# Patient Record
Sex: MALE | Race: WHITE | NOT HISPANIC OR LATINO | Employment: FULL TIME | URBAN - METROPOLITAN AREA
[De-identification: names, ages, dates, MRNs, and addresses within clinical notes are randomized per-mention and may not be internally consistent; named-entity substitution may affect disease eponyms.]

---

## 2017-02-03 ENCOUNTER — PRE VISIT (OUTPATIENT)
Dept: UROLOGY | Facility: CLINIC | Age: 52
End: 2017-02-03

## 2017-02-06 DIAGNOSIS — C61 MALIGNANT NEOPLASM OF PROSTATE (H): ICD-10-CM

## 2017-02-06 LAB — PSA SERPL-MCNC: 7.39 UG/L (ref 0–4)

## 2017-02-09 ENCOUNTER — OFFICE VISIT (OUTPATIENT)
Dept: UROLOGY | Facility: CLINIC | Age: 52
End: 2017-02-09

## 2017-02-09 VITALS
WEIGHT: 202 LBS | BODY MASS INDEX: 29.92 KG/M2 | SYSTOLIC BLOOD PRESSURE: 157 MMHG | HEIGHT: 69 IN | DIASTOLIC BLOOD PRESSURE: 105 MMHG | HEART RATE: 90 BPM

## 2017-02-09 DIAGNOSIS — C61 MALIGNANT NEOPLASM OF PROSTATE (H): Primary | ICD-10-CM

## 2017-02-09 ASSESSMENT — PAIN SCALES - GENERAL: PAINLEVEL: NO PAIN (0)

## 2017-02-09 NOTE — LETTER
"2/9/2017       RE: Mlies Valencia  [address] [place] [address]     Dear Colleague,    Thank you for referring your patient, Miles Valencia, to the [facility] UROLOGY AND INST FOR PROSTATE AND UROLOGIC CANCERS at [facility]. Please see a copy of my visit note below.    Prostate Cancer Follow Up    Miles Valencia is a very pleasant 51 year old male who presents with a history of prostate cancer.    Initial PSA:2.8  Biopsy Rafael Score was 3 + 3 on 7/28/16  Pathologic Stage T1c     Risk Group: Low  Normal MRI 2016  Sacral lesion (confirmed negative with bone scan)    At his last visit, he elected to manage his prostate cancer with active surveillance. He returns today for 3 month PSA recheck. PSA has increased from 2.28 in August to 6.53 in September and 9.56 last week. He denies any new LUTS, hematuria, or pelvic pain. He also had a MRI of his prostate which was unremarkable other than an indeterminate 2 cm sacral lesion.     PSA   Date Value Ref Range Status   02/06/2017 7.39* 0 - 4 ug/L Final     Comment:     Assay Method:  Chemiluminescence using Siemens Vista analyzer   11/01/2016 9.56* 0 - 4 ug/L Final     Comment:     Assay Method:  Chemiluminescence using Siemens Vista analyzer   09/23/2016 6.53* 0 - 4 ug/L Final     Comment:     Assay Method:  Chemiluminescence using Siemens Vista analyzer   08/23/2010 2.28 0 - 4 ug/L Final       MRI prostate shows no PIRADS 3 or higher 11/2016  Bone scan negative 9/2016      Objective:  /105 mmHg  Pulse 90  Ht 1.753 m (5' 9\")  Wt 91.627 kg (202 lb)  BMI 29.82 kg/m2     Alert, oriented x3, NAD  No respiratory distress on room air    Assessment:  Mr. Valencia is a 51 year old male with Rafael 3+3 prostate cancer managed with active surveillance.     -No specific prostate lesions to target on MRI guided biopsy; will plan to repeat prostate biopsy in 3 months (July 2017) given persistent increase in PSA since " July    Better since school is out then as well    Plan for repeat biopsy in July to assure acitve surveillance remains a good option    Eber Mcmillan MD  Department of Urology  [facility]

## 2017-02-09 NOTE — PATIENT INSTRUCTIONS
Please schedule a regular biopsy in July and a follow up appointment to review the results two weeks later.          [facility] for Prostate and Urologic Cancers  Preparation for Prostate Ultrasound and Biopsy    You have been scheduled for a prostate ultrasound with biopsies. A lubricated probe will be inserted into your rectum by your doctor. This equipment uses sound waves to produce an image or picture of the inside of the prostate gland. They will be able to measure the size of your prostate, look for any abnormalities in anatomy, and target any areas that may look suspicious for cancer before taking the samples (biopsies).  During this procedure the prostate will be injected with a numbing medication. This medication will not make you sleepy nor affect your ability to drive.    How to Prepare for the Procedure      On the day of the procedure eat and drink normally. Please do not fast or skip meals on the day of your procedure.      Please bring a list of your current medications to your appointment and take all regular medications as normal.      Do not take any of the following medications 7 days before your procedure:  - Anacin  - Bufferin  - Excedrin  - Ibuprofen  - Ecotrin   - Any other aspirin based products.   - Motrin  - Naprosyn  - Feldene  - Plavix  - Any other anti-inflammatory   medications      If you are taking any anticoagulation medications such as Coumadin, Jantoven, Warfarin, special instructions will need to be discussed.      You will be given an antibiotic the day of the procedure in the clinic.  *If patient had ? 2 prostate biopsies in the last 2 years, Gentamicin 80mg IM and Cipro 500 mg will be administered in the clinic prior to procedure*      You will need to purchase one Fleets enema (or equivalent).  This can be purchased at any pharmacy or grocery store and will be found in the laxative section. We ask that you give the enema to yourself approximately 2 hours before your  appointment time.       The procedure takes about 30-45 minutes and will be done in the office. After the procedure you will be sent home with instructions.    Please call Urology/Center for Pro[facility] Clinic with any questions or concerns at [phone], press option # 3 to speak with a nurse.      As recommended by the American Urological Association Education and Research, Inc. article of 2007.

## 2017-02-09 NOTE — MR AVS SNAPSHOT
After Visit Summary   2/9/2017    Miles Valencia    MRN: [id number]           Patient Information     Date Of Birth          1965        Visit Information        Provider Department      2/9/2017 1:00 PM Weight, Eber Luna MD [facility] Urology and [facility] for Prostate and Urologic Cancers        Care Instructions    Please schedule a regular biopsy in July and a follow up appointment to review the results two weeks later.          [place] for Prostate and Urologic Cancers  Preparation for Prostate Ultrasound and Biopsy    You have been scheduled for a prostate ultrasound with biopsies. A lubricated probe will be inserted into your rectum by your doctor. This equipment uses sound waves to produce an image or picture of the inside of the prostate gland. They will be able to measure the size of your prostate, look for any abnormalities in anatomy, and target any areas that may look suspicious for cancer before taking the samples (biopsies).  During this procedure the prostate will be injected with a numbing medication. This medication will not make you sleepy nor affect your ability to drive.    How to Prepare for the Procedure      On the day of the procedure eat and drink normally. Please do not fast or skip meals on the day of your procedure.      Please bring a list of your current medications to your appointment and take all regular medications as normal.      Do not take any of the following medications 7 days before your procedure:  - Anacin  - Bufferin  - Excedrin  - Ibuprofen  - Ecotrin   - Any other aspirin based products.   - Motrin  - Naprosyn  - Feldene  - Plavix  - Any other anti-inflammatory   medications      If you are taking any anticoagulation medications such as Coumadin, Jantoven, Warfarin, special instructions will need to be discussed.      You will be given an antibiotic the day of the procedure in the clinic.  *If patient had ? 2 prostate biopsies in the last 2 years,  Gentamicin 80mg IM and Cipro 500 mg will be administered in the clinic prior to procedure*      You will need to purchase one Fleets enema (or equivalent).  This can be purchased at any pharmacy or grocery store and will be found in the laxative section. We ask that you give the enema to yourself approximately 2 hours before your appointment time.       The procedure takes about 30-45 minutes and will be done in the office. After the procedure you will be sent home with instructions.    Please call Urology/[place] for Prostate Clinic with any questions or concerns at [phone], press option # 3 to speak with a nurse.      As recommended by the American Urological Association Education and Research, Inc. article of 2007.        Follow-ups after your visit        Your next 10 appointments already scheduled     Jul 27, 2017  8:00 AM   (Arrive by 7:45 AM)   Sonography/Biopsy with Eber Mcmillan MD   [facility] Urology and [facility] for Prostate and Urologic Cancers ([facility])    [address] [place] [address] [phone]            Aug 10, 2017  2:45 PM   (Arrive by 2:30 PM)   Return Visit with Eber Mcmillan MD   [facility] Urology and [facility] for Prostate and Urologic Cancers ([facility])    [address] [place] [address] [phone]              Who to contact     Please call your clinic at [phone] to:    Ask questions about your health    Make or cancel appointments    Discuss your medicines    Learn about your test results    Speak to your doctor   If you have compliments or concerns about an experience at your clinic, or if you wish to file a complaint, please contact [facility] Patient Relations at [phone] or email us at [email or web address]         Additional Information About Your Visit        [employer] Information     [employer] gives you secure  "access to your electronic health record. If you see a primary care provider, you can also send messages to your care team and make appointments. If you have questions, please call your primary care clinic.  If you do not have a primary care provider, please call [phone] and they will assist you.      [employer] is an electronic gateway that provides easy, online access to your medical records. With [employer], you can request a clinic appointment, read your test results, renew a prescription or communicate with your care team.     To access your existing account, please contact your [facility] or call [phone] for assistance.        Care EveryWhere ID     This is your Care EveryWhere ID. This could be used by other organizations to access your [facility] medical records  [id number]        Your Vitals Were     Pulse Height BMI (Body Mass Index)             90 1.753 m (5' 9\") 29.82 kg/m2          Blood Pressure from Last 3 Encounters:   02/09/17 157/105   11/10/16 142/84   09/23/16 151/91    Weight from Last 3 Encounters:   02/09/17 91.627 kg (202 lb)   11/10/16 89.631 kg (197 lb 9.6 oz)   09/23/16 91.173 kg (201 lb)              Today, you had the following     No orders found for display       Primary Care Provider Office Phone # Fax #    Oneyda Jones [phone]       [address] [address]        Thank you!     Thank you for choosing [facility] UROLOGY AND [facility] FOR PROSTATE AND UROLOGIC CANCERS  for your care. Our goal is always to provide you with excellent care. Hearing back from our patients is one way we can continue to improve our services. Please take a few minutes to complete the written survey that you may receive in the mail after your visit with us. Thank you!             Your Updated Medication List - Protect others around you: Learn how to safely use, store and throw away your medicines at www.disposemymeds.org. "          This list is accurate as of: 2/9/17  2:35 PM.  Always use your most recent med list.                   Brand Name Dispense Instructions for use    AMBIEN 5 MG tablet   Generic drug:  zolpidem      Take 5 mg by mouth nightly as needed for sleep       ammonium lactate 12 % cream    AMLACTIN     Apply  topically 2 times daily.       BENADRYL ALLERGY PO      Take  by mouth as needed.       CARTIA  MG 24 hr capsule   Generic drug:  diltiazem          cetirizine HCl 10 MG Caps      Take  by mouth.       gabapentin 100 MG capsule    NEURONTIN     TK 4 CS PO D HS       hydrochlorothiazide 25 MG tablet    HYDRODIURIL     25 mg       IBUPROFEN PO          meclizine 12.5 MG tablet    ANTIVERT    30 tablet    Take 2 tablets (25 mg) by mouth 4 times daily as needed for dizziness       Omega-3 Fish Oil 1000 MG Caps      1,000 mg       QVAR 80 MCG/ACT Inhaler   Generic drug:  beclomethasone      INL 2 PUFFS PO BID       rizatriptan 10 MG ODT tab    MAXALT-MLT         SUDAFED COLD/COUGH PO      Take  by mouth as needed.       SUMAtriptan Succinate Refill 6 MG/0.5ML Soct    IMITREX         TYLENOL PO          VITAMIN D3 MAXIMUM STRENGTH 5000 UNITS Caps   Generic drug:  cholecalciferol      5,000 Units

## 2017-02-09 NOTE — NURSING NOTE
"Chief Complaint   Patient presents with     RECHECK     Prostate cancer follow up       Initial Ht 1.753 m (5' 9\")  Wt 91.627 kg (202 lb)  BMI 29.82 kg/m2 Estimated body mass index is 29.82 kg/(m^2) as calculated from the following:    Height as of this encounter: 1.753 m (5' 9\").    Weight as of this encounter: 91.627 kg (202 lb).  Medication Reconciliation: complete     DAV Zaman    "

## 2017-03-16 DIAGNOSIS — M25.512 CHRONIC LEFT SHOULDER PAIN: Primary | ICD-10-CM

## 2017-03-16 DIAGNOSIS — G89.29 CHRONIC LEFT SHOULDER PAIN: Primary | ICD-10-CM

## 2017-03-29 ENCOUNTER — OFFICE VISIT (OUTPATIENT)
Dept: ORTHOPEDICS | Facility: CLINIC | Age: 52
End: 2017-03-29

## 2017-03-29 VITALS — BODY MASS INDEX: 29.13 KG/M2 | HEIGHT: 69 IN | WEIGHT: 196.7 LBS

## 2017-03-29 DIAGNOSIS — M19.019 ARTHRITIS OF SHOULDER: Primary | ICD-10-CM

## 2017-03-29 NOTE — NURSING NOTE
Teaching Flowsheet   Relevant Diagnosis: Shoulder arthritis  Teaching Topic: Pre-op for left total shoulder arthroplasty - will be scheduled at [facility] stay post-op. Surgery coordinator will call to schedule.     Person(s) involved in teaching:   Patient     Motivation Level:  Asks Questions: Yes  Eager to Learn: Yes  Cooperative: Yes  Receptive (willing/able to accept information): Yes  Any cultural factors/Uatsdin beliefs that may influence understanding or compliance? No     Patient demonstrates understanding of the following:  Reason for the appointment, diagnosis and treatment plan: Yes  Knowledge of proper use of medications and conditions for which they are ordered (with special attention to potential side effects or drug interactions): Yes  Which situations necessitate calling provider and whom to contact: Yes    Aware of pre-op expectations and post-op limitations     Proper use and care of sling x 6 weeks  Nutritional needs and diet plan: Yes  Pain management techniques: Yes  Wound Care: Yes  How and/when to access community resources: Yes     Instructional Materials Used/Given: Pre-op packet, surgical soap, guidelines for care after shoulder replacement     Time spent with patient: 12.

## 2017-03-29 NOTE — MR AVS SNAPSHOT
After Visit Summary   3/29/2017    Miles Valencia    MRN: [id number]           Patient Information     Date Of Birth          1965        Visit Information        Provider Department      3/29/2017 8:00 AM Jossy Swanson MD [facility] Orthopaedic Clinic        Today's Diagnoses     Arthritis of shoulder    -  1       Follow-ups after your visit        Your next 10 appointments already scheduled     May 08, 2017  3:30 PM CDT   (Arrive by 3:15 PM)   New Patient Visit with Yamileth Ascencio PA-C   [facility] Dermatology ([facility])    [address] [place] [address]  3rd Floor  [facility] [id number] [phone]            Jun 13, 2017   Procedure with Jossy Swanson MD   [facility] Surgery and Procedure Center ([facility])    [address] [place] [address]  [facility] [facility] [id number] [phone]           Located in the Clinics and Surgery Center at [address] [place] [address].   parking is very convenient and highly recommended.  is a $6 flat rate fee.  Both  and self parkers should enter the main arrival plaza from [facility]; parking attendants will direct you based on your parking preference.            Jul 27, 2017  8:00 AM CDT   (Arrive by 7:45 AM)   Sonography/Biopsy with Eber Mcmillan MD   [facility] Urology and [facility] for Prostate and Urologic Cancers ([facility])    [address] [place] [address]  [facility] [id number] [phone]            Aug 10, 2017  2:45 PM CDT   (Arrive by 2:30 PM)   Return Visit with Eber Mcmillan MD   [facility] Urology and [facility] for Prostate and Urologic Cancers ([facility])    [address] [place] [address]  [facility] [id number] [phone]              Who to contact     Please call your clinic at [phone] to:    Ask questions about your health    Make or cancel  "appointments    Discuss your medicines    Learn about your test results    Speak to your doctor   If you have compliments or concerns about an experience at your clinic, or if you wish to file a complaint, please contact [facility] Patient Relations at [phone] or email us at [email or web address]         Additional Information About Your Visit        MyChart Information     [employer] gives you secure access to your electronic health record. If you see a primary care provider, you can also send messages to your care team and make appointments. If you have questions, please call your primary care clinic.  If you do not have a primary care provider, please call [phone] and they will assist you.      [employer] is an electronic gateway that provides easy, online access to your medical records. With [employer], you can request a clinic appointment, read your test results, renew a prescription or communicate with your care team.     To access your existing account, please contact your [facility] or call [phone] for assistance.        Care EveryWhere ID     This is your Care EveryWhere ID. This could be used by other organizations to access your [place] medical records  [id number]        Your Vitals Were     Height BMI (Body Mass Index)                1.753 m (5' 9\") 29.05 kg/m2           Blood Pressure from Last 3 Encounters:   02/09/17 (!) 157/105   11/10/16 142/84   09/23/16 (!) 151/91    Weight from Last 3 Encounters:   03/29/17 89.2 kg (196 lb 11.2 oz)   02/09/17 91.6 kg (202 lb)   11/10/16 89.6 kg (197 lb 9.6 oz)              We Performed the Following     Amita-Operative Worksheet        Primary Care Provider Office Phone # Fax #    Oneyda VANDANA Jones [phone]       [address] [address]        Thank you!     Thank you for choosing [facility] ORTHOPAEDIC [facility]  for your care. Our goal " is always to provide you with excellent care. Hearing back from our patients is one way we can continue to improve our services. Please take a few minutes to complete the written survey that you may receive in the mail after your visit with us. Thank you!             Your Updated Medication List - Protect others around you: Learn how to safely use, store and throw away your medicines at www.disposemymeds.org.          This list is accurate as of: 3/29/17 11:59 PM.  Always use your most recent med list.                   Brand Name Dispense Instructions for use    AMBIEN 5 MG tablet   Generic drug:  zolpidem      Take 5 mg by mouth nightly as needed for sleep       ammonium lactate 12 % cream    AMLACTIN     Apply  topically 2 times daily.       BENADRYL ALLERGY PO      Take  by mouth as needed.       CARTIA  MG 24 hr capsule   Generic drug:  diltiazem          cetirizine HCl 10 MG Caps      Take  by mouth.       fish oil-omega-3 fatty acids 1000 MG capsule      1,000 mg       gabapentin 100 MG capsule    NEURONTIN     TK 4 CS PO D HS       hydrochlorothiazide 25 MG tablet    HYDRODIURIL     25 mg       IBUPROFEN PO          meclizine 12.5 MG tablet    ANTIVERT    30 tablet    Take 2 tablets (25 mg) by mouth 4 times daily as needed for dizziness       QVAR 80 MCG/ACT Inhaler   Generic drug:  beclomethasone      INL 2 PUFFS PO BID       rizatriptan 10 MG ODT tab    MAXALT-MLT         SUDAFED COLD/COUGH PO      Take  by mouth as needed.       SUMAtriptan Succinate Refill 6 MG/0.5ML Soct    IMITREX         TYLENOL PO          VITAMIN B-12 PO      Take by mouth daily       VITAMIN D3 MAXIMUM STRENGTH 5000 UNITS Caps   Generic drug:  cholecalciferol      5,000 Units

## 2017-03-29 NOTE — NURSING NOTE
"Reason For Visit:   Chief Complaint   Patient presents with     Shoulder Pain     Follow up for left glenohumeral osteoarthritis.        PCP: Oneyda Jones  Ref: established    ?  No  Occupation  .  Currently working? Yes.  Work status?  Full time.  Date of injury: none  Date of surgery: none  Smoker: No      Right hand dominant    SANE score  Affected shoulder: Left  Right shoulder SANE: 100  Left shoulder SANE: 50    Ht 1.753 m (5' 9\")  Wt 89.2 kg (196 lb 11.2 oz)  BMI 29.05 kg/m2      Pain Assessment  Patient Currently in Pain: No        "

## 2017-03-29 NOTE — LETTER
3/29/2017       RE: Miles Valencia  [address] [place] [address]     Dear Colleague,    Thank you for referring your patient, Miles Valencia, to the [facility] ORTHOPAEDIC CLINIC at [facility]. Please see a copy of my visit note below.    CHIEF CONCERN: Left shoulder arthritis    HISTORY OF PRESENT ILLNESS: Mr. Valencia is a 52 year old gentleman I have seen previously for his left shoulder arthritis. He has been managing with non-operative measures but he feels this is no longer working and he would like to discuss total shoulder arthroplasty. Pain is limiting his basic daily activities.    PHYSICAL EXAM:  Adult male in no acute distress  Respirations even and unlabored  Left shoulder: CMS intact without deficits. ROM is active FE to 150, ER to 40, and IR to T12.  Right shoulder ROM is 170/85/T6    IMAGING:   Left shoulder xrays demonstrate a moderately large humeral osteophyte and rather marked flattening of the humeral head. There is posterior glenoid wear.     ASSESSMENT:  1. Left end stage glenohumeral arthritis    PLAN:  I reviewed the risks and benefits of nonoperative and operative treatment options. We discussed expectations for total shoulder replacement as well as the expected postop restrictions. We reviewed the surgical risks including but not limited to incomplete relief of pain, bleeding, infection, stiffness, and reaction to anesthesia.   He would like to proceed with TSA and may want to consider the outpatient option.  We will work with him on scheduling.    Again, thank you for allowing me to participate in the care of your patient.      Sincerely,    Jossy Swanson MD

## 2017-04-14 ENCOUNTER — TRANSFERRED RECORDS (OUTPATIENT)
Dept: HEALTH INFORMATION MANAGEMENT | Facility: CLINIC | Age: 52
End: 2017-04-14

## 2017-04-14 ENCOUNTER — MEDICAL CORRESPONDENCE (OUTPATIENT)
Dept: HEALTH INFORMATION MANAGEMENT | Facility: CLINIC | Age: 52
End: 2017-04-14

## 2017-04-20 ENCOUNTER — MEDICAL CORRESPONDENCE (OUTPATIENT)
Dept: HEALTH INFORMATION MANAGEMENT | Facility: CLINIC | Age: 52
End: 2017-04-20

## 2017-04-24 NOTE — PROGRESS NOTES
CHIEF CONCERN: Left shoulder arthritis    HISTORY OF PRESENT ILLNESS: Mr. Valencia is a 52 year old gentleman I have seen previously for his left shoulder arthritis. He has been managing with non-operative measures but he feels this is no longer working and he would like to discuss total shoulder arthroplasty. Pain is limiting his basic daily activities.    PHYSICAL EXAM:  Adult male in no acute distress  Respirations even and unlabored  Left shoulder: CMS intact without deficits. ROM is active FE to 150, ER to 40, and IR to T12.  Right shoulder ROM is 170/85/T6    IMAGING:   Left shoulder xrays demonstrate a moderately large humeral osteophyte and rather marked flattening of the humeral head. There is posterior glenoid wear.     ASSESSMENT:  1. Left end stage glenohumeral arthritis    PLAN:  I reviewed the risks and benefits of nonoperative and operative treatment options. We discussed expectations for total shoulder replacement as well as the expected postop restrictions. We reviewed the surgical risks including but not limited to incomplete relief of pain, bleeding, infection, stiffness, and reaction to anesthesia.   He would like to proceed with TSA and may want to consider the outpatient option.  We will work with him on scheduling.

## 2017-05-08 ENCOUNTER — OFFICE VISIT (OUTPATIENT)
Dept: DERMATOLOGY | Facility: CLINIC | Age: 52
End: 2017-05-08

## 2017-05-08 DIAGNOSIS — L82.1 SEBORRHEIC KERATOSIS: Primary | ICD-10-CM

## 2017-05-08 DIAGNOSIS — Z12.83 SKIN CANCER SCREENING: ICD-10-CM

## 2017-05-08 DIAGNOSIS — D22.39 FIBROUS PAPULE OF NOSE: ICD-10-CM

## 2017-05-08 DIAGNOSIS — D22.9 MULTIPLE BENIGN NEVI: ICD-10-CM

## 2017-05-08 ASSESSMENT — PAIN SCALES - GENERAL: PAINLEVEL: NO PAIN (0)

## 2017-05-08 NOTE — MR AVS SNAPSHOT
After Visit Summary   5/8/2017    Miles Valencia    MRN: [id number]           Patient Information     Date Of Birth          1965        Visit Information        Provider Department      5/8/2017 3:30 PM Yamileth Ascencio PA-C [facility] Dermatology        Today's Diagnoses     Dermatofibroma    -  1    Seborrheic keratosis        Multiple benign nevi        Fibrous papule of nose           Follow-ups after your visit        Your next 10 appointments already scheduled     Jun 08, 2017  8:20 AM CDT   (Arrive by 8:05 AM)   Sonography/Biopsy with Eber Mcmillan MD   [facility] ([facility])    [address] [place] [address]  [facility] [address] [phone]            Jun 13, 2017   Procedure with Jossy Swanson MD   [facility] ([facility])    [address] [place] [address]  [facility] [id number] [phone]           Located in the Clinics and Surgery Center at [address] [place] [address].   parking is very convenient and highly recommended.  is a $6 flat rate fee.  Both  and self parkers should enter the main arrival plaza from [facility]; parking attendants will direct you based on your parking preference.            Jun 29, 2017 12:20 PM CDT   (Arrive by 12:05 PM)   Return Visit with Eber Mcmillan MD   [facility] ([facility])    [address] [place] [address] [phone]              Who to contact     Please call your clinic at [phone] to:    Ask questions about your health    Make or cancel appointments    Discuss your medicines    Learn about your test results    Speak to your doctor   If you have compliments or concerns about an experience at your clinic, or if you wish to file a  complaint, please contact [facility] Patient Relations at [phone] or email us at [email or web address]         Additional Information About Your Visit        MyChart Information     [employer] gives you secure access to your electronic health record. If you see a primary care provider, you can also send messages to your care team and make appointments. If you have questions, please call your primary care clinic.  If you do not have a primary care provider, please call [phone] and they will assist you.      [employer] is an electronic gateway that provides easy, online access to your medical records. With [employer], you can request a clinic appointment, read your test results, renew a prescription or communicate with your care team.     To access your existing account, please contact your [facility] Clinic or call [phone] for assistance.        Care EveryWhere ID     This is your Care EveryWhere ID. This could be used by other organizations to access your [place] medical records  [id number]         Blood Pressure from Last 3 Encounters:   02/09/17 (!) 157/105   11/10/16 142/84   09/23/16 (!) 151/91    Weight from Last 3 Encounters:   03/29/17 89.2 kg (196 lb 11.2 oz)   02/09/17 91.6 kg (202 lb)   11/10/16 89.6 kg (197 lb 9.6 oz)              Today, you had the following     No orders found for display       Primary Care Provider Office Phone # Fax #    Oneyda Jones [phone]       [address] [address]        Thank you!     Thank you for choosing [facility] DERMATOLOGY  for your care. Our goal is always to provide you with excellent care. Hearing back from our patients is one way we can continue to improve our services. Please take a few minutes to complete the written survey that you may receive in the mail after your visit with us. Thank you!             Your Updated Medication List -  Protect others around you: Learn how to safely use, store and throw away your medicines at www.disposemymeds.org.          This list is accurate as of: 5/8/17  4:01 PM.  Always use your most recent med list.                   Brand Name Dispense Instructions for use    AMBIEN 5 MG tablet   Generic drug:  zolpidem      Take 5 mg by mouth nightly as needed for sleep       ammonium lactate 12 % cream    AMLACTIN     Apply  topically 2 times daily.       BENADRYL ALLERGY PO      Take  by mouth as needed.       CARTIA  MG 24 hr capsule   Generic drug:  diltiazem          cetirizine HCl 10 MG Caps      Take  by mouth.       fish oil-omega-3 fatty acids 1000 MG capsule      1,000 mg       gabapentin 100 MG capsule    NEURONTIN     TK 4 CS PO D HS       hydrochlorothiazide 25 MG tablet    HYDRODIURIL     25 mg       IBUPROFEN PO          meclizine 12.5 MG tablet    ANTIVERT    30 tablet    Take 2 tablets (25 mg) by mouth 4 times daily as needed for dizziness       QVAR 80 MCG/ACT Inhaler   Generic drug:  beclomethasone      INL 2 PUFFS PO BID       rizatriptan 10 MG ODT tab    MAXALT-MLT         SUDAFED COLD/COUGH PO      Take  by mouth as needed.       SUMAtriptan Succinate Refill 6 MG/0.5ML Soct    IMITREX         TYLENOL PO          VITAMIN B-12 PO      Take by mouth daily       VITAMIN D3 MAXIMUM STRENGTH 5000 UNITS Caps   Generic drug:  cholecalciferol      5,000 Units

## 2017-05-08 NOTE — PROGRESS NOTES
"[facility] Dermatology Note      Dermatology Problem List:  1.Skin cancer screening 5/8/17  With multiple benign pigmented nevi, seborrheic keratoses and fibrous papule of the nose  CC:   Chief Complaint   Patient presents with     Derm Problem     Miles states \" I have a mole on my chest that seems abnormal\"          Encounter Date: May 8, 2017    History of Present Illness:  Mr. Miles Valencia is a 52 year old male who presents as a new patient as a referral from Dr. Oneyda Benitez. He is here for a skin cancer screening. He has noticed a growth on his chest that he feels looks unusual. He state is has a weird texture and he doesn't always remember having this. He had a lot of sun exposure in this early years, living in [place] until he was 18. He is otherwise a healthy person with other other skin concerns. The area on his chest does not bleed, itch, hurt or grow rapidly.     Past Medical History:   Patient Active Problem List   Diagnosis     Pain in joint, shoulder region     Achilles bursitis or tendinitis     Past Medical History:   Diagnosis Date     Congestion      Headache(784.0)      Sleep problems      Past Surgical History:   Procedure Laterality Date     BIOPSY      Prostate     COLONOSCOPY N/A 3/21/2016    Procedure: COLONOSCOPY;  Surgeon: Toy Lima MD;  [facility]: [facility]       Social History:  The patient works as a . The patient denies use of tanning beds.    Family History:  Mother had spots removed, on sun exposed areas.     Medications:  Current Outpatient Prescriptions   Medication Sig Dispense Refill     Cyanocobalamin (VITAMIN B-12 PO) Take by mouth daily       QVAR 80 MCG/ACT Inhaler INL 2 PUFFS PO BID  0     meclizine (ANTIVERT) 12.5 MG tablet Take 2 tablets (25 mg) by mouth 4 times daily as needed for dizziness 30 tablet 0     CARTIA  MG 24 hr CD capsule   0     gabapentin (NEURONTIN) 100 MG capsule TK 4 CS PO D HS  2     rizatriptan " (MAXALT-MLT) 10 MG disintegrating tablet   6     SUMAtriptan Succinate Refill (IMITREX) 6 MG/0.5ML SOCT   1     hydrochlorothiazide (HYDRODIURIL) 25 MG tablet 25 mg       Omega-3 Fatty Acids (OMEGA-3 FISH OIL) 1000 MG CAPS 1,000 mg       cholecalciferol (VITAMIN D3 MAXIMUM STRENGTH) 5000 UNITS CAPS 5,000 Units       Acetaminophen (TYLENOL PO)        IBUPROFEN PO        zolpidem (AMBIEN) 5 MG tablet Take 5 mg by mouth nightly as needed for sleep       Cetirizine HCl 10 MG CAPS Take  by mouth.       Pseudoephedrine-DM-GG-APAP (SUDAFED COLD/COUGH PO) Take  by mouth as needed.       DiphenhydrAMINE HCl (BENADRYL ALLERGY PO) Take  by mouth as needed.       ammonium lactate (AMLACTIN) 12 % cream Apply  topically 2 times daily.       Allergies   Allergen Reactions     Amoxicillin Diarrhea         Review of Systems:  -Skin/Heme New Pt: The patient admits to frequent sun exposure, in his early life. LIved in [facility] age 9-18. Lots of sunburns. The patient denies excessive scarring or problems healing except as per HPI. The patient denies excessive bleeding.  -Constitutional: The patient denies fatigue, fevers, chills, unintended weight loss, and night sweats.  -HEENT: Patient denies nonhealing oral sores.  -Skin: As above in HPI. No additional skin concerns.    Physical exam:  Vitals: There were no vitals taken for this visit.  GEN: This is a well developed, well-nourished female in no acute distress, in a pleasant mood.    SKIN: Full skin, which includes the head/face, both arms, chest, back, abdomen,both legs, groin, buttocks, digits and/or nails, was examined. Significant for:   -There are waxy stuck on tan to brown papules on the trunk, including the chest and extremities.  -Multiple regular brown pigmented macules and papules are identified on the trunk and extremities.   There is a skin colored papule on the nose, ~2 mm.   -No other lesions of concern on areas examined.     Impression/Plan:  1. Seborrheic  keratosis, non irritated (chest, remaining trunk and extremities)    Seborrheic keratosis: Discussed benign nature of lesions.    2. Multiple clinically benign nevi on the trunk and extremities.     ABCDs of melanoma were discussed and self skin checks were advised.     Sun precaution was advised including the use of sun screens of SPF 30 or higher, sun protective clothing, and avoidance of tanning beds.      3. Fibrous papule on the nose.    Benign. Notify office if you are seeing changes.       CC Dr. Jones on close of this encounter.        Staff Involved:  Staff Only    All risk, benefits and alternatives were discussed with patient.  Patient is in agreement and understands the assessment and plan.  All questions were answered.  Sun Screen Education was given.   Return to Clinic in 1-2 yrs or sooner as needed.   Yamileth Ascencoi PA-C

## 2017-05-08 NOTE — LETTER
"5/8/2017       RE: Miles Valencia  [address] [place] [address]     Dear Colleague,    Thank you for referring your patient, Miles Valencia, to the [facility] DERMATOLOGY at [facility]. Please see a copy of my visit note below.    [facility] Dermatology Note      Dermatology Problem List:  1.Skin cancer screening 5/8/17  With multiple benign pigmented nevi, seborrheic keratoses and fibrous papule of the nose  CC:   Chief Complaint   Patient presents with     Derm Problem     Miles states \" I have a mole on my chest that seems abnormal\"          Encounter Date: May 8, 2017    History of Present Illness:  Mr. Miles Valencia is a 52 year old male who presents as a new patient as a referral from Dr. Oneyda Benitez. He is here for a skin cancer screening. He has noticed a growth on his chest that he feels looks unusual. He state is has a weird texture and he doesn't always remember having this. He had a lot of sun exposure in this early years, living in [place] until he was 18. He is otherwise a healthy person with other other skin concerns. The area on his chest does not bleed, itch, hurt or grow rapidly.     Past Medical History:   Patient Active Problem List   Diagnosis     Pain in joint, shoulder region     Achilles bursitis or tendinitis     Past Medical History:   Diagnosis Date     Congestion      Headache(784.0)      Sleep problems      Past Surgical History:   Procedure Laterality Date     BIOPSY      Prostate     COLONOSCOPY N/A 3/21/2016    Procedure: COLONOSCOPY;  Surgeon: Toy Lima MD;  Location:  GI       Social History:  The patient works as a . The patient denies use of tanning beds.    Family History:  Mother had spots removed, on sun exposed areas.     Medications:  Current Outpatient Prescriptions   Medication Sig Dispense Refill     Cyanocobalamin (VITAMIN B-12 PO) Take by mouth daily       QVAR 80 MCG/ACT " Inhaler INL 2 PUFFS PO BID  0     meclizine (ANTIVERT) 12.5 MG tablet Take 2 tablets (25 mg) by mouth 4 times daily as needed for dizziness 30 tablet 0     CARTIA  MG 24 hr CD capsule   0     gabapentin (NEURONTIN) 100 MG capsule TK 4 CS PO D HS  2     rizatriptan (MAXALT-MLT) 10 MG disintegrating tablet   6     SUMAtriptan Succinate Refill (IMITREX) 6 MG/0.5ML SOCT   1     hydrochlorothiazide (HYDRODIURIL) 25 MG tablet 25 mg       Omega-3 Fatty Acids (OMEGA-3 FISH OIL) 1000 MG CAPS 1,000 mg       cholecalciferol (VITAMIN D3 MAXIMUM STRENGTH) 5000 UNITS CAPS 5,000 Units       Acetaminophen (TYLENOL PO)        IBUPROFEN PO        zolpidem (AMBIEN) 5 MG tablet Take 5 mg by mouth nightly as needed for sleep       Cetirizine HCl 10 MG CAPS Take  by mouth.       Pseudoephedrine-DM-GG-APAP (SUDAFED COLD/COUGH PO) Take  by mouth as needed.       DiphenhydrAMINE HCl (BENADRYL ALLERGY PO) Take  by mouth as needed.       ammonium lactate (AMLACTIN) 12 % cream Apply  topically 2 times daily.       Allergies   Allergen Reactions     Amoxicillin Diarrhea         Review of Systems:  -Skin/Heme New Pt: The patient admits to frequent sun exposure, in his early life. LIved in [facility] age 9-18. Lots of sunburns. The patient denies excessive scarring or problems healing except as per HPI. The patient denies excessive bleeding.  -Constitutional: The patient denies fatigue, fevers, chills, unintended weight loss, and night sweats.  -HEENT: Patient denies nonhealing oral sores.  -Skin: As above in HPI. No additional skin concerns.    Physical exam:  Vitals: There were no vitals taken for this visit.  GEN: This is a well developed, well-nourished female in no acute distress, in a pleasant mood.    SKIN: Full skin, which includes the head/face, both arms, chest, back, abdomen,both legs, groin, buttocks, digits and/or nails, was examined. Significant for:   -There are waxy stuck on tan to brown papules on the trunk, including  the chest and extremities.  -Multiple regular brown pigmented macules and papules are identified on the trunk and extremities.   There is a skin colored papule on the nose, ~2 mm.   -No other lesions of concern on areas examined.     Impression/Plan:  1. Seborrheic keratosis, non irritated (chest, remaining trunk and extremities)    Seborrheic keratosis: Discussed benign nature of lesions.    2. Multiple clinically benign nevi on the trunk and extremities.     ABCDs of melanoma were discussed and self skin checks were advised.     Sun precaution was advised including the use of sun screens of SPF 30 or higher, sun protective clothing, and avoidance of tanning beds.      3. Fibrous papule on the nose.    Benign. Notify office if you are seeing changes.       CC Dr. Jones on close of this encounter.        Staff Involved:  Staff Only    All risk, benefits and alternatives were discussed with patient.  Patient is in agreement and understands the assessment and plan.  All questions were answered.  Sun Screen Education was given.   Return to Clinic in 1-2 yrs or sooner as needed.   Yamileth sAcencio PA-C

## 2017-05-08 NOTE — NURSING NOTE
"Dermatology Rooming Note    Miles Valencia's goals for this visit include:   Chief Complaint   Patient presents with     Derm Problem     Miles states \" I have a mole on my chest that seems abnormal\"      Gunjan Reese CMA  "

## 2017-05-31 ENCOUNTER — ALLIED HEALTH/NURSE VISIT (OUTPATIENT)
Dept: SURGERY | Facility: CLINIC | Age: 52
End: 2017-05-31

## 2017-05-31 ENCOUNTER — OFFICE VISIT (OUTPATIENT)
Dept: SURGERY | Facility: CLINIC | Age: 52
End: 2017-05-31

## 2017-05-31 ENCOUNTER — ANESTHESIA EVENT (OUTPATIENT)
Dept: SURGERY | Facility: AMBULATORY SURGERY CENTER | Age: 52
End: 2017-05-31

## 2017-05-31 VITALS
OXYGEN SATURATION: 96 % | HEIGHT: 69 IN | WEIGHT: 201.9 LBS | BODY MASS INDEX: 29.9 KG/M2 | RESPIRATION RATE: 16 BRPM | HEART RATE: 90 BPM | SYSTOLIC BLOOD PRESSURE: 157 MMHG | DIASTOLIC BLOOD PRESSURE: 94 MMHG

## 2017-05-31 DIAGNOSIS — M19.012 GLENOHUMERAL ARTHRITIS, LEFT: ICD-10-CM

## 2017-05-31 DIAGNOSIS — C61 MALIGNANT NEOPLASM OF PROSTATE (H): ICD-10-CM

## 2017-05-31 DIAGNOSIS — M19.019 SHOULDER ARTHRITIS: Primary | ICD-10-CM

## 2017-05-31 DIAGNOSIS — Z01.818 PREOP EXAMINATION: Primary | ICD-10-CM

## 2017-05-31 DIAGNOSIS — Z83.2 FAMILY HISTORY OF FACTOR V LEIDEN MUTATION: ICD-10-CM

## 2017-05-31 LAB
ALBUMIN UR-MCNC: NEGATIVE MG/DL
ANION GAP SERPL CALCULATED.3IONS-SCNC: 7 MMOL/L (ref 3–14)
APPEARANCE UR: CLEAR
BILIRUB UR QL STRIP: NEGATIVE
BUN SERPL-MCNC: 15 MG/DL (ref 7–30)
CALCIUM SERPL-MCNC: 9.2 MG/DL (ref 8.5–10.1)
CHLORIDE SERPL-SCNC: 104 MMOL/L (ref 94–109)
CO2 SERPL-SCNC: 26 MMOL/L (ref 20–32)
COLOR UR AUTO: NORMAL
CREAT SERPL-MCNC: 0.85 MG/DL (ref 0.66–1.25)
ERYTHROCYTE [DISTWIDTH] IN BLOOD BY AUTOMATED COUNT: 12.5 % (ref 10–15)
GFR SERPL CREATININE-BSD FRML MDRD: ABNORMAL ML/MIN/1.7M2
GLUCOSE SERPL-MCNC: 106 MG/DL (ref 70–99)
GLUCOSE UR STRIP-MCNC: NEGATIVE MG/DL
HCT VFR BLD AUTO: 45.6 % (ref 40–53)
HGB BLD-MCNC: 15.8 G/DL (ref 13.3–17.7)
HGB UR QL STRIP: NEGATIVE
KETONES UR STRIP-MCNC: NEGATIVE MG/DL
LEUKOCYTE ESTERASE UR QL STRIP: NEGATIVE
MCH RBC QN AUTO: 29.4 PG (ref 26.5–33)
MCHC RBC AUTO-ENTMCNC: 34.6 G/DL (ref 31.5–36.5)
MCV RBC AUTO: 85 FL (ref 78–100)
NITRATE UR QL: NEGATIVE
PH UR STRIP: 6 PH (ref 5–7)
PLATELET # BLD AUTO: 311 10E9/L (ref 150–450)
POTASSIUM SERPL-SCNC: 3.4 MMOL/L (ref 3.4–5.3)
PSA SERPL-MCNC: 6.91 UG/L (ref 0–4)
RBC # BLD AUTO: 5.37 10E12/L (ref 4.4–5.9)
SODIUM SERPL-SCNC: 137 MMOL/L (ref 133–144)
SP GR UR STRIP: 1.01 (ref 1–1.03)
URN SPEC COLLECT METH UR: NORMAL
UROBILINOGEN UR STRIP-MCNC: 0 MG/DL (ref 0–2)
WBC # BLD AUTO: 6.6 10E9/L (ref 4–11)

## 2017-05-31 RX ORDER — BENZOCAINE/MENTHOL 6 MG-10 MG
LOZENGE MUCOUS MEMBRANE 2 TIMES DAILY PRN
COMMUNITY

## 2017-05-31 RX ORDER — ALBUTEROL SULFATE 90 UG/1
2 AEROSOL, METERED RESPIRATORY (INHALATION) EVERY 4 HOURS PRN
COMMUNITY

## 2017-05-31 ASSESSMENT — LIFESTYLE VARIABLES: TOBACCO_USE: 0

## 2017-05-31 NOTE — PROGRESS NOTES
Preoperative Assessment Center medication history for May 31, 2017 is complete.    See Epic admission navigator for allergy information, pharmacy and prior to admission medications.    Operating room staff will still need to confirm medications and last dose information on day of surgery.     Medication history interview sources:  patient, patient's med list     Changes made to PTA medication list (reason)  Added: excedrin, zofran, tramadol, phenylephrine PO, sarna cream, hydrocortisone, ibuprofen gel, albuterol inhaler  Deleted: sudafed cold  Changed: sig/dose updated or added on: acetamionphen, ibuprofen, cholecalciferol, zyrtec, gabapentin, benadryl, vitamin B12, hydrochlorothiazide, fish oil, Q massimo (uses PRN), rizatriptan, imitrex, ibuprofen    Additional medication history information (including reliability of information, actions taken by pharmacist):None    Prior to Admission medications    Medication Sig Last Dose Taking? Auth Provider   aspirin-acetaminophen-caffeine (EXCEDRIN MIGRAINE) 250-250-65 MG per tablet Take 2 tablets by mouth every 6 hours as needed for headaches Taking Yes Unknown, Entered By History   TRAMADOL HCL PO Take 1 tablet by mouth every 6 hours as needed for moderate to severe pain Taking Yes Unknown, Entered By History   PHENYLEPHRINE HCL PO Take 10 mg by mouth every 4 hours as needed Taking Yes Unknown, Entered By History   Ondansetron (ZOFRAN ODT PO) Take 4 mg by mouth every 8 hours as needed for nausea Taking Yes Unknown, Entered By History   hydrocortisone (CORTAID) 1 % cream Apply topically 2 times daily as needed Taking Yes Unknown, Entered By History   camphor-menthol (DERMASARRA) 0.5-0.5 % LOTN Apply topically every 6 hours as needed for skin care Taking Yes Unknown, Entered By History   NONFORMULARY Drug: Ibuprofen gel  Apply topically to L shoulder as needed.  Yes Unknown, Entered By History   albuterol (PROAIR HFA/PROVENTIL HFA/VENTOLIN HFA) 108 (90 BASE) MCG/ACT Inhaler  Inhale 2 puffs into the lungs every 4 hours as needed for shortness of breath / dyspnea or wheezing Taking Yes Unknown, Entered By History   Cyanocobalamin (VITAMIN B-12 PO) Take 1 tablet by mouth every evening  Taking Yes Reported, Patient   QVAR 80 MCG/ACT Inhaler Inhale 2 puffs twice daily as needed when allergies flare up Taking Yes Reported, Patient   meclizine (ANTIVERT) 12.5 MG tablet Take 2 tablets (25 mg) by mouth 4 times daily as needed for dizziness Taking Yes Bill Branham MD   CARTIA  MG 24 hr CD capsule Take 120 mg by mouth every morning  Taking Yes Reported, Patient   gabapentin (NEURONTIN) 100 MG capsule Take 400 mg by mouth at night  (occasionally takes an extra pill to make 500 mg by mouth at night) Taking Yes Reported, Patient   rizatriptan (MAXALT-MLT) 10 MG disintegrating tablet Take 10 mg by mouth at onset of headache  Taking Yes Reported, Patient   SUMAtriptan Succinate Refill (IMITREX) 6 MG/0.5ML SOCT Inject 6 mg into the muscle every 12 hours as needed  Taking Yes Reported, Patient   hydrochlorothiazide (HYDRODIURIL) 25 MG tablet Take 25 mg by mouth every morning  Taking Yes Reported, Patient   Omega-3 Fatty Acids (OMEGA-3 FISH OIL) 1000 MG CAPS Take 1 gram by mouth in the morning and take 2 grams by mouth in the evening. Taking Yes Reported, Patient   cholecalciferol (VITAMIN D3 MAXIMUM STRENGTH) 5000 UNITS CAPS Take 5,000 Units by mouth every morning  Taking Yes Reported, Patient   Acetaminophen (TYLENOL PO) Take 1,000 mg by mouth every 6 hours as needed  Taking Yes Reported, Patient   IBUPROFEN PO Take 800 mg by mouth every 8 hours as needed  Taking Yes Reported, Patient   zolpidem (AMBIEN) 5 MG tablet Take 5 mg by mouth nightly as needed for sleep Taking Yes Reported, Patient   Cetirizine HCl 10 MG CAPS Take 10 mg by mouth daily  Taking Yes Reported, Patient   DiphenhydrAMINE HCl (BENADRYL ALLERGY PO) Take 25-50 mg by mouth daily as needed (allergies)  Taking Yes Reported,  Patient   ammonium lactate (AMLACTIN) 12 % cream Apply topically daily as needed  Taking Yes Reported, Patient       Medication history completed by: Praneeth Olivarez RPH

## 2017-05-31 NOTE — MR AVS SNAPSHOT
After Visit Summary   5/31/2017    Miles Valencia    MRN: [id number]           Patient Information     Date Of Birth          1965        Visit Information        Provider Department      5/31/2017 8:30 AM Pharmacist, Danyelle Polo [facility] [place]        Today's Diagnoses     Preop examination    -  1       Follow-ups after your visit        Your next 10 appointments already scheduled     May 31, 2017 10:00 AM CDT   (Arrive by 9:45 AM)   PAC RN ASSESSMENT with Danyelle Pac Rn   [facility] [place] ([facility])    [address] [place] [id number] [phone]            May 31, 2017 10:40 AM CDT   (Arrive by 10:25 AM)   PAC Anesthesia Consult with  Pac Anesthesiologist   [facility] [place] ([facility])    [address] [place] [id number] [phone]            May 31, 2017 11:15 AM CDT   LAB with  LAB   [facility] ([facility])    [address] [place] [address] [id number] [phone]           Patient must bring picture ID.  Patient should be prepared to give a urine specimen  Please do not eat 10-12 hours before your appointment if you are coming in fasting for labs on lipids, cholesterol, or glucose (sugar).  Pregnant women should follow their Care Team instructions. Water with medications is okay. Do not drink coffee or other fluids.   If you have concerns about taking  your medications, please ask at office or if scheduling via [employer], send a message by clicking on Secure Messaging, Message Your Care Team.            Jun 08, 2017  8:20 AM CDT   (Arrive by 8:05 AM)   Sonography/Biopsy with Eber Mcmillan MD   [facility] ([facility])    [address] [place] [id number]    [phone]            Jun 13, 2017   Procedure with Jossy Swanson MD   [facility] ([facility])    [address] [place] [address]  5th [facility] [id number] [phone]           Located in the Clinics and Surgery Center at [address] [place] [id number].   parking is very convenient and highly recommended.  is a $6 flat rate fee.  Both  and self parkers should enter the main arrival plaza from [facility]; parking attendants will direct you based on your parking preference.            Jun 28, 2017  8:00 AM CDT   (Arrive by 7:45 AM)   RETURN SHOULDER with Jossy Swanson MD   [facility] ([facility])    [address] [place] [address]  4th [facility] [id number] [phone]            Jun 29, 2017 12:20 PM CDT   (Arrive by 12:05 PM)   Return Visit with Eber Mcmillan MD   [facility] ([facility])    [address] [place] [id number] [phone]            Jul 26, 2017  8:00 AM CDT   (Arrive by 7:45 AM)   RETURN SHOULDER with Jossy Swanson MD   [facility] ([facility])    [address] [place] [id number] [phone]              Future tests that were ordered for you today     Open Future Orders        Priority Expected Expires Ordered    Basic metabolic panel Routine 5/31/2017 6/30/2017 5/31/2017    CBC with platelets Routine 5/31/2017 6/30/2017 5/31/2017    UA reflex to Microscopic and Culture Routine 5/31/2017 6/30/2017 5/31/2017            Who to contact     Please call your clinic at [phone] to:    Ask questions about your health    Make or cancel appointments    Discuss your medicines    Learn about your test results    Speak to your doctor   If you have compliments or concerns  about an experience at your clinic, or if you wish to file a complaint, please contact [facility] Patient Relations at [phone] or email us at [email or web address]         Additional Information About Your Visit        [employer] Information     [employer] gives you secure access to your electronic health record. If you see a primary care provider, you can also send messages to your care team and make appointments. If you have questions, please call your primary care clinic.  If you do not have a primary care provider, please call [phone] and they will assist you.      [employer] is an electronic gateway that provides easy, online access to your medical records. With [employer], you can request a clinic appointment, read your test results, renew a prescription or communicate with your care team.     To access your existing account, please contact your [facility] or call [phone] for assistance.        Care EveryWhere ID     This is your Care EveryWhere ID. This could be used by other organizations to access your [facility] medical records  [id number]         Blood Pressure from Last 3 Encounters:   05/31/17 (!) 157/94   02/09/17 (!) 157/105   11/10/16 142/84    Weight from Last 3 Encounters:   05/31/17 91.6 kg (201 lb 14.4 oz)   03/29/17 89.2 kg (196 lb 11.2 oz)   02/09/17 91.6 kg (202 lb)              Today, you had the following     No orders found for display         Today's Medication Changes          These changes are accurate as of: 5/31/17  9:02 AM.  If you have any questions, ask your nurse or doctor.               Stop taking these medicines if you haven't already. Please contact your care team if you have questions.     SUDAFED COLD/COUGH PO   Stopped by:  Pharmacist, [facility]                    Primary Care Provider Office Phone # Fax #    Oneyda Jones [phone]       [address]  [facility] [address]        Thank you!     Thank you for choosing [facility]  for your care. Our goal is always to provide you with excellent care. Hearing back from our patients is one way we can continue to improve our services. Please take a few minutes to complete the written survey that you may receive in the mail after your visit with us. Thank you!             Your Updated Medication List - Protect others around you: Learn how to safely use, store and throw away your medicines at www.disposemymeds.org.          This list is accurate as of: 5/31/17  9:02 AM.  Always use your most recent med list.                   Brand Name Dispense Instructions for use    AMBIEN 5 MG tablet   Generic drug:  zolpidem      Take 5 mg by mouth nightly as needed for sleep       ammonium lactate 12 % cream    AMLACTIN     Apply topically daily as needed       aspirin-acetaminophen-caffeine 250-250-65 MG per tablet    EXCEDRIN MIGRAINE     Take 2 tablets by mouth every 6 hours as needed for headaches       BENADRYL ALLERGY PO      Take 25-50 mg by mouth daily as needed (allergies)       camphor-menthol 0.5-0.5 % Lotn    DERMASARRA     Apply topically every 6 hours as needed for skin care       CARTIA  MG 24 hr capsule   Generic drug:  diltiazem      Take 120 mg by mouth every morning       cetirizine HCl 10 MG Caps      Take 10 mg by mouth daily       fish oil-omega-3 fatty acids 1000 MG capsule      Take 1 gram by mouth in the morning and take 2 grams by mouth in the evening.       gabapentin 100 MG capsule    NEURONTIN     Take 400 mg by mouth at night  (occasionally takes an extra pill to make 500 mg by mouth at night)       hydrochlorothiazide 25 MG tablet    HYDRODIURIL     Take 25 mg by mouth every morning       hydrocortisone 1 % cream    CORTAID     Apply topically 2 times daily as needed       IBUPROFEN PO      Take 800 mg by mouth every 8 hours as needed       meclizine 12.5 MG  tablet    ANTIVERT    30 tablet    Take 2 tablets (25 mg) by mouth 4 times daily as needed for dizziness       NONFORMULARY      Drug: Ibuprofen gel Apply topically to L shoulder as needed.       PHENYLEPHRINE HCL PO      Take 10 mg by mouth every 4 hours as needed       QVAR 80 MCG/ACT Inhaler   Generic drug:  beclomethasone      Inhale 2 puffs twice daily as needed when allergies flare up       rizatriptan 10 MG ODT tab    MAXALT-MLT     Take 10 mg by mouth at onset of headache       SUMAtriptan Succinate Refill 6 MG/0.5ML Soct    IMITREX     Inject 6 mg into the muscle every 12 hours as needed       TRAMADOL HCL PO      Take 1 tablet by mouth every 6 hours as needed for moderate to severe pain       TYLENOL PO      Take 1,000 mg by mouth every 6 hours as needed       VITAMIN B-12 PO      Take 1 tablet by mouth every evening       VITAMIN D3 MAXIMUM STRENGTH 5000 UNITS Caps   Generic drug:  cholecalciferol      Take 5,000 Units by mouth every morning       ZOFRAN ODT PO      Take 4 mg by mouth every 8 hours as needed for nausea

## 2017-05-31 NOTE — MR AVS SNAPSHOT
After Visit Summary   5/31/2017    Miles Valencia    MRN: [id number]           Patient Information     Date Of Birth          1965        Visit Information        Provider Department      5/31/2017 10:00 AM Rn, [facility]        Care Instructions      Using an Incentive Spirometer: 5x's/day and 5x's each day, Bring with you.  Soon after your surgery, a nurse or therapist will teach you breathing exercises. These keep your lungs clear, strengthen your breathing muscles, and help prevent complications.  The exercises include doing a deep-breathing exercise using a device called an incentive spirometer.  To do these exercises, you will breathe in through your mouth and not your nose. The incentive spirometer only works correctly if you breathe in through your mouth.  Four steps to clear lungs     Deep breathing expands the lungs, aids circulation, and helps prevent pneumonia.   1. Exhale normally.    Relax and breathe out.  2. Place your lips tightly around the mouthpiece.    Make sure the device is upright and not tilted.  3. Inhale as much air as you can through the mouthpiece (don't breath through your nose).    Inhale slowly and deeply.    Hold your breath long enough to keep the balls or disk raised for at least 3 seconds.    If you re inhaling too quickly, your device may make a tone. If you hear this tone, inhale more slowly.  4. Repeat the exercise regularly.    Do this exercise every hour while you're awake, or as your health care provider instructs.    You will also be taught coughing exercises and be asked to do them regularly on your own.    0872-3215 [employer]. [address] [place] [address]. All rights reserved. This information is not intended as a substitute for professional medical care. Always follow your healthcare professional's   instructions.    AFTER YOUR SURGERY  Breathing exercises   Breathing exercises help you  recover faster. Take deep breaths and let the air out slowly. This will:     Help you wake up after surgery.    Help prevent complications like pneumonia.  Preventing complications will help you go home sooner.   We may give you a breathing device (incentive spirometer) to encourage you to breathe deeply.   Nausea and vomiting   You may feel sick to your stomach after surgery; if so, let your nurse know.    Pain control:  After surgery, you may have pain. Our goal is to help you manage your pain. Pain medicine will help you feel comfortable enough to do activities that will help you heal.  These activities may include breathing exercises, walking and physical therapy.   To help your health care team treat your pain we will ask: 1) If you have pain  2) where it is located 3) describe your pain in your words  Methods of pain control include medications given by mouth, vein or by nerve block for some surgeries.  We may give you a pain control pump that will:  1) Deliver the medicine through a tube placed in your vein  2) Control the amount of medicine you receive  3) Allow you to push a button to deliver a dose of pain medicine  Sequential Compression Device (SCD) or Pneumo Boots:  You may need to wear SCD S on your legs or feet. These are wraps connected to a machine that pumps in air and releases it. The repeated pumping helps prevent blood clots from forming. AFTER YOUR SURGERY  Breathing exercises   Breathing exercises help you recover faster. Take deep breaths and let the air out slowly. This will:     Help you wake up after surgery.    Help prevent complications like pneumonia.  Preventing complications will help you go home sooner.   We may give you a breathing device (incentive spirometer) to encourage you to breathe deeply.   Nausea and vomiting   You may feel sick to your stomach after surgery; if so, let your nurse know.    Pain control:  After surgery, you may have pain. Our goal is to help you manage your  pain. Pain medicine will help you feel comfortable enough to do activities that will help you heal.  These activities may include breathing exercises, walking and physical therapy.   To help your health care team treat your pain we will ask: 1) If you have pain  2) where it is located 3) describe your pain in your words  Methods of pain control include medications given by mouth, vein or by nerve block for some surgeries.  We may give you a pain control pump that will:  1) Deliver the medicine through a tube placed in your vein  2) Control the amount of medicine you receive  3) Allow you to push a button to deliver a dose of pain medicine  Sequential Compression Device (SCD) or Pneumo Boots:  You may need to wear SCD S on your legs or feet. These are wraps connected to a machine that pumps in air and releases it. The repeated pumping helps prevent blood clots from forming.      Preparing for Your Surgery      Name:  Miles Valencia   MRN:  [id number]   :  1965   Today's Date:  2017     Arriving for surgery:  Surgery date: 17  Surgery time:0800 AM  Arrival time: 0600 AM  Please come to:     [facility]  [address] [place] [phone]     Parking is available in front of the Clinics and Surgery Center building from 5:30AM to 8:00PM.  -  Proceed to the 5th floor to check into the Ambulatory Surgery Center.              >> There will be patient concierges on the 1st and 5th floor, for assistance or an escort, if you would like.              >> Please call [phone] with any questions.    What can I eat or drink?  -  You may have solid food or milk products until 8 hours prior to your surgery. 12 MN Monday Yoselin.  -  You may have water, apple juice or 7up/Sprite until 2 hours prior to your surgery. 0600 AM Tuesday    Which medicines can I take?  -  Do NOT take these medications in the morning, the day of surgery:  Migraine meds x2, Vit. D,  Hydrochlorothiazide, STOP Ibuprofen  And Ibuprofen gel 06/11/17, Stop Excedrin 06/06/17 and Omega 3's    -  Please take these medications the day of surgery:  Scheduled meds. (Take Cartia.)    How do I prepare myself?  -  Take two showers: one the night before surgery; and one the morning of surgery.         Use Scrubcare or Hibiclens to wash from neck down.  You may use your own shampoo and conditioner. No other hair products.   -  Do NOT use lotion, powder, deodorant, or antiperspirant the day of your surgery.  -  Do NOT wear any makeup, fingernail polish or jewelry.  -  Begin using Incentive Spirometer 1 week prior to surgery.  Use 4 times per day, up to 5-10 breaths each time.  Bring Incentive Spirometer to hospital.  -Do not bring your own medications to the hospital, except for inhalers and eye drops.  -  Bring your ID and insurance card.    Questions or Concerns:  If you have questions or concerns, please call the  Preoperative Assessment Center, Monday-Friday 7AM-7PM:  [phone]                      Follow-ups after your visit        Your next 10 appointments already scheduled     May 31, 2017 10:00 AM CDT   (Arrive by 9:45 AM)   PAC RN ASSESSMENT with  Pac Rn   [facility] ([facility])    [address] [place] [id number] [phone]            May 31, 2017 10:40 AM CDT   (Arrive by 10:25 AM)   PAC Anesthesia Consult with  Pac Anesthesiologist   [facility] ([facility])    [address] [place] [id number] [phone]            May 31, 2017 11:15 AM CDT   LAB with  LAB   [facility] ([facility])    [address] [place] [id number] [phone]           Patient must bring picture ID.  Patient should be prepared to give a urine specimen  Please do not eat 10-12 hours before your  appointment if you are coming in fasting for labs on lipids, cholesterol, or glucose (sugar).  Pregnant women should follow their Care Team instructions. Water with medications is okay. Do not drink coffee or other fluids.   If you have concerns about taking  your medications, please ask at office or if scheduling via Teachbase[employer], send a message by clicking on Secure Messaging, Message Your Care Team.            Jun 08, 2017  8:20 AM CDT   (Arrive by 8:05 AM)   Sonography/Biopsy with Eber Mcmillan MD   [facility] ([facility])    [address] [place] [address]  4th [facility] [id number] [phone]            Jun 13, 2017   Procedure with Jossy Swanson MD   [facility] ([facility])    [address] [place] [address]  5th [facility] [id number] [phone]           Located in the Clinics and Surgery Center at [address] [place] [address].   parking is very convenient and highly recommended.  is a $6 flat rate fee.  Both  and self parkers should enter the main arrival plaza from [facility]; parking attendants will direct you based on your parking preference.            Jun 28, 2017  8:00 AM CDT   (Arrive by 7:45 AM)   RETURN SHOULDER with Jossy Swanson MD   [facility] ([facility])    [address] [place] [id number] [phone]            Jun 29, 2017 12:20 PM CDT   (Arrive by 12:05 PM)   Return Visit with Eber Mcmillan MD   [facility] ([facility])    [address] [place] [id number] [phone]            Jul 26, 2017  8:00 AM CDT   (Arrive by 7:45 AM)   RETURN SHOULDER with Jossy Swanson MD   [facility] ([facility])    [address] [facility]  4th [facility] [address] [phone]              Future tests that were ordered for you today     Open Future Orders        Priority Expected Expires Ordered    Basic metabolic panel Routine 5/31/2017 6/30/2017 5/31/2017    CBC with platelets Routine 5/31/2017 6/30/2017 5/31/2017    UA reflex to Microscopic and Culture Routine 5/31/2017 6/30/2017 5/31/2017            Who to contact     Please call your clinic at [phone] to:    Ask questions about your health    Make or cancel appointments    Discuss your medicines    Learn about your test results    Speak to your doctor   If you have compliments or concerns about an experience at your clinic, or if you wish to file a complaint, please contact [facility] Patient Relations at [phone] or email us at [email or web address]         Additional Information About Your Visit        DataRobotharHabet Information     PeopleJart gives you secure access to your electronic health record. If you see a primary care provider, you can also send messages to your care team and make appointments. If you have questions, please call your primary care clinic.  If you do not have a primary care provider, please call [phone] and they will assist you.      MicroEval is an electronic gateway that provides easy, online access to your medical records. With MicroEval, you can request a clinic appointment, read your test results, renew a prescription or communicate with your care team.     To access your existing account, please contact your [facility] or call [phone] for assistance.        Care EveryWhere ID     This is your Care EveryWhere ID. This could be used by other organizations to access your [place] medical records  [id number]         Blood Pressure from Last 3 Encounters:   05/31/17 (!) 157/94   02/09/17 (!) 157/105   11/10/16 142/84    Weight from Last 3  Encounters:   05/31/17 91.6 kg (201 lb 14.4 oz)   03/29/17 89.2 kg (196 lb 11.2 oz)   02/09/17 91.6 kg (202 lb)              Today, you had the following     No orders found for display         Today's Medication Changes          These changes are accurate as of: 5/31/17  9:58 AM.  If you have any questions, ask your nurse or doctor.               Stop taking these medicines if you haven't already. Please contact your care team if you have questions.     SUDAFED COLD/COUGH PO   Stopped by:  Pharmacist,  Pac                    Primary Care Provider Office Phone # Fax #    Oneyda Jones [phone]       [address] [address]        Thank you!     Thank you for choosing [facility]OPER[facility]  for your care. Our goal is always to provide you with excellent care. Hearing back from our patients is one way we can continue to improve our services. Please take a few minutes to complete the written survey that you may receive in the mail after your visit with us. Thank you!             Your Updated Medication List - Protect others around you: Learn how to safely use, store and throw away your medicines at www.disposemymeds.org.          This list is accurate as of: 5/31/17  9:58 AM.  Always use your most recent med list.                   Brand Name Dispense Instructions for use    albuterol 108 (90 BASE) MCG/ACT Inhaler    PROAIR HFA/PROVENTIL HFA/VENTOLIN HFA     Inhale 2 puffs into the lungs every 4 hours as needed for shortness of breath / dyspnea or wheezing       AMBIEN 5 MG tablet   Generic drug:  zolpidem      Take 5 mg by mouth nightly as needed for sleep       ammonium lactate 12 % cream    AMLACTIN     Apply topically daily as needed       aspirin-acetaminophen-caffeine 250-250-65 MG per tablet    EXCEDRIN MIGRAINE     Take 2 tablets by mouth every 6 hours as needed for headaches       BENADRYL ALLERGY PO      Take 25-50 mg by mouth  daily as needed (allergies)       camphor-menthol 0.5-0.5 % Lotn    DERMASARRA     Apply topically every 6 hours as needed for skin care       CARTIA  MG 24 hr capsule   Generic drug:  diltiazem      Take 120 mg by mouth every morning       cetirizine HCl 10 MG Caps      Take 10 mg by mouth daily       fish oil-omega-3 fatty acids 1000 MG capsule      Take 1 gram by mouth in the morning and take 2 grams by mouth in the evening.       gabapentin 100 MG capsule    NEURONTIN     Take 400 mg by mouth at night  (occasionally takes an extra pill to make 500 mg by mouth at night)       hydrochlorothiazide 25 MG tablet    HYDRODIURIL     Take 25 mg by mouth every morning       hydrocortisone 1 % cream    CORTAID     Apply topically 2 times daily as needed       IBUPROFEN PO      Take 800 mg by mouth every 8 hours as needed       meclizine 12.5 MG tablet    ANTIVERT    30 tablet    Take 2 tablets (25 mg) by mouth 4 times daily as needed for dizziness       NONFORMULARY      Drug: Ibuprofen gel Apply topically to L shoulder as needed.       PHENYLEPHRINE HCL PO      Take 10 mg by mouth every 4 hours as needed       QVAR 80 MCG/ACT Inhaler   Generic drug:  beclomethasone      Inhale 2 puffs twice daily as needed when allergies flare up       rizatriptan 10 MG ODT tab    MAXALT-MLT     Take 10 mg by mouth at onset of headache       SUMAtriptan Succinate Refill 6 MG/0.5ML Soct    IMITREX     Inject 6 mg into the muscle every 12 hours as needed       TRAMADOL HCL PO      Take 1 tablet by mouth every 6 hours as needed for moderate to severe pain       TYLENOL PO      Take 1,000 mg by mouth every 6 hours as needed       VITAMIN B-12 PO      Take 1 tablet by mouth every evening       VITAMIN D3 MAXIMUM STRENGTH 5000 UNITS Caps   Generic drug:  cholecalciferol      Take 5,000 Units by mouth every morning       ZOFRAN ODT PO      Take 4 mg by mouth every 8 hours as needed for nausea

## 2017-05-31 NOTE — PATIENT INSTRUCTIONS
Using an Incentive Spirometer: 5x's/day and 5x's each day, Bring with you.  Soon after your surgery, a nurse or therapist will teach you breathing exercises. These keep your lungs clear, strengthen your breathing muscles, and help prevent complications.  The exercises include doing a deep-breathing exercise using a device called an incentive spirometer.  To do these exercises, you will breathe in through your mouth and not your nose. The incentive spirometer only works correctly if you breathe in through your mouth.  Four steps to clear lungs     Deep breathing expands the lungs, aids circulation, and helps prevent pneumonia.   1. Exhale normally.    Relax and breathe out.  2. Place your lips tightly around the mouthpiece.    Make sure the device is upright and not tilted.  3. Inhale as much air as you can through the mouthpiece (don't breath through your nose).    Inhale slowly and deeply.    Hold your breath long enough to keep the balls or disk raised for at least 3 seconds.    If you re inhaling too quickly, your device may make a tone. If you hear this tone, inhale more slowly.  4. Repeat the exercise regularly.    Do this exercise every hour while you're awake, or as your health care provider instructs.    You will also be taught coughing exercises and be asked to do them regularly on your own.    [id number] [employer]. [address] [place] [address], [place] [id number]. All rights reserved. This information is not intended as a substitute for professional medical care. Always follow your healthcare professional's   instructions.    AFTER YOUR SURGERY  Breathing exercises   Breathing exercises help you recover faster. Take deep breaths and let the air out slowly. This will:     Help you wake up after surgery.    Help prevent complications like pneumonia.  Preventing complications will help you go home sooner.   We may give you a breathing device (incentive spirometer) to encourage you to breathe deeply.    Nausea and vomiting   You may feel sick to your stomach after surgery; if so, let your nurse know.    Pain control:  After surgery, you may have pain. Our goal is to help you manage your pain. Pain medicine will help you feel comfortable enough to do activities that will help you heal.  These activities may include breathing exercises, walking and physical therapy.   To help your health care team treat your pain we will ask: 1) If you have pain  2) where it is located 3) describe your pain in your words  Methods of pain control include medications given by mouth, vein or by nerve block for some surgeries.  We may give you a pain control pump that will:  1) Deliver the medicine through a tube placed in your vein  2) Control the amount of medicine you receive  3) Allow you to push a button to deliver a dose of pain medicine  Sequential Compression Device (SCD) or Pneumo Boots:  You may need to wear SCD S on your legs or feet. These are wraps connected to a machine that pumps in air and releases it. The repeated pumping helps prevent blood clots from forming. AFTER YOUR SURGERY  Breathing exercises   Breathing exercises help you recover faster. Take deep breaths and let the air out slowly. This will:     Help you wake up after surgery.    Help prevent complications like pneumonia.  Preventing complications will help you go home sooner.   We may give you a breathing device (incentive spirometer) to encourage you to breathe deeply.   Nausea and vomiting   You may feel sick to your stomach after surgery; if so, let your nurse know.    Pain control:  After surgery, you may have pain. Our goal is to help you manage your pain. Pain medicine will help you feel comfortable enough to do activities that will help you heal.  These activities may include breathing exercises, walking and physical therapy.   To help your health care team treat your pain we will ask: 1) If you have pain  2) where it is located 3) describe your pain  in your words  Methods of pain control include medications given by mouth, vein or by nerve block for some surgeries.  We may give you a pain control pump that will:  1) Deliver the medicine through a tube placed in your vein  2) Control the amount of medicine you receive  3) Allow you to push a button to deliver a dose of pain medicine  Sequential Compression Device (SCD) or Pneumo Boots:  You may need to wear SCD S on your legs or feet. These are wraps connected to a machine that pumps in air and releases it. The repeated pumping helps prevent blood clots from forming.      Preparing for Your Surgery      Name:  Miles Valencia   MRN:  [id number]   :  1965   Today's Date:  2017     Arriving for surgery:  Surgery date: 17  Surgery time:0800 AM  Arrival time: 0600 AM  Please come to:     [facility]  [address] [place] [phone]     Parking is available in front of the Clinics and Surgery Center building from 5:30AM to 8:00PM.  -  Proceed to the 5th floor to check into the Ambulatory Surgery Center.              >> There will be patient concierges on the 1st and 5th floor, for assistance or an escort, if you would like.              >> Please call [phone] with any questions.    What can I eat or drink?  -  You may have solid food or milk products until 8 hours prior to your surgery. 12 MN Monday Yoselin.  -  You may have water, apple juice or 7up/Sprite until 2 hours prior to your surgery. 0600 AM Tuesday    Which medicines can I take?  -  Do NOT take these medications in the morning, the day of surgery:  Migraine meds x2, Vit. D, Hydrochlorothiazide, STOP Ibuprofen  And Ibuprofen gel 17, Stop Excedrin 17 and Omega 3's    -  Please take these medications the day of surgery:  Scheduled meds. (Take Cartia.)    How do I prepare myself?  -  Take two showers: one the night before surgery; and one the morning of surgery.         Use  Scrubcare or Hibiclens to wash from neck down.  You may use your own shampoo and conditioner. No other hair products.   -  Do NOT use lotion, powder, deodorant, or antiperspirant the day of your surgery.  -  Do NOT wear any makeup, fingernail polish or jewelry.  -  Begin using Incentive Spirometer 1 week prior to surgery.  Use 4 times per day, up to 5-10 breaths each time.  Bring Incentive Spirometer to hospital.  -Do not bring your own medications to the hospital, except for inhalers and eye drops.  -  Bring your ID and insurance card.    Questions or Concerns:  If you have questions or concerns, please call the  Preoperative Assessment Center, Monday-Friday 7AM-7PM:  [phone]

## 2017-05-31 NOTE — H&P
Pre-Operative H & P     CC:  Preoperative exam to assess for increased cardiopulmonary risk while undergoing surgery and anesthesia.    Date of Encounter: 5/31/2017  Primary Care Physician:  Oneyda Jones  Miles Valencia is a 52 year old male who presents for pre-operative H & P in preparation for left total shoulder arthroplasty with Dr. Swanson on 6/13/17 at [facility]. History is obtained from the patient.     Patient with left end stage glenohumeral arthritis who was recently evaluated by Dr. Swanson and counseled for above procedure.   His history is otherwise significant for hypertension, asthma, RLS, multinodular goiter and low grade prostate cancer being followed. He also reports a family history of Factor V Leiden, believes that he was tested and was negative but unable to remember full details.   Past Medical History  Past Medical History:   Diagnosis Date     Achilles bursitis or tendinitis 5/4/2014     Allergic rhinitis      Asthma      Congestion      Hypertension      Migraine      Multinodular goiter      Osteoarthritis      Pain in joint, shoulder region 4/18/2014     Prostate cancer (H)      RLS (restless legs syndrome)      Sleep problems        Past Surgical History  Past Surgical History:   Procedure Laterality Date     BIOPSY      Prostate     COLONOSCOPY N/A 3/21/2016    Procedure: COLONOSCOPY;  Surgeon: Toy Lima MD;  Location: [facility]       Hx of Blood transfusions/reactions: Denies.     Hx of abnormal bleeding or anti-platelet use: Denies.    Menstrual history: No LMP for male patient.    Steroid use in the last year: Denies.    Personal or FH with difficulty with Anesthesia:  Denies.    Prior to Admission Medications  Current Outpatient Prescriptions   Medication Sig Dispense Refill     aspirin-acetaminophen-caffeine (EXCEDRIN MIGRAINE) 250-250-65 MG per tablet Take 2 tablets by mouth every 6 hours as needed for headaches       TRAMADOL HCL  PO Take 1 tablet by mouth every 6 hours as needed for moderate to severe pain       PHENYLEPHRINE HCL PO Take 10 mg by mouth every 4 hours as needed       Ondansetron (ZOFRAN ODT PO) Take 4 mg by mouth every 8 hours as needed for nausea       hydrocortisone (CORTAID) 1 % cream Apply topically 2 times daily as needed       camphor-menthol (DERMASARRA) 0.5-0.5 % LOTN Apply topically every 6 hours as needed for skin care       NONFORMULARY Drug: Ibuprofen gel  Apply topically to L shoulder as needed.       albuterol (PROAIR HFA/PROVENTIL HFA/VENTOLIN HFA) 108 (90 BASE) MCG/ACT Inhaler Inhale 2 puffs into the lungs every 4 hours as needed for shortness of breath / dyspnea or wheezing       Cyanocobalamin (VITAMIN B-12 PO) Take 1 tablet by mouth every evening        QVAR 80 MCG/ACT Inhaler Inhale 2 puffs twice daily as needed when allergies flare up  0     meclizine (ANTIVERT) 12.5 MG tablet Take 2 tablets (25 mg) by mouth 4 times daily as needed for dizziness 30 tablet 0     CARTIA  MG 24 hr CD capsule Take 120 mg by mouth every morning   0     gabapentin (NEURONTIN) 100 MG capsule Take 400 mg by mouth at night  (occasionally takes an extra pill to make 500 mg by mouth at night)  2     rizatriptan (MAXALT-MLT) 10 MG disintegrating tablet Take 10 mg by mouth at onset of headache   6     SUMAtriptan Succinate Refill (IMITREX) 6 MG/0.5ML SOCT Inject 6 mg into the muscle every 12 hours as needed   1     hydrochlorothiazide (HYDRODIURIL) 25 MG tablet Take 25 mg by mouth every morning        Omega-3 Fatty Acids (OMEGA-3 FISH OIL) 1000 MG CAPS Take 1 gram by mouth in the morning and take 2 grams by mouth in the evening.       cholecalciferol (VITAMIN D3 MAXIMUM STRENGTH) 5000 UNITS CAPS Take 5,000 Units by mouth every morning        Acetaminophen (TYLENOL PO) Take 1,000 mg by mouth every 6 hours as needed        IBUPROFEN PO Take 800 mg by mouth every 8 hours as needed        zolpidem (AMBIEN) 5 MG tablet Take 5 mg by  "mouth nightly as needed for sleep       Cetirizine HCl 10 MG CAPS Take 10 mg by mouth daily        DiphenhydrAMINE HCl (BENADRYL ALLERGY PO) Take 25-50 mg by mouth daily as needed (allergies)        ammonium lactate (AMLACTIN) 12 % cream Apply topically daily as needed          Allergies  Allergies   Allergen Reactions     Amoxicillin Diarrhea       Social History  Social History     Social History     Marital status: Life Partner     Spouse name: N/A     Number of children: N/A     Years of education: N/A     Occupational History           Social History Main Topics     Smoking status: Never Smoker     Smokeless tobacco: Never Used     Alcohol use 0.0 oz/week     0 Standard drinks or equivalent per week      Comment: 1-2 drinks a week     Drug use: No     Sexual activity: Not on file     Other Topics Concern     Not on file     Social History Narrative       Family History  Family History   Problem Relation Age of Onset     Skin Cancer Mother      Other - See Comments Brother      Factor V Leiden     Deep Vein Thrombosis (DVT) Brother      Other - See Comments Niece      Von Willebrands     Other - See Comments Nephew      Von Willebrands, Factor V Leiden       Review of Systems  The complete review of systems is negative other than noted in the HPI or here.   Constitutional: Denies fever, chills, weight loss.  HEENT: Wears glasses for vision. Has goiter which is sometimes sensitive to pressure in the area.  Respiratory: Denies cough or shortness of breath. Allergy and exercise induced asthma, no significant history. Uses albuterol prior to exercise. Had QVAR last year for respiratory infection.  CV: Denies chest pain or irregular HR. Has had \"borderline\" HYPERTENSION. Good exercise tolerance.  GI: Denies abdominal pain or bowel issues.  : Denies dysuria.  M/S: Pain and limited mobility in left shoulder.  Heme: No personal history of blood clots.        BP: (!) 157/94 Pulse: 90   Resp: 16 SpO2: 96 %    " "     201 lbs 14.4 oz  5' 9\"   Body mass index is 29.82 kg/(m^2).       Physical Exam  Constitutional: Awake, alert, cooperative, no apparent distress, and appears stated age.  Eyes: Pupils equal, round and reactive to light, extra ocular muscles intact, sclera clear, conjunctiva normal. Glasses on.  HENT: Normocephalic, oral pharynx with moist mucus membranes, good dentition. Goiter present. Full beard.  Respiratory: Clear to auscultation bilaterally, no crackles or wheezing. No cough or obvious dyspnea.  .Cardiovascular: Regular rate and rhythm, normal S1 and S2, and no murmur noted. Carotids, no bruits. No edema. Palpable pulses to radial  DP and PT arteries.   GI: Normal bowel sounds, soft, non-distended, non-tender, no masses palpated, no hepatosplenomegaly.    Lymph/Hematologic: No cervical lymphadenopathy and no supraclavicular lymphadenopathy.  Genitourinary:  Deferred.  Skin: Warm and dry.  Musculoskeletal: Full ROM of neck. There is no redness, warmth, or swelling of the joints. Gross motor strength is normal.    Neurologic: Awake, alert, oriented to name, place and time. Cranial nerves II-XII are grossly intact. Gait is normal.   Neuropsychiatric: Calm, cooperative. Normal affect.     Labs: (personally reviewed)  Lab Results   Component Value Date    WBC 6.6 05/31/2017     Lab Results   Component Value Date    RBC 5.37 05/31/2017     Lab Results   Component Value Date    HGB 15.8 05/31/2017     Lab Results   Component Value Date    HCT 45.6 05/31/2017     Lab Results   Component Value Date    MCV 85 05/31/2017     Lab Results   Component Value Date    MCH 29.4 05/31/2017     Lab Results   Component Value Date    MCHC 34.6 05/31/2017     Lab Results   Component Value Date    RDW 12.5 05/31/2017     Lab Results   Component Value Date     05/31/2017     Last Basic Metabolic Panel:  Lab Results   Component Value Date     05/31/2017      Lab Results   Component Value Date    POTASSIUM 3.4 " 2017     Lab Results   Component Value Date    CHLORIDE 104 2017     Lab Results   Component Value Date    OTILIO 9.2 2017     Lab Results   Component Value Date    CO2 26 2017     Lab Results   Component Value Date    BUN 15 2017     Lab Results   Component Value Date    CR 0.85 2017     Lab Results   Component Value Date     2017     UA RESULTS:  Recent Labs   Lab Test  17   0925   COLOR  Straw   APPEARANCE  Clear   URINEGLC  Negative   URINEBILI  Negative   URINEKETONE  Negative   SG  1.006   UBLD  Negative   URINEPH  6.0   PROTEIN  Negative   NITRITE  Negative   LEUKEST  Negative     EKG: Personally reviewed but formal cardiology read pendin17 Normal sinus rhythm    3/28/17 Shoulder XR 2 view  FINDINGS: Neutral, Grashey, axillary, and Y views of the left shoulder  were obtained. Osteoarthrosis at the left shoulder glenohumeral joint  with marked joint space narrowing and osteophytic spurring. Slight  posterior displacement of the humeral head in relation to the glenoid  on the axillary view. Acromioclavicular joint is well aligned. No  evidence of fracture.         IMPRESSION: Marked osteoarthrosis in the left shoulder glenohumeral  joint, without evidence of fracture or dislocation.    Outside records reviewed from: [facility]    ASSESSMENT and PLAN  Miles Valencia is a 52 year old male scheduled to undergo left total shoulder arthroplasty on 17 by Dr. Swanson. He has the following specific operative considerations:   - RCRI : No serious cardiac risks.    - Anesthesia considerations:  Refer to PAC assessment in anesthesia records  - Risk of PONV score = 1.  If > 2, anti-emetic intervention recommended.     --Left shoulder end stage glenohumeral arthritis. Above procedure now planned. Listed as choice anesthesia. Discussion with Anesthesia today. Final counseling and decisions by Anesthesia on DOS.   --HYPERTENSION. Will take Cartia XT on  DOS. Will hold HCTZ. EKG NSR. No other cardiac history, symptoms or meds. Good exercise tolerance.   --Nonsmoker. Asthma triggered by allergies and exercise. Has used QVAR for respiratory infection last year but no regular use. Uses Albuterol inhaler prior to exercise and will bring in on DOS. No history of significant issues with asthma.      --RLS.  Gabapentin at HS.   --Multinodular goiter. Followed, biopsies benign. Last TSH: 0.59   --Low grade prostate cancer under active surveillance.   --Family history of Factor V Leiden in brother and Von Willebrands in niece/nephew. No personal history. Requested Factor V Leiden blood draw today as he is unable to remember past testing. Results negative.   --Pain management. Discussed possibility of nerve block if appropriate for patient's procedure. Final counseling and decisions by regional team on DOS.    Arrival time, NPO, shower and medication instructions provided by nursing staff today. Preparing For Your Surgery handout given.    Patient was discussed with Dr Sawyer.    FIDELIA Stringer CNS  Preoperative Assessment Center  [facility]  Phone: [phone]  Fax: [phone]

## 2017-05-31 NOTE — ANESTHESIA PREPROCEDURE EVALUATION
Anesthesia Evaluation     . Pt has had prior anesthetic. Type: MAC    No history of anesthetic complications          ROS/MED HX    ENT/Pulmonary:     (+)JULIÁN risk factors hypertension, allergic rhinitis, Intermittent asthma Last exacerbation: last year,Treatment: Inhaler prn,  , . .   (-) tobacco use   Neurologic:     (+)other neuro RLS    Cardiovascular:     (+) hypertension----. : . . . :. . Previous cardiac testing date:results:date: results:ECG reviewed date:5/31/17 results:NSR date: results:         (-) taking anticoagulants/antiplatelets   METS/Exercise Tolerance:  >4 METS   Hematologic: Comments: Family history of DVT, Factor V Leiden. Niece/nephew have Von Willebrands.         Musculoskeletal:   (+) arthritis, , , other musculoskeletal- Left shoulder pain      GI/Hepatic:  - neg GI/hepatic ROS       Renal/Genitourinary:  - ROS Renal section negative       Endo: Comment: Multinodular goiter followed    (+) thyroid problem  Thyroid disease - Other, .      Psychiatric:  - neg psychiatric ROS       Infectious Disease:  - neg infectious disease ROS       Malignancy:   (+) Malignancy History of Prostate  Prostate CA Active status post, Active surveillance-low grade        Other:    (+) No chance of pregnancy C-spine cleared: N/A, H/O Chronic Pain,no other significant disability                    Physical Exam  Normal systems: dental    Airway   Mallampati: II  TM distance: >3 FB  Neck ROM: full  Comment: Full beard    Dental     Cardiovascular   Rhythm and rate: regular and normal      Pulmonary    breath sounds clear to auscultation    Other findings: For further details of assessment, testing, and physical exam please see H and P completed on same date.           PAC Discussion and Assessment    ASA Classification: 2  Case is suitable for: ASC  Anesthetic techniques and relevant risks discussed: GA, Regional and GA with regional block for post-op pain control  Invasive monitoring and risk discussed:  No  Types:   Possibility and Risk of blood transfusion discussed: No  NPO instructions given:   Additional anesthetic preparation and risks discussed:   Needs early admission to pre-op area:   Other:     PAC Resident/NP Anesthesia Assessment:  Miles Valencia is a 52 year old male scheduled to undergo left total shoulder arthroplasty on 6/13/17 by Dr. Swanson. He has the following specific operative considerations:   - RCRI : No serious cardiac risks.    - Risk of PONV score = 1.  If > 2, anti-emetic intervention recommended.    No GA. Colonoscopy with sedation. No history of problems with anesthesia.       --Left shoulder end stage glenohumeral arthritis. Above procedure now planned. Listed as choice anesthesia. Discussion with Anesthesia today. Final counseling and decisions by Anesthesia on DOS.   --HYPERTENSION. Will take Cartia XT on DOS. Will hold HCTZ. EKG NSR. No other cardiac history, symptoms or meds. Good exercise tolerance.   --Nonsmoker. Asthma triggered by allergies and exercise. Has used QVAR for respiratory infection last year but no regular use. Uses Albuterol inhaler prior to exercise and will bring in on DOS. No history of significant issues with asthma.      --RLS.  Gabapentin at HS.   --Multinodular goiter. Followed, biopsies benign. Last TSH: 0.59   --Low grade prostate cancer under active surveillance.   --Family history of Factor V Leiden in brother and Von Willebrands in niece/nephew. No personal history. Requested Factor V Leiden blood draw today as he is unable to remember past testing. Results negative.   --Pain management. Discussed possibility of nerve block if appropriate for patient's procedure. Final counseling and decisions by regional team on DOS.        Patient was discussed with Dr Sawyer.        Reviewed and Signed by PAC Mid-Level Provider/Resident  Mid-Level Provider/Resident: FIDELIA Bull, CNS  Date: 5/31/17  Time: 8:43am    Attending Anesthesiologist Anesthesia  Assessment:  I saw the patient and discussed with the YVROSE as above.  Final anesthetic plan and recommendations to be made by the attending anesthesiologist on the day of surgery.   Patient currently medically optimized for the proposed procedure.       Reviewed and Signed by PAC Anesthesiologist  Anesthesiologist: PRETTY  Date: 5/31/17  Time: 1040  Pass/Fail: Pass  Disposition:     PAC Pharmacist Assessment:        Pharmacist:   Date:   Time:      Anesthesia Plan      History & Physical Review  History and physical reviewed and following examination; no interval change.    ASA Status:  2 .    NPO Status:  > 6 hours    Plan for General, Periph. Nerve Block for postop pain and LMA with Propofol induction. Maintenance will be TIVA.    PONV prophylaxis:  Ondansetron (or other 5HT-3) and Dexamethasone or Solumedrol  Patient seen and examined by me, agree with above assessment and plan.  Nikolay Evans MD    6/13/2017 9:24 AM        Postoperative Care  Postoperative pain management:  Multi-modal analgesia, Peripheral nerve block (Single Shot) and Oral pain medications.      Consents  Anesthetic plan, risks, benefits and alternatives discussed with:  Patient..                          .

## 2017-06-01 LAB — INTERPRETATION ECG - MUSE: NORMAL

## 2017-06-02 ENCOUNTER — DOCUMENTATION ONLY (OUTPATIENT)
Dept: ORTHOPEDICS | Facility: CLINIC | Age: 52
End: 2017-06-02

## 2017-06-02 ENCOUNTER — PRE VISIT (OUTPATIENT)
Dept: UROLOGY | Facility: CLINIC | Age: 52
End: 2017-06-02

## 2017-06-02 NOTE — TELEPHONE ENCOUNTER
Prostate cancer follow up- repeat bx.  Prep given.  Records available in [facility].

## 2017-06-03 LAB — COPATH REPORT: NORMAL

## 2017-06-08 ENCOUNTER — OFFICE VISIT (OUTPATIENT)
Dept: UROLOGY | Facility: CLINIC | Age: 52
End: 2017-06-08

## 2017-06-08 VITALS
DIASTOLIC BLOOD PRESSURE: 91 MMHG | SYSTOLIC BLOOD PRESSURE: 132 MMHG | HEIGHT: 69 IN | HEART RATE: 79 BPM | WEIGHT: 202 LBS | BODY MASS INDEX: 29.92 KG/M2

## 2017-06-08 DIAGNOSIS — C61 MALIGNANT NEOPLASM OF PROSTATE (H): Primary | ICD-10-CM

## 2017-06-08 ASSESSMENT — PAIN SCALES - GENERAL
PAINLEVEL: NO PAIN (0)
PAINLEVEL: NO PAIN (0)

## 2017-06-08 NOTE — NURSING NOTE
Invasive Procedure Safety Checklist:    Procedure: Prostate biopsy    Action: Complete sections and checkboxes as appropriate.    Pre-procedure:  1. Patient ID Verified with 2 identifiers (Leigha and  or MRN) : YES    2. Procedure and site verified with patient/designee (when able) : YES    3. Accurate consent documentation in medical record : YES    4. H&P (or appropriate assessment) documented in medical record : NO  H&P must be up to 30 days prior to procedure an updated within 24 hours of                 Procedure as applicable.     5. Relevant diagnostic and radiology test results appropriately labeled and displayed as applicable : NO    6. Blood products, implants, devices, and/or special equipment available for the procedure as applicable : NO    7. Procedure site(s) marked with provider initials [Exclusions: ] : NO    8. Marking not required. Reason : Yes  Procedure does not require site marking    Time Out:     Time-Out performed immediately prior to starting procedure, including verbal and active participation of all team members addressing: YES    1. Correct patient identity.  2. Confirmed that the correct side and site are marked.  3. An accurate procedure to be done.  4. Agreement on the procedure to be done.  5. Correct patient position.  6. Relevant images and results are properly labeled and appropriately displayed.  7. The need to administer antibiotics or fluids for irrigation purposes during the procedure as applicable.  8. Safety precautions based on patient history or medication use.    During Procedure: Verification of correct person, site, and procedure occurs any time the responsibility for care of the patient is transferred to another member of the care team.    DAV Zaman

## 2017-06-08 NOTE — LETTER
6/8/2017       RE: Miles Valencia  [address] [place] [address]     Dear Colleague,    Thank you for referring your patient, Miles Valencia, to the [facility] UROLOGY AND INST FOR PROSTATE AND UROLOGIC CANCERS at [facility]. Please see a copy of my visit note below.    Miles Valencia is here for a transrectal US guided biopsy of the prostate for follow up on acticve surveillance    Prostate cancer history as below    Initial PSA:2.8  Biopsy Rafael Score was 3 + 3 on 7/28/16  Pathologic Stage T1c     Risk Group: Low  Normal MRI 2016  Sacral lesion (confirmed negative with bone scan)  Prostate volume 35 on MRI 11/2016    The risks, benefits, alternatives, and personnel involved in the procudure were discussed.  All questions were answered.  A written informed consent was obtained.      PSA   Date Value Ref Range Status   05/31/2017 6.91 (H) 0 - 4 ug/L Final     Comment:     Assay Method:  Chemiluminescence using Siemens Vista analyzer   02/06/2017 7.39 (H) 0 - 4 ug/L Final     Comment:     Assay Method:  Chemiluminescence using Siemens Vista analyzer   11/01/2016 9.56 (H) 0 - 4 ug/L Final     Comment:     Assay Method:  Chemiluminescence using Siemens Vista analyzer   09/23/2016 6.53 (H) 0 - 4 ug/L Final     Comment:     Assay Method:  Chemiluminescence using Siemens Vista analyzer     An enema was completed and 500 mg of Cipro was given prior to the biopsy.  After obtaining informed consent patient was placed in lateral decubitus position.  The ultrasound probe was placed in the rectum.  The prostate was numbed using ultrasound guidance with 1% lidocaine 5 mls along each nerve bundle.  The volume was measured and estimated to be 36 grams.  US images were used to guide the biopsies of the prostate.  12 cores were taken with 6 on each side, 2 at the base,  2 at the midgland and  2 at the apex.  The patient tolerated the procedure well.      We will follow up with the  results in 7-10 days and contact patient with these results.        Eber Mcmillan MD  Department of Urology Staff  [facility]

## 2017-06-08 NOTE — MR AVS SNAPSHOT
After Visit Summary   6/8/2017    Miles Valencia    MRN: [id number]           Patient Information     Date Of Birth          1965        Visit Information        Provider Department      6/8/2017 8:20 AM Eber Mcmillan MD [facility]rology and [facility] for Prostate and Urologic Cancers        Today's Diagnoses     Malignant neoplasm of prostate (H)    -  1      Care Instructions    Follow up with Dr. Mcmillan on 6-29-17 to discuss results of prostate biopsy.    It was a pleasure meeting with you today.  Thank you for allowing me and my team the privilege of caring for you today.  YOU are the reason we are here, and I truly hope we provided you with the excellent service you deserve.  Please let us know if there is anything else we can do for you so that we can be sure you are leaving completely satisfied with your care experience.      Taryn Deshpande, [facility]          [place] for Prostate and Urologic Cancers  Precautions Following a Prostate Biopsy    There are four conditions that you should watch for after a prostate biopsy:    1. Excessive pain  2. Bleeding irregularities (passing numerous  dime sized  clots or if your urine looks like cranberry juice)  3. Fever of 100 degrees or more  4. If you are unable to urinate        If any of these occur, call the Urology Clinic during normal business hours (M-F, 8:00-4:30) at [phone].  If you experience a problem after normal business hours, call our 24-hour phone number at [phone] and ask for the Urology Resident on call to be paged.      If you experience any discomfort following the biopsy, you may take Tylenol.  DO NOT TAKE ASPIRIN unless specified by your physician.   If the discomfort becomes severe or uncontrolled by medication, contact the Urology Clinic or Urology Resident (after normal business hours).      Do not be alarmed if you have some blood in your stool, in your urine, or ejaculate (semen).  This occurrence is normal  and may last up to three (3) or four (4) days, usually intermittently.  Blood in the ejaculate (semen) may last several weeks, up to about a dozen ejaculations.  The blood in your ejaculate may appear as brown streaks, blood tinged, and immediately following a biopsy, it may appear bright red.      If you run a fever above 100 degrees, call the Urology Clinic or Urology Resident (after normal business hours) immediately.  If you are unable to reach your physician or the Resident on call, go to the nearest emergency room.  Explain that you have had a transrectal biopsy of your prostate and what problems you are experiencing.        You should attempt to urinate following your biopsy before you leave the clinic.  If you are unable to urinate four (4) to six (6) hours after you leave the clinic, you will need to contact the Urology Clinic or the Resident on call.  If you are unable to reach your physician or the Resident on call, go to the nearest emergency room.            If you have any questions or concerns after your biopsy, feel free to contact the Urology Clinic at [phone] during M-F, 8:00-5pm business hours.  If you need to speak with someone after normal business hours, call [phone] and ask for the Resident on call to be paged.          Follow-ups after your visit        Your next 10 appointments already scheduled     Jun 13, 2017   Procedure with Jossy Swanson MD   [facility] ([facility])    [address] [place] [id number] [phone]           Located in the Clinics and Surgery Center at [address] [place] [address].   parking is very convenient and highly recommended.  is a $6 flat rate fee.  Both  and self parkers should enter the main arrival plaza from [facility]; parking attendants will direct you based on your parking preference.            Jun 28, 2017  8:00 AM CDT    (Arrive by 7:45 AM)   RETURN SHOULDER with Jossy Swanson MD   [facility] ([facility])    [address] [id number] [phone]            Jun 29, 2017 12:20 PM CDT   (Arrive by 12:05 PM)   Return Visit with Eber Mcmillan MD   [facility]rology and [facility] for Prostate and Urologic Cancers ([facility])    [address] [place] [id number] [phone]            Jul 26, 2017  8:00 AM CDT   (Arrive by 7:45 AM)   RETURN SHOULDER with Jossy Swanson MD   [facility] ([facility])    [address] [place] [id number] [phone]              Who to contact     Please call your clinic at [phone] to:    Ask questions about your health    Make or cancel appointments    Discuss your medicines    Learn about your test results    Speak to your doctor   If you have compliments or concerns about an experience at your clinic, or if you wish to file a complaint, please contact [facility] Patient Relations at [phone] or email us at [email or web address]         Additional Information About Your Visit        [employer] Information     [facility] gives you secure access to your electronic health record. If you see a primary care provider, you can also send messages to your care team and make appointments. If you have questions, please call your primary care clinic.  If you do not have a primary care provider, please call [phone] and they will assist you.      [facility] is an electronic gateway that provides easy, online access to your medical records. With [facility], you can request a clinic appointment, read your test results, renew a prescription or communicate with your care team.     To access your existing account, please contact your [facility] or call [phone] for assistance.        Care EveryWhere ID     This is your Care EveryWhere ID. This could be used by other organizations to access your [facility] medical records  [id number]        Your Vitals Were     Pulse Height BMI (Body Mass Index)             79 1.753 m (5' 9\") 29.83 kg/m2          Blood Pressure from Last 3 Encounters:   06/08/17 (!) 132/91   05/31/17 (!) 157/94   02/09/17 (!) 157/105    Weight from Last 3 Encounters:   06/08/17 91.6 kg (202 lb)   05/31/17 91.6 kg (201 lb 14.4 oz)   03/29/17 89.2 kg (196 lb 11.2 oz)              We Performed the Following     BIOPSY PROSTATE NEEDLE/PUNCH     US GUIDE FOR NEEDLE PLACEMENT     US TRANSRECTAL        Primary Care Provider Office Phone # Fax #    Oneyda Jones [phone]       [place] [address]        Thank you!     Thank you for choosing [facility] UROLOGY AND [facility] FOR PROSTATE AND UROLOGIC CANCERS  for your care. Our goal is always to provide you with excellent care. Hearing back from our patients is one way we can continue to improve our services. Please take a few minutes to complete the written survey that you may receive in the mail after your visit with us. Thank you!             Your Updated Medication List - Protect others around you: Learn how to safely use, store and throw away your medicines at www.disposemymeds.org.          This list is accurate as of: 6/8/17  9:15 AM.  Always use your most recent med list.                   Brand Name Dispense Instructions for use    albuterol 108 (90 BASE) MCG/ACT Inhaler    PROAIR HFA/PROVENTIL HFA/VENTOLIN HFA     Inhale 2 puffs into the lungs every 4 hours as needed for shortness of breath / dyspnea or wheezing       AMBIEN 5 MG tablet   Generic drug:  zolpidem      Take 5 mg by mouth nightly as needed for sleep       ammonium lactate 12 % cream    AMLACTIN     Apply topically daily as needed       " aspirin-acetaminophen-caffeine 250-250-65 MG per tablet    EXCEDRIN MIGRAINE     Take 2 tablets by mouth every 6 hours as needed for headaches       BENADRYL ALLERGY PO      Take 25-50 mg by mouth daily as needed (allergies)       camphor-menthol 0.5-0.5 % Lotn    DERMASARRA     Apply topically every 6 hours as needed for skin care       CARTIA  MG 24 hr capsule   Generic drug:  diltiazem      Take 120 mg by mouth every morning       cetirizine HCl 10 MG Caps      Take 10 mg by mouth daily       fish oil-omega-3 fatty acids 1000 MG capsule      Take 1 gram by mouth in the morning and take 2 grams by mouth in the evening.       gabapentin 100 MG capsule    NEURONTIN     Take 400 mg by mouth at night  (occasionally takes an extra pill to make 500 mg by mouth at night)       hydrochlorothiazide 25 MG tablet    HYDRODIURIL     Take 25 mg by mouth every morning       hydrocortisone 1 % cream    CORTAID     Apply topically 2 times daily as needed       IBUPROFEN PO      Take 800 mg by mouth every 8 hours as needed       meclizine 12.5 MG tablet    ANTIVERT    30 tablet    Take 2 tablets (25 mg) by mouth 4 times daily as needed for dizziness       NONFORMULARY      Drug: Ibuprofen gel Apply topically to L shoulder as needed.       PHENYLEPHRINE HCL PO      Take 10 mg by mouth every 4 hours as needed       QVAR 80 MCG/ACT Inhaler   Generic drug:  beclomethasone      Inhale 2 puffs twice daily as needed when allergies flare up       rizatriptan 10 MG ODT tab    MAXALT-MLT     Take 10 mg by mouth at onset of headache       SUMAtriptan Succinate Refill 6 MG/0.5ML Soct    IMITREX     Inject 6 mg into the muscle every 12 hours as needed       TRAMADOL HCL PO      Take 1 tablet by mouth every 6 hours as needed for moderate to severe pain       TYLENOL PO      Take 1,000 mg by mouth every 6 hours as needed       VITAMIN B-12 PO      Take 1 tablet by mouth every evening       VITAMIN D3 MAXIMUM STRENGTH 5000 UNITS Caps    Generic drug:  cholecalciferol      Take 5,000 Units by mouth every morning       ZOFRAN ODT PO      Take 4 mg by mouth every 8 hours as needed for nausea

## 2017-06-08 NOTE — NURSING NOTE
"Chief Complaint   Patient presents with     RECHECK     Prostate cancer follow up with prostate biopsy       Initial Ht 1.753 m (5' 9\")  Wt 91.6 kg (202 lb)  BMI 29.83 kg/m2 Estimated body mass index is 29.83 kg/(m^2) as calculated from the following:    Height as of this encounter: 1.753 m (5' 9\").    Weight as of this encounter: 91.6 kg (202 lb).  Medication Reconciliation: complete     DAV Zaman    "

## 2017-06-08 NOTE — PROGRESS NOTES
Miles Valencia is here for a transrectal US guided biopsy of the prostate for follow up on acticve surveillance    Prostate cancer history as below    Initial PSA:2.8  Biopsy [place] Score was 3 + 3 on 7/28/16  Pathologic Stage T1c     Risk Group: Low  Normal MRI 2016  Sacral lesion (confirmed negative with bone scan)  Prostate volume 35 on MRI 11/2016    The risks, benefits, alternatives, and personnel involved in the procudure were discussed.  All questions were answered.  A written informed consent was obtained.      PSA   Date Value Ref Range Status   05/31/2017 6.91 (H) 0 - 4 ug/L Final     Comment:     Assay Method:  Chemiluminescence using Siemens Vista analyzer   02/06/2017 7.39 (H) 0 - 4 ug/L Final     Comment:     Assay Method:  Chemiluminescence using Siemens Vista analyzer   11/01/2016 9.56 (H) 0 - 4 ug/L Final     Comment:     Assay Method:  Chemiluminescence using Siemens Vista analyzer   09/23/2016 6.53 (H) 0 - 4 ug/L Final     Comment:     Assay Method:  Chemiluminescence using Siemens Vista analyzer       An enema was completed and 500 mg of Cipro was given prior to the biopsy.  After obtaining informed consent patient was placed in lateral decubitus position.  The ultrasound probe was placed in the rectum.  The prostate was numbed using ultrasound guidance with 1% lidocaine 5 mls along each nerve bundle.  The volume was measured and estimated to be 36 grams.  US images were used to guide the biopsies of the prostate.  12 cores were taken with 6 on each side, 2 at the base,  2 at the midgland and  2 at the apex.  The patient tolerated the procedure well.      We will follow up with the results in 7-10 days and contact patient with these results.        Eber Mcmillan MD  Department of Urology Staff  [facility]

## 2017-06-08 NOTE — PATIENT INSTRUCTIONS
Follow up with Dr. Mcmillan on 6-29-17 to discuss results of prostate biopsy.    It was a pleasure meeting with you today.  Thank you for allowing me and my team the privilege of caring for you today.  YOU are the reason we are here, and I truly hope we provided you with the excellent service you deserve.  Please let us know if there is anything else we can do for you so that we can be sure you are leaving completely satisfied with your care experience.      Taryn Deshpande, [facility]          [place] for Prostate and Urologic Cancers  Precautions Following a Prostate Biopsy    There are four conditions that you should watch for after a prostate biopsy:    1. Excessive pain  2. Bleeding irregularities (passing numerous  dime sized  clots or if your urine looks like cranberry juice)  3. Fever of 100 degrees or more  4. If you are unable to urinate        If any of these occur, call the Urology Clinic during normal business hours (M-F, 8:00-4:30) at [phone].  If you experience a problem after normal business hours, call our 24-hour phone number at [phone] and ask for the Urology Resident on call to be paged.      If you experience any discomfort following the biopsy, you may take Tylenol.  DO NOT TAKE ASPIRIN unless specified by your physician.   If the discomfort becomes severe or uncontrolled by medication, contact the Urology Clinic or Urology Resident (after normal business hours).      Do not be alarmed if you have some blood in your stool, in your urine, or ejaculate (semen).  This occurrence is normal and may last up to three (3) or four (4) days, usually intermittently.  Blood in the ejaculate (semen) may last several weeks, up to about a dozen ejaculations.  The blood in your ejaculate may appear as brown streaks, blood tinged, and immediately following a biopsy, it may appear bright red.      If you run a fever above 100 degrees, call the Urology Clinic or Urology Resident (after normal business hours)  immediately.  If you are unable to reach your physician or the Resident on call, go to the nearest emergency room.  Explain that you have had a transrectal biopsy of your prostate and what problems you are experiencing.        You should attempt to urinate following your biopsy before you leave the clinic.  If you are unable to urinate four (4) to six (6) hours after you leave the clinic, you will need to contact the Urology Clinic or the Resident on call.  If you are unable to reach your physician or the Resident on call, go to the nearest emergency room.            If you have any questions or concerns after your biopsy, feel free to contact the Urology Clinic at [phone] during M-F, 8:00-5pm business hours.  If you need to speak with someone after normal business hours, call [phone] and ask for the Resident on call to be paged.

## 2017-06-08 NOTE — NURSING NOTE
The following medication was given:     MEDICATION: Gentamicin  ROUTE: IM  SITE: RUQ - Gluteus  DOSE: 80 mg  LOT #: [id number]  :  [employer]  EXPIRATION DATE:  07/2018  NDC#: 06607-461-99    Patient tolerated injection well.    DAV Zaman

## 2017-06-09 LAB — COPATH REPORT: NORMAL

## 2017-06-13 ENCOUNTER — ANESTHESIA (OUTPATIENT)
Dept: SURGERY | Facility: AMBULATORY SURGERY CENTER | Age: 52
End: 2017-06-13

## 2017-06-13 ENCOUNTER — SURGERY (OUTPATIENT)
Age: 52
End: 2017-06-13

## 2017-06-13 ENCOUNTER — HOSPITAL ENCOUNTER (OUTPATIENT)
Facility: AMBULATORY SURGERY CENTER | Age: 52
End: 2017-06-13
Attending: ORTHOPAEDIC SURGERY

## 2017-06-13 VITALS
RESPIRATION RATE: 14 BRPM | BODY MASS INDEX: 28.51 KG/M2 | DIASTOLIC BLOOD PRESSURE: 71 MMHG | HEIGHT: 69 IN | OXYGEN SATURATION: 95 % | WEIGHT: 192.5 LBS | HEART RATE: 80 BPM | TEMPERATURE: 97 F | SYSTOLIC BLOOD PRESSURE: 120 MMHG

## 2017-06-13 DIAGNOSIS — Z96.612 H/O TOTAL SHOULDER REPLACEMENT, LEFT: ICD-10-CM

## 2017-06-13 DIAGNOSIS — Z96.612 STATUS POST TOTAL SHOULDER ARTHROPLASTY, LEFT: Primary | ICD-10-CM

## 2017-06-13 DEVICE — IMPLANTABLE DEVICE: Type: IMPLANTABLE DEVICE | Site: SHOULDER | Status: FUNCTIONAL

## 2017-06-13 RX ORDER — ONDANSETRON 4 MG/1
4 TABLET, ORALLY DISINTEGRATING ORAL EVERY 30 MIN PRN
Status: DISCONTINUED | OUTPATIENT
Start: 2017-06-13 | End: 2017-06-14 | Stop reason: HOSPADM

## 2017-06-13 RX ORDER — LIDOCAINE HYDROCHLORIDE 20 MG/ML
INJECTION, SOLUTION INFILTRATION; PERINEURAL PRN
Status: DISCONTINUED | OUTPATIENT
Start: 2017-06-13 | End: 2017-06-13

## 2017-06-13 RX ORDER — OXYCODONE HYDROCHLORIDE 5 MG/1
5-10 TABLET ORAL
Status: DISCONTINUED | OUTPATIENT
Start: 2017-06-13 | End: 2017-06-14 | Stop reason: HOSPADM

## 2017-06-13 RX ORDER — MORPHINE SULFATE 4 MG/ML
4 INJECTION, SOLUTION INTRAMUSCULAR; INTRAVENOUS EVERY 4 HOURS PRN
Qty: 4 ML | Refills: 0 | Status: SHIPPED | OUTPATIENT
Start: 2017-06-13 | End: 2017-07-26

## 2017-06-13 RX ORDER — HYDRALAZINE HYDROCHLORIDE 20 MG/ML
2.5-5 INJECTION INTRAMUSCULAR; INTRAVENOUS EVERY 10 MIN PRN
Status: DISCONTINUED | OUTPATIENT
Start: 2017-06-13 | End: 2017-06-14 | Stop reason: HOSPADM

## 2017-06-13 RX ORDER — DEXAMETHASONE SODIUM PHOSPHATE 4 MG/ML
INJECTION, SOLUTION INTRA-ARTICULAR; INTRALESIONAL; INTRAMUSCULAR; INTRAVENOUS; SOFT TISSUE PRN
Status: DISCONTINUED | OUTPATIENT
Start: 2017-06-13 | End: 2017-06-13

## 2017-06-13 RX ORDER — ONDANSETRON 2 MG/ML
4 INJECTION INTRAMUSCULAR; INTRAVENOUS EVERY 30 MIN PRN
Status: DISCONTINUED | OUTPATIENT
Start: 2017-06-13 | End: 2017-06-14 | Stop reason: HOSPADM

## 2017-06-13 RX ORDER — BUPIVACAINE HYDROCHLORIDE 2.5 MG/ML
INJECTION, SOLUTION INFILTRATION; PERINEURAL PRN
Status: DISCONTINUED | OUTPATIENT
Start: 2017-06-13 | End: 2017-06-13 | Stop reason: HOSPADM

## 2017-06-13 RX ORDER — ACETAMINOPHEN 325 MG/1
975 TABLET ORAL ONCE
Status: DISCONTINUED | OUTPATIENT
Start: 2017-06-13 | End: 2017-06-14 | Stop reason: HOSPADM

## 2017-06-13 RX ORDER — ONDANSETRON 4 MG/1
4 TABLET, ORALLY DISINTEGRATING ORAL EVERY 6 HOURS PRN
Status: DISCONTINUED | OUTPATIENT
Start: 2017-06-13 | End: 2017-06-14 | Stop reason: HOSPADM

## 2017-06-13 RX ORDER — MAGNESIUM HYDROXIDE 1200 MG/15ML
LIQUID ORAL PRN
Status: DISCONTINUED | OUTPATIENT
Start: 2017-06-13 | End: 2017-06-13 | Stop reason: HOSPADM

## 2017-06-13 RX ORDER — VANCOMYCIN HCL 900 MCG/MG
POWDER (GRAM) MISCELLANEOUS PRN
Status: DISCONTINUED | OUTPATIENT
Start: 2017-06-13 | End: 2017-06-13 | Stop reason: HOSPADM

## 2017-06-13 RX ORDER — SODIUM CHLORIDE, SODIUM LACTATE, POTASSIUM CHLORIDE, CALCIUM CHLORIDE 600; 310; 30; 20 MG/100ML; MG/100ML; MG/100ML; MG/100ML
INJECTION, SOLUTION INTRAVENOUS CONTINUOUS
Status: DISCONTINUED | OUTPATIENT
Start: 2017-06-13 | End: 2017-06-14 | Stop reason: HOSPADM

## 2017-06-13 RX ORDER — NALOXONE HYDROCHLORIDE 0.4 MG/ML
.1-.4 INJECTION, SOLUTION INTRAMUSCULAR; INTRAVENOUS; SUBCUTANEOUS
Status: DISCONTINUED | OUTPATIENT
Start: 2017-06-13 | End: 2017-06-14 | Stop reason: HOSPADM

## 2017-06-13 RX ORDER — GABAPENTIN 300 MG/1
300 CAPSULE ORAL ONCE
Status: COMPLETED | OUTPATIENT
Start: 2017-06-13 | End: 2017-06-13

## 2017-06-13 RX ORDER — LIDOCAINE 40 MG/G
CREAM TOPICAL
Status: DISCONTINUED | OUTPATIENT
Start: 2017-06-13 | End: 2017-06-14 | Stop reason: HOSPADM

## 2017-06-13 RX ORDER — GABAPENTIN 300 MG/1
300 CAPSULE ORAL 3 TIMES DAILY
Qty: 60 CAPSULE | Refills: 0 | Status: SHIPPED | OUTPATIENT
Start: 2017-06-13

## 2017-06-13 RX ORDER — AMOXICILLIN 250 MG
1-2 CAPSULE ORAL 2 TIMES DAILY
Status: DISCONTINUED | OUTPATIENT
Start: 2017-06-13 | End: 2017-06-14 | Stop reason: HOSPADM

## 2017-06-13 RX ORDER — GABAPENTIN 300 MG/1
300 CAPSULE ORAL ONCE
Status: DISCONTINUED | OUTPATIENT
Start: 2017-06-13 | End: 2017-06-14 | Stop reason: HOSPADM

## 2017-06-13 RX ORDER — ACETAMINOPHEN 160 MG
TABLET,DISINTEGRATING ORAL PRN
Status: DISCONTINUED | OUTPATIENT
Start: 2017-06-13 | End: 2017-06-13 | Stop reason: HOSPADM

## 2017-06-13 RX ORDER — BUPIVACAINE HYDROCHLORIDE 5 MG/ML
INJECTION, SOLUTION EPIDURAL; INTRACAUDAL PRN
Status: DISCONTINUED | OUTPATIENT
Start: 2017-06-13 | End: 2017-06-13

## 2017-06-13 RX ORDER — ONDANSETRON 4 MG/1
4-8 TABLET, ORALLY DISINTEGRATING ORAL EVERY 8 HOURS PRN
Qty: 20 TABLET | Refills: 1 | Status: SHIPPED | OUTPATIENT
Start: 2017-06-13 | End: 2017-09-06

## 2017-06-13 RX ORDER — GLYCOPYRROLATE 0.2 MG/ML
INJECTION, SOLUTION INTRAMUSCULAR; INTRAVENOUS PRN
Status: DISCONTINUED | OUTPATIENT
Start: 2017-06-13 | End: 2017-06-13

## 2017-06-13 RX ORDER — ACETAMINOPHEN 325 MG/1
975 TABLET ORAL EVERY 8 HOURS
Status: DISCONTINUED | OUTPATIENT
Start: 2017-06-13 | End: 2017-06-14 | Stop reason: HOSPADM

## 2017-06-13 RX ORDER — CELECOXIB 200 MG/1
200 CAPSULE ORAL ONCE
Status: COMPLETED | OUTPATIENT
Start: 2017-06-13 | End: 2017-06-13

## 2017-06-13 RX ORDER — OXYCODONE HYDROCHLORIDE 5 MG/1
5-10 TABLET ORAL
Qty: 75 TABLET | Refills: 0 | Status: SHIPPED | OUTPATIENT
Start: 2017-06-13 | End: 2017-12-14

## 2017-06-13 RX ORDER — FENTANYL CITRATE 50 UG/ML
25-50 INJECTION, SOLUTION INTRAMUSCULAR; INTRAVENOUS
Status: DISCONTINUED | OUTPATIENT
Start: 2017-06-13 | End: 2017-06-14 | Stop reason: HOSPADM

## 2017-06-13 RX ORDER — CLINDAMYCIN PHOSPHATE 900 MG/50ML
900 INJECTION, SOLUTION INTRAVENOUS EVERY 8 HOURS
Status: DISCONTINUED | OUTPATIENT
Start: 2017-06-13 | End: 2017-06-14 | Stop reason: HOSPADM

## 2017-06-13 RX ORDER — KETOROLAC TROMETHAMINE 30 MG/ML
INJECTION, SOLUTION INTRAMUSCULAR; INTRAVENOUS PRN
Status: DISCONTINUED | OUTPATIENT
Start: 2017-06-13 | End: 2017-06-13

## 2017-06-13 RX ORDER — MEPERIDINE HYDROCHLORIDE 25 MG/ML
12.5 INJECTION INTRAMUSCULAR; INTRAVENOUS; SUBCUTANEOUS
Status: DISCONTINUED | OUTPATIENT
Start: 2017-06-13 | End: 2017-06-14 | Stop reason: HOSPADM

## 2017-06-13 RX ORDER — SODIUM CHLORIDE, SODIUM LACTATE, POTASSIUM CHLORIDE, CALCIUM CHLORIDE 600; 310; 30; 20 MG/100ML; MG/100ML; MG/100ML; MG/100ML
INJECTION, SOLUTION INTRAVENOUS CONTINUOUS PRN
Status: DISCONTINUED | OUTPATIENT
Start: 2017-06-13 | End: 2017-06-13

## 2017-06-13 RX ORDER — ACETAMINOPHEN 325 MG/1
650 TABLET ORAL EVERY 4 HOURS PRN
Status: DISCONTINUED | OUTPATIENT
Start: 2017-06-16 | End: 2017-06-14 | Stop reason: HOSPADM

## 2017-06-13 RX ORDER — FLUMAZENIL 0.1 MG/ML
0.2 INJECTION, SOLUTION INTRAVENOUS
Status: DISCONTINUED | OUTPATIENT
Start: 2017-06-13 | End: 2017-06-14 | Stop reason: HOSPADM

## 2017-06-13 RX ORDER — LABETALOL HYDROCHLORIDE 5 MG/ML
10 INJECTION, SOLUTION INTRAVENOUS
Status: DISCONTINUED | OUTPATIENT
Start: 2017-06-13 | End: 2017-06-14 | Stop reason: HOSPADM

## 2017-06-13 RX ORDER — ONDANSETRON 2 MG/ML
4 INJECTION INTRAMUSCULAR; INTRAVENOUS EVERY 6 HOURS PRN
Status: DISCONTINUED | OUTPATIENT
Start: 2017-06-13 | End: 2017-06-14 | Stop reason: HOSPADM

## 2017-06-13 RX ORDER — PROPOFOL 10 MG/ML
INJECTION, EMULSION INTRAVENOUS CONTINUOUS PRN
Status: DISCONTINUED | OUTPATIENT
Start: 2017-06-13 | End: 2017-06-13

## 2017-06-13 RX ORDER — FENTANYL CITRATE 50 UG/ML
INJECTION, SOLUTION INTRAMUSCULAR; INTRAVENOUS PRN
Status: DISCONTINUED | OUTPATIENT
Start: 2017-06-13 | End: 2017-06-13

## 2017-06-13 RX ORDER — ACETAMINOPHEN 325 MG/1
975 TABLET ORAL ONCE
Status: COMPLETED | OUTPATIENT
Start: 2017-06-13 | End: 2017-06-13

## 2017-06-13 RX ORDER — KETOROLAC TROMETHAMINE 10 MG/1
10 TABLET, FILM COATED ORAL EVERY 6 HOURS PRN
Qty: 20 TABLET | Refills: 0 | Status: SHIPPED | OUTPATIENT
Start: 2017-06-13 | End: 2017-06-28

## 2017-06-13 RX ORDER — ONDANSETRON 2 MG/ML
INJECTION INTRAMUSCULAR; INTRAVENOUS PRN
Status: DISCONTINUED | OUTPATIENT
Start: 2017-06-13 | End: 2017-06-13

## 2017-06-13 RX ORDER — GABAPENTIN 300 MG/1
300 CAPSULE ORAL 2 TIMES DAILY
Status: DISCONTINUED | OUTPATIENT
Start: 2017-06-13 | End: 2017-06-14 | Stop reason: HOSPADM

## 2017-06-13 RX ORDER — CEFAZOLIN SODIUM 1 G/3ML
1 INJECTION, POWDER, FOR SOLUTION INTRAMUSCULAR; INTRAVENOUS SEE ADMIN INSTRUCTIONS
Status: DISCONTINUED | OUTPATIENT
Start: 2017-06-13 | End: 2017-06-14 | Stop reason: HOSPADM

## 2017-06-13 RX ORDER — PROPOFOL 10 MG/ML
INJECTION, EMULSION INTRAVENOUS PRN
Status: DISCONTINUED | OUTPATIENT
Start: 2017-06-13 | End: 2017-06-13

## 2017-06-13 RX ADMIN — FENTANYL CITRATE 50 MCG: 50 INJECTION, SOLUTION INTRAMUSCULAR; INTRAVENOUS at 08:02

## 2017-06-13 RX ADMIN — DEXAMETHASONE SODIUM PHOSPHATE 4 MG: 4 INJECTION, SOLUTION INTRA-ARTICULAR; INTRALESIONAL; INTRAMUSCULAR; INTRAVENOUS; SOFT TISSUE at 08:05

## 2017-06-13 RX ADMIN — SODIUM CHLORIDE, SODIUM LACTATE, POTASSIUM CHLORIDE, CALCIUM CHLORIDE: 600; 310; 30; 20 INJECTION, SOLUTION INTRAVENOUS at 07:58

## 2017-06-13 RX ADMIN — BUPIVACAINE HYDROCHLORIDE 10 ML: 5 INJECTION, SOLUTION EPIDURAL; INTRACAUDAL at 07:55

## 2017-06-13 RX ADMIN — LIDOCAINE HYDROCHLORIDE 80 MG: 20 INJECTION, SOLUTION INFILTRATION; PERINEURAL at 08:04

## 2017-06-13 RX ADMIN — FENTANYL CITRATE 25 MCG: 50 INJECTION, SOLUTION INTRAMUSCULAR; INTRAVENOUS at 09:13

## 2017-06-13 RX ADMIN — ACETAMINOPHEN 975 MG: 325 TABLET ORAL at 08:06

## 2017-06-13 RX ADMIN — ONDANSETRON 4 MG: 2 INJECTION INTRAMUSCULAR; INTRAVENOUS at 10:20

## 2017-06-13 RX ADMIN — MAGNESIUM HYDROXIDE 1000 ML: 1200 LIQUID ORAL at 08:48

## 2017-06-13 RX ADMIN — PROPOFOL 250 MG: 10 INJECTION, EMULSION INTRAVENOUS at 08:04

## 2017-06-13 RX ADMIN — GLYCOPYRROLATE 0.2 MG: 0.2 INJECTION, SOLUTION INTRAMUSCULAR; INTRAVENOUS at 08:02

## 2017-06-13 RX ADMIN — GABAPENTIN 300 MG: 300 CAPSULE ORAL at 08:07

## 2017-06-13 RX ADMIN — Medication 1 G: at 08:48

## 2017-06-13 RX ADMIN — Medication 17 ML: at 08:46

## 2017-06-13 RX ADMIN — KETOROLAC TROMETHAMINE 15 MG: 30 INJECTION, SOLUTION INTRAMUSCULAR; INTRAVENOUS at 10:47

## 2017-06-13 RX ADMIN — PROPOFOL 250 MCG/KG/MIN: 10 INJECTION, EMULSION INTRAVENOUS at 08:04

## 2017-06-13 RX ADMIN — SODIUM CHLORIDE, SODIUM LACTATE, POTASSIUM CHLORIDE, CALCIUM CHLORIDE: 600; 310; 30; 20 INJECTION, SOLUTION INTRAVENOUS at 08:07

## 2017-06-13 RX ADMIN — BUPIVACAINE HYDROCHLORIDE 10 ML: 2.5 INJECTION, SOLUTION INFILTRATION; PERINEURAL at 10:27

## 2017-06-13 RX ADMIN — FENTANYL CITRATE 50 MCG: 50 INJECTION, SOLUTION INTRAMUSCULAR; INTRAVENOUS at 07:54

## 2017-06-13 RX ADMIN — SODIUM CHLORIDE, SODIUM LACTATE, POTASSIUM CHLORIDE, CALCIUM CHLORIDE: 600; 310; 30; 20 INJECTION, SOLUTION INTRAVENOUS at 10:55

## 2017-06-13 RX ADMIN — CLINDAMYCIN PHOSPHATE 900 MG: 900 INJECTION, SOLUTION INTRAVENOUS at 08:10

## 2017-06-13 RX ADMIN — CELECOXIB 200 MG: 200 CAPSULE ORAL at 08:08

## 2017-06-13 NOTE — ANESTHESIA POSTPROCEDURE EVALUATION
Patient: Miles Valencia    Procedure(s):  Left Total Shoulder Arthroplasty   - Wound Class: I-Clean    Diagnosis:Arthritis  Diagnosis Additional Information: No value filed.    Anesthesia Type:  General, Peripheral Nerve Block for post-op pain at surgeon's request    Note:  Anesthesia Post Evaluation    Patient location during evaluation: bedside  Patient participation: Able to participate in evaluation but full recovery from regional anesthesia has not yet occurred and is not anticipated to occur within 48 hours  Level of consciousness: awake  Pain management: adequate  Airway patency: patent  Cardiovascular status: acceptable  Respiratory status: acceptable  Hydration status: acceptable  PONV: controlled     Anesthetic complications: None          Last vitals:  Vitals:    06/13/17 1115 06/13/17 1130 06/13/17 1145   BP: 114/63 113/72    Pulse:      Resp: 16 16 14   Temp:  36.1  C (97  F)    SpO2: 96% 93% 98%         Electronically Signed By: Nikolay Evans MD, MD  June 13, 2017  12:28 PM

## 2017-06-13 NOTE — BRIEF OP NOTE
[facility] Orthopedic Brief Operative Note    Pre-operative diagnosis: Arthritis   Post-operative diagnosis: SAME   Procedure: Procedure(s):  ARTHROPLASTY SHOULDER   Surgeon: Jossy Swanson, *    Assistant(s): Toy Gutierrez MD   Anesthesia: General endotracheal anesthesia   Estimated blood loss: 200 ml   Total IV fluids: (See anesthesia record)   Total urine output: Not measured   Drains: left Hemovac   Specimens: None   Implants: See dictated operative report for full details   Findings: See dictated operative report for full details   Complications: None   Disposition:   Stable to PACU     POSTOPERATIVE CARE:  Activity: NWB LUE, No ROM of left shoulder, ok to range elbow, wrist, and fingers  Bracing: shoulder inmobilizer at all times, except for hygiene  Wound care: Change dressings on POD#5.  Drains: Remove drain when output <30 cc per shift x 2 shifts. For c-spine procedures, remove drain on POD#2.  Imaging: AP/lateral spine x-rays before d/c.   Diet: Clear liquids ADAT.   Prophylaxis: SCDs and ambulation for DVT. ISB for atelectasis/pneumonia. Aspirin 325mg  Dispo: Pend progression with PT/OT, pain control on orals, tolerating diet, to \[facility]\"" for outpatient follow up.       Toy Salamanca MD  Orthopaedic Surgery, PGY-1  Pager: [phone]

## 2017-06-13 NOTE — PROGRESS NOTES
Left interscalene block with exparel given at 0755. 1mg versed and 50mcg fentanyl given. Pt comfortable. Timeout performed before medication administration. Site marked and verified. VSS. EKG monitored. Ultrasound utilized. Pt denies pain. Pt tolerated well.

## 2017-06-13 NOTE — DISCHARGE INSTRUCTIONS
"[facility]  Home Care Following Anesthesia  For 24 hours after surgery:  1. Get plenty of rest.  A responsible adult must stay with you for at least 24 hours after you leave the surgery center.  2. Do not drive or use heavy equipment.  If you have weakness or tingling, don't drive or use heavy equipment until this feeling goes away.   3. Do not drink alcohol.   4. Avoid strenuous or risky activities.  Ask for help when climbing stairs.  5. You may feel lightheaded.  IF so, sit for a few minutes before standing.  Have someone help you get up.   6. If you have nausea (feel sick to your stomach): Drink only clear liquids such as apple juice, ginger ale, broth or 7-Up.  Rest may also help.  Be sure to drink enough fluids.  Move to a regular diet as you feel able.   7. You may have a slight fever.  Call the doctor if your fever is over 100 F (37.7 C) (taken under the tongue) or lasts longer than 24 hours.  8. You may have a dry mouth, a sore throat, muscle aches or trouble sleeping. These should go away after 24 hours.  9. Do not make important or legal decisions.        Today you received an Exparel block to numb the nerves near your surgery site.  This is a block using local anesthetic or \"numbing\" medication injected around the nerves to anesthetize or \"numb\" the area supplied by those nerves.  This block is injected into the muscle layer near your surgical site.  This medication may numb the location where you had surgery up to 72 hours.  If your surgical site is an arm or leg you should be careful with your affected limb, since it is possible to injure your limb without being aware of it due to the numbing.  Until full feeling returns, you should guard against bumping or hitting your limb, and avoid extreme hot or cold temperatures on the skin.  As the block wears off, the feeling will return as a tingling or prickly sensation near your surgical site.  You will experince more " discomfort from your incision as the feeling returns.  You may want to take a pain pill (a narcotic or Tylenol if this was prescribed by your surgeon) when you start to experience mild pain before the pain beomes more severe.  If your pain medications do not control your pain, you should notify your surgeon.    Tips for taking pain medications  To get the best pain relief possible, remember these points:    Take pain medications as directed, before pain becomes severe.    Pain medication can upset your stomach: taking it with food may help.    Constipation is a common side effect of pain medication. Drink plenty of  fluids.    Eat foods high in fiber. Take a stool softener if recommended by your doctor or pharmacist.    Do not drink alcohol, drive or operate machinery while taking pain medications.    Ask about other ways to control pain, such as with heat, ice or relaxation.    Call a doctor for any of the followin. Signs of infection (fever, growing tenderness at the surgery site, a large amount of drainage or bleeding, severe pain, foul-smelling drainage, redness, swelling).  2. It has been over 8 to 10 hours since surgery and you are still not able to urinate (pass water).  3. Headache for over 24 hours.  4. Numbness, tingling or weakness the day after surgery (if you had spinal anesthesia).      Your doctor is:  Dr. Jossy Swanson, Orthopaedics: [phone]  Or dial [phone] and ask for the resident on call for:  Orthopaedics  For emergency care, call the:  [facility] Emergency Department: [phone] (TTY for hearing impaired: [phone])    Total Shoulder Arthroplasty Discharge Instructions:  -No weight bearing with operative shoulder  -active range of motion with hand, wrist, and elbow. No active motion of shoulder  -Incentive spirometer every hour while awake  -Regular diet, drink plenty of fluids  -Ice to operative site on and off as much as possible for 48 hours, then 3 times/day after  that  -Wear sling at all times and elevate with a pillow while resting  -Pain medications as prescribed

## 2017-06-13 NOTE — ANESTHESIA PROCEDURE NOTES
Peripheral Nerve Block Procedure Note    Staff:     Anesthesiologist:  SEGUN RUELAS    Resident/CRNA:  GISELLE PEACOCK    Block performed by resident/CRNA in the presence of a teaching physician      Referred By:  BOBO DONOVAN  Location: Pre-op  Procedure Start/Stop TImes:      6/13/2017 7:45 AM     6/13/2017 7:55 AM    patient identified, IV checked, site marked, risks and benefits discussed, informed consent, monitors and equipment checked, pre-op evaluation, at physician/surgeon's request and post-op pain management      Correct Patient: Yes      Correct Position: Yes      Correct Site: Yes      Correct Procedure: Yes      Correct Laterality:  Yes    Site Marked:  Yes  Procedure details:     Procedure:  Interscalene    Laterality:  Left    Position:  Sitting    Sterile Prep: chloraprep, mask and sterile gloves      Local skin infiltration:  2% lidocaine    amount (mL):  2    Needle:  Short bevel    Needle gauge:  21    Needle length (inches):  4    Ultrasound: Yes      Ultrasound used to identify targeted nerve, plexus, or vascular structure and placed a needle adjacent to it      Permanent Image entered into patiient's record      Abnormal pain on injection: No      Blood Aspirated: No      Paresthesias:  No    Bleeding at site: No      Test dose negative for signs of intravascular injection: Yes      Bolus via:  Needle    Infusion Method:  Single Shot    Complications:  None  Assessment/Narrative:     Injection made incrementally with aspirations every (mL):  5     Less than 15 psi on monometer

## 2017-06-13 NOTE — ANESTHESIA CARE TRANSFER NOTE
Patient: Miles Valencia    Procedure(s):  Left Total Shoulder Arthroplasty   - Wound Class: I-Clean    Diagnosis: Arthritis  Diagnosis Additional Information: No value filed.    Anesthesia Type:   General, Peripheral Nerve Block for post-op pain at surgeon's request     Note:  Airway :Face Mask  Patient transferred to:PACU  Comments: Arrive PACU, Stable, Airway Intact  114/67, 84,14,93%,97.2  All questions answered.        Vitals: (Last set prior to Anesthesia Care Transfer)    CRNA VITALS  6/13/2017 1035 - 6/13/2017 1108      6/13/2017             Pulse: 81    SpO2: 98 %    Resp Rate (observed): 11                Electronically Signed By: FIDELIA Garcia CRNA  June 13, 2017  11:08 AM

## 2017-06-13 NOTE — IP AVS SNAPSHOT
[facility]    [address] [place] [id number]    Phone:  [phone]    Fax:  [phone]                                       After Visit Summary   6/13/2017    Miles Valencia    MRN: [id number]           After Visit Summary Signature Page     I have received my discharge instructions, and my questions have been answered. I have discussed any challenges I see with this plan with the nurse or doctor.    ..........................................................................................................................................  Patient/Patient Representative Signature      ..........................................................................................................................................  Patient Representative Print Name and Relationship to Patient    ..................................................               ................................................  Date                                            Time    ..........................................................................................................................................  Reviewed by Signature/Title    ...................................................              ..............................................  Date                                                            Time

## 2017-06-13 NOTE — PROGRESS NOTES
Pt arrived in PACU, VSS, afebrile. Dressing CDI. Hemovac draining to gravity, WDL. Nic stockings on. Taken to 4th floor for xray. Dr. Sky PEREZ'd eventual DC after reading xray. Pt doing great in phase II.  Raul at bedside. No meds given during entire recovery stay. DC instructions given, questions answered. Bedside handoff given to Анна (home care RN). Pt taken over to hotel via 's care to meet home care RN.

## 2017-06-13 NOTE — IP AVS SNAPSHOT
MRN:[id number]                      After Visit Summary   6/13/2017    Miles Valencia    MRN: [id number]           Thank you!     Thank you for choosing [facility] for your care. Our goal is always to provide you with excellent care. Hearing back from our patients is one way we can continue to improve our services. Please take a few minutes to complete the written survey that you may receive in the mail after you visit with us. Thank you!        Patient Information     Date Of Birth          1965        About your hospital stay     You were admitted on:  June 13, 2017 You last received care in the:  [facility]    You were discharged on:  June 13, 2017       Who to Call     For medical emergencies, please call 911.  For non-urgent questions about your medical care, please call your primary care provider or clinic, [phone]  For questions related to your surgery, please call your surgery clinic        Attending Provider     Provider Specialty    Jossy Swanson MD Orthopedics       Primary Care Provider Office Phone # Fax #    Oneyda Jones [phone]      Your next 10 appointments already scheduled     Jun 28, 2017  8:00 AM CDT   (Arrive by 7:45 AM)   RETURN SHOULDER with Jossy Swanson MD   [facility] ([facility] [place])    [address] [place] [id number] [phone]            Jun 29, 2017 12:20 PM CDT   (Arrive by 12:05 PM)   Return Visit with Eber Mcmillan MD   [facility] ([facility])    [address] [place] [id number] [phone]            Jul 26, 2017  8:00 AM CDT   (Arrive by 7:45 AM)   RETURN SHOULDER with Jossy Swanson MD   [facility] ([facility])    [address] [place] [address]  "[place] [id number] [phone]              Further instructions from your care team       [facility]  Home Care Following Anesthesia  For 24 hours after surgery:  1. Get plenty of rest.  A responsible adult must stay with you for at least 24 hours after you leave the surgery center.  2. Do not drive or use heavy equipment.  If you have weakness or tingling, don't drive or use heavy equipment until this feeling goes away.   3. Do not drink alcohol.   4. Avoid strenuous or risky activities.  Ask for help when climbing stairs.  5. You may feel lightheaded.  IF so, sit for a few minutes before standing.  Have someone help you get up.   6. If you have nausea (feel sick to your stomach): Drink only clear liquids such as apple juice, ginger ale, broth or 7-Up.  Rest may also help.  Be sure to drink enough fluids.  Move to a regular diet as you feel able.   7. You may have a slight fever.  Call the doctor if your fever is over 100 F (37.7 C) (taken under the tongue) or lasts longer than 24 hours.  8. You may have a dry mouth, a sore throat, muscle aches or trouble sleeping. These should go away after 24 hours.  9. Do not make important or legal decisions.        Today you received an Exparel block to numb the nerves near your surgery site.  This is a block using local anesthetic or \"numbing\" medication injected around the nerves to anesthetize or \"numb\" the area supplied by those nerves.  This block is injected into the muscle layer near your surgical site.  This medication may numb the location where you had surgery up to 72 hours.  If your surgical site is an arm or leg you should be careful with your affected limb, since it is possible to injure your limb without being aware of it due to the numbing.  Until full feeling returns, you should guard against bumping or hitting your limb, and avoid extreme hot or cold temperatures on the skin.  As the block wears off, the feeling will " return as a tingling or prickly sensation near your surgical site.  You will experince more discomfort from your incision as the feeling returns.  You may want to take a pain pill (a narcotic or Tylenol if this was prescribed by your surgeon) when you start to experience mild pain before the pain beomes more severe.  If your pain medications do not control your pain, you should notify your surgeon.    Tips for taking pain medications  To get the best pain relief possible, remember these points:    Take pain medications as directed, before pain becomes severe.    Pain medication can upset your stomach: taking it with food may help.    Constipation is a common side effect of pain medication. Drink plenty of  fluids.    Eat foods high in fiber. Take a stool softener if recommended by your doctor or pharmacist.    Do not drink alcohol, drive or operate machinery while taking pain medications.    Ask about other ways to control pain, such as with heat, ice or relaxation.    Call a doctor for any of the followin. Signs of infection (fever, growing tenderness at the surgery site, a large amount of drainage or bleeding, severe pain, foul-smelling drainage, redness, swelling).  2. It has been over 8 to 10 hours since surgery and you are still not able to urinate (pass water).  3. Headache for over 24 hours.  4. Numbness, tingling or weakness the day after surgery (if you had spinal anesthesia).      Your doctor is:  Dr. Jossy Swanson, Orthopaedics: [phone]  Or dial [phone] and ask for the resident on call for:  Orthopaedics  For emergency care, call the:  [facility] Emergency Department: [phone] (TTY for hearing impaired: [phone])    Total Shoulder Arthroplasty Discharge Instructions:  -No weight bearing with operative shoulder  -active range of motion with hand, wrist, and elbow. No active motion of shoulder  -Incentive spirometer every hour while awake  -Regular diet, drink plenty of fluids  -Ice  "to operative site on and off as much as possible for 48 hours, then 3 times/day after that  -Wear sling at all times and elevate with a pillow while resting  -Pain medications as prescribed                  Pending Results     No orders found from 6/11/2017 to 6/14/2017.            Admission Information     Date & Time Provider Department Dept. Phone    6/13/2017 Jossy Swanson MD [facility] [phone]      Your Vitals Were     Blood Pressure Pulse Temperature Respirations Height Weight    118/71 80 97  F (36.1  C) (Temporal) 14 1.753 m (5' 9\") 87.3 kg (192 lb 8 oz)    Pulse Oximetry BMI (Body Mass Index)                96% 28.43 kg/m2          [employer] Information     [employer] gives you secure access to your electronic health record. If you see a primary care provider, you can also send messages to your care team and make appointments. If you have questions, please call your primary care clinic.  If you do not have a primary care provider, please call [phone] and they will assist you.      [employer] is an electronic gateway that provides easy, online access to your medical records. With [employer], you can request a clinic appointment, read your test results, renew a prescription or communicate with your care team.     To access your existing account, please contact your [facility] or call [phone] for assistance.        Care EveryWhere ID     This is your Care EveryWhere ID. This could be used by other organizations to access your [facility] medical records  [id number]           Review of your medicines      START taking        Dose / Directions    ketorolac 10 MG tablet   Commonly known as:  TORADOL   Used for:  Status post total shoulder arthroplasty, left        Dose:  10 mg   Take 1 tablet (10 mg) by mouth every 6 hours as needed for moderate pain   Quantity:  20 tablet   Refills:  0       morphine (PF) 4 MG/ML injection   Used for:  Status " post total shoulder arthroplasty, left        Dose:  4 mg   Inject 4 mg into the muscle every 4 hours as needed for moderate to severe pain   Quantity:  4 mL   Refills:  0       oxyCODONE 5 MG IR tablet   Commonly known as:  ROXICODONE   Used for:  Status post total shoulder arthroplasty, left        Dose:  5-10 mg   Take 1-2 tablets (5-10 mg) by mouth every 3 hours as needed for moderate to severe pain   Quantity:  75 tablet   Refills:  0         CONTINUE these medicines which may have CHANGED, or have new prescriptions. If we are uncertain of the size of tablets/capsules you have at home, strength may be listed as something that might have changed.        Dose / Directions    gabapentin 300 MG capsule   Commonly known as:  NEURONTIN   This may have changed:  See the new instructions.   Used for:  Status post total shoulder arthroplasty, left        Dose:  300 mg   Take 1 capsule (300 mg) by mouth 3 times daily   Quantity:  60 capsule   Refills:  0       ondansetron 4 MG ODT tab   Commonly known as:  ZOFRAN ODT   This may have changed:  how much to take   Used for:  Status post total shoulder arthroplasty, left        Dose:  4-8 mg   Take 1-2 tablets (4-8 mg) by mouth every 8 hours as needed for nausea   Quantity:  20 tablet   Refills:  1         CONTINUE these medicines which have NOT CHANGED        Dose / Directions    albuterol 108 (90 BASE) MCG/ACT Inhaler   Commonly known as:  PROAIR HFA/PROVENTIL HFA/VENTOLIN HFA        Dose:  2 puff   Inhale 2 puffs into the lungs every 4 hours as needed for shortness of breath / dyspnea or wheezing   Refills:  0       AMBIEN 5 MG tablet   Generic drug:  zolpidem        Dose:  5 mg   Take 5 mg by mouth nightly as needed for sleep   Refills:  0       ammonium lactate 12 % cream   Commonly known as:  AMLACTIN        Apply topically daily as needed   Refills:  0       aspirin-acetaminophen-caffeine 250-250-65 MG per tablet   Commonly known as:  EXCEDRIN MIGRAINE        Dose:  2  tablet   Take 2 tablets by mouth every 6 hours as needed for headaches   Refills:  0       BENADRYL ALLERGY PO        Dose:  25-50 mg   Take 25-50 mg by mouth daily as needed (allergies)   Refills:  0       camphor-menthol 0.5-0.5 % Lotn   Commonly known as:  DERMASARRA        Apply topically every 6 hours as needed for skin care   Refills:  0       CARTIA  MG 24 hr capsule   Used for:  Elevated prostate specific antigen (PSA)   Generic drug:  diltiazem        Dose:  120 mg   Take 120 mg by mouth every morning   Refills:  0       cetirizine HCl 10 MG Caps        Dose:  10 mg   Take 10 mg by mouth daily   Refills:  0       fish oil-omega-3 fatty acids 1000 MG capsule        Take 1 gram by mouth in the morning and take 2 grams by mouth in the evening.   Refills:  0       hydrochlorothiazide 25 MG tablet   Commonly known as:  HYDRODIURIL        Dose:  25 mg   Take 25 mg by mouth every morning   Refills:  0       hydrocortisone 1 % cream   Commonly known as:  CORTAID        Apply topically 2 times daily as needed   Refills:  0       meclizine 12.5 MG tablet   Commonly known as:  ANTIVERT        Dose:  25 mg   Take 2 tablets (25 mg) by mouth 4 times daily as needed for dizziness   Quantity:  30 tablet   Refills:  0       MELATONIN PO        Dose:  1 mg   Take 1 mg by mouth nightly as needed   Refills:  0       PHENYLEPHRINE HCL PO        Dose:  10 mg   Take 10 mg by mouth every 4 hours as needed   Refills:  0       QVAR 80 MCG/ACT Inhaler   Generic drug:  beclomethasone        Inhale 2 puffs twice daily as needed when allergies flare up   Refills:  0       rizatriptan 10 MG ODT tab   Commonly known as:  MAXALT-MLT   Used for:  Elevated prostate specific antigen (PSA)        Dose:  10 mg   Take 10 mg by mouth at onset of headache   Refills:  6       SUMAtriptan Succinate Refill 6 MG/0.5ML Soct   Commonly known as:  IMITREX   Used for:  Elevated prostate specific antigen (PSA)        Dose:  6 mg   Inject 6 mg into  the muscle every 12 hours as needed   Refills:  1       TYLENOL PO        Dose:  1000 mg   Take 1,000 mg by mouth every 6 hours as needed   Refills:  0       VITAMIN B-12 PO        Dose:  1 tablet   Take 1 tablet by mouth every evening   Refills:  0       VITAMIN D3 MAXIMUM STRENGTH 5000 UNITS Caps   Generic drug:  cholecalciferol        Dose:  5000 Units   Take 5,000 Units by mouth every morning   Refills:  0         STOP taking     IBUPROFEN PO           NONFORMULARY           TRAMADOL HCL PO                Where to get your medicines      These medications were sent to [place], [place] - [address]  [address] [facility] [address] [address]    Hours:  TRANSPLANT PHONE NUMBER [phone] Phone:  [phone]     gabapentin 300 MG capsule    ketorolac 10 MG tablet    ondansetron 4 MG ODT tab         Some of these will need a paper prescription and others can be bought over the counter. Ask your nurse if you have questions.     Bring a paper prescription for each of these medications     morphine (PF) 4 MG/ML injection    oxyCODONE 5 MG IR tablet                Protect others around you: Learn how to safely use, store and throw away your medicines at www.disposemymeds.org.             Medication List: This is a list of all your medications and when to take them. Check marks below indicate your daily home schedule. Keep this list as a reference.      Medications           Morning Afternoon Evening Bedtime As Needed    albuterol 108 (90 BASE) MCG/ACT Inhaler   Commonly known as:  PROAIR HFA/PROVENTIL HFA/VENTOLIN HFA   Inhale 2 puffs into the lungs every 4 hours as needed for shortness of breath / dyspnea or wheezing                                AMBIEN 5 MG tablet   Take 5 mg by mouth nightly as needed for sleep   Generic drug:  zolpidem                                ammonium lactate 12 % cream   Commonly known as:  AMLACTIN   Apply topically daily as  needed                                aspirin-acetaminophen-caffeine 250-250-65 MG per tablet   Commonly known as:  EXCEDRIN MIGRAINE   Take 2 tablets by mouth every 6 hours as needed for headaches                                BENADRYL ALLERGY PO   Take 25-50 mg by mouth daily as needed (allergies)                                camphor-menthol 0.5-0.5 % Lotn   Commonly known as:  DERMASARRA   Apply topically every 6 hours as needed for skin care                                CARTIA  MG 24 hr capsule   Take 120 mg by mouth every morning   Generic drug:  diltiazem                                cetirizine HCl 10 MG Caps   Take 10 mg by mouth daily                                fish oil-omega-3 fatty acids 1000 MG capsule   Take 1 gram by mouth in the morning and take 2 grams by mouth in the evening.                                gabapentin 300 MG capsule   Commonly known as:  NEURONTIN   Take 1 capsule (300 mg) by mouth 3 times daily   Last time this was given:  300 mg on 6/13/2017  8:07 AM                                hydrochlorothiazide 25 MG tablet   Commonly known as:  HYDRODIURIL   Take 25 mg by mouth every morning                                hydrocortisone 1 % cream   Commonly known as:  CORTAID   Apply topically 2 times daily as needed                                ketorolac 10 MG tablet   Commonly known as:  TORADOL   Take 1 tablet (10 mg) by mouth every 6 hours as needed for moderate pain                                meclizine 12.5 MG tablet   Commonly known as:  ANTIVERT   Take 2 tablets (25 mg) by mouth 4 times daily as needed for dizziness                                MELATONIN PO   Take 1 mg by mouth nightly as needed                                morphine (PF) 4 MG/ML injection   Inject 4 mg into the muscle every 4 hours as needed for moderate to severe pain                                ondansetron 4 MG ODT tab   Commonly known as:  ZOFRAN ODT   Take 1-2 tablets (4-8 mg) by mouth  every 8 hours as needed for nausea                                oxyCODONE 5 MG IR tablet   Commonly known as:  ROXICODONE   Take 1-2 tablets (5-10 mg) by mouth every 3 hours as needed for moderate to severe pain                                PHENYLEPHRINE HCL PO   Take 10 mg by mouth every 4 hours as needed                                QVAR 80 MCG/ACT Inhaler   Inhale 2 puffs twice daily as needed when allergies flare up   Generic drug:  beclomethasone                                rizatriptan 10 MG ODT tab   Commonly known as:  MAXALT-MLT   Take 10 mg by mouth at onset of headache                                SUMAtriptan Succinate Refill 6 MG/0.5ML Soct   Commonly known as:  IMITREX   Inject 6 mg into the muscle every 12 hours as needed                                TYLENOL PO   Take 1,000 mg by mouth every 6 hours as needed   Last time this was given:  975 mg on 6/13/2017  8:06 AM                                VITAMIN B-12 PO   Take 1 tablet by mouth every evening                                VITAMIN D3 MAXIMUM STRENGTH 5000 UNITS Caps   Take 5,000 Units by mouth every morning   Generic drug:  cholecalciferol

## 2017-06-17 DIAGNOSIS — Z96.612 HISTORY OF TOTAL SHOULDER REPLACEMENT, LEFT: Primary | ICD-10-CM

## 2017-06-17 NOTE — OP NOTE
DATE OF PROCEDURE: 6/13/17    PREOPERATIVE DIAGNOSIS:  Left end-stage glenohumeral arthrosis.      POSTOPERATIVE DIAGNOSIS: Left end-stage glenohumeral arthrosis.      PROCEDURE:  Left total shoulder arthroplasty.      STAFF SURGEON:  Jossy Swanson MD   ASSISTANT: Toy Salamanca MD    ANESTHESIA:  General endotracheal anesthesia with supplementary interscalene block.      ESTIMATED BLOOD LOSS:  200mL.      IMPLANTS:    1) Biomet Comprehensive Shoulder stem 12x83, medium glenoid with Regenerex post, and humeral head 46x21 with offset at E posteriorly     BRIEF PATIENT HISTORY: Mr. Valencia is a 52-year-old with advanced left shoulder arthritis. He has radiographic evidence of joint space loss and a ring humeral osteophyte and in fact already has posterior glenoid wear. He has had non-operative treatment which have provided brief relief of symptoms, but they have not provided lasting relief of pain or improvement in motion.  For this reason, he wished to consider surgical intervention.  We discussed the risks and benefits of total shoulder arthroplasty and the patient wished to proceed with this surgery.  Informed consent was completed.      DESCRIPTION OF PROCEDURE:  The patient was identified in the preoperative area and correct left shoulder was marked for surgery.  He was provided an interscalene block by our anesthesia colleagues and was taken to the operating room where he was surrendered to general endotracheal anesthesia. He was moved to the operating room table in the beachchair position with all bony prominences well padded, and the head was placed in a head juarez with the neck in neutral position.  The left upper extremity was prepped and draped in the usual sterile fashion.  A timeout was held in accordance with hospital policy, confirming correct patient, side, site, procedure, placement of lower extremity, sequential compressive devices and administration of IV antibiotics prior to incision.       I completed a deltopectoral incision and approach.  I identified the cephalic vein and brought this medially.  I entered the deltopectoral interval and freed the subdeltoid adhesions.  I palpated the bursal surface of the rotator cuff and found it to be intact.  I palpated the axillary nerve medially and laterally performing a tug test confirming location and continuity of the nerve.  This was performed multiple times throughout the procedure including once prior to closure.  I opened the bicipital groove and rotator cuff interval and tenotomized the biceps for later tenodesis.  I released the subscapularis subperiosteally off of the lesser tuberosity as opposed to doing a lesser tuberosity osteotomy because of the posterior position of the humerus on the glenoid making the osteotomy less ideal.  I released the capsule along the inferior neck of the glenoid while protecting the axillary nerve.  I then was able to dislocate the humerus anteriorly.  The shoulder was extremely tight. There was eburnated bone over the central humeral head with only a small thin area of chondromalacia over the periphery of the humeral head.  I was then eventually able to enter the humeral canal and reamed up progressively.  I cut the humeral head in 30 degrees of retroversion and then impacted the trial stem in this version.  I removed the excess humeral osteophyte.  The humeral head sized to a 46.  I then turned my attention to the glenoid.  I released the anterior and inferior capsule which was quite thickened and scarred in.  I protected the axillary nerve during these releases.  I then performed an anterior inferior capsulectomy and released the labrum circumferentially.  The glenoid was certainly preferentially worn more posteriorly.  I placed my index finger along the anterior glenoid neck to guide the initial pin placement in neutral version.  I then reamed over this for a size medium glenoid.  The superior and 2 inferior peg  holes were then drilled.  All peripheral peg holes were then sequentially prepared with thrombin and then hydrogen peroxide-soaked sponges.  The holes were then sequentially pressurized with cement.  The final implant was impacted and seated securely and excess cement was removed.  The implant was held in place while the cement cured.  I then trialed humeral head components and a 46x21 was found to be most appropriate.  This allowed forward elevation to 165, external rotation at the side to 60 and internal rotation in abduction to 70 degrees with 50% bounce back. There was no excessive posterior translation.  I removed the trial humeral components and passed 3 sets of #2 Force Fiber around the lesser tuberosity.  The final stem was impacted through these and the final humeral head placed with maximal offset at 4:00.  The joint was then reduced with the same range of motion as previously noted.  The subscapularis was repaired back with the #2 force fiber sutures in racking hitch stitch fashion.  The biceps was then tenodesed.  The wound was copiously irrigated with a dilute betadine solution followed by saline. A deep Hemovac drain was placed.  One gram of Vanco powder was placed deep and superficial in the wound. I then infiltrated the subQ tissues of the incision with 10ml liposomal bupivacaine and 10ml 0.25% plain bupivacaine. The wound was closed in layered fashion with monofilament sutures.  Steri-Strips and a soft sterile dressing were applied.  The arm was placed into an abduction sling.  The patient was extubated and transported to recovery room in stable condition.  There were no apparent intraoperative complications.      POSTOPERATIVE PLAN:   1.  The patient will be allowed passive and active assisted range of motion with forward elevation to 140 and external rotation at the side to 40 -he will do forward elevation in the plane of the scapula and in supination.  He will be discharged to the hotel when he  meets discharge criteria.   2.  The patient will be allowed to transition to active range of motion at 6 weeks and discontinue the sling at that time.  He will be seen by me for wound check at 2 weeks.         BOBO DONOVAN MD

## 2017-06-22 ENCOUNTER — TELEPHONE (OUTPATIENT)
Dept: ORTHOPEDICS | Facility: CLINIC | Age: 52
End: 2017-06-22

## 2017-06-22 NOTE — TELEPHONE ENCOUNTER
Received a call from [facility]  desk regarding this patient.  He had shoulder surgery 6/13/17.  He is up at his cabin in [facility]>  He feels he may have thrush in his mouth. Instructions given to see a local Physician to diagnose and prescribe medication for this.

## 2017-06-26 ENCOUNTER — PRE VISIT (OUTPATIENT)
Dept: UROLOGY | Facility: CLINIC | Age: 52
End: 2017-06-26

## 2017-06-26 NOTE — TELEPHONE ENCOUNTER
Prostate cancer follow up- discuss recent bx results.  Records available in [facility].

## 2017-06-28 ENCOUNTER — OFFICE VISIT (OUTPATIENT)
Dept: ORTHOPEDICS | Facility: CLINIC | Age: 52
End: 2017-06-28

## 2017-06-28 DIAGNOSIS — Z96.612 HISTORY OF TOTAL SHOULDER REPLACEMENT, LEFT: Primary | ICD-10-CM

## 2017-06-28 RX ORDER — FLUCONAZOLE 50 MG/1
50 TABLET ORAL DAILY
COMMUNITY
End: 2017-08-20

## 2017-06-28 NOTE — LETTER
6/28/2017       RE: Miles Valencia  [address] [place] [address]     Dear Colleague,    Thank you for referring your patient, Miles Valencia, to the [facility] ORTHOP[facility]EDIC [facility]IC at [facility]. Please see a copy of my visit note below.    CHIEF COMPLAINT: Status post left total shoulder arthroplasty   DATE OF SURGERY: 6/13/17    HISTORY OF PRESENT ILLNESS: Miles is an extremely pleasant 52 year-old gentlamen who is 2 weeks status post the above procedure. He developed symptoms of oral thrush last week when in [place] and was seen at a local urgent care. He was given diflucan and another medication. His symptoms are improving and he will follow up with his primary physician if not fully resolved.    EXAM:  Pleasant adult male in no distress.  Respirations even and unlabored.  Left upper extremity: Incisions clean, dry, and intact. No erythema. No drainage. Sens intact Ax/Musc/Med/Rad/Uln nerves. Motor intact EPL, FPL, and Intrinsics. Shoulder range of motion tolerates passive FE in plane of scapula to at least 155. ER at side to 20.    ASSESSMENT:  1. Two weeks status post above procedure    PLAN:  I reviewed the intraop findings. We discussed the plan for rehabilitation.  He will start PT with Olya.  I will see the patient back in 4 weeks.    Again, thank you for allowing me to participate in the care of your patient.      Jossy Swanson MD

## 2017-06-28 NOTE — LETTER
6/28/2017      RE: Miles Valencia  [address] [place] [address]       CHIEF COMPLAINT: Status post left total shoulder arthroplasty   DATE OF SURGERY: 6/13/17    HISTORY OF PRESENT ILLNESS: Miles is an extremely pleasant 52 year-old gentlamen who is 2 weeks status post the above procedure. He developed symptoms of oral thrush last week when in [place] and was seen at a local urgent care. He was given diflucan and another medication. His symptoms are improving and he will follow up with his primary physician if not fully resolved.    EXAM:  Pleasant adult male in no distress.  Respirations even and unlabored.  Left upper extremity: Incisions clean, dry, and intact. No erythema. No drainage. Sens intact Ax/Musc/Med/Rad/Uln nerves. Motor intact EPL, FPL, and Intrinsics. Shoulder range of motion tolerates passive FE in plane of scapula to at least 155. ER at side to 20.    ASSESSMENT:  1. Two weeks status post above procedure    PLAN:  I reviewed the intraop findings. We discussed the plan for rehabilitation.  He will start PT with Olya.  I will see the patient back in 4 weeks.    Jossy Swanson MD

## 2017-06-28 NOTE — NURSING NOTE
Reason For Visit:   Chief Complaint   Patient presents with     Surgical Followup     DOS: 6.13.17 for Left Total Shoulder Arthroplasty.     PCP: Oneyda Jones  Ref: established     ?  No  Occupation  .  Currently working? No.  Work status?  On medical leave.  Date of injury: none  Date of surgery: 6.13.17  Type of surgery: Left TSA  Smoker: No        Right hand dominant    SANE score  Affected shoulder: Left  Right shoulder SANE: 100  Left shoulder SANE: 50    There were no vitals taken for this visit.      Pain Assessment  Patient Currently in Pain: Yes  0-10 Pain Scale: 2  Primary Pain Location: Shoulder  Pain Orientation: Left  Pain Descriptors: Aching  Alleviating Factors: NSAIDS, Ice, Rest, Pain medication, Other (comment) (Tylenol)  Aggravating Factors: Sitting

## 2017-06-28 NOTE — PROGRESS NOTES
CHIEF COMPLAINT: Status post left total shoulder arthroplasty   DATE OF SURGERY: 6/13/17    HISTORY OF PRESENT ILLNESS: Miles is an extremely pleasant 52 year-old gentlamen who is 2 weeks status post the above procedure. He developed symptoms of oral thrush last week when in [place] and was seen at a local urgent care. He was given diflucan and another medication. His symptoms are improving and he will follow up with his primary physician if not fully resolved.    EXAM:  Pleasant adult male in no distress.  Respirations even and unlabored.  Left upper extremity: Incisions clean, dry, and intact. No erythema. No drainage. Sens intact Ax/Musc/Med/Rad/Uln nerves. Motor intact EPL, FPL, and Intrinsics. Shoulder range of motion tolerates passive FE in plane of scapula to at least 155. ER at side to 20.    ASSESSMENT:  1. Two weeks status post above procedure    PLAN:  I reviewed the intraop findings. We discussed the plan for rehabilitation.  He will start PT with Olya.  I will see the patient back in 4 weeks.

## 2017-06-28 NOTE — MR AVS SNAPSHOT
After Visit Summary   6/28/2017    Miles Valencia    MRN: [id number]           Patient Information     Date Of Birth          1965        Visit Information        Provider Department      6/28/2017 8:00 AM Jossy Swanson MD [facility]        Today's Diagnoses     History of total shoulder replacement, left    -  1       Follow-ups after your visit        Your next 10 appointments already scheduled     Jun 29, 2017 12:20 PM CDT   (Arrive by 12:05 PM)   Return Visit with Eber Mcmillan MD   [facility] Urology and [facility] Prostate [facility] Urologic C[facility] ([facility])    [address] [place] [id number]   [phone]            Jul 26, 2017  8:00 AM CDT   (Arrive by 7:45 AM)   RETURN SHOULDER with Jossy Swanson MD   [facility] ([facility])    [address] [place] [id number] [phone]            Jul 31, 2017 10:10 AM CDT   SUDHA Extremity with Olya Rondon PT   [facility] ([facility]  )    [address] [place] [id number] [phone]            Aug 07, 2017 12:10 PM CDT   SUDHA Extremity with Olya Rondon PT   [facility] Therapy ([facility]  )    [address] [place] [id number] [phone]              Who to contact     Please call your clinic at [phone] to:    Ask questions about your health    Make or cancel appointments    Discuss your medicines    Learn about your test results    Speak to your doctor   If you have compliments or concerns about an experience at your clinic, or if you wish to file a complaint, please contact [facility] Patient Relations at [phone] or email us at [email or web address]         Additional Information About Your Visit         [employer] Information     [employer] gives you secure access to your electronic health record. If you see a primary care provider, you can also send messages to your care team and make appointments. If you have questions, please call your primary care clinic.  If you do not have a primary care provider, please call [phone] and they will assist you.      [employer] is an electronic gateway that provides easy, online access to your medical records. With [employer], you can request a clinic appointment, read your test results, renew a prescription or communicate with your care team.     To access your existing account, please contact your [facility] or call [phone] for assistance.        Care EveryWhere ID     This is your Care EveryWhere ID. This could be used by other organizations to access your [place] medical records  [id number]         Blood Pressure from Last 3 Encounters:   06/13/17 120/71   06/08/17 (!) 132/91   05/31/17 (!) 157/94    Weight from Last 3 Encounters:   06/13/17 87.3 kg (192 lb 8 oz)   06/08/17 91.6 kg (202 lb)   05/31/17 91.6 kg (201 lb 14.4 oz)              Today, you had the following     No orders found for display         Today's Medication Changes          These changes are accurate as of: 6/28/17  8:21 AM.  If you have any questions, ask your nurse or doctor.               These medicines have changed or have updated prescriptions.        Dose/Directions    gabapentin 300 MG capsule   Commonly known as:  NEURONTIN   This may have changed:  additional instructions   Used for:  Status post total shoulder arthroplasty, left        Dose:  300 mg   Take 1 capsule (300 mg) by mouth 3 times daily   Quantity:  60 capsule   Refills:  0         Stop taking these medicines if you haven't already. Please contact your care team if you have questions.     ketorolac 10 MG tablet   Commonly known as:  TORADOL                    Primary Care Provider Office Phone # Faw  #    Oneyda ROSS [facility] [phone]       [address] [address]        Equal Access to Services     SHANNAN SCHROEDER : Hadii aad ku hadmaggie Martínez, jess laishaun, akin katsephy leiva, lalo powell dominiquesarika duarte tasia szymanski. So [facility] [phone].    ATENCIÓN: Si habla español, tiene a wilson disposición servicios gratuitos de asistencia lingüística. Llame al [phone].    We comply with applicable federal civil rights laws and [place] laws. We do not discriminate on the basis of race, color, national origin, age, disability sex, sexual orientation or gender identity.            Thank you!     Thank you for choosing [facility]THOP[facility]IN[facility]  for your care. Our goal is always to provide you with excellent care. Hearing back from our patients is one way we can continue to improve our services. Please take a few minutes to complete the written survey that you may receive in the mail after your visit with us. Thank you!             Your Updated Medication List - Protect others around you: Learn how to safely use, store and throw away your medicines at www.disposemymeds.org.          This list is accurate as of: 6/28/17  8:21 AM.  Always use your most recent med list.                   Brand Name Dispense Instructions for use Diagnosis    albuterol 108 (90 BASE) MCG/ACT Inhaler    PROAIR HFA/PROVENTIL HFA/VENTOLIN HFA     Inhale 2 puffs into the lungs every 4 hours as needed for shortness of breath / dyspnea or wheezing        AMBIEN 5 MG tablet   Generic drug:  zolpidem      Take 5 mg by mouth nightly as needed for sleep        ammonium lactate 12 % cream    AMLACTIN     Apply topically daily as needed        aspirin-acetaminophen-caffeine 250-250-65 MG per tablet    EXCEDRIN MIGRAINE     Take 2 tablets by mouth every 6 hours as needed for headaches        BENADRYL ALLERGY PO      Take 25-50 mg by mouth daily as needed (allergies)        camphor-menthol  0.5-0.5 % Lotn    DERMASARRA     Apply topically every 6 hours as needed for skin care        CARTIA  MG 24 hr capsule   Generic drug:  diltiazem      Take 120 mg by mouth every morning    Elevated prostate specific antigen (PSA)       cetirizine HCl 10 MG Caps      Take 10 mg by mouth daily        fish oil-omega-3 fatty acids 1000 MG capsule      Take 1 gram by mouth in the morning and take 2 grams by mouth in the evening.        fluconazole 50 MG tablet    DIFLUCAN     Take 50 mg by mouth daily        gabapentin 300 MG capsule    NEURONTIN    60 capsule    Take 1 capsule (300 mg) by mouth 3 times daily    Status post total shoulder arthroplasty, left       hydrochlorothiazide 25 MG tablet    HYDRODIURIL     Take 25 mg by mouth every morning        hydrocortisone 1 % cream    CORTAID     Apply topically 2 times daily as needed        meclizine 12.5 MG tablet    ANTIVERT    30 tablet    Take 2 tablets (25 mg) by mouth 4 times daily as needed for dizziness        MELATONIN PO      Take 1 mg by mouth nightly as needed        morphine (PF) 4 MG/ML injection     4 mL    Inject 4 mg into the muscle every 4 hours as needed for moderate to severe pain    Status post total shoulder arthroplasty, left       ondansetron 4 MG ODT tab    ZOFRAN ODT    20 tablet    Take 1-2 tablets (4-8 mg) by mouth every 8 hours as needed for nausea    Status post total shoulder arthroplasty, left       oxyCODONE 5 MG IR tablet    ROXICODONE    75 tablet    Take 1-2 tablets (5-10 mg) by mouth every 3 hours as needed for moderate to severe pain    Status post total shoulder arthroplasty, left       PHENYLEPHRINE HCL PO      Take 10 mg by mouth every 4 hours as needed        QVAR 80 MCG/ACT Inhaler   Generic drug:  beclomethasone      Inhale 2 puffs twice daily as needed when allergies flare up        rizatriptan 10 MG ODT tab    MAXALT-MLT     Take 10 mg by mouth at onset of headache    Elevated prostate specific antigen (PSA)        SUMAtriptan Succinate Refill 6 MG/0.5ML Soct    IMITREX     Inject 6 mg into the muscle every 12 hours as needed    Elevated prostate specific antigen (PSA)       TYLENOL PO      Take 1,000 mg by mouth every 6 hours as needed        VITAMIN B-12 PO      Take 1 tablet by mouth every evening        VITAMIN D3 MAXIMUM STRENGTH 5000 UNITS Caps   Generic drug:  cholecalciferol      Take 5,000 Units by mouth every morning

## 2017-06-29 ENCOUNTER — OFFICE VISIT (OUTPATIENT)
Dept: UROLOGY | Facility: CLINIC | Age: 52
End: 2017-06-29

## 2017-06-29 VITALS
HEIGHT: 69 IN | HEART RATE: 79 BPM | DIASTOLIC BLOOD PRESSURE: 84 MMHG | WEIGHT: 200 LBS | SYSTOLIC BLOOD PRESSURE: 133 MMHG | BODY MASS INDEX: 29.62 KG/M2

## 2017-06-29 DIAGNOSIS — C61 PROSTATE CANCER (H): Primary | ICD-10-CM

## 2017-06-29 ASSESSMENT — PAIN SCALES - GENERAL: PAINLEVEL: NO PAIN (0)

## 2017-06-29 NOTE — PATIENT INSTRUCTIONS
Follow up with Dr. Mcmillan in 6 months with PSA.    It was a pleasure meeting with you today.  Thank you for allowing me and my team the privilege of caring for you today.  YOU are the reason we are here, and I truly hope we provided you with the excellent service you deserve.  Please let us know if there is anything else we can do for you so that we can be sure you are leaving completely satisfied with your care experience.      DAV Zaman

## 2017-06-29 NOTE — LETTER
6/29/2017       RE: Miles Valencia  [address] [place] [address]     Dear Colleague,    Thank you for referring your patient, Miles Valencia, to the [facility] UROLOGY AND INST FOR PROSTATE AND UROLOGIC CANCERS at [facility]. Please see a copy of my visit note below.    Miles Valencia is here with history of low risk prostate cancer on active surveillance    Prostate cancer history as below    Initial PSA:2.8  Biopsy Clifford Score was 3 + 3 on 7/28/16 in 3/12 cores right apex  Pathologic Stage T1c     Risk Group: Low  Normal MRI 2016  Sacral lesion (confirmed negative with bone scan)  Prostate volume 35 on MRI 11/2016    Repeat Bx   6/2017    Initial PSA: 6.9  Biopsy Rafael Score was 3 + 3 in 1 of 12 cores right apexxxxx  Pathologic Stage T1c     Risk Group: Low        PSA   Date Value Ref Range Status   05/31/2017 6.91 (H) 0 - 4 ug/L Final     Comment:     Assay Method:  Chemiluminescence using Siemens Vista analyzer   02/06/2017 7.39 (H) 0 - 4 ug/L Final     Comment:     Assay Method:  Chemiluminescence using Siemens Vista analyzer   11/01/2016 9.56 (H) 0 - 4 ug/L Final     Comment:     Assay Method:  Chemiluminescence using Siemens Vista analyzer   09/23/2016 6.53 (H) 0 - 4 ug/L Final     Comment:     Assay Method:  Chemiluminescence using Siemens Vista analyzer     A/p Low risk PC grade group 1 in 4 of 24 cores on two separate biopsies over the past year with no evidence of progression    PSA in 6 months    MRI in one year.  Eber Mcmillan MD  Department of Urology Staff  [facility]

## 2017-06-29 NOTE — MR AVS SNAPSHOT
After Visit Summary   6/29/2017    Miles Valencia    MRN: [id number]           Patient Information     Date Of Birth          1965        Visit Information        Provider Department      6/29/2017 12:20 PM Eber Mcmillan MD [facility]        Today's Diagnoses     Prostate cancer (H)    -  1      Care Instructions    Follow up with Dr. Mcmillan in 6 months with PSA.    It was a pleasure meeting with you today.  Thank you for allowing me and my team the privilege of caring for you today.  YOU are the reason we are here, and I truly hope we provided you with the excellent service you deserve.  Please let us know if there is anything else we can do for you so that we can be sure you are leaving completely satisfied with your care experience.      DAV Zaman          Follow-ups after your visit        Your next 10 appointments already scheduled     Jul 26, 2017  8:00 AM CDT   (Arrive by 7:45 AM)   RETURN SHOULDER with Jossy Swanson MD   [facility] ([facility])    [address] [place] [id number] [phone]            Jul 31, 2017 10:10 AM CDT   SUDHA Extremity with Olya Rondon PT   [facility] ([facility]  )    [address] [place] [id number]   [phone]            Aug 07, 2017 12:10 PM CDT   SUDHA Extremity with Olya Rondon PT   [facility] ([address] [address]   [phone]            Dec 14, 2017 12:00 PM CST   (Arrive by 11:45 AM)   Return Visit with Eber Mcmillan MD   [facility]rolog[facility] ([facility])    [address] [place] [id number] [phone]              Future  "tests that were ordered for you today     Open Future Orders        Priority Expected Expires Ordered    PSA tumor marker Routine 6/29/2017 6/29/2018 6/29/2017            Who to contact     Please call your clinic at [phone] to:    Ask questions about your health    Make or cancel appointments    Discuss your medicines    Learn about your test results    Speak to your doctor   If you have compliments or concerns about an experience at your clinic, or if you wish to file a complaint, please contact [facility] Patient Relations at [phone] or email us at [email or web address]         Additional Information About Your Visit        [employer] Information     [employer] gives you secure access to your electronic health record. If you see a primary care provider, you can also send messages to your care team and make appointments. If you have questions, please call your primary care clinic.  If you do not have a primary care provider, please call [phone] and they will assist you.      [employer] is an electronic gateway that provides easy, online access to your medical records. With [employer], you can request a clinic appointment, read your test results, renew a prescription or communicate with your care team.     To access your existing account, please contact your [facility] or call [phone] for assistance.        Care EveryWhere ID     This is your Care EveryWhere ID. This could be used by other organizations to access your [facility] medical records  [id number]        Your Vitals Were     Pulse Height BMI (Body Mass Index)             79 1.753 m (5' 9\") 29.53 kg/m2          Blood Pressure from Last 3 Encounters:   06/29/17 133/84   06/13/17 120/71   06/08/17 (!) 132/91    Weight from Last 3 Encounters:   06/29/17 90.7 kg (200 lb)   06/13/17 87.3 kg (192 lb 8 oz)   06/08/17 91.6 kg (202 lb)                 Today's Medication Changes        "   These changes are accurate as of: 6/29/17 12:31 PM.  If you have any questions, ask your nurse or doctor.               These medicines have changed or have updated prescriptions.        Dose/Directions    gabapentin 300 MG capsule   Commonly known as:  NEURONTIN   This may have changed:  additional instructions   Used for:  Status post total shoulder arthroplasty, left        Dose:  300 mg   Take 1 capsule (300 mg) by mouth 3 times daily   Quantity:  60 capsule   Refills:  0                Primary Care Provider Office Phone # Fax #    Oneyda Jones [phone]       [address] [address]        Equal Access to Services     [facility]: Hadii rosas ku hadasho Soomaali, waaxda luqadaha, qaybta kaalmada adesarikayada, lalo dozier . So [facility] [phone].    ATENCIÓN: Si habla español, tiene a wilson disposición servicios gratuitos de asistencia lingüística. Llame al [phone].    We comply with applicable federal civil rights laws and [place] laws. We do not discriminate on the basis of race, color, national origin, age, disability sex, sexual orientation or gender identity.            Thank you!     Thank you for choosing [facility] AND [facility] FOR PROSTATE AND UROLOGIC CANCERS  for your care. Our goal is always to provide you with excellent care. Hearing back from our patients is one way we can continue to improve our services. Please take a few minutes to complete the written survey that you may receive in the mail after your visit with us. Thank you!             Your Updated Medication List - Protect others around you: Learn how to safely use, store and throw away your medicines at www.disposemymeds.org.          This list is accurate as of: 6/29/17 12:31 PM.  Always use your most recent med list.                   Brand Name Dispense Instructions for use Diagnosis    albuterol 108 (90 BASE) MCG/ACT Inhaler    PROAIR  HFA/PROVENTIL HFA/VENTOLIN HFA     Inhale 2 puffs into the lungs every 4 hours as needed for shortness of breath / dyspnea or wheezing        AMBIEN 5 MG tablet   Generic drug:  zolpidem      Take 5 mg by mouth nightly as needed for sleep        ammonium lactate 12 % cream    AMLACTIN     Apply topically daily as needed        aspirin-acetaminophen-caffeine 250-250-65 MG per tablet    EXCEDRIN MIGRAINE     Take 2 tablets by mouth every 6 hours as needed for headaches        BENADRYL ALLERGY PO      Take 25-50 mg by mouth daily as needed (allergies)        camphor-menthol 0.5-0.5 % Lotn    DERMASARRA     Apply topically every 6 hours as needed for skin care        CARTIA  MG 24 hr capsule   Generic drug:  diltiazem      Take 120 mg by mouth every morning    Elevated prostate specific antigen (PSA)       cetirizine HCl 10 MG Caps      Take 10 mg by mouth daily        fish oil-omega-3 fatty acids 1000 MG capsule      Take 1 gram by mouth in the morning and take 2 grams by mouth in the evening.        fluconazole 50 MG tablet    DIFLUCAN     Take 50 mg by mouth daily        gabapentin 300 MG capsule    NEURONTIN    60 capsule    Take 1 capsule (300 mg) by mouth 3 times daily    Status post total shoulder arthroplasty, left       hydrochlorothiazide 25 MG tablet    HYDRODIURIL     Take 25 mg by mouth every morning        hydrocortisone 1 % cream    CORTAID     Apply topically 2 times daily as needed        meclizine 12.5 MG tablet    ANTIVERT    30 tablet    Take 2 tablets (25 mg) by mouth 4 times daily as needed for dizziness        MELATONIN PO      Take 1 mg by mouth nightly as needed        morphine (PF) 4 MG/ML injection     4 mL    Inject 4 mg into the muscle every 4 hours as needed for moderate to severe pain    Status post total shoulder arthroplasty, left       ondansetron 4 MG ODT tab    ZOFRAN ODT    20 tablet    Take 1-2 tablets (4-8 mg) by mouth every 8 hours as needed for nausea    Status post total  shoulder arthroplasty, left       oxyCODONE 5 MG IR tablet    ROXICODONE    75 tablet    Take 1-2 tablets (5-10 mg) by mouth every 3 hours as needed for moderate to severe pain    Status post total shoulder arthroplasty, left       PHENYLEPHRINE HCL PO      Take 10 mg by mouth every 4 hours as needed        QVAR 80 MCG/ACT Inhaler   Generic drug:  beclomethasone      Inhale 2 puffs twice daily as needed when allergies flare up        rizatriptan 10 MG ODT tab    MAXALT-MLT     Take 10 mg by mouth at onset of headache    Elevated prostate specific antigen (PSA)       SUMAtriptan Succinate Refill 6 MG/0.5ML Soct    IMITREX     Inject 6 mg into the muscle every 12 hours as needed    Elevated prostate specific antigen (PSA)       TYLENOL PO      Take 1,000 mg by mouth every 6 hours as needed        VITAMIN B-12 PO      Take 1 tablet by mouth every evening        VITAMIN D3 MAXIMUM STRENGTH 5000 UNITS Caps   Generic drug:  cholecalciferol      Take 5,000 Units by mouth every morning

## 2017-06-29 NOTE — NURSING NOTE
"Chief Complaint   Patient presents with     RECHECK     Prostate cancer follow up       Initial Ht 1.753 m (5' 9\")  Wt 90.7 kg (200 lb)  BMI 29.53 kg/m2 Estimated body mass index is 29.53 kg/(m^2) as calculated from the following:    Height as of this encounter: 1.753 m (5' 9\").    Weight as of this encounter: 90.7 kg (200 lb).  Medication Reconciliation: complete     DAV Zaman    "

## 2017-06-29 NOTE — PROGRESS NOTES
Miles Valencia is here with history of low risk prostate cancer on active surveillance        Prostate cancer history as below    Initial PSA:2.8  Biopsy Coahoma Score was 3 + 3 on 7/28/16 in 3/12 cores right apex  Pathologic Stage T1c     Risk Group: Low  Normal MRI 2016  Sacral lesion (confirmed negative with bone scan)  Prostate volume 35 on MRI 11/2016    Repeat Bx   6/2017    Initial PSA: 6.9  Biopsy Coahoma Score was 3 + 3 in 1 of 12 cores right apexxxxx  Pathologic Stage T1c     Risk Group: Low        PSA   Date Value Ref Range Status   05/31/2017 6.91 (H) 0 - 4 ug/L Final     Comment:     Assay Method:  Chemiluminescence using Siemens Vista analyzer   02/06/2017 7.39 (H) 0 - 4 ug/L Final     Comment:     Assay Method:  Chemiluminescence using Siemens Vista analyzer   11/01/2016 9.56 (H) 0 - 4 ug/L Final     Comment:     Assay Method:  Chemiluminescence using Siemens Vista analyzer   09/23/2016 6.53 (H) 0 - 4 ug/L Final     Comment:     Assay Method:  Chemiluminescence using Siemens Vista analyzer     A/p Low risk PC grade group 1 in 4 of 24 cores on two separate biopsies over the past year with no evidence of progression    PSA in 6 months    MRI in one year.  Eber Mcmillan MD  Department of Urology Staff  [facility]

## 2017-07-26 ENCOUNTER — OFFICE VISIT (OUTPATIENT)
Dept: ORTHOPEDICS | Facility: CLINIC | Age: 52
End: 2017-07-26

## 2017-07-26 DIAGNOSIS — Z96.612 HISTORY OF TOTAL SHOULDER REPLACEMENT, LEFT: Primary | ICD-10-CM

## 2017-07-26 NOTE — PROGRESS NOTES
CHIEF COMPLAINT: Status post left total shoulder arthroplasty   DATE OF SURGERY: 6/13/17    HISTORY OF PRESENT ILLNESS: Miles is an extremely pleasant 52 year-old gentlamen who is now 6 weeks status post the above procedure. He states that he is anxious to be out of his sling but worried also about doing so. He has been working with his partner on range of motion and has made excellent gains. He is another physical therapy appointment coming up next week.    EXAM:  Pleasant adult male in no distress. Accompanied by his partner today.  Respirations even and unlabored.  Left upper extremity: Incision well healing. No erythema. No drainage. Sens intact Ax/Musc/Med/Rad/Uln nerves. Motor intact EPL, FPL, and Intrinsics. Shoulder range of motion tolerates passive FE to at least 155 and ER at side to 60.    ASSESSMENT:  1. Six weeks status post above procedure    PLAN:  We discussed the progression of his activities. He will discontinue his sling at this time for wean out of it. He will progress to active range of motion. He may do very gentle light strengthening with regards to deltoid and periscapular work. He is to avoid stretching in internal rotation up the back for 3 months postop.  I will see the patient back in 6 weeks.

## 2017-07-26 NOTE — MR AVS SNAPSHOT
After Visit Summary   7/26/2017    Miles Valencia    MRN: [id number]           Patient Information     Date Of Birth          1965        Visit Information        Provider Department      7/26/2017 8:00 AM Jossy Swanson MD [facility]        Today's Diagnoses     History of total shoulder replacement, left    -  1       Follow-ups after your visit        Your next 10 appointments already scheduled     Jul 31, 2017 10:10 AM CDT   SUDHA Extremity with Olya Rondon PT   [facility] ([facility]  )    [address] [place] [address]   [phone]            Aug 07, 2017 12:10 PM CDT   SUDHA Extremity with Olya Rondon PT   [facility] ([facility]  )    [address] [place] [address]   [phone]            Sep 06, 2017  2:30 PM CDT   (Arrive by 2:15 PM)   RETURN SHOULDER with Jossy Swanson MD   [facility] ([facility])    [address] [place] [id number] [phone]            Dec 14, 2017 12:00 PM CST   (Arrive by 11:45 AM)   Return Visit with Eber Mcmillan MD   [facility] C[facility] ([place] [facility])    [address] [place] [id number] [phone]              Who to contact     Please call your clinic at [phone] to:    Ask questions about your health    Make or cancel appointments    Discuss your medicines    Learn about your test results    Speak to your doctor   If you have compliments or concerns about an experience at your clinic, or if you wish to file a complaint, please contact [facility] Patient Relations at [phone] or email us at [email or web address]         Additional Information About Your Visit         [employer] Information     [employer] gives you secure access to your electronic health record. If you see a primary care provider, you can also send messages to your care team and make appointments. If you have questions, please call your primary care clinic.  If you do not have a primary care provider, please call [phone] and they will assist you.      [employer] is an electronic gateway that provides easy, online access to your medical records. With [employer], you can request a clinic appointment, read your test results, renew a prescription or communicate with your care team.     To access your existing account, please contact your [facility] or call [phone] for assistance.        Care EveryWhere ID     This is your Care EveryWhere ID. This could be used by other organizations to access your [facility] medical records  [id number]         Blood Pressure from Last 3 Encounters:   06/29/17 133/84   06/13/17 120/71   06/08/17 (!) 132/91    Weight from Last 3 Encounters:   06/29/17 90.7 kg (200 lb)   06/13/17 87.3 kg (192 lb 8 oz)   06/08/17 91.6 kg (202 lb)              Today, you had the following     No orders found for display         Today's Medication Changes          These changes are accurate as of: 7/26/17  6:10 PM.  If you have any questions, ask your nurse or doctor.               These medicines have changed or have updated prescriptions.        Dose/Directions    gabapentin 300 MG capsule   Commonly known as:  NEURONTIN   This may have changed:  additional instructions   Used for:  Status post total shoulder arthroplasty, left        Dose:  300 mg   Take 1 capsule (300 mg) by mouth 3 times daily   Quantity:  60 capsule   Refills:  0         Stop taking these medicines if you haven't already. Please contact your care team if you have questions.     morphine (PF) 4 MG/ML injection   Stopped by:  Jossy Swansno MD                    Primary Care Provider Office  Phone # Fax #    Oneyda Jones [phone]       [place] [address]        Equal Access to Services     SHANNAN SCHROEDER : Hadii aad ku hadmiguelsteve Martínez, wakhurramda luqadaha, qaraymonta kaalmada cali, lalo powell dominiquesarika duarte tasia szymanski. So [facility] [phone].    ATENCIÓN: Si habla español, tiene a wilson disposición servicios gratuitos de asistencia lingüística. Llame al [phone].    We comply with applicable federal civil rights laws and [place] laws. We do not discriminate on the basis of race, color, national origin, age, disability sex, sexual orientation or gender identity.            Thank you!     Thank you for choosing [facility]  for your care. Our goal is always to provide you with excellent care. Hearing back from our patients is one way we can continue to improve our services. Please take a few minutes to complete the written survey that you may receive in the mail after your visit with us. Thank you!             Your Updated Medication List - Protect others around you: Learn how to safely use, store and throw away your medicines at www.disposemymeds.org.          This list is accurate as of: 7/26/17  6:10 PM.  Always use your most recent med list.                   Brand Name Dispense Instructions for use Diagnosis    albuterol 108 (90 BASE) MCG/ACT Inhaler    PROAIR HFA/PROVENTIL HFA/VENTOLIN HFA     Inhale 2 puffs into the lungs every 4 hours as needed for shortness of breath / dyspnea or wheezing        AMBIEN 5 MG tablet   Generic drug:  zolpidem      Take 5 mg by mouth nightly as needed for sleep        ammonium lactate 12 % cream    AMLACTIN     Apply topically daily as needed        aspirin-acetaminophen-caffeine 250-250-65 MG per tablet    EXCEDRIN MIGRAINE     Take 2 tablets by mouth every 6 hours as needed for headaches        BENADRYL ALLERGY PO      Take 25-50 mg by mouth daily as needed (allergies)         camphor-menthol 0.5-0.5 % Lotn    DERMASARRA     Apply topically every 6 hours as needed for skin care        CARTIA  MG 24 hr capsule   Generic drug:  diltiazem      Take 120 mg by mouth every morning    Elevated prostate specific antigen (PSA)       cetirizine HCl 10 MG Caps      Take 10 mg by mouth daily        fish oil-omega-3 fatty acids 1000 MG capsule      Take 1 gram by mouth in the morning and take 2 grams by mouth in the evening.        fluconazole 50 MG tablet    DIFLUCAN     Take 50 mg by mouth daily        gabapentin 300 MG capsule    NEURONTIN    60 capsule    Take 1 capsule (300 mg) by mouth 3 times daily    Status post total shoulder arthroplasty, left       hydrochlorothiazide 25 MG tablet    HYDRODIURIL     Take 25 mg by mouth every morning        hydrocortisone 1 % cream    CORTAID     Apply topically 2 times daily as needed        meclizine 12.5 MG tablet    ANTIVERT    30 tablet    Take 2 tablets (25 mg) by mouth 4 times daily as needed for dizziness        MELATONIN PO      Take 1 mg by mouth nightly as needed        ondansetron 4 MG ODT tab    ZOFRAN ODT    20 tablet    Take 1-2 tablets (4-8 mg) by mouth every 8 hours as needed for nausea    Status post total shoulder arthroplasty, left       oxyCODONE 5 MG IR tablet    ROXICODONE    75 tablet    Take 1-2 tablets (5-10 mg) by mouth every 3 hours as needed for moderate to severe pain    Status post total shoulder arthroplasty, left       PHENYLEPHRINE HCL PO      Take 10 mg by mouth every 4 hours as needed        QVAR 80 MCG/ACT Inhaler   Generic drug:  beclomethasone      Inhale 2 puffs twice daily as needed when allergies flare up        rizatriptan 10 MG ODT tab    MAXALT-MLT     Take 10 mg by mouth at onset of headache    Elevated prostate specific antigen (PSA)       SUMAtriptan Succinate Refill 6 MG/0.5ML Soct    IMITREX     Inject 6 mg into the muscle every 12 hours as needed    Elevated prostate specific antigen (PSA)        TYLENOL PO      Take 1,000 mg by mouth every 6 hours as needed        VITAMIN B-12 PO      Take 1 tablet by mouth every evening        VITAMIN D3 MAXIMUM STRENGTH 5000 UNITS Caps   Generic drug:  cholecalciferol      Take 5,000 Units by mouth every morning

## 2017-07-26 NOTE — NURSING NOTE
Reason For Visit:   Chief Complaint   Patient presents with     Surgical Followup     DOS: 6.13.17 for Left Total Shoulder Arthroplasty.      Letter Request     Will need Dx and request for ergonomic for work.        PCP: Oneyda Jones  Ref: Established    ?  No  Occupation .  Currently working? Yes.  Work status?  Part-time.  Date of injury: none  Date of surgery: 6.13.17  Type of surgery: Left TSA  Smoker: No          Right hand dominant    SANE score  Affected shoulder: Left  Right shoulder SANE: 50  Left shoulder SANE: 100        Pain Assessment  Patient Currently in Pain: Yes  0-10 Pain Scale: 2  Primary Pain Location: Shoulder  Pain Orientation: Left

## 2017-07-31 ENCOUNTER — THERAPY VISIT (OUTPATIENT)
Dept: PHYSICAL THERAPY | Facility: CLINIC | Age: 52
End: 2017-07-31
Payer: COMMERCIAL

## 2017-07-31 ENCOUNTER — TRANSFERRED RECORDS (OUTPATIENT)
Dept: HEALTH INFORMATION MANAGEMENT | Facility: CLINIC | Age: 52
End: 2017-07-31

## 2017-07-31 DIAGNOSIS — Z96.612 AFTERCARE FOLLOWING LEFT SHOULDER JOINT REPLACEMENT SURGERY: Primary | ICD-10-CM

## 2017-07-31 DIAGNOSIS — Z47.1 AFTERCARE FOLLOWING LEFT SHOULDER JOINT REPLACEMENT SURGERY: Primary | ICD-10-CM

## 2017-07-31 PROCEDURE — 97110 THERAPEUTIC EXERCISES: CPT | Mod: GP | Performed by: PHYSICAL THERAPIST

## 2017-07-31 PROCEDURE — 97161 PT EVAL LOW COMPLEX 20 MIN: CPT | Mod: GP | Performed by: PHYSICAL THERAPIST

## 2017-07-31 PROCEDURE — 97530 THERAPEUTIC ACTIVITIES: CPT | Mod: GP | Performed by: PHYSICAL THERAPIST

## 2017-07-31 NOTE — MR AVS SNAPSHOT
After Visit Summary   7/31/2017    Miles Valencia    MRN: [id number]           Patient Information     Date Of Birth          1965        Visit Information        Provider Department      7/31/2017 10:10 AM Olya Rondon PT [facility]        Today's Diagnoses     Aftercare following left shoulder joint replacement surgery    -  1       Follow-ups after your visit        Your next 10 appointments already scheduled     Aug 07, 2017 12:10 PM CDT   SUDHA Extremity with Olya Rondon PT   [facility] ([facility]  )    [address] [place] [id number]   [phone]            Sep 06, 2017  2:30 PM CDT   (Arrive by 2:15 PM)   RETURN SHOULDER with Jossy Swanson MD   [facility] ([facility] [place])    [address] [place] [id number] [phone]            Dec 14, 2017 12:00 PM CST   (Arrive by 11:45 AM)   Return Visit with Eber Mcmillan MD   [facility] ([facility])    [address] [place] [id number] [phone]              Who to contact     If you have questions or need follow up information about today's clinic visit or your schedule please contact [facility] PH[facility]ICAL THERAP[facility] directly at [phone].  Normal or non-critical lab and imaging results will be communicated to you by MyChart, letter or phone within 4 business days after the clinic has received the results. If you do not hear from us within 7 days, please contact the clinic through MyChart or phone. If you have a critical or abnormal lab result, we will notify you by phone as soon as possible.  Submit refill requests through [employer] or call your pharmacy and they will forward the refill request to us.  Please allow 3 business days for your refill to be completed.          Additional Information About Your Visit        MyChart Information     [employer] gives you secure access to your electronic health record. If you see a primary care provider, you can also send messages to your care team and make appointments. If you have questions, please call your primary care clinic.  If you do not have a primary care provider, please call [phone] and they will assist you.        Care EveryWhere ID     This is your Care EveryWhere ID. This could be used by other organizations to access your [facility] medical records  [id number]         Blood Pressure from Last 3 Encounters:   06/29/17 133/84   06/13/17 120/71   06/08/17 (!) 132/91    Weight from Last 3 Encounters:   06/29/17 90.7 kg (200 lb)   06/13/17 87.3 kg (192 lb 8 oz)   06/08/17 91.6 kg (202 lb)              We Performed the Following     SUDHA Inital Eval Report     PT Eval, Low Complexity (56393)     Therapeutic Activities     Therapeutic Exercises          Today's Medication Changes          These changes are accurate as of: 7/31/17  4:33 PM.  If you have any questions, ask your nurse or doctor.               These medicines have changed or have updated prescriptions.        Dose/Directions    gabapentin 300 MG capsule   Commonly known as:  NEURONTIN   This may have changed:  additional instructions   Used for:  Status post total shoulder arthroplasty, left        Dose:  300 mg   Take 1 capsule (300 mg) by mouth 3 times daily   Quantity:  60 capsule   Refills:  0                Primary Care Provider Office Phone # Fax #    Oneyda Jones [phone]       [address] [address]        Equal Access to Services     [facility]: Ivett Martínez, jess acosta, qalalo robertson. So [facility] [phone].    ATENCIÓN: Si josé clark  disposición servicios gratuitos de asistencia lingüística. Anthony govea [phone].    We comply with applicable federal civil rights laws and [place] laws. We do not discriminate on the basis of race, color, national origin, age, disability sex, sexual orientation or gender identity.            Thank you!     Thank you for choosing [facility]ERAP[facility]  for your care. Our goal is always to provide you with excellent care. Hearing back from our patients is one way we can continue to improve our services. Please take a few minutes to complete the written survey that you may receive in the mail after your visit with us. Thank you!             Your Updated Medication List - Protect others around you: Learn how to safely use, store and throw away your medicines at www.disposemymeds.org.          This list is accurate as of: 7/31/17  4:33 PM.  Always use your most recent med list.                   Brand Name Dispense Instructions for use Diagnosis    albuterol 108 (90 BASE) MCG/ACT Inhaler    PROAIR HFA/PROVENTIL HFA/VENTOLIN HFA     Inhale 2 puffs into the lungs every 4 hours as needed for shortness of breath / dyspnea or wheezing        AMBIEN 5 MG tablet   Generic drug:  zolpidem      Take 5 mg by mouth nightly as needed for sleep        ammonium lactate 12 % cream    AMLACTIN     Apply topically daily as needed        aspirin-acetaminophen-caffeine 250-250-65 MG per tablet    EXCEDRIN MIGRAINE     Take 2 tablets by mouth every 6 hours as needed for headaches        BENADRYL ALLERGY PO      Take 25-50 mg by mouth daily as needed (allergies)        camphor-menthol 0.5-0.5 % Lotn    DERMASARRA     Apply topically every 6 hours as needed for skin care        CARTIA  MG 24 hr capsule   Generic drug:  diltiazem      Take 120 mg by mouth every morning    Elevated prostate specific antigen (PSA)       cetirizine HCl 10 MG Caps      Take 10 mg by mouth daily        fish  oil-omega-3 fatty acids 1000 MG capsule      Take 1 gram by mouth in the morning and take 2 grams by mouth in the evening.        fluconazole 50 MG tablet    DIFLUCAN     Take 50 mg by mouth daily        gabapentin 300 MG capsule    NEURONTIN    60 capsule    Take 1 capsule (300 mg) by mouth 3 times daily    Status post total shoulder arthroplasty, left       hydrochlorothiazide 25 MG tablet    HYDRODIURIL     Take 25 mg by mouth every morning        hydrocortisone 1 % cream    CORTAID     Apply topically 2 times daily as needed        meclizine 12.5 MG tablet    ANTIVERT    30 tablet    Take 2 tablets (25 mg) by mouth 4 times daily as needed for dizziness        MELATONIN PO      Take 1 mg by mouth nightly as needed        ondansetron 4 MG ODT tab    ZOFRAN ODT    20 tablet    Take 1-2 tablets (4-8 mg) by mouth every 8 hours as needed for nausea    Status post total shoulder arthroplasty, left       oxyCODONE 5 MG IR tablet    ROXICODONE    75 tablet    Take 1-2 tablets (5-10 mg) by mouth every 3 hours as needed for moderate to severe pain    Status post total shoulder arthroplasty, left       PHENYLEPHRINE HCL PO      Take 10 mg by mouth every 4 hours as needed        QVAR 80 MCG/ACT Inhaler   Generic drug:  beclomethasone      Inhale 2 puffs twice daily as needed when allergies flare up        rizatriptan 10 MG ODT tab    MAXALT-MLT     Take 10 mg by mouth at onset of headache    Elevated prostate specific antigen (PSA)       SUMAtriptan Succinate Refill 6 MG/0.5ML Soct    IMITREX     Inject 6 mg into the muscle every 12 hours as needed    Elevated prostate specific antigen (PSA)       TYLENOL PO      Take 1,000 mg by mouth every 6 hours as needed        VITAMIN B-12 PO      Take 1 tablet by mouth every evening        VITAMIN D3 MAXIMUM STRENGTH 5000 UNITS Caps   Generic drug:  cholecalciferol      Take 5,000 Units by mouth every morning

## 2017-07-31 NOTE — PROGRESS NOTES
[facility] Initial Evaluation          Subjective:    Patient is a 52 year old male presenting with rehab left shoulder hpi.   Miles Valencia is a 52 year old male with a left shoulder condition.  Condition occurred with:  Degenerative joint disease (Pt. is s/p L TSA 6-13. Pt. has been working on PROM at home with his partner and feels his ROM is progressing well. He can now begin assisted and AROM. Pt. can begin weaning from his sling. ).  Condition occurred: for unknown reasons.  This is a new condition  6-13-17.    Patient reports pain:  In the joint.  Radiates to:  Upper arm.  Pain is described as aching  and reported as 4/10.  Associated symptoms:  Loss of motion/stiffness and loss of strength. Pain is worse during the night.  Symptoms are exacerbated by using arm at shoulder level, lifting and lying on extremity and relieved by rest.  Since onset symptoms are gradually improving.    Previous treatment: none.    General health as reported by patient is good.                                              Objective:    Standing Alignment:      Shoulder/UE:  Protracted scapula L                  Flexibility/Screens:   Positive screens:  Shoulder                             Shoulder Evaluation:  ROM:  AROM:    Flexion:  Left:  82        Abduction:  Left: 78       Internal Rotation:  Left:  60      External Rotation:  Left:  35                    PROM:    Flexion:  Left:  138          Abduction:  Left:  110          External Rotation:  Left:  40                        Strength:    Flexion: Left:4-/5   Pain:        Abduction:  Left: 3+/5  Pain:        Internal Rotation:  Left:4-/5     Pain:      External Rotation:   Left:3+/5     Pain:             Stability Testing:  not assessed      Special Tests:  not assessed      Palpation:    Left shoulder tenderness present at:  Supraspinatus; Infraspinatus; Teres Minor; Rhomboids and Upper Trap    Mobility Tests:    Glenohumeral anterior left:   Hypomobile    Glenohumeral posterior left:  Hypomobile    Glenohumeral inferior left:  Hypomobile                                             General     ROS    Assessment/Plan:      Patient is a 52 year old male with left side shoulder complaints.    Patient has the following significant findings with corresponding treatment plan.                Diagnosis 1:  L TSA  Pain -  self management and education  Decreased mobility - therapeutic exercise, therapeutic activities  Decreased strength - therapeutic exercise, therapeutic activities, neuromuscular re-education  Therapy Evaluation Codes:   1) History comprised of:   Personal factors that impact the plan of care:      None.    Comorbidity factors that impact the plan of care are:      None.     Medications impacting care: None.  2) Examination of Body Systems comprised of:   Body structures and functions that impact the plan of care:      Shoulder.   Activity limitations that impact the plan of care are:      Lifting and Reading/Computer work.  3) Clinical presentation characteristics are:   Stable/Uncomplicated.  4) Decision-Making    Low complexity using standardized patient assessment instrument and/or measureable assessment of functional outcome.  Cumulative Therapy Evaluation is: Low complexity.    Previous and current functional limitations:  (See Goal Flow Sheet for this information)    Short term and Long term goals: (See Goal Flow Sheet for this information)     Communication ability:  Patient appears to be able to clearly communicate and understand verbal and written communication and follow directions correctly.  Treatment Explanation - The following has been discussed with the patient:   RX ordered/plan of care  Anticipated outcomes  Possible risks and side effects  This patient would benefit from PT intervention to resume normal activities.   Rehab potential is good.    Frequency:  1 X week, once daily  Duration:  for 2 weeks tapering to 2 X a month  over 8 weeks  Discharge Plan:  Achieve all LTG.  Independent in home treatment program.  Reach maximal therapeutic benefit.    Please refer to the daily flowsheet for treatment today, total treatment time and time spent performing 1:1 timed codes.

## 2017-07-31 NOTE — LETTER
[facility] PHYSICAL THERAPY  [address] [place] [id number]  [phone]    2017    Re: Miles Valencia   :   1965  MRN:  [id number]   REFERRING PHYSICIAN:   Jossy Swanson    [facility] PHYSICAL [facility]    Date of Initial Evaluation:  2017  Visits:  Rxs Used: 1  Reason for Referral:  Aftercare following left shoulder joint replacement surgery    EVALUATION SUMMARY    [facility] Initial Evaluation      Subjective:    Patient is a 52 year old male presenting with rehab left shoulder hpi. Miles Valencia is a 52 year old male with a left shoulder condition.  Condition occurred with:  Degenerative joint disease (Pt. is s/p L TSA . Pt. has been working on PROM at home with his partner and feels his ROM is progressing well. He can now begin assisted and AROM. Pt. can begin weaning from his sling. ). Condition occurred: for unknown reasons.  This is a new condition  17. Patient reports pain:  In the joint.  Radiates to:  Upper arm. Pain is described as aching  and reported as 4/10.  Associated symptoms: Loss of motion/stiffness and loss of strength. Pain is worse during the night.  Symptoms are exacerbated by using arm at shoulder level, lifting and lying on extremity and relieved by rest.  Since onset symptoms are gradually improving.    Previous treatment: none.   General health as reported by patient is good.                    Objective:    Standing Alignment:      Shoulder/UE:  Protracted scapula L  Flexibility/Screens:   Positive screens:  Shoulder  Shoulder Evaluation:  ROM:  AROM:    Flexion:  Left:  82      Abduction:  Left: 78     Internal Rotation:  Left:  60      External Rotation:  Left:  35      PROM:    Flexion:  Left:  138        Abduction:  Left:  110      Re: Miles Valencia   :   1965  Page:  2      External Rotation:  Left:  40      Strength:     Flexion: Left:4-/5   Pain:      Abduction:  Left: 3+/5  Pain:      Internal Rotation:  Left:4-/5     Pain:      External Rotation:   Left:3+/5     Pain:     Stability Testing:  not assessed  Special Tests:  not assessed  Palpation:    Left shoulder tenderness present at:  Supraspinatus; Infraspinatus; Teres Minor; Rhomboids and Upper Trap  Mobility Tests:    Glenohumeral anterior left:  Hypomobile    Glenohumeral posterior left:  Hypomobile    Glenohumeral inferior left:  Hypomobile        Assessment/Plan:      Patient is a 52 year old male with left side shoulder complaints.    Patient has the following significant findings with corresponding treatment plan.                Diagnosis 1:  L TSA  Pain -  self management and education  Decreased mobility - therapeutic exercise, therapeutic activities  Decreased strength - therapeutic exercise, therapeutic activities, neuromuscular re-education  Therapy Evaluation Codes:   1) History comprised of:   Personal factors that impact the plan of care:      None.    Comorbidity factors that impact the plan of care are:      None.     Medications impacting care: None.  2) Examination of Body Systems comprised of:   Body structures and functions that impact the plan of care:      Shoulder.   Activity limitations that impact the plan of care are:      Lifting and Reading/Computer work.  3) Clinical presentation characteristics are:   Stable/Uncomplicated.  4) Decision-Making    Low complexity using standardized patient assessment instrument and/or measureable assessment of functional outcome.  Cumulative Therapy Evaluation is: Low complexity.    Previous and current functional limitations:  (See Goal Flow Sheet for this information)    Short term and Long term goals: (See Goal Flow Sheet for this information)       Re: Miles Valencia   :   1965  Page:  3      Communication ability:  Patient appears to be able to clearly communicate and understand verbal and written  communication and follow directions correctly.  Treatment Explanation - The following has been discussed with the patient:   RX ordered/plan of care  Anticipated outcomes  Possible risks and side effects  This patient would benefit from PT intervention to resume normal activities.   Rehab potential is good.    Frequency:  1 X week, once daily  Duration:  for 2 weeks tapering to 2 X a month over 8 weeks  Discharge Plan:  Achieve all LTG.  Independent in home treatment program.  Reach maximal therapeutic benefit.      Thank you for your referral.    INQUIRIES  Therapist: Olya Rondon, PT  [facility]  [address] [place] [phone]  Phone: [phone]  Fax: [phone]

## 2017-08-07 ENCOUNTER — THERAPY VISIT (OUTPATIENT)
Dept: PHYSICAL THERAPY | Facility: CLINIC | Age: 52
End: 2017-08-07
Payer: COMMERCIAL

## 2017-08-07 DIAGNOSIS — Z96.612 AFTERCARE FOLLOWING LEFT SHOULDER JOINT REPLACEMENT SURGERY: ICD-10-CM

## 2017-08-07 DIAGNOSIS — Z47.1 AFTERCARE FOLLOWING LEFT SHOULDER JOINT REPLACEMENT SURGERY: ICD-10-CM

## 2017-08-07 PROCEDURE — 97140 MANUAL THERAPY 1/> REGIONS: CPT | Mod: GP | Performed by: PHYSICAL THERAPIST

## 2017-08-07 PROCEDURE — 97110 THERAPEUTIC EXERCISES: CPT | Mod: GP | Performed by: PHYSICAL THERAPIST

## 2017-08-07 PROCEDURE — 97112 NEUROMUSCULAR REEDUCATION: CPT | Mod: GP | Performed by: PHYSICAL THERAPIST

## 2017-08-07 NOTE — MR AVS SNAPSHOT
After Visit Summary   8/7/2017    Miles Valencia    MRN: [id number]           Patient Information     Date Of Birth          1965        Visit Information        Provider Department      8/7/2017 12:10 PM Olya Rondon PT [facility] Physical Therapy        Today's Diagnoses     Aftercare following left shoulder joint replacement surgery           Follow-ups after your visit        Your next 10 appointments already scheduled     Aug 28, 2017  2:40 PM CDT   SUDHA Extremity with Olya Rondon PT   [facility] Physical Therapy ([facility]  )    [address] [place] [id number] [phone]            Sep 06, 2017  2:30 PM CDT   (Arrive by 2:15 PM)   RETURN SHOULDER with Jossy Swanson MD   [facility] ([facility])    [address] [place] [id number] [phone]            Dec 14, 2017 12:00 PM CST   (Arrive by 11:45 AM)   Return Visit with Eber Mcmillan MD   [facility] ([facility])    [address] [place] [id number] [phone]              Who to contact     If you have questions or need follow up information about today's clinic visit or your schedule please contact [facility] PHYSICAL THERAPY directly at [phone].  Normal or non-critical lab and imaging results will be communicated to you by MyChart, letter or phone within 4 business days after the clinic has received the results. If you do not hear from us within 7 days, please contact the clinic through MyChart or phone. If you have a critical or abnormal lab result, we will notify you by phone as soon as possible.  Submit refill requests through [facility] or call your pharmacy and they will forward the refill request to us.  Please allow 3 business days for your refill to be completed.          Additional Information About Your Visit        MyChart Information     [facility] gives you secure access to your electronic health record. If you see a primary care provider, you can also send messages to your care team and make appointments. If you have questions, please call your primary care clinic.  If you do not have a primary care provider, please call [phone] and they will assist you.        Care EveryWhere ID     This is your Care EveryWhere ID. This could be used by other organizations to access your [place] medical records  [id number]         Blood Pressure from Last 3 Encounters:   06/29/17 133/84   06/13/17 120/71   06/08/17 (!) 132/91    Weight from Last 3 Encounters:   06/29/17 90.7 kg (200 lb)   06/13/17 87.3 kg (192 lb 8 oz)   06/08/17 91.6 kg (202 lb)              We Performed the Following     Manual Ther Tech, 1+Regions, EA 15 min     Neuromuscular Re-Education     Therapeutic Exercises          Today's Medication Changes          These changes are accurate as of: 8/7/17  2:13 PM.  If you have any questions, ask your nurse or doctor.               These medicines have changed or have updated prescriptions.        Dose/Directions    gabapentin 300 MG capsule   Commonly known as:  NEURONTIN   This may have changed:  additional instructions   Used for:  Status post total shoulder arthroplasty, left        Dose:  300 mg   Take 1 capsule (300 mg) by mouth 3 times daily   Quantity:  60 capsule   Refills:  0                Primary Care Provider Office Phone # Fax #    Oneyda ROSS [facility] [phone]       [address] [address]        Equal Access to Services     [facility]: Ivett Martínez, jess acosta, qalalo robertson. So [facility] [phone].    ATENCIÓN: Si habla español, tiene a wilson disposición servicios  ciarra de asistencia lingüística. Anthony govea [phone].    We comply with applicable federal civil rights laws and [place] laws. We do not discriminate on the basis of race, color, national origin, age, disability sex, sexual orientation or gender identity.            Thank you!     Thank you for choosing [place] [facility] PHYSICAL THERAP[facility]  for your care. Our goal is always to provide you with excellent care. Hearing back from our patients is one way we can continue to improve our services. Please take a few minutes to complete the written survey that you may receive in the mail after your visit with us. Thank you!             Your Updated Medication List - Protect others around you: Learn how to safely use, store and throw away your medicines at www.disposemymeds.org.          This list is accurate as of: 8/7/17  2:13 PM.  Always use your most recent med list.                   Brand Name Dispense Instructions for use Diagnosis    albuterol 108 (90 BASE) MCG/ACT Inhaler    PROAIR HFA/PROVENTIL HFA/VENTOLIN HFA     Inhale 2 puffs into the lungs every 4 hours as needed for shortness of breath / dyspnea or wheezing        AMBIEN 5 MG tablet   Generic drug:  zolpidem      Take 5 mg by mouth nightly as needed for sleep        ammonium lactate 12 % cream    AMLACTIN     Apply topically daily as needed        aspirin-acetaminophen-caffeine 250-250-65 MG per tablet    EXCEDRIN MIGRAINE     Take 2 tablets by mouth every 6 hours as needed for headaches        BENADRYL ALLERGY PO      Take 25-50 mg by mouth daily as needed (allergies)        camphor-menthol 0.5-0.5 % Lotn    DERMASARRA     Apply topically every 6 hours as needed for skin care        CARTIA  MG 24 hr capsule   Generic drug:  diltiazem      Take 120 mg by mouth every morning    Elevated prostate specific antigen (PSA)       cetirizine HCl 10 MG Caps      Take 10 mg by mouth daily        fish oil-omega-3 fatty acids 1000 MG  capsule      Take 1 gram by mouth in the morning and take 2 grams by mouth in the evening.        fluconazole 50 MG tablet    DIFLUCAN     Take 50 mg by mouth daily        gabapentin 300 MG capsule    NEURONTIN    60 capsule    Take 1 capsule (300 mg) by mouth 3 times daily    Status post total shoulder arthroplasty, left       hydrochlorothiazide 25 MG tablet    HYDRODIURIL     Take 25 mg by mouth every morning        hydrocortisone 1 % cream    CORTAID     Apply topically 2 times daily as needed        meclizine 12.5 MG tablet    ANTIVERT    30 tablet    Take 2 tablets (25 mg) by mouth 4 times daily as needed for dizziness        MELATONIN PO      Take 1 mg by mouth nightly as needed        ondansetron 4 MG ODT tab    ZOFRAN ODT    20 tablet    Take 1-2 tablets (4-8 mg) by mouth every 8 hours as needed for nausea    Status post total shoulder arthroplasty, left       oxyCODONE 5 MG IR tablet    ROXICODONE    75 tablet    Take 1-2 tablets (5-10 mg) by mouth every 3 hours as needed for moderate to severe pain    Status post total shoulder arthroplasty, left       PHENYLEPHRINE HCL PO      Take 10 mg by mouth every 4 hours as needed        QVAR 80 MCG/ACT Inhaler   Generic drug:  beclomethasone      Inhale 2 puffs twice daily as needed when allergies flare up        rizatriptan 10 MG ODT tab    MAXALT-MLT     Take 10 mg by mouth at onset of headache    Elevated prostate specific antigen (PSA)       SUMAtriptan Succinate Refill 6 MG/0.5ML Soct    IMITREX     Inject 6 mg into the muscle every 12 hours as needed    Elevated prostate specific antigen (PSA)       TYLENOL PO      Take 1,000 mg by mouth every 6 hours as needed        VITAMIN B-12 PO      Take 1 tablet by mouth every evening        VITAMIN D3 MAXIMUM STRENGTH 5000 UNITS Caps   Generic drug:  cholecalciferol      Take 5,000 Units by mouth every morning

## 2017-08-07 NOTE — PROGRESS NOTES
Subjective:    HPI                    Objective:    System    Physical Exam    General     ROS    Assessment/Plan:      SUBJECTIVE  Subjective changes as noted by pt:   Pt. has had increased L scapular muscle pain/spasms since d/c the sling and increasing exercise. Sleeping was difficult over the weekend. Pt. is looking for recommendations for a massage therapist. Otherwise he reports good progress with ROM and strength.   Current pain level:  4/10   Changes in function:  Yes (See Goal flowsheet attached for changes in current functional level)     Adverse reaction to treatment or activity:  None    OBJECTIVE  Changes in objective findings:   AROM L sh flex 132; abd 114; ER 35.     ASSESSMENT  Miles continues to require intervention to meet STG and LTG's: PT  Patient is progressing as expected.  Response to therapy has shown an improvement in  ROM  and strength  Progress made towards STG/LTG?  Yes (See Goal flowsheet attached for updates on achievement of STG and LTG)    PLAN  Current treatment program is being advanced to more complex exercises.    PTA/ATC plan:  N/A    Please refer to the daily flowsheet for treatment today, total treatment time and time spent performing 1:1 timed codes.

## 2017-08-20 ENCOUNTER — HOSPITAL ENCOUNTER (EMERGENCY)
Facility: CLINIC | Age: 52
Discharge: HOME | End: 2017-08-20
Admitting: EMERGENCY MEDICINE
Payer: COMMERCIAL

## 2017-08-20 VITALS
SYSTOLIC BLOOD PRESSURE: 155 MMHG | DIASTOLIC BLOOD PRESSURE: 82 MMHG | WEIGHT: 192 LBS | BODY MASS INDEX: 28.44 KG/M2 | RESPIRATION RATE: 16 BRPM | TEMPERATURE: 98.6 F | HEART RATE: 92 BPM | OXYGEN SATURATION: 95 % | HEIGHT: 69 IN

## 2017-08-20 PROCEDURE — 90675 RABIES VACCINE IM: CPT | Performed by: EMERGENCY MEDICINE

## 2017-08-20 PROCEDURE — 25000128 H RX IP 250 OP 636: Performed by: EMERGENCY MEDICINE

## 2017-08-20 PROCEDURE — 40000268 ZZH STATISTIC NO CHARGES: Performed by: EMERGENCY MEDICINE

## 2017-08-20 PROCEDURE — 90471 IMMUNIZATION ADMIN: CPT | Performed by: EMERGENCY MEDICINE

## 2017-08-20 RX ADMIN — RABIES VIRUS STRAIN PM-1503-3M ANTIGEN (PROPIOLACTONE INACTIVATED) AND WATER 1 ML: KIT at 16:29

## 2017-08-20 NOTE — ED AVS SNAPSHOT
[facility], Emergency Department    [address] [facility] [address]    Phone:  [phone]                                       Miles Valencia   MRN: [id number]    Department:  [facility], Emergency Department   Date of Visit:  8/20/2017           Patient Information     Date Of Birth          1965        Your diagnoses for this visit were:     None      Future Appointments        Provider Department Dept Phone Center    8/28/2017 2:40 PM Olya Rondon PT [facility] Physical Therapy [phone] SUDHA AKHTAR    9/6/2017 2:30 PM Jossy Swanson MD [facility] [phone] [facility]    12/14/2017 12:00 PM Eber Mcmillan MD [facility]rology and [facility] Urolog[facility] C[facility] [phone] [facility]      24 Hour Appointment Hotline       To make an appointment at any [facility], call [phone] ([phone]). If you don't have a family doctor or clinic, we will help you find one. [place] clinics are conveniently located to serve the needs of you and your family.             Review of your medicines      Our records show that you are taking the medicines listed below. If these are incorrect, please call your family doctor or clinic.        Dose / Directions Last dose taken    albuterol 108 (90 BASE) MCG/ACT Inhaler   Commonly known as:  PROAIR HFA/PROVENTIL HFA/VENTOLIN HFA   Dose:  2 puff        Inhale 2 puffs into the lungs every 4 hours as needed for shortness of breath / dyspnea or wheezing   Refills:  0        AMBIEN 5 MG tablet   Dose:  5 mg   Generic drug:  zolpidem        Take 5 mg by mouth nightly as needed for sleep   Refills:  0        ammonium lactate 12 % cream   Commonly known as:  AMLACTIN        Apply topically daily as needed   Refills:  0        aspirin-acetaminophen-caffeine 250-250-65 MG per tablet   Commonly known as:  EXCEDRIN MIGRAINE   Dose:  2 tablet        Take 2 tablets by mouth every 6  hours as needed for headaches   Refills:  0        BENADRYL ALLERGY PO   Dose:  25-50 mg        Take 25-50 mg by mouth daily as needed (allergies)   Refills:  0        camphor-menthol 0.5-0.5 % Lotn   Commonly known as:  DERMASARRA        Apply topically every 6 hours as needed for skin care   Refills:  0        CARTIA  MG 24 hr capsule   Dose:  120 mg   Generic drug:  diltiazem        Take 120 mg by mouth every morning   Refills:  0        cetirizine HCl 10 MG Caps   Dose:  10 mg        Take 10 mg by mouth daily   Refills:  0        fish oil-omega-3 fatty acids 1000 MG capsule        Take 1 gram by mouth in the morning and take 2 grams by mouth in the evening.   Refills:  0        gabapentin 300 MG capsule   Commonly known as:  NEURONTIN   Dose:  300 mg   Quantity:  60 capsule        Take 1 capsule (300 mg) by mouth 3 times daily   Refills:  0        hydrochlorothiazide 25 MG tablet   Commonly known as:  HYDRODIURIL   Dose:  25 mg        Take 25 mg by mouth every morning   Refills:  0        hydrocortisone 1 % cream   Commonly known as:  CORTAID        Apply topically 2 times daily as needed   Refills:  0        meclizine 12.5 MG tablet   Commonly known as:  ANTIVERT   Dose:  25 mg   Quantity:  30 tablet        Take 2 tablets (25 mg) by mouth 4 times daily as needed for dizziness   Refills:  0        MELATONIN PO   Dose:  1 mg        Take 1 mg by mouth nightly as needed   Refills:  0        ondansetron 4 MG ODT tab   Commonly known as:  ZOFRAN ODT   Dose:  4-8 mg   Quantity:  20 tablet        Take 1-2 tablets (4-8 mg) by mouth every 8 hours as needed for nausea   Refills:  1        oxyCODONE 5 MG IR tablet   Commonly known as:  ROXICODONE   Dose:  5-10 mg   Quantity:  75 tablet        Take 1-2 tablets (5-10 mg) by mouth every 3 hours as needed for moderate to severe pain   Refills:  0        PHENYLEPHRINE HCL PO   Dose:  10 mg        Take 10 mg by mouth every 4 hours as needed   Refills:  0        QVAR 80  MCG/ACT Inhaler   Generic drug:  beclomethasone        Inhale 2 puffs twice daily as needed when allergies flare up   Refills:  0        rizatriptan 10 MG ODT tab   Commonly known as:  MAXALT-MLT   Dose:  10 mg        Take 10 mg by mouth at onset of headache   Refills:  6        SUMAtriptan Succinate Refill 6 MG/0.5ML Soct   Commonly known as:  IMITREX   Dose:  6 mg        Inject 6 mg into the muscle every 12 hours as needed   Refills:  1        TYLENOL PO   Dose:  1000 mg        Take 1,000 mg by mouth every 6 hours as needed   Refills:  0        VITAMIN B-12 PO   Dose:  1 tablet        Take 1 tablet by mouth every evening   Refills:  0        VITAMIN D3 MAXIMUM STRENGTH 5000 UNITS Caps   Dose:  5000 Units   Generic drug:  cholecalciferol        Take 5,000 Units by mouth every morning   Refills:  0                Orders Needing Specimen Collection     None      Pending Results     No orders found from 8/18/2017 to 8/21/2017.            Pending Culture Results     No orders found from 8/18/2017 to 8/21/2017.            Pending Results Instructions     If you had any lab results that were not finalized at the time of your Discharge, you can call the ED Lab Result RN at [phone]. You will be contacted by this team for any positive Lab results or changes in treatment. The nurses are available 7 days a week from 10A to 6:30P.  You can leave a message 24 hours per day and they will return your call.        Thank you for choosing [facility]       Thank you for choosing [facility] for your care. Our goal is always to provide you with excellent care. Hearing back from our patients is one way we can continue to improve our services. Please take a few minutes to complete the written survey that you may receive in the mail after you visit with us. Thank you!        [facility] Information     [facility] gives you secure access to your electronic health record. If you see a primary care provider, you can also send messages to your care  team and make appointments. If you have questions, please call your primary care clinic.  If you do not have a primary care provider, please call [phone] and they will assist you.        Care EveryWhere ID     This is your Care EveryWhere ID. This could be used by other organizations to access your [place] medical records  [id number]        Equal Access to Services     SHANNAN SCHROEDER : Ivett Martínez, jess acosta, lalo stein. So [facility] [phone].    ATENCIÓN: Si habla español, tiene a wilson disposición servicios gratuitos de asistencia lingüística. Llame al [phone].    We comply with applicable federal civil rights laws and [place] laws. We do not discriminate on the basis of race, color, national origin, age, disability sex, sexual orientation or gender identity.            After Visit Summary       This is your record. Keep this with you and show to your community pharmacist(s) and doctor(s) at your next visit.

## 2017-08-20 NOTE — ED AVS SNAPSHOT
[facility] [place], Emergency Department    [address] [place] [address]    Phone:  [phone]                                       Miles Valencia   MRN: [id number]    Department:  [facility], Emergency Department   Date of Visit:  8/20/2017           After Visit Summary Signature Page     I have received my discharge instructions, and my questions have been answered. I have discussed any challenges I see with this plan with the nurse or doctor.    ..........................................................................................................................................  Patient/Patient Representative Signature      ..........................................................................................................................................  Patient Representative Print Name and Relationship to Patient    ..................................................               ................................................  Date                                            Time    ..........................................................................................................................................  Reviewed by Signature/Title    ...................................................              ..............................................  Date                                                            Time

## 2017-08-20 NOTE — ED NOTES
Miles had his first rabies shot at [place] and now comes in for his second shot. He brings the order with him.

## 2017-08-28 ENCOUNTER — THERAPY VISIT (OUTPATIENT)
Dept: PHYSICAL THERAPY | Facility: CLINIC | Age: 52
End: 2017-08-28
Payer: COMMERCIAL

## 2017-08-28 DIAGNOSIS — Z96.612 AFTERCARE FOLLOWING LEFT SHOULDER JOINT REPLACEMENT SURGERY: ICD-10-CM

## 2017-08-28 DIAGNOSIS — Z47.1 AFTERCARE FOLLOWING LEFT SHOULDER JOINT REPLACEMENT SURGERY: ICD-10-CM

## 2017-08-28 PROCEDURE — 99207 C NEUROMUSCULAR RE-EDUCATION: CPT | Mod: GP | Performed by: PHYSICAL THERAPIST

## 2017-08-28 PROCEDURE — 99207 C THERAPEUTIC EXERCISES: CPT | Mod: GP | Performed by: PHYSICAL THERAPIST

## 2017-08-28 NOTE — PROGRESS NOTES
Subjective:    HPI                    Objective:    System    Physical Exam    General     ROS    Assessment/Plan:      PROGRESS  REPORT    Progress reporting period is from 7-31-17 to 8-28-17.       SUBJECTIVE  Subjective changes noted by patient:   Pt. reports very good progress with his ROM and strength but he still c/o muscle pain/spams at night with sleeping. He demonstrates outstanding L sh ROM today and improving strength. He has been able to completely discontinue the use of the sling and is performing many of his normal daily functions both at home and work without difficulty. He reports that if it was not for the continued problem with sleeping, he would have no complaints at all.      Current pain level is  2/10.     Previous pain level was  4/10.   Changes in function:  Yes (See Goal flowsheet attached for changes in current functional level)  Adverse reaction to treatment or activity: None    OBJECTIVE  Changes noted in objective findings:  AROM L sh flex 158; abd 140; ER 48. Strength L sh flex 4/5; abd 4-/5; ER 4-/5     ASSESSMENT/PLAN  Updated problem list and treatment plan: Diagnosis 1:  S/p L TSA  Pain -  manual therapy, self management and education  Decreased ROM/flexibility - manual therapy and therapeutic exercise  Decreased strength - therapeutic exercise and therapeutic activities  Impaired muscle performance - neuro re-education  Decreased function - therapeutic activities  STG/LTGs have been met or progress has been made towards goals:  Yes (See Goal flow sheet completed today.)  Assessment of Progress: The patient's condition is improving.  Self Management Plans:  Patient has been instructed in a home treatment program.  Patient  has been instructed in self management of symptoms.  I have re-evaluated this patient and find that the nature, scope, duration and intensity of the therapy is appropriate for the medical condition of the patient.  Miles continues to require the following  intervention to meet STG and LTG's:  PT    Recommendations:  This patient would benefit from continued therapy.     Frequency:  1 X a month, once daily  Duration:  for 1 month          Please refer to the daily flowsheet for treatment today, total treatment time and time spent performing 1:1 timed codes.

## 2017-08-28 NOTE — MR AVS SNAPSHOT
After Visit Summary   8/28/2017    Miles Valencia    MRN: [id number]           Patient Information     Date Of Birth          1965        Visit Information        Provider Department      8/28/2017 2:40 PM Olya Rondon PT [facility]        Today's Diagnoses     Aftercare following left shoulder joint replacement surgery           Follow-ups after your visit        Your next 10 appointments already scheduled     Sep 06, 2017  2:30 PM CDT   (Arrive by 2:15 PM)   RETURN SHOULDER with Jossy Swanson MD   [facility] ([facility])    [address] [place] [id number] [phone]            Sep 19, 2017  3:20 PM CDT   SUDHA Extremity with Olya Rondon PT   [facility] ([facility]  )    [address] [place] [id number] [phone]            Dec 14, 2017 12:00 PM CST   (Arrive by 11:45 AM)   Return Visit with Eber Mcmillan MD   [facility] and [facility] Prostate and Urologic C[facility] ([facility])    [address] [place] [id number] [phone]              Who to contact     If you have questions or need follow up information about today's clinic visit or your schedule please contact [facility] PHYSICAL THERAPY directly at [phone].  Normal or non-critical lab and imaging results will be communicated to you by MyChart, letter or phone within 4 business days after the clinic has received the results. If you do not hear from us within 7 days, please contact the clinic through MyChart or phone. If you have a critical or abnormal lab result, we will notify you by phone as soon as possible.  Submit refill requests through [employer] or call your pharmacy and they will forward the refill request to us.  Please allow 3 business days for your refill to be completed.          Additional Information About Your Visit        MyChart Information     [employer] gives you secure access to your electronic health record. If you see a primary care provider, you can also send messages to your care team and make appointments. If you have questions, please call your primary care clinic.  If you do not have a primary care provider, please call [phone] and they will assist you.        Care EveryWhere ID     This is your Care EveryWhere ID. This could be used by other organizations to access your [place] medical records  [id number]         Blood Pressure from Last 3 Encounters:   08/20/17 155/82   06/29/17 133/84   06/13/17 120/71    Weight from Last 3 Encounters:   08/20/17 87.1 kg (192 lb)   06/29/17 90.7 kg (200 lb)   06/13/17 87.3 kg (192 lb 8 oz)              We Performed the Following     SUDHA Progress Notes Report     Neuromuscular Re-Education     Therapeutic Exercises          Today's Medication Changes          These changes are accurate as of: 8/28/17  5:19 PM.  If you have any questions, ask your nurse or doctor.               These medicines have changed or have updated prescriptions.        Dose/Directions    gabapentin 300 MG capsule   Commonly known as:  NEURONTIN   This may have changed:  additional instructions   Used for:  Status post total shoulder arthroplasty, left        Dose:  300 mg   Take 1 capsule (300 mg) by mouth 3 times daily   Quantity:  60 capsule   Refills:  0                Primary Care Provider Office Phone # Fax #    Oneyda Upown [phone]       [address] [address]        Equal Access to Services     [facility] Hospital[facility] : Ivett Martínez, wakhurramda luqgrace, qaybta kaalmada cali, lalo szymanski. So [facility] [phone].    ATENCIÓN: Si habla español, tiene a wilson disposición servicios gratuitos de  clema lingüística. Anthony al [phone].    We comply with applicable federal civil rights laws and [place] laws. We do not discriminate on the basis of race, color, national origin, age, disability sex, sexual orientation or gender identity.            Thank you!     Thank you for choosing [place] [facility] PHYSICAL THERAPY  for your care. Our goal is always to provide you with excellent care. Hearing back from our patients is one way we can continue to improve our services. Please take a few minutes to complete the written survey that you may receive in the mail after your visit with us. Thank you!             Your Updated Medication List - Protect others around you: Learn how to safely use, store and throw away your medicines at www.disposemymeds.org.          This list is accurate as of: 8/28/17  5:19 PM.  Always use your most recent med list.                   Brand Name Dispense Instructions for use Diagnosis    albuterol 108 (90 BASE) MCG/ACT Inhaler    PROAIR HFA/PROVENTIL HFA/VENTOLIN HFA     Inhale 2 puffs into the lungs every 4 hours as needed for shortness of breath / dyspnea or wheezing        AMBIEN 5 MG tablet   Generic drug:  zolpidem      Take 5 mg by mouth nightly as needed for sleep        ammonium lactate 12 % cream    AMLACTIN     Apply topically daily as needed        aspirin-acetaminophen-caffeine 250-250-65 MG per tablet    EXCEDRIN MIGRAINE     Take 2 tablets by mouth every 6 hours as needed for headaches        BENADRYL ALLERGY PO      Take 25-50 mg by mouth daily as needed (allergies)        camphor-menthol 0.5-0.5 % Lotn    DERMASARRA     Apply topically every 6 hours as needed for skin care        CARTIA  MG 24 hr capsule   Generic drug:  diltiazem      Take 120 mg by mouth every morning    Elevated prostate specific antigen (PSA)       cetirizine HCl 10 MG Caps      Take 10 mg by mouth daily        fish oil-omega-3 fatty acids 1000 MG capsule       Take 1 gram by mouth in the morning and take 2 grams by mouth in the evening.        gabapentin 300 MG capsule    NEURONTIN    60 capsule    Take 1 capsule (300 mg) by mouth 3 times daily    Status post total shoulder arthroplasty, left       hydrochlorothiazide 25 MG tablet    HYDRODIURIL     Take 25 mg by mouth every morning        hydrocortisone 1 % cream    CORTAID     Apply topically 2 times daily as needed        meclizine 12.5 MG tablet    ANTIVERT    30 tablet    Take 2 tablets (25 mg) by mouth 4 times daily as needed for dizziness        MELATONIN PO      Take 1 mg by mouth nightly as needed        ondansetron 4 MG ODT tab    ZOFRAN ODT    20 tablet    Take 1-2 tablets (4-8 mg) by mouth every 8 hours as needed for nausea    Status post total shoulder arthroplasty, left       oxyCODONE 5 MG IR tablet    ROXICODONE    75 tablet    Take 1-2 tablets (5-10 mg) by mouth every 3 hours as needed for moderate to severe pain    Status post total shoulder arthroplasty, left       PHENYLEPHRINE HCL PO      Take 10 mg by mouth every 4 hours as needed        QVAR 80 MCG/ACT Inhaler   Generic drug:  beclomethasone      Inhale 2 puffs twice daily as needed when allergies flare up        rizatriptan 10 MG ODT tab    MAXALT-MLT     Take 10 mg by mouth at onset of headache    Elevated prostate specific antigen (PSA)       SUMAtriptan Succinate Refill 6 MG/0.5ML Soct    IMITREX     Inject 6 mg into the muscle every 12 hours as needed    Elevated prostate specific antigen (PSA)       TYLENOL PO      Take 1,000 mg by mouth every 6 hours as needed        VITAMIN B-12 PO      Take 1 tablet by mouth every evening        VITAMIN D3 MAXIMUM STRENGTH 5000 UNITS Caps   Generic drug:  cholecalciferol      Take 5,000 Units by mouth every morning

## 2017-09-06 ENCOUNTER — OFFICE VISIT (OUTPATIENT)
Dept: ORTHOPEDICS | Facility: CLINIC | Age: 52
End: 2017-09-06

## 2017-09-06 DIAGNOSIS — Z98.890 HX OF SHOULDER SURGERY: Primary | ICD-10-CM

## 2017-09-06 DIAGNOSIS — Z96.612 HISTORY OF TOTAL SHOULDER REPLACEMENT, LEFT: Primary | ICD-10-CM

## 2017-09-06 NOTE — LETTER
2017      RE: Miles Valencia  [address] [place] [address]       CHIEF COMPLAINT: Status post left total shoulder arthroplasty   DATE OF SURGERY: 17    HISTORY OF PRESENT ILLNESS: Miles is an extremely pleasant 52 year-old gentlamen who is now 3 months s/p Left TSA. Overall he is doing well. Still taking tylenol and ibuprofen occasionally for pain. Working with PT and doing very well with ROM. He has returned to work full-time and this is going well. No wound healing issues.     SANE score  Affected shoulder: Left  Right shoulder SANE: 100  Left shoulder SANE: 60    EXAM:  Pleasant adult male in no distress.  Respirations even and unlabored.  Left upper extremity: Incision well healing. Sense intact Ax/Musc/Med/Rad/Uln nerves. Motor intact EPL, FPL, and Intrinsics. Active shoulder ROM: ; ER 40; IR L1  Imagin view of the left shoulder today demonstrates implant in excellent anatomic alignment with no evidence of loosening or failure. On the axial, the head is well centered over the glenoid and good retrotraction of version.    ASSESSMENT:  1. 3 months status post above procedure    PLAN:  1. He should continue working with PT and may now advance to working on strengthening of the left shoulder.  2. His IR ROM restriction is now lifted and he may begin advancing this as sxs tolerate  3. He should continue to refrain from doing heavy/pounding activities with the left shoulder such as snow shoveling and chopping wood.  4. Return to clinic in 3 months with repeat films    Huang Corley MD   Orthopedic Surgery Resident, PGY-5  [phone]    I have personally examined this patient and have reviewed the clinical presentation and progress note with the resident.  I agree with the treatment plan as outlined.  The plan was formulated with the resident on the day of the resident's note.       Jossy Swanson MD

## 2017-09-06 NOTE — LETTER
2017       RE: Miles Valencia  [address] [place] [address]     Dear Colleague,    Thank you for referring your patient, Miles Valencia, to the [facility] at [facility]. Please see a copy of my visit note below.    CHIEF COMPLAINT: Status post left total shoulder arthroplasty   DATE OF SURGERY: 17    HISTORY OF PRESENT ILLNESS: Miles is an extremely pleasant 52 year-old gentlamen who is now 3 months s/p Left TSA. Overall he is doing well. Still taking tylenol and ibuprofen occasionally for pain. Working with PT and doing very well with ROM. He has returned to work full-time and this is going well. No wound healing issues.     SANE score  Affected shoulder: Left  Right shoulder SANE: 100  Left shoulder SANE: 60    EXAM:  Pleasant adult male in no distress.  Respirations even and unlabored.  Left upper extremity: Incision well healing. Sense intact Ax/Musc/Med/Rad/Uln nerves. Motor intact EPL, FPL, and Intrinsics. Active shoulder ROM: ; ER 40; IR L1  Imagin view of the left shoulder today demonstrates implant in excellent anatomic alignment with no evidence of loosening or failure. On the axial, the head is well centered over the glenoid and good retrotraction of version.    ASSESSMENT:  1. 3 months status post above procedure    PLAN:  1. He should continue working with PT and may now advance to working on strengthening of the left shoulder.  2. His IR ROM restriction is now lifted and he may begin advancing this as sxs tolerate  3. He should continue to refrain from doing heavy/pounding activities with the left shoulder such as snow shoveling and chopping wood.  4. Return to clinic in 3 months with repeat films    Huang Corley MD   Orthopedic Surgery Resident, PGY-5  [phone]    I have personally examined this patient and have reviewed the clinical presentation and progress note with the resident.  I agree with the treatment  plan as outlined.  The plan was formulated with the resident on the day of the resident's note.       Again, thank you for allowing me to participate in the care of your patient.      Sincerely,    Jossy Swanson MD

## 2017-09-06 NOTE — MR AVS SNAPSHOT
After Visit Summary   9/6/2017    Miles Valencia    MRN: [id number]           Patient Information     Date Of Birth          1965        Visit Information        Provider Department      9/6/2017 2:30 PM Jossy Swnason MD [facility]        Today's Diagnoses     History of total shoulder replacement, left    -  1       Follow-ups after your visit        Your next 10 appointments already scheduled     Oct 24, 2017  4:00 PM CDT   SUDHA Extremity with Olya Rondon PT   [place] [facility]    [address] [place] [id number]   [phone]            Dec 06, 2017 12:45 PM CST   (Arrive by 12:30 PM)   RETURN SHOULDER with Jossy Swanson MD   [facility] ([facility])    [address] [place] [id number] [phone]            Dec 14, 2017 12:00 PM CST   (Arrive by 11:45 AM)   Return Visit with Eber Mcmillan MD   [facility] and [facility] for Prostate and Urologic Cancers ([facility])    [address] [place] [phone] [phone]              Who to contact     Please call your clinic at [phone] to:    Ask questions about your health    Make or cancel appointments    Discuss your medicines    Learn about your test results    Speak to your doctor   If you have compliments or concerns about an experience at your clinic, or if you wish to file a complaint, please contact [facility] Patient Relations at [phone] or email us at [email or web address]         Additional Information About Your Visit        MyChart Information     [employer] gives you secure access to your electronic health record. If you see a primary care provider, you can also send messages to your care team and make appointments. If you have questions,  please call your primary care clinic.  If you do not have a primary care provider, please call [phone] and they will assist you.      [employer] is an electronic gateway that provides easy, online access to your medical records. With [employer], you can request a clinic appointment, read your test results, renew a prescription or communicate with your care team.     To access your existing account, please contact your [facility] or call [phone] for assistance.        Care EveryWhere ID     This is your Care EveryWhere ID. This could be used by other organizations to access your [place] medical records  [id number]         Blood Pressure from Last 3 Encounters:   08/20/17 155/82   06/29/17 133/84   06/13/17 120/71    Weight from Last 3 Encounters:   08/20/17 87.1 kg (192 lb)   06/29/17 90.7 kg (200 lb)   06/13/17 87.3 kg (192 lb 8 oz)              Today, you had the following     No orders found for display         Today's Medication Changes          These changes are accurate as of: 9/6/17 11:59 PM.  If you have any questions, ask your nurse or doctor.               These medicines have changed or have updated prescriptions.        Dose/Directions    gabapentin 300 MG capsule   Commonly known as:  NEURONTIN   This may have changed:    - how much to take  - when to take this   Used for:  Status post total shoulder arthroplasty, left        Dose:  300 mg   Take 1 capsule (300 mg) by mouth 3 times daily   Quantity:  60 capsule   Refills:  0         Stop taking these medicines if you haven't already. Please contact your care team if you have questions.     ondansetron 4 MG ODT tab   Commonly known as:  ZOFRAN ODT   Stopped by:  Jossy Swanson MD                    Primary Care Provider Office Phone # Fax #    Oneyda VANDANA Robert [phone]       [address] [address]        Equal Access to Services     SHANNAN SCHROEDER AH: Ivett  rosas Martínez, wakhurramda luqadaha, qaybta kaalmada cali, lalo markin hayaan dominiquesarika duarte lajoseever nessa. So [facility] [phone].    ATENCIÓN: Si habla patricia, tiene a wilson disposición servicios gratuitos de asistencia lingüística. Anthony al [phone].    We comply with applicable federal civil rights laws and [place] laws. We do not discriminate on the basis of race, color, national origin, age, disability sex, sexual orientation or gender identity.            Thank you!     Thank you for choosing [facility]  for your care. Our goal is always to provide you with excellent care. Hearing back from our patients is one way we can continue to improve our services. Please take a few minutes to complete the written survey that you may receive in the mail after your visit with us. Thank you!             Your Updated Medication List - Protect others around you: Learn how to safely use, store and throw away your medicines at www.disposemymeds.org.          This list is accurate as of: 9/6/17 11:59 PM.  Always use your most recent med list.                   Brand Name Dispense Instructions for use Diagnosis    albuterol 108 (90 BASE) MCG/ACT Inhaler    PROAIR HFA/PROVENTIL HFA/VENTOLIN HFA     Inhale 2 puffs into the lungs every 4 hours as needed for shortness of breath / dyspnea or wheezing        AMBIEN 5 MG tablet   Generic drug:  zolpidem      Take 5 mg by mouth nightly as needed for sleep        ammonium lactate 12 % cream    AMLACTIN     Apply topically daily as needed        aspirin-acetaminophen-caffeine 250-250-65 MG per tablet    EXCEDRIN MIGRAINE     Take 2 tablets by mouth every 6 hours as needed for headaches        BENADRYL ALLERGY PO      Take 25-50 mg by mouth daily as needed (allergies)        camphor-menthol 0.5-0.5 % Lotn    DERMASARRA     Apply topically every 6 hours as needed for skin care        CARTIA  MG 24 hr capsule   Generic drug:  diltiazem      Take 120 mg by mouth  every morning    Elevated prostate specific antigen (PSA)       cetirizine HCl 10 MG Caps      Take 10 mg by mouth daily        fish oil-omega-3 fatty acids 1000 MG capsule      Take 1 gram by mouth in the morning and take 2 grams by mouth in the evening.        gabapentin 300 MG capsule    NEURONTIN    60 capsule    Take 1 capsule (300 mg) by mouth 3 times daily    Status post total shoulder arthroplasty, left       hydrochlorothiazide 25 MG tablet    HYDRODIURIL     Take 25 mg by mouth every morning        hydrocortisone 1 % cream    CORTAID     Apply topically 2 times daily as needed        meclizine 12.5 MG tablet    ANTIVERT    30 tablet    Take 2 tablets (25 mg) by mouth 4 times daily as needed for dizziness        MELATONIN PO      Take 1 mg by mouth nightly as needed        MELOXICAM PO      Take by mouth as needed        oxyCODONE 5 MG IR tablet    ROXICODONE    75 tablet    Take 1-2 tablets (5-10 mg) by mouth every 3 hours as needed for moderate to severe pain    Status post total shoulder arthroplasty, left       PHENYLEPHRINE HCL PO      Take 10 mg by mouth every 4 hours as needed        QVAR 80 MCG/ACT Inhaler   Generic drug:  beclomethasone      Inhale 2 puffs twice daily as needed when allergies flare up        rizatriptan 10 MG ODT tab    MAXALT-MLT     Take 10 mg by mouth at onset of headache    Elevated prostate specific antigen (PSA)       SUMAtriptan Succinate Refill 6 MG/0.5ML Soct    IMITREX     Inject 6 mg into the muscle every 12 hours as needed    Elevated prostate specific antigen (PSA)       TYLENOL PO      Take 1,000 mg by mouth every 6 hours as needed        VITAMIN B-12 PO      Take 1 tablet by mouth every evening        VITAMIN D3 MAXIMUM STRENGTH 5000 UNITS Caps   Generic drug:  cholecalciferol      Take 5,000 Units by mouth every morning

## 2017-09-06 NOTE — PROGRESS NOTES
CHIEF COMPLAINT: Status post left total shoulder arthroplasty   DATE OF SURGERY: 17    HISTORY OF PRESENT ILLNESS: Miles is an extremely pleasant 52 year-old gentlamen who is now 3 months s/p Left TSA. Overall he is doing well. Still taking tylenol and ibuprofen occasionally for pain. Working with PT and doing very well with ROM. He has returned to work full-time and this is going well. No wound healing issues.     SANE score  Affected shoulder: Left  Right shoulder SANE: 100  Left shoulder SANE: 60    EXAM:  Pleasant adult male in no distress.  Respirations even and unlabored.  Left upper extremity: Incision well healing. Sense intact Ax/Musc/Med/Rad/Uln nerves. Motor intact EPL, FPL, and Intrinsics. Active shoulder ROM: ; ER 40; IR L1  Imagin view of the left shoulder today demonstrates implant in excellent anatomic alignment with no evidence of loosening or failure. On the axial, the head is well centered over the glenoid and good retrotraction of version.    ASSESSMENT:  1. 3 months status post above procedure    PLAN:  1. He should continue working with PT and may now advance to working on strengthening of the left shoulder.  2. His IR ROM restriction is now lifted and he may begin advancing this as sxs tolerate  3. He should continue to refrain from doing heavy/pounding activities with the left shoulder such as snow shoveling and chopping wood.  4. Return to clinic in 3 months with repeat films    Huang Corley MD   Orthopedic Surgery Resident, PGY-5  [phone]    I have personally examined this patient and have reviewed the clinical presentation and progress note with the resident.  I agree with the treatment plan as outlined.  The plan was formulated with the resident on the day of the resident's note.

## 2017-09-06 NOTE — NURSING NOTE
Reason For Visit:   Chief Complaint   Patient presents with     Surgical Followup     DOS: 6.13.17 for Left Total Shoulder Arthroplasty        PCP: Oneyda Jones  Ref: Established     ?  No  Occupation .  Currently working? Yes.  Work status?  Full-time.  Date of injury: none  Date of surgery: 6.13.17  Type of surgery: Left TSA  Smoker: No          Right hand dominant      SANE score  Affected shoulder: Left  Right shoulder SANE: 100  Left shoulder SANE: 60        Pain Assessment  Patient Currently in Pain: Yes  0-10 Pain Scale: 1  Primary Pain Location: Shoulder  Pain Orientation: Left    Marissa Becerra LPN

## 2017-09-19 ENCOUNTER — THERAPY VISIT (OUTPATIENT)
Dept: PHYSICAL THERAPY | Facility: CLINIC | Age: 52
End: 2017-09-19
Payer: COMMERCIAL

## 2017-09-19 DIAGNOSIS — Z96.612 AFTERCARE FOLLOWING LEFT SHOULDER JOINT REPLACEMENT SURGERY: ICD-10-CM

## 2017-09-19 DIAGNOSIS — Z47.1 AFTERCARE FOLLOWING LEFT SHOULDER JOINT REPLACEMENT SURGERY: ICD-10-CM

## 2017-09-19 PROCEDURE — 99207 C NEUROMUSCULAR RE-EDUCATION: CPT | Mod: GP | Performed by: PHYSICAL THERAPIST

## 2017-09-19 PROCEDURE — 97530 THERAPEUTIC ACTIVITIES: CPT | Mod: GP | Performed by: PHYSICAL THERAPIST

## 2017-09-19 PROCEDURE — 97110 THERAPEUTIC EXERCISES: CPT | Mod: GP | Performed by: PHYSICAL THERAPIST

## 2017-09-19 NOTE — PROGRESS NOTES
Subjective:    HPI                    Objective:    System    Physical Exam    General     ROS    Assessment/Plan:      SUBJECTIVE  Subjective changes as noted by pt:   Pt. reports good progress with his L shoulder with no more scapular pain and much improved ROM and strength. He saw his surgeon 2 weeks ago and no longer has restrictions other than heavier physical lifting and activity.   Current pain level:  1/10   Changes in function:  Yes (See Goal flowsheet attached for changes in current functional level)     Adverse reaction to treatment or activity:  None    OBJECTIVE  Changes in objective findings:   AROM L sh flex 162; abd 154; IR 65; ER 55. Strength L sh flex 4/5; abd 4/5; IR 4/5; ER 4/5      ASSESSMENT  Miles continues to require intervention to meet STG and LTG's: PT  Patient's symptoms are resolving.  Response to therapy has shown an improvement in  pain level, ROM , strength and function  Progress made towards STG/LTG?  Yes (See Goal flowsheet attached for updates on achievement of STG and LTG)    PLAN  Current treatment program is being advanced to more complex exercises.    PTA/ATC plan:  N/A    Please refer to the daily flowsheet for treatment today, total treatment time and time spent performing 1:1 timed codes.

## 2017-09-19 NOTE — MR AVS SNAPSHOT
After Visit Summary   9/19/2017    Miles Valencia    MRN: [id number]           Patient Information     Date Of Birth          1965        Visit Information        Provider Department      9/19/2017 3:20 PM Olya Rondon PT [facility]        Today's Diagnoses     Aftercare following left shoulder joint replacement surgery           Follow-ups after your visit        Your next 10 appointments already scheduled     Oct 24, 2017  4:00 PM CDT   SUDHA Extremity with Olya Rondon PT   [facility] ([facility]  )    [address] [place] [id number]   [phone]            Dec 06, 2017 12:45 PM CST   (Arrive by 12:30 PM)   RETURN SHOULDER with Jossy Swanson MD   [facility] ([facility] [place])    [address] [place] [id number] [phone]            Dec 14, 2017 12:00 PM CST   (Arrive by 11:45 AM)   Return Visit with Eber Mcmillan MD   [facility] Urology and [facility] for Prostate and Urologic Cancers ([facility] [place])    [address] [place] [id number] [phone]              Who to contact     If you have questions or need follow up information about today's clinic visit or your schedule please contact [facility] PHYSICAL THERAPY directly at [phone].  Normal or non-critical lab and imaging results will be communicated to you by MyChart, letter or phone within 4 business days after the clinic has received the results. If you do not hear from us within 7 days, please contact the clinic through MyChart or phone. If you have a critical or abnormal lab result, we will notify you by phone as soon as possible.  Submit refill requests through [employer] or call your pharmacy and they will forward the refill request to us.  Please allow 3 business days for your refill to be completed.          Additional Information About Your Visit        MyChart Information     [employer] gives you secure access to your electronic health record. If you see a primary care provider, you can also send messages to your care team and make appointments. If you have questions, please call your primary care clinic.  If you do not have a primary care provider, please call [phone] and they will assist you.        Care EveryWhere ID     This is your Care EveryWhere ID. This could be used by other organizations to access your [place] medical records  [id number]         Blood Pressure from Last 3 Encounters:   08/20/17 155/82   06/29/17 133/84   06/13/17 120/71    Weight from Last 3 Encounters:   08/20/17 87.1 kg (192 lb)   06/29/17 90.7 kg (200 lb)   06/13/17 87.3 kg (192 lb 8 oz)              We Performed the Following     Neuromuscular Re-Education     Therapeutic Activities     Therapeutic Exercises          Today's Medication Changes          These changes are accurate as of: 9/19/17  6:21 PM.  If you have any questions, ask your nurse or doctor.               These medicines have changed or have updated prescriptions.        Dose/Directions    gabapentin 300 MG capsule   Commonly known as:  NEURONTIN   This may have changed:    - how much to take  - when to take this   Used for:  Status post total shoulder arthroplasty, left        Dose:  300 mg   Take 1 capsule (300 mg) by mouth 3 times daily   Quantity:  60 capsule   Refills:  0                Primary Care Provider Office Phone # Fax #    Oneyda Upown [phone]       [address] [address]        Equal Access to Services     JENNIFER SCHROEDER : Ivett Martínez, jess acosta, lalo stein. So [facility] [phone].    ATENCIÓN: Si habla español, tiene a iwlson disposición servicios  ciarra de asistencia lingüística. Anthony govea [phone].    We comply with applicable federal civil rights laws and [place] laws. We do not discriminate on the basis of race, color, national origin, age, disability sex, sexual orientation or gender identity.            Thank you!     Thank you for choosing [facility] PHYSICAL THERAPY  for your care. Our goal is always to provide you with excellent care. Hearing back from our patients is one way we can continue to improve our services. Please take a few minutes to complete the written survey that you may receive in the mail after your visit with us. Thank you!             Your Updated Medication List - Protect others around you: Learn how to safely use, store and throw away your medicines at www.disposemymeds.org.          This list is accurate as of: 9/19/17  6:21 PM.  Always use your most recent med list.                   Brand Name Dispense Instructions for use Diagnosis    albuterol 108 (90 BASE) MCG/ACT Inhaler    PROAIR HFA/PROVENTIL HFA/VENTOLIN HFA     Inhale 2 puffs into the lungs every 4 hours as needed for shortness of breath / dyspnea or wheezing        AMBIEN 5 MG tablet   Generic drug:  zolpidem      Take 5 mg by mouth nightly as needed for sleep        ammonium lactate 12 % cream    AMLACTIN     Apply topically daily as needed        aspirin-acetaminophen-caffeine 250-250-65 MG per tablet    EXCEDRIN MIGRAINE     Take 2 tablets by mouth every 6 hours as needed for headaches        BENADRYL ALLERGY PO      Take 25-50 mg by mouth daily as needed (allergies)        camphor-menthol 0.5-0.5 % Lotn    DERMASARRA     Apply topically every 6 hours as needed for skin care        CARTIA  MG 24 hr capsule   Generic drug:  diltiazem      Take 120 mg by mouth every morning    Elevated prostate specific antigen (PSA)       cetirizine HCl 10 MG Caps      Take 10 mg by mouth daily        fish oil-omega-3 fatty acids 1000 MG  capsule      Take 1 gram by mouth in the morning and take 2 grams by mouth in the evening.        gabapentin 300 MG capsule    NEURONTIN    60 capsule    Take 1 capsule (300 mg) by mouth 3 times daily    Status post total shoulder arthroplasty, left       hydrochlorothiazide 25 MG tablet    HYDRODIURIL     Take 25 mg by mouth every morning        hydrocortisone 1 % cream    CORTAID     Apply topically 2 times daily as needed        meclizine 12.5 MG tablet    ANTIVERT    30 tablet    Take 2 tablets (25 mg) by mouth 4 times daily as needed for dizziness        MELATONIN PO      Take 1 mg by mouth nightly as needed        MELOXICAM PO      Take by mouth as needed        oxyCODONE 5 MG IR tablet    ROXICODONE    75 tablet    Take 1-2 tablets (5-10 mg) by mouth every 3 hours as needed for moderate to severe pain    Status post total shoulder arthroplasty, left       PHENYLEPHRINE HCL PO      Take 10 mg by mouth every 4 hours as needed        QVAR 80 MCG/ACT Inhaler   Generic drug:  beclomethasone      Inhale 2 puffs twice daily as needed when allergies flare up        rizatriptan 10 MG ODT tab    MAXALT-MLT     Take 10 mg by mouth at onset of headache    Elevated prostate specific antigen (PSA)       SUMAtriptan Succinate Refill 6 MG/0.5ML Soct    IMITREX     Inject 6 mg into the muscle every 12 hours as needed    Elevated prostate specific antigen (PSA)       TYLENOL PO      Take 1,000 mg by mouth every 6 hours as needed        VITAMIN B-12 PO      Take 1 tablet by mouth every evening        VITAMIN D3 MAXIMUM STRENGTH 5000 UNITS Caps   Generic drug:  cholecalciferol      Take 5,000 Units by mouth every morning

## 2017-10-24 ENCOUNTER — THERAPY VISIT (OUTPATIENT)
Dept: PHYSICAL THERAPY | Facility: CLINIC | Age: 52
End: 2017-10-24
Payer: COMMERCIAL

## 2017-10-24 DIAGNOSIS — Z47.1 AFTERCARE FOLLOWING LEFT SHOULDER JOINT REPLACEMENT SURGERY: ICD-10-CM

## 2017-10-24 DIAGNOSIS — Z96.612 AFTERCARE FOLLOWING LEFT SHOULDER JOINT REPLACEMENT SURGERY: ICD-10-CM

## 2017-10-24 PROCEDURE — 99207 C NEUROMUSCULAR RE-EDUCATION: CPT | Mod: GP | Performed by: PHYSICAL THERAPIST

## 2017-10-24 PROCEDURE — 99207 C THERAPEUTIC EXERCISES: CPT | Mod: GP | Performed by: PHYSICAL THERAPIST

## 2017-10-24 NOTE — MR AVS SNAPSHOT
After Visit Summary   10/24/2017    Miles Valencia    MRN: [id number]           Patient Information     Date Of Birth          1965        Visit Information        Provider Department      10/24/2017 4:00 PM Olya Rondon PT [facility] Therapy        Today's Diagnoses     Aftercare following left shoulder joint replacement surgery           Follow-ups after your visit        Your next 10 appointments already scheduled     Dec 06, 2017 12:45 PM CST   (Arrive by 12:30 PM)   RETURN SHOULDER with Jossy Swanson MD   [facility] ([facility])    [address] [place] [phone]            Dec 14, 2017 12:00 PM CST   (Arrive by 11:45 AM)   Return Visit with Eber Mcmillan MD   [facility]rology and [facility] for Prostate and Urologic Cancers ([facility])    [address] [place] [phone] [phone]              Who to contact     If you have questions or need follow up information about today's clinic visit or your schedule please contact [facility] PHYSICAL THERAPY directly at [phone].  Normal or non-critical lab and imaging results will be communicated to you by MyChart, letter or phone within 4 business days after the clinic has received the results. If you do not hear from us within 7 days, please contact the clinic through MyChart or phone. If you have a critical or abnormal lab result, we will notify you by phone as soon as possible.  Submit refill requests through [employer] or call your pharmacy and they will forward the refill request to us. Please allow 3 business days for your refill to be completed.          Additional Information About Your Visit        [employer] Information     [employer] gives you secure access to your electronic health record. If you see a  primary care provider, you can also send messages to your care team and make appointments. If you have questions, please call your primary care clinic.  If you do not have a primary care provider, please call [phone] and they will assist you.        Care EveryWhere ID     This is your Care EveryWhere ID. This could be used by other organizations to access your [place] medical records  [id number]         Blood Pressure from Last 3 Encounters:   08/20/17 155/82   06/29/17 133/84   06/13/17 120/71    Weight from Last 3 Encounters:   08/20/17 87.1 kg (192 lb)   06/29/17 90.7 kg (200 lb)   06/13/17 87.3 kg (192 lb 8 oz)              We Performed the Following     SUDHA Progress Notes Report     Neuromuscular Re-Education     Therapeutic Exercises          Today's Medication Changes          These changes are accurate as of: 10/24/17  5:19 PM.  If you have any questions, ask your nurse or doctor.               These medicines have changed or have updated prescriptions.        Dose/Directions    gabapentin 300 MG capsule   Commonly known as:  NEURONTIN   This may have changed:    - how much to take  - when to take this   Used for:  Status post total shoulder arthroplasty, left        Dose:  300 mg   Take 1 capsule (300 mg) by mouth 3 times daily   Quantity:  60 capsule   Refills:  0                Primary Care Provider Office Phone # Fax #    Oneyda Jones [phone]       [place] [id number]        Equal Access to Services     SHANNAN SCHROEDER AH: Hadii rosas Martínez, waaxda luderekadaha, qaybta kaalmada duaenda, lalo szymanski. So [facility] [phone].    ATENCIÓN: Si habla español, tiene a wilson disposición servicios gratuitos de asistencia lingüística. Llame al [phone].    We comply with applicable federal civil rights laws and [place] laws. We do not discriminate on the basis of race, color, national origin, age,  disability, sex, sexual orientation, or gender identity.            Thank you!     Thank you for choosing [facility] PH[facility]ICAL THERAPY  for your care. Our goal is always to provide you with excellent care. Hearing back from our patients is one way we can continue to improve our services. Please take a few minutes to complete the written survey that you may receive in the mail after your visit with us. Thank you!             Your Updated Medication List - Protect others around you: Learn how to safely use, store and throw away your medicines at www.disposemymeds.org.          This list is accurate as of: 10/24/17  5:19 PM.  Always use your most recent med list.                   Brand Name Dispense Instructions for use Diagnosis    albuterol 108 (90 BASE) MCG/ACT Inhaler    PROAIR HFA/PROVENTIL HFA/VENTOLIN HFA     Inhale 2 puffs into the lungs every 4 hours as needed for shortness of breath / dyspnea or wheezing        AMBIEN 5 MG tablet   Generic drug:  zolpidem      Take 5 mg by mouth nightly as needed for sleep        ammonium lactate 12 % cream    AMLACTIN     Apply topically daily as needed        aspirin-acetaminophen-caffeine 250-250-65 MG per tablet    EXCEDRIN MIGRAINE     Take 2 tablets by mouth every 6 hours as needed for headaches        BENADRYL ALLERGY PO      Take 25-50 mg by mouth daily as needed (allergies)        camphor-menthol 0.5-0.5 % Lotn    DERMASARRA     Apply topically every 6 hours as needed for skin care        CARTIA  MG 24 hr capsule   Generic drug:  diltiazem      Take 120 mg by mouth every morning    Elevated prostate specific antigen (PSA)       cetirizine HCl 10 MG Caps      Take 10 mg by mouth daily        fish oil-omega-3 fatty acids 1000 MG capsule      Take 1 gram by mouth in the morning and take 2 grams by mouth in the evening.        gabapentin 300 MG capsule    NEURONTIN    60 capsule    Take 1 capsule (300 mg) by mouth 3 times daily     Status post total shoulder arthroplasty, left       hydrochlorothiazide 25 MG tablet    HYDRODIURIL     Take 25 mg by mouth every morning        hydrocortisone 1 % cream    CORTAID     Apply topically 2 times daily as needed        meclizine 12.5 MG tablet    ANTIVERT    30 tablet    Take 2 tablets (25 mg) by mouth 4 times daily as needed for dizziness        MELATONIN PO      Take 1 mg by mouth nightly as needed        MELOXICAM PO      Take by mouth as needed        oxyCODONE 5 MG IR tablet    ROXICODONE    75 tablet    Take 1-2 tablets (5-10 mg) by mouth every 3 hours as needed for moderate to severe pain    Status post total shoulder arthroplasty, left       PHENYLEPHRINE HCL PO      Take 10 mg by mouth every 4 hours as needed        QVAR 80 MCG/ACT Inhaler   Generic drug:  beclomethasone      Inhale 2 puffs twice daily as needed when allergies flare up        rizatriptan 10 MG ODT tab    MAXALT-MLT     Take 10 mg by mouth at onset of headache    Elevated prostate specific antigen (PSA)       SUMAtriptan Succinate Refill 6 MG/0.5ML Soct    IMITREX     Inject 6 mg into the muscle every 12 hours as needed    Elevated prostate specific antigen (PSA)       TYLENOL PO      Take 1,000 mg by mouth every 6 hours as needed        VITAMIN B-12 PO      Take 1 tablet by mouth every evening        VITAMIN D3 MAXIMUM STRENGTH 5000 UNITS Caps   Generic drug:  cholecalciferol      Take 5,000 Units by mouth every morning

## 2017-10-24 NOTE — LETTER
[facility] PHYSICAL THERAPY   [address] [address]  [phone]    2017    Re: Miles Valencia   :   1965  MRN:  [id number]   REFERRING PHYSICIAN:   Jossy Swanson    [facility] ATHLETIC [facility] PHYSICAL [facility]    Date of Initial Evaluation:   Visits:  Rxs Used: 5  Reason for Referral:  Aftercare following left shoulder joint replacement surgery    EVALUATION SUMMARY    DISCHARGE REPORT    Progress reporting period is from 17 to 10-24-17.       SUBJECTIVE  Subjective changes noted by patient:   Pt. has made excellent progress in PT with his L shoulder following a TSA in . Pt. has normal ROM and near normal strength at this time. He has no problem with any of his normal daily activities. He has resumed lifting with the L arm and has started back to the gym without difficulty. Pt. will continue with his HEP with no further PT visits planned unless increased problems arise.       Current pain level is  1/10.     Previous pain level was  4/10.   Changes in function:  Yes (See Goal flowsheet attached for changes in current functional level)  Adverse reaction to treatment or activity: None    OBJECTIVE  Changes noted in objective findings:  AROM L sh flex 162; abd 159; IR 70; ER 64. Strength L sh flex 4/5; abd 4+/5; IR 5/5; ER 4+/5; hor abd 4/5.     ASSESSMENT/PLAN  Updated problem list and treatment plan: Diagnosis 1:  S/p L TSA  Pain -  self management and education  Decreased ROM/flexibility - manual therapy and therapeutic exercise  Decreased strength - therapeutic exercise and therapeutic activities  Impaired muscle performance - neuro re-education  Decreased function - therapeutic activities  STG/LTGs have been met or progress has been made towards goals:  Yes (See Goal flow sheet completed today.)  Assessment of Progress: Patient is meeting short term goals and is progressing towards long term  goals.  Self Management Plans:  Patient is independent in a home treatment program.  Patient is independent in self management of symptoms.  Re: Miles VALENTINO Erik   :   1965    I have re-evaluated this patient and find that the nature, scope, duration and intensity of the therapy is appropriate for the medical condition of the patient.  Miles continues to require the following intervention to meet STG and LTG's:  PT intervention is no longer required to meet STG/LTG.    Recommendations:  This patient is ready to be discharged from therapy and continue their home treatment program.        Thank you for your referral.        INQUIRIES  Therapist: Olya Rondon   [facility]  [address] [place] [phone]  Phone: [phone]  Fax: [phone]

## 2017-10-24 NOTE — PROGRESS NOTES
Subjective:    HPI                    Objective:    System    Physical Exam    General     ROS    Assessment/Plan:      DISCHARGE REPORT    Progress reporting period is from 8-28-17 to 10-24-17.       SUBJECTIVE  Subjective changes noted by patient:   Pt. has made excellent progress in PT with his L shoulder following a TSA in June. Pt. has normal ROM and near normal strength at this time. He has no problem with any of his normal daily activities. He has resumed lifting with the L arm and has started back to the gym without difficulty. Pt. will continue with his HEP with no further PT visits planned unless increased problems arise.       Current pain level is  1/10.     Previous pain level was  4/10.   Changes in function:  Yes (See Goal flowsheet attached for changes in current functional level)  Adverse reaction to treatment or activity: None    OBJECTIVE  Changes noted in objective findings:  AROM L sh flex 162; abd 159; IR 70; ER 64. Strength L sh flex 4/5; abd 4+/5; IR 5/5; ER 4+/5; hor abd 4/5.     ASSESSMENT/PLAN  Updated problem list and treatment plan: Diagnosis 1:  S/p L TSA  Pain -  self management and education  Decreased ROM/flexibility - manual therapy and therapeutic exercise  Decreased strength - therapeutic exercise and therapeutic activities  Impaired muscle performance - neuro re-education  Decreased function - therapeutic activities  STG/LTGs have been met or progress has been made towards goals:  Yes (See Goal flow sheet completed today.)  Assessment of Progress: Patient is meeting short term goals and is progressing towards long term goals.  Self Management Plans:  Patient is independent in a home treatment program.  Patient is independent in self management of symptoms.  I have re-evaluated this patient and find that the nature, scope, duration and intensity of the therapy is appropriate for the medical condition of the patient.  Miles continues to require the following intervention to meet  STG and LTG's:  PT intervention is no longer required to meet STG/LTG.    Recommendations:  This patient is ready to be discharged from therapy and continue their home treatment program.    Please refer to the daily flowsheet for treatment today, total treatment time and time spent performing 1:1 timed codes.

## 2017-12-05 DIAGNOSIS — Z96.612 S/P SHOULDER REPLACEMENT, LEFT: Primary | ICD-10-CM

## 2017-12-06 ENCOUNTER — OFFICE VISIT (OUTPATIENT)
Dept: ORTHOPEDICS | Facility: CLINIC | Age: 52
End: 2017-12-06

## 2017-12-06 DIAGNOSIS — Z96.612 S/P SHOULDER REPLACEMENT, LEFT: Primary | ICD-10-CM

## 2017-12-06 NOTE — MR AVS SNAPSHOT
After Visit Summary   12/6/2017    Miles Valencia    MRN: [id number]           Patient Information     Date Of Birth          1965        Visit Information        Provider Department      12/6/2017 12:45 PM Jossy Swanson MD [facility]        Today's Diagnoses     S/P shoulder replacement, left    -  1       Follow-ups after your visit        Your next 10 appointments already scheduled     Dec 14, 2017 12:00 PM CST   (Arrive by 11:45 AM)   Return Visit with Eber Mcmillan MD   [facility] Urologic [facility] ([facility])    [address]  [address]th [facility] [address] [phone]            Jan 12, 2018  9:50 AM CST   (Arrive by 9:35 AM)   RETURN ENDOCRINE with Silvia Villalobos MD   [facility] ([facility])    [address] [place] [address]  3rd [facility] [address] [phone]              Who to contact     Please call your clinic at [phone] to:    Ask questions about your health    Make or cancel appointments    Discuss your medicines    Learn about your test results    Speak to your doctor   If you have compliments or concerns about an experience at your clinic, or if you wish to file a complaint, please contact [facility] Patient Relations at [phone] or email us at [email or web address]         Additional Information About Your Visit        MyChart Information     [employer] gives you secure access to your electronic health record. If you see a primary care provider, you can also send messages to your care team and make appointments. If you have questions, please call your primary care clinic.  If you do not have a primary care provider, please call [phone] and they will assist you.      TapTrack is an electronic gateway that provides easy, online access to your medical records. With TapTrack,  you can request a clinic appointment, read your test results, renew a prescription or communicate with your care team.     To access your existing account, please contact your [facility] or call [phone] for assistance.        Care EveryWhere ID     This is your Care EveryWhere ID. This could be used by other organizations to access your [place] medical records  [id number]         Blood Pressure from Last 3 Encounters:   08/20/17 155/82   06/29/17 133/84   06/13/17 120/71    Weight from Last 3 Encounters:   08/20/17 87.1 kg (192 lb)   06/29/17 90.7 kg (200 lb)   06/13/17 87.3 kg (192 lb 8 oz)              Today, you had the following     No orders found for display         Today's Medication Changes          These changes are accurate as of: 12/6/17 11:59 PM.  If you have any questions, ask your nurse or doctor.               These medicines have changed or have updated prescriptions.        Dose/Directions    gabapentin 300 MG capsule   Commonly known as:  NEURONTIN   This may have changed:    - how much to take  - when to take this   Used for:  Status post total shoulder arthroplasty, left        Dose:  300 mg   Take 1 capsule (300 mg) by mouth 3 times daily   Quantity:  60 capsule   Refills:  0                Primary Care Provider Office Phone # Fax #    Oneyda Jones [phone]       [place] [address]        Equal Access to Services     SHANNAN SCHROEDER AH: Ivett Martínez, waaxda karla, qaybta kaallalo strange. So [facility] [phone].    ATENCIÓN: Si habla español, tiene a wilson disposición servicios gratuitos de asistencia lingüística. Anthony govea [phone].    We comply with applicable federal civil rights laws and [place] laws. We do not discriminate on the basis of race, color, national origin, age, disability, sex, sexual orientation, or gender  identity.            Thank you!     Thank you for choosing [facility]  for your care. Our goal is always to provide you with excellent care. Hearing back from our patients is one way we can continue to improve our services. Please take a few minutes to complete the written survey that you may receive in the mail after your visit with us. Thank you!             Your Updated Medication List - Protect others around you: Learn how to safely use, store and throw away your medicines at www.disposemymeds.org.          This list is accurate as of: 12/6/17 11:59 PM.  Always use your most recent med list.                   Brand Name Dispense Instructions for use Diagnosis    albuterol 108 (90 BASE) MCG/ACT Inhaler    PROAIR HFA/PROVENTIL HFA/VENTOLIN HFA     Inhale 2 puffs into the lungs every 4 hours as needed for shortness of breath / dyspnea or wheezing        AMBIEN 5 MG tablet   Generic drug:  zolpidem      Take 5 mg by mouth nightly as needed for sleep        ammonium lactate 12 % cream    AMLACTIN     Apply topically daily as needed        aspirin-acetaminophen-caffeine 250-250-65 MG per tablet    EXCEDRIN MIGRAINE     Take 2 tablets by mouth every 6 hours as needed for headaches        BENADRYL ALLERGY PO      Take 25-50 mg by mouth daily as needed (allergies)        camphor-menthol 0.5-0.5 % Lotn    DERMASARRA     Apply topically every 6 hours as needed for skin care        CARTIA  MG 24 hr capsule   Generic drug:  diltiazem      Take 120 mg by mouth every morning    Elevated prostate specific antigen (PSA)       cetirizine HCl 10 MG Caps      Take 10 mg by mouth daily        fish oil-omega-3 fatty acids 1000 MG capsule      Take 1 gram by mouth in the morning and take 2 grams by mouth in the evening.        gabapentin 300 MG capsule    NEURONTIN    60 capsule    Take 1 capsule (300 mg) by mouth 3 times daily    Status post total shoulder arthroplasty, left       hydrochlorothiazide 25 MG  tablet    HYDRODIURIL     Take 25 mg by mouth every morning        hydrocortisone 1 % cream    CORTAID     Apply topically 2 times daily as needed        meclizine 12.5 MG tablet    ANTIVERT    30 tablet    Take 2 tablets (25 mg) by mouth 4 times daily as needed for dizziness        MELATONIN PO      Take 1 mg by mouth nightly as needed        MELOXICAM PO      Take by mouth as needed        oxyCODONE IR 5 MG tablet    ROXICODONE    75 tablet    Take 1-2 tablets (5-10 mg) by mouth every 3 hours as needed for moderate to severe pain    Status post total shoulder arthroplasty, left       PHENYLEPHRINE HCL PO      Take 10 mg by mouth every 4 hours as needed        QVAR 80 MCG/ACT Inhaler   Generic drug:  beclomethasone      Inhale 2 puffs twice daily as needed when allergies flare up        rizatriptan 10 MG ODT tab    MAXALT-MLT     Take 10 mg by mouth at onset of headache    Elevated prostate specific antigen (PSA)       SUMAtriptan Succinate Refill 6 MG/0.5ML Soct    IMITREX     Inject 6 mg into the muscle every 12 hours as needed    Elevated prostate specific antigen (PSA)       TYLENOL PO      Take 1,000 mg by mouth every 6 hours as needed        VITAMIN B-12 PO      Take 1 tablet by mouth every evening        VITAMIN D3 MAXIMUM STRENGTH 5000 UNITS Caps   Generic drug:  cholecalciferol      Take 5,000 Units by mouth every morning

## 2017-12-06 NOTE — LETTER
12/6/2017       RE: Miles Valencia  [address] [place] [address]     Dear Colleague,    Thank you for referring your patient, Miles Valencia, to the [facility] at [facility]. Please see a copy of my visit note below.    CHIEF CONCERN:  Status Post Left Total Shoulder Arthroplasty    DATE OF SURGERY: 6/13/17    HISTORY OF PRESENT ILLNESS:  Miles returns 6 months s/p above procedure. He is doing very well. After his last visit he began doing some strengthening with PT which has been going well. His pain has continued to improve and his ROM has increased as well. He has no concerns. He has specific questions about lifting and activity restrictions.    PHYSICAL EXAM:    Pleasant male, NAD  LUE: Incision closed and healed. No pain with motion. FF to 170, ER to 50, IR to T12. 5/5 strength with deltoid, ER, and IR. Sensation intact and normal.     IMAGING:  XR of the LUE shows a well-placed and stable impant. The humeral implant is seated over the glenoid component.    ASSESSMENT:  1. 6 months s/p above procedure    PLAN:  We discussed specific activity restrictions moving forward. He did cross-fit prior to surgery and we would not recommend returning to push-ups, full body-weight pull ups, or  press, but he may return to lifting activities that include weight <50lbs and should avoid repetitive or jarring activities and simply be smart about the activities he perform. All of his questions were answered. He should see us back in 6 months for his 1-year follow-up visit.     Ron Farmer MD    I have personally examined this patient and have reviewed the clinical presentation and progress note with the resident.  I agree with the treatment plan as outlined.  The plan was formulated with the resident on the day of the resident's note.       Again, thank you for allowing me to participate in the care of your patient.      Sincerely,    Jossy Trejo  MD Sky

## 2017-12-06 NOTE — PROGRESS NOTES
CHIEF CONCERN:  Status Post Left Total Shoulder Arthroplasty    DATE OF SURGERY: 6/13/17    HISTORY OF PRESENT ILLNESS:  Miles returns 6 months s/p above procedure. He is doing very well. After his last visit he began doing some strengthening with PT which has been going well. His pain has continued to improve and his ROM has increased as well. He has no concerns. He has specific questions about lifting and activity restrictions.    PHYSICAL EXAM:    Pleasant male, NAD  LUE: Incision closed and healed. No pain with motion. FF to 170, ER to 50, IR to T12. 5/5 strength with deltoid, ER, and IR. Sensation intact and normal.     IMAGING:  XR of the LUE shows a well-placed and stable impant. The humeral implant is seated over the glenoid component.    ASSESSMENT:  1. 6 months s/p above procedure    PLAN:  We discussed specific activity restrictions moving forward. He did cross-fit prior to surgery and we would not recommend returning to push-ups, full body-weight pull ups, or  press, but he may return to lifting activities that include weight <50lbs and should avoid repetitive or jarring activities and simply be smart about the activities he perform. All of his questions were answered. He should see us back in 6 months for his 1-year follow-up visit.     Ron Farmer MD    I have personally examined this patient and have reviewed the clinical presentation and progress note with the resident.  I agree with the treatment plan as outlined.  The plan was formulated with the resident on the day of the resident's note.

## 2017-12-06 NOTE — NURSING NOTE
Reason For Visit:   Chief Complaint   Patient presents with     Surgical Followup     Left TSA. DOS: 6/13/2017       PCP: Oneyda Jones  Ref: Dr. Swanson    ?  No  Occupation .  Currently working? Yes.  Work status?  Full time.  Date of injury: *ongoing    Date of surgery: 6/13/2017  Type of surgery: Left TSA.  Smoker: No  Request smoking cessation information: No    Right hand dominant    SANE score  Affected shoulder: Left  Right shoulder SANE: 100  Left shoulder SANE: 90    There were no vitals taken for this visit.      Pain Assessment  Patient Currently in Pain: Bogdan Link, ATC

## 2017-12-12 ENCOUNTER — PRE VISIT (OUTPATIENT)
Dept: UROLOGY | Facility: CLINIC | Age: 52
End: 2017-12-12

## 2017-12-13 DIAGNOSIS — C61 PROSTATE CANCER (H): ICD-10-CM

## 2017-12-13 LAB — PSA SERPL-MCNC: 7.44 UG/L (ref 0–4)

## 2017-12-14 ENCOUNTER — OFFICE VISIT (OUTPATIENT)
Dept: UROLOGY | Facility: CLINIC | Age: 52
End: 2017-12-14
Payer: COMMERCIAL

## 2017-12-14 VITALS
BODY MASS INDEX: 28.93 KG/M2 | SYSTOLIC BLOOD PRESSURE: 122 MMHG | DIASTOLIC BLOOD PRESSURE: 85 MMHG | HEIGHT: 69 IN | HEART RATE: 86 BPM | WEIGHT: 195.3 LBS

## 2017-12-14 DIAGNOSIS — C61 PROSTATE CANCER (H): Primary | ICD-10-CM

## 2017-12-14 RX ORDER — OXYCODONE HYDROCHLORIDE 5 MG/1
TABLET ORAL
Refills: 0 | COMMUNITY
Start: 2017-06-13 | End: 2019-12-06

## 2017-12-14 RX ORDER — GABAPENTIN 100 MG/1
CAPSULE ORAL
Refills: 4 | COMMUNITY
Start: 2017-08-17 | End: 2019-12-06

## 2017-12-14 RX ORDER — ONDANSETRON 4 MG/1
TABLET, ORALLY DISINTEGRATING ORAL
Refills: 0 | COMMUNITY
Start: 2017-06-13 | End: 2018-12-14

## 2017-12-14 RX ORDER — MORPHINE SULFATE 4 MG/ML
INJECTION, SOLUTION INTRAMUSCULAR; INTRAVENOUS
Refills: 0 | COMMUNITY
Start: 2017-06-13 | End: 2018-12-14

## 2017-12-14 ASSESSMENT — PAIN SCALES - GENERAL: PAINLEVEL: NO PAIN (0)

## 2017-12-14 NOTE — LETTER
12/14/2017       RE: Miles Valencia  [address] [place] [address]     Dear Colleague,    Thank you for referring your patient, Miles Valencia, to the [facility] AND [facility]ST FOR PROSTATE AND UROLOGIC CANCERS at [facility]. Please see a copy of my visit note below.    Miles Valencia is here with history of low risk prostate cancer on active surveillance      Prostate cancer history as below    Initial PSA:2.8  Biopsy Mobile Score was 3 + 3 on 7/28/16 in 3/12 cores right apex  Pathologic Stage T1c     Risk Group: Low  Normal MRI 2016  Sacral lesion (confirmed negative with bone scan)    Prostate volume 35 on MRI 11/2016, PIRADS 1-2 lesions only    Repeat Bx   6/2017    Initial PSA: 6.9  Biopsy Mobile Score was 3 + 3 in 1 of 12 cores right apex  Pathologic Stage T1c     Risk Group: Low    Current PSA density is 0.21.     PSA   Date Value Ref Range Status   12/13/2017 7.44 (H) 0 - 4 ug/L Final     Comment:     Assay Method:  Chemiluminescence using Siemens Vista analyzer   05/31/2017 6.91 (H) 0 - 4 ug/L Final     Comment:     Assay Method:  Chemiluminescence using Siemens Vista analyzer   02/06/2017 7.39 (H) 0 - 4 ug/L Final     Comment:     Assay Method:  Chemiluminescence using Siemens Vista analyzer   11/01/2016 9.56 (H) 0 - 4 ug/L Final     Comment:     Assay Method:  Chemiluminescence using Siemens Vista analyzer     A/p Low risk PC grade group in 4 of 24 cores on two separate biopsies over the past year with no evidence of progression    Discussed Prolaris and Oncotype Dx, but ultimately decided to hold off on molecular testing at this time.     We also discussed the benefits of a plant-based diet, including a reduced risk of progression of prostate cancer. Patient is a vegetarian.    Follow up in 1 year with PSA and MRI.     Scribe Disclosure:   Joan TONEY, am serving as a scribe; to document services personally performed by Eber Mcmillan,  "MD based on data collection and the provider's statements to me.     Eber Mcmillan MD  Department of Urology Staff  [facility]    Attestation:  This patient was seen and evaluated by me, with a scribe taking notes.  I have reviewed the note above and agree.  The physical exam was performed by me and the pertinant details are outlined below.     Patient is a 52 year old  male   Vitals: Blood pressure 122/85, pulse 86, height 1.753 m (5' 9\"), weight 88.6 kg (195 lb 4.8 oz).  General Appearance Adult: Alert, no acute distress, oriented      Assessment:low risk PC on as    Plan:  Continued close follow up because the psa density is high    Will need to repeat MRI as part of follow up    Eber Mcmillan MD  Department of Urology  [facility]    "

## 2017-12-14 NOTE — NURSING NOTE
Chief Complaint   Patient presents with     RECHECK     Prostate cancer follow up with PSA.      Brenda Garcia

## 2017-12-14 NOTE — MR AVS SNAPSHOT
After Visit Summary   12/14/2017    Miles Valencia    MRN: [id number]           Patient Information     Date Of Birth          1965        Visit Information        Provider Department      12/14/2017 12:00 PM Eber Mcmillan MD [facility]rologic [facility]        Today's Diagnoses     Prostate cancer (H)    -  1      Care Instructions    Follow up with Dr. Mcmillan in one year with PSA and prostate MRI.    It was a pleasure meeting with you today.  Thank you for allowing me and my team the privilege of caring for you today.  YOU are the reason we are here, and I truly hope we provided you with the excellent service you deserve.  Please let us know if there is anything else we can do for you so that we can be sure you are leaving completely satisfied with your care experience.      DAV Zaman          Follow-ups after your visit        Your next 10 appointments already scheduled     Jan 12, 2018  9:50 AM CST   (Arrive by 9:35 AM)   RETURN ENDOCRINE with Silvia Villalobos MD   [facility] ([facility])    [address] [place] [address] [phone]              Who to contact     Please call your clinic at [phone] to:    Ask questions about your health    Make or cancel appointments    Discuss your medicines    Learn about your test results    Speak to your doctor   If you have compliments or concerns about an experience at your clinic, or if you wish to file a complaint, please contact [facility] Patient Relations at [phone] or email us at [email or web address]         Additional Information About Your Visit        MyChart Information     [employer] gives you secure access to your electronic health record. If you see a primary care provider, you can also send messages to your care team and make appointments. If you have questions,  "please call your primary care clinic.  If you do not have a primary care provider, please call [phone] and they will assist you.      C[employer] is an electronic gateway that provides easy, online access to your medical records. With [employer], you can request a clinic appointment, read your test results, renew a prescription or communicate with your care team.     To access your existing account, please contact your [facility] or call [phone] for assistance.        Care EveryWhere ID     This is your Care EveryWhere ID. This could be used by other organizations to access your [place] medical records  [id number]        Your Vitals Were     Pulse Height BMI (Body Mass Index)             86 1.753 m (5' 9\") 28.84 kg/m2          Blood Pressure from Last 3 Encounters:   12/14/17 122/85   08/20/17 155/82   06/29/17 133/84    Weight from Last 3 Encounters:   12/14/17 88.6 kg (195 lb 4.8 oz)   08/20/17 87.1 kg (192 lb)   06/29/17 90.7 kg (200 lb)              Today, you had the following     No orders found for display         Today's Medication Changes          These changes are accurate as of: 12/14/17 11:59 PM.  If you have any questions, ask your nurse or doctor.               These medicines have changed or have updated prescriptions.        Dose/Directions    * gabapentin 300 MG capsule   Commonly known as:  NEURONTIN   This may have changed:    - how much to take  - when to take this   Used for:  Status post total shoulder arthroplasty, left        Dose:  300 mg   Take 1 capsule (300 mg) by mouth 3 times daily   Quantity:  60 capsule   Refills:  0       * gabapentin 100 MG capsule   Commonly known as:  NEURONTIN   This may have changed:  Another medication with the same name was changed. Make sure you understand how and when to take each.   Changed by:  Weight, Eber Luna MD        TK 4 CS PO D HS   Refills:  4       oxyCODONE IR 5 MG tablet   Commonly known as:  " ROXICODONE   This may have changed:  Another medication with the same name was removed. Continue taking this medication, and follow the directions you see here.   Changed by:  Eber Mcmillan MD        Refills:  0       * Notice:  This list has 2 medication(s) that are the same as other medications prescribed for you. Read the directions carefully, and ask your doctor or other care provider to review them with you.      Stop taking these medicines if you haven't already. Please contact your care team if you have questions.     PHENYLEPHRINE HCL PO   Stopped by:  Eber Mcmillan MD                    Primary Care Provider Office Phone # Fax #    Oneyda Jones [phone]       [address] [address]        Equal Access to Services     [facility]: Hadii rosas lloyd hadasho Soomaali, waaxda luqadaha, qaybta kaalmada adeegyada, lalo dozier . So [facility] [phone].    ATENCIÓN: Si habla español, tiene a wilson disposición servicios gratuitos de asistencia lingüística. [facility] [phone].    We comply with applicable federal civil rights laws and [place] laws. We do not discriminate on the basis of race, color, national origin, age, disability, sex, sexual orientation, or gender identity.            Thank you!     Thank you for choosing [facility] UROLOGY AND [facility] FOR PROSTATE AND UROLOGIC CANCERS  for your care. Our goal is always to provide you with excellent care. Hearing back from our patients is one way we can continue to improve our services. Please take a few minutes to complete the written survey that you may receive in the mail after your visit with us. Thank you!             Your Updated Medication List - Protect others around you: Learn how to safely use, store and throw away your medicines at www.disposemymeds.org.          This list is accurate as of: 12/14/17 11:59 PM.  Always use your most recent med list.                    Brand Name Dispense Instructions for use Diagnosis    albuterol 108 (90 BASE) MCG/ACT Inhaler    PROAIR HFA/PROVENTIL HFA/VENTOLIN HFA     Inhale 2 puffs into the lungs every 4 hours as needed for shortness of breath / dyspnea or wheezing        AMBIEN 5 MG tablet   Generic drug:  zolpidem      Take 5 mg by mouth nightly as needed for sleep        ammonium lactate 12 % cream    AMLACTIN     Apply topically daily as needed        aspirin-acetaminophen-caffeine 250-250-65 MG per tablet    EXCEDRIN MIGRAINE     Take 2 tablets by mouth every 6 hours as needed for headaches        BENADRYL ALLERGY PO      Take 25-50 mg by mouth daily as needed (allergies)        camphor-menthol 0.5-0.5 % Lotn    DERMASARRA     Apply topically every 6 hours as needed for skin care        CARTIA  MG 24 hr capsule   Generic drug:  diltiazem      Take 120 mg by mouth every morning    Elevated prostate specific antigen (PSA)       cetirizine HCl 10 MG Caps      Take 10 mg by mouth daily        fish oil-omega-3 fatty acids 1000 MG capsule      Take 1 gram by mouth in the morning and take 2 grams by mouth in the evening.        * gabapentin 300 MG capsule    NEURONTIN    60 capsule    Take 1 capsule (300 mg) by mouth 3 times daily    Status post total shoulder arthroplasty, left       * gabapentin 100 MG capsule    NEURONTIN     TK 4 CS PO D HS        hydrochlorothiazide 25 MG tablet    HYDRODIURIL     Take 25 mg by mouth every morning        hydrocortisone 1 % cream    CORTAID     Apply topically 2 times daily as needed        meclizine 12.5 MG tablet    ANTIVERT    30 tablet    Take 2 tablets (25 mg) by mouth 4 times daily as needed for dizziness        MELATONIN PO      Take 1 mg by mouth nightly as needed        MELOXICAM PO      Take by mouth as needed        morphine (PF) 4 MG/ML injection           ondansetron 4 MG ODT tab    ZOFRAN-ODT          oxyCODONE IR 5 MG tablet    ROXICODONE          QVAR 80 MCG/ACT  Inhaler   Generic drug:  beclomethasone      Inhale 2 puffs twice daily as needed when allergies flare up        rizatriptan 10 MG ODT tab    MAXALT-MLT     Take 10 mg by mouth at onset of headache    Elevated prostate specific antigen (PSA)       SUMAtriptan Succinate Refill 6 MG/0.5ML Soct    IMITREX     Inject 6 mg into the muscle every 12 hours as needed    Elevated prostate specific antigen (PSA)       TYLENOL PO      Take 1,000 mg by mouth every 6 hours as needed        VITAMIN B-12 PO      Take 1 tablet by mouth every evening        VITAMIN D3 MAXIMUM STRENGTH 5000 UNITS Caps   Generic drug:  cholecalciferol      Take 5,000 Units by mouth every morning        * Notice:  This list has 2 medication(s) that are the same as other medications prescribed for you. Read the directions carefully, and ask your doctor or other care provider to review them with you.

## 2017-12-14 NOTE — PATIENT INSTRUCTIONS
Follow up with Dr. Mcmillan in one year with PSA and prostate MRI.    It was a pleasure meeting with you today.  Thank you for allowing me and my team the privilege of caring for you today.  YOU are the reason we are here, and I truly hope we provided you with the excellent service you deserve.  Please let us know if there is anything else we can do for you so that we can be sure you are leaving completely satisfied with your care experience.      BRANDON ZamanA

## 2017-12-14 NOTE — PROGRESS NOTES
Miles Valencia is here with history of low risk prostate cancer on active surveillance      Prostate cancer history as below    Initial PSA:2.8  Biopsy Danevang Score was 3 + 3 on 7/28/16 in 3/12 cores right apex  Pathologic Stage T1c     Risk Group: Low  Normal MRI 2016  Sacral lesion (confirmed negative with bone scan)    Prostate volume 35 on MRI 11/2016, PIRADS 1-2 lesions only    Repeat Bx   6/2017    Initial PSA: 6.9  Biopsy Rafael Score was 3 + 3 in 1 of 12 cores right apex  Pathologic Stage T1c     Risk Group: Low    Current PSA density is 0.21.     PSA   Date Value Ref Range Status   12/13/2017 7.44 (H) 0 - 4 ug/L Final     Comment:     Assay Method:  Chemiluminescence using Siemens Vista analyzer   05/31/2017 6.91 (H) 0 - 4 ug/L Final     Comment:     Assay Method:  Chemiluminescence using Siemens Vista analyzer   02/06/2017 7.39 (H) 0 - 4 ug/L Final     Comment:     Assay Method:  Chemiluminescence using Siemens Vista analyzer   11/01/2016 9.56 (H) 0 - 4 ug/L Final     Comment:     Assay Method:  Chemiluminescence using Siemens Vista analyzer     A/p Low risk PC grade group in 4 of 24 cores on two separate biopsies over the past year with no evidence of progression    Discussed Prolaris and Oncotype Dx, but ultimately decided to hold off on molecular testing at this time.     We also discussed the benefits of a plant-based diet, including a reduced risk of progression of prostate cancer. Patient is a vegetarian.    Follow up in 1 year with PSA and MRI.     Scribe Disclosure:   I,Joan Clark, am serving as a scribe; to document services personally performed by Eber Mcmillan MD based on data collection and the provider's statements to me.     Eber Mcmillan MD  Department of Urology Staff  [facility]    Attestation:  This patient was seen and evaluated by me, with a scribe taking notes.  I have reviewed the note above and agree.  The physical exam was performed by me and  "the pertinant details are outlined below.     Patient is a 52 year old  male   Vitals: Blood pressure 122/85, pulse 86, height 1.753 m (5' 9\"), weight 88.6 kg (195 lb 4.8 oz).  General Appearance Adult: Alert, no acute distress, oriented      Assessment:low risk PC on as    Plan:  Continued close follow up because the psa density is high    Will need to repeat MRI as part of follow up    Eber Mcmillan MD  Department of Urology  [facility]    "

## 2018-01-09 ENCOUNTER — TELEPHONE (OUTPATIENT)
Dept: ORTHOPEDICS | Facility: CLINIC | Age: 53
End: 2018-01-09

## 2018-01-09 DIAGNOSIS — Z96.60 S/P JOINT REPLACEMENT: Primary | ICD-10-CM

## 2018-01-09 RX ORDER — CLINDAMYCIN HCL 300 MG
CAPSULE ORAL
Qty: 2 CAPSULE | Refills: 3 | Status: SHIPPED | OUTPATIENT
Start: 2018-01-09 | End: 2019-12-06

## 2018-01-09 NOTE — TELEPHONE ENCOUNTER
[facility]:  Nursing Note  SITUATION                                                      Miles Valencia is a 52 year old male whose dental clinic called with question regarding prophylactic antibiotics prior to dental care.    BACKGROUND                                                      Patient is s/p Left TSA on 6/17/2017.    Date of last clinic appointment: on 12/6/2017 with Dr. Swanson    Does patient have a future appointment scheduled?  No    Provider documentation from last clinic visit reviewed in EPIC.    ASSESSMENT      This was discussed with Urvashi WATERS RN who confirmed that clinic protocol for dental work after joint replacement has not changed.    PLAN                                                      Nursing Interventions: Per clinic protocol, prophylactic antibiotic therapy is needed for life prior to dental/invasive procedures.      If further questions/concerns or if symptoms do not improve, worsen or new symptoms develop, patient/family are advised to call [phone], option #3 to speak with a triage nurse, as soon as possible.    Kelsey Mcintyre LPN

## 2018-01-12 ENCOUNTER — OFFICE VISIT (OUTPATIENT)
Dept: ENDOCRINOLOGY | Facility: CLINIC | Age: 53
End: 2018-01-12
Payer: COMMERCIAL

## 2018-01-12 ENCOUNTER — RADIANT APPOINTMENT (OUTPATIENT)
Dept: ULTRASOUND IMAGING | Facility: CLINIC | Age: 53
End: 2018-01-12
Attending: INTERNAL MEDICINE
Payer: COMMERCIAL

## 2018-01-12 VITALS
HEIGHT: 69 IN | WEIGHT: 198.6 LBS | BODY MASS INDEX: 29.41 KG/M2 | SYSTOLIC BLOOD PRESSURE: 154 MMHG | HEART RATE: 73 BPM | DIASTOLIC BLOOD PRESSURE: 95 MMHG

## 2018-01-12 DIAGNOSIS — R68.82 LOW LIBIDO: ICD-10-CM

## 2018-01-12 DIAGNOSIS — E04.2 MULTIPLE THYROID NODULES: ICD-10-CM

## 2018-01-12 DIAGNOSIS — R53.83 FATIGUE, UNSPECIFIED TYPE: ICD-10-CM

## 2018-01-12 DIAGNOSIS — E04.2 MULTIPLE THYROID NODULES: Primary | ICD-10-CM

## 2018-01-12 LAB
T4 FREE SERPL-MCNC: 1.05 NG/DL (ref 0.76–1.46)
TSH SERPL DL<=0.005 MIU/L-ACNC: 0.28 MU/L (ref 0.4–4)

## 2018-01-12 NOTE — PATIENT INSTRUCTIONS
- lab today  - schedule ultrasound thyroid  - I will let you know with result via myChart or phone call    If you have any questions, please do not hesitate to call [phone] and ask for Endocrinology clinic.      After clinic hours, please contact [phone] and ask for Endocrinologist-on call    Sincerely,    Silvia Villalobos MD  Endocrinology

## 2018-01-12 NOTE — MR AVS SNAPSHOT
After Visit Summary   1/12/2018    Miles Valencia    MRN: [id number]           Patient Information     Date Of Birth          1965        Visit Information        Provider Department      1/12/2018 9:50 AM Silvia Villalobos MD [facility] Endocrinology        Today's Diagnoses     Multiple thyroid nodules    -  1    Fatigue, unspecified type        Low libido          Care Instructions    - lab today  - schedule ultrasound thyroid  - I will let you know with result via [employer] or phone call    If you have any questions, please do not hesitate to call [phone] and ask for Endocrinology clinic.      After clinic hours, please contact [phone] and ask for Endocrinologist-on call    Sincerely,    Silvia Villalobos MD  Endocrinology            Follow-ups after your visit        Who to contact     Please call your clinic at [phone] to:    Ask questions about your health    Make or cancel appointments    Discuss your medicines    Learn about your test results    Speak to your doctor   If you have compliments or concerns about an experience at your clinic, or if you wish to file a complaint, please contact [facility] Patient Relations at [phone] or email us at [email or web address]         Additional Information About Your Visit        [employer] Information     [employer] gives you secure access to your electronic health record. If you see a primary care provider, you can also send messages to your care team and make appointments. If you have questions, please call your primary care clinic.  If you do not have a primary care provider, please call [phone] and they will assist you.      [employer] is an electronic gateway that provides easy, online access to your medical records. With [employer], you can request a clinic appointment, read your test results, renew a prescription or communicate with your care team.     To access your existing account,  "please contact your [facility] or call [phone] for assistance.        Care EveryWhere ID     This is your Care EveryWhere ID. This could be used by other organizations to access your [facility] medical records  [id number]        Your Vitals Were     Pulse Height BMI (Body Mass Index)             73 1.753 m (5' 9\") 29.33 kg/m2          Blood Pressure from Last 3 Encounters:   01/12/18 (!) 154/95   12/14/17 122/85   08/20/17 155/82    Weight from Last 3 Encounters:   01/12/18 90.1 kg (198 lb 9.6 oz)   12/14/17 88.6 kg (195 lb 4.8 oz)   08/20/17 87.1 kg (192 lb)                 Today's Medication Changes          These changes are accurate as of: 1/12/18 11:59 PM.  If you have any questions, ask your nurse or doctor.               These medicines have changed or have updated prescriptions.        Dose/Directions    * gabapentin 300 MG capsule   Commonly known as:  NEURONTIN   This may have changed:    - how much to take  - when to take this   Used for:  Status post total shoulder arthroplasty, left        Dose:  300 mg   Take 1 capsule (300 mg) by mouth 3 times daily   Quantity:  60 capsule   Refills:  0       * gabapentin 100 MG capsule   Commonly known as:  NEURONTIN   This may have changed:  Another medication with the same name was changed. Make sure you understand how and when to take each.   Changed by:  Eber Mcmillan MD        TK 4 CS PO D HS   Refills:  4       * Notice:  This list has 2 medication(s) that are the same as other medications prescribed for you. Read the directions carefully, and ask your doctor or other care provider to review them with you.             Primary Care Provider Office Phone # Fax #    Oneyda VANDANA Robert [phone]       [address] [address]        Equal Access to Services     SHANNAN SCHROEDER AH: Ivett Martínez, jess acosta, lalo stein " sherry dominiquesarika kmmario dozier ah. [facility] [phone].    ATENCIÓN: Si habla patricia, tiene a wilson disposición servicios gratuitos de asistencia lingüística. Anthony al [phone].    We comply with applicable federal civil rights laws and [place] laws. We do not discriminate on the basis of race, color, national origin, age, disability, sex, sexual orientation, or gender identity.            Thank you!     Thank you for choosing [facility]  for your care. Our goal is always to provide you with excellent care. Hearing back from our patients is one way we can continue to improve our services. Please take a few minutes to complete the written survey that you may receive in the mail after your visit with us. Thank you!             Your Updated Medication List - Protect others around you: Learn how to safely use, store and throw away your medicines at www.disposemymeds.org.          This list is accurate as of: 1/12/18 11:59 PM.  Always use your most recent med list.                   Brand Name Dispense Instructions for use Diagnosis    albuterol 108 (90 BASE) MCG/ACT Inhaler    PROAIR HFA/PROVENTIL HFA/VENTOLIN HFA     Inhale 2 puffs into the lungs every 4 hours as needed for shortness of breath / dyspnea or wheezing        AMBIEN 5 MG tablet   Generic drug:  zolpidem      Take 5 mg by mouth nightly as needed for sleep        ammonium lactate 12 % cream    AMLACTIN     Apply topically daily as needed        aspirin-acetaminophen-caffeine 250-250-65 MG per tablet    EXCEDRIN MIGRAINE     Take 2 tablets by mouth every 6 hours as needed for headaches        BENADRYL ALLERGY PO      Take 25-50 mg by mouth daily as needed (allergies)        camphor-menthol 0.5-0.5 % Lotn    DERMASARRA     Apply topically every 6 hours as needed for skin care        CARTIA  MG 24 hr capsule   Generic drug:  diltiazem      Take 120 mg by mouth every morning    Elevated prostate specific antigen (PSA)       cetirizine HCl 10  MG Caps      Take 10 mg by mouth daily        clindamycin 300 MG capsule    CLEOCIN    2 capsule    Take 2 capsules (600 mg) by mouth 1 hour before dental work.    S/P joint replacement       fish oil-omega-3 fatty acids 1000 MG capsule      Take 1 gram by mouth in the morning and take 2 grams by mouth in the evening.        * gabapentin 300 MG capsule    NEURONTIN    60 capsule    Take 1 capsule (300 mg) by mouth 3 times daily    Status post total shoulder arthroplasty, left       * gabapentin 100 MG capsule    NEURONTIN     TK 4 CS PO D HS        hydrochlorothiazide 25 MG tablet    HYDRODIURIL     Take 25 mg by mouth every morning        hydrocortisone 1 % cream    CORTAID     Apply topically 2 times daily as needed        meclizine 12.5 MG tablet    ANTIVERT    30 tablet    Take 2 tablets (25 mg) by mouth 4 times daily as needed for dizziness        MELATONIN PO      Take 1 mg by mouth nightly as needed        MELOXICAM PO      Take by mouth as needed        morphine (PF) 4 MG/ML injection           ondansetron 4 MG ODT tab    ZOFRAN-ODT          oxyCODONE IR 5 MG tablet    ROXICODONE          QVAR 80 MCG/ACT Inhaler   Generic drug:  beclomethasone      Inhale 2 puffs twice daily as needed when allergies flare up        rizatriptan 10 MG ODT tab    MAXALT-MLT     Take 10 mg by mouth at onset of headache    Elevated prostate specific antigen (PSA)       SUMAtriptan Succinate Refill 6 MG/0.5ML Soct    IMITREX     Inject 6 mg into the muscle every 12 hours as needed    Elevated prostate specific antigen (PSA)       TYLENOL PO      Take 1,000 mg by mouth every 6 hours as needed        VITAMIN B-12 PO      Take 1 tablet by mouth every evening        VITAMIN D3 MAXIMUM STRENGTH 5000 UNITS Caps   Generic drug:  cholecalciferol      Take 5,000 Units by mouth every morning        * Notice:  This list has 2 medication(s) that are the same as other medications prescribed for you. Read the directions carefully, and ask your  doctor or other care provider to review them with you.

## 2018-01-12 NOTE — PROGRESS NOTES
Endocrinology Note         Miles is a 52 year old male presents today for multinodular goiter    HPI  Miles Valencia is a 52 years old male with hx of T1N0M0 prostate cancer who is here for multinodular goiter.    He was previously seen  and  for multinodular goiter and mild elevation of calcitonin in 2010. I have reviewed previous medical record.    In 2010, he was noted to have multinodular goiter and palpitation during physical examination. Given intermittent headache and palpitation, urine metanephrine was checked. At that time, he was noted to have mild elevation of urine normetanephrine which was thought to be secondary to amitriptyline. After stopping amitriptyline, he has not had any palpitation anymore and repeated urine metanephrine was normalized. At the same visit, he was also evaluated for multinodular goiter. US thyroid in 2010 showed multiple thyroid nodules throughout both lobes (detail in result section). The largest nodules on the right lobe was 3.3x2.2.x3.3 cm heterogeneous nodule with microcalcification and the largest left sided nodule was 2.7x2.1x3.3 cm heterogeneous nodule. He underwent right thyroid nodule which was benign. He did have follow-up thyroid ultrasound in 2011 which showed slight enlargement of thyroid nodules. Since then, he has not had any follow-up endocrine visit or ultrasound until 2016.    He had routine physical exam in January 2016 and was told to follow on multinodular goiter.     He did f/u thyroid ultrasound in 10/2016 which showed growth in thyroid nodules and new 2.9 cm left thyroid nodule. Therefore she underwent FNA of left thyroid nodule #1, #2 and #4 which were all benign in 10/2016.    Interim history  He is here for follow up. He stated that he has been feeling well except that he noticed intermittent pressure in his neck. He also felt that his neck is getting slightly larger particularly on the left side. He denied difficulty  swallowing or breathing. He denied chest pain, and palpitation. He always had chronic migraine and tension headache particularly after episode of dizziness.     Past Medical History  Past Medical History:   Diagnosis Date     Achilles bursitis or tendinitis 5/4/2014     Allergic rhinitis      Asthma      Congestion      Hypertension      Migraine      Multinodular goiter      Osteoarthritis      Pain in joint, shoulder region 4/18/2014     Prostate cancer (H)      RLS (restless legs syndrome)      Sleep problems    T1N0M0 prostate cancer with close monitoring with urologist.  Migraine headache    Allergies  Allergies   Allergen Reactions     Amoxicillin Diarrhea        Medications  Current Outpatient Prescriptions   Medication Sig Dispense Refill     clindamycin (CLEOCIN) 300 MG capsule Take 2 capsules (600 mg) by mouth 1 hour before dental work. 2 capsule 3     ondansetron (ZOFRAN-ODT) 4 MG ODT tab   0     Morphine Sulfate (MORPHINE, PF,) 4 MG/ML injection   0     gabapentin (NEURONTIN) 100 MG capsule TK 4 CS PO D HS  4     oxyCODONE IR (ROXICODONE) 5 MG tablet   0     MELOXICAM PO Take by mouth as needed       MELATONIN PO Take 1 mg by mouth nightly as needed       gabapentin (NEURONTIN) 300 MG capsule Take 1 capsule (300 mg) by mouth 3 times daily (Patient taking differently: Take 600 mg by mouth 2 times daily ) 60 capsule 0     aspirin-acetaminophen-caffeine (EXCEDRIN MIGRAINE) 250-250-65 MG per tablet Take 2 tablets by mouth every 6 hours as needed for headaches       hydrocortisone (CORTAID) 1 % cream Apply topically 2 times daily as needed       camphor-menthol (DERMASARRA) 0.5-0.5 % LOTN Apply topically every 6 hours as needed for skin care       albuterol (PROAIR HFA/PROVENTIL HFA/VENTOLIN HFA) 108 (90 BASE) MCG/ACT Inhaler Inhale 2 puffs into the lungs every 4 hours as needed for shortness of breath / dyspnea or wheezing       Cyanocobalamin (VITAMIN B-12 PO) Take 1 tablet by mouth every evening         QVAR 80 MCG/ACT Inhaler Inhale 2 puffs twice daily as needed when allergies flare up  0     meclizine (ANTIVERT) 12.5 MG tablet Take 2 tablets (25 mg) by mouth 4 times daily as needed for dizziness 30 tablet 0     CARTIA  MG 24 hr CD capsule Take 120 mg by mouth every morning   0     rizatriptan (MAXALT-MLT) 10 MG disintegrating tablet Take 10 mg by mouth at onset of headache   6     SUMAtriptan Succinate Refill (IMITREX) 6 MG/0.5ML SOCT Inject 6 mg into the muscle every 12 hours as needed   1     hydrochlorothiazide (HYDRODIURIL) 25 MG tablet Take 25 mg by mouth every morning        Omega-3 Fatty Acids (OMEGA-3 FISH OIL) 1000 MG CAPS Take 1 gram by mouth in the morning and take 2 grams by mouth in the evening.       cholecalciferol (VITAMIN D3 MAXIMUM STRENGTH) 5000 UNITS CAPS Take 5,000 Units by mouth every morning        Acetaminophen (TYLENOL PO) Take 1,000 mg by mouth every 6 hours as needed        zolpidem (AMBIEN) 5 MG tablet Take 5 mg by mouth nightly as needed for sleep       Cetirizine HCl 10 MG CAPS Take 10 mg by mouth daily        DiphenhydrAMINE HCl (BENADRYL ALLERGY PO) Take 25-50 mg by mouth daily as needed (allergies)        ammonium lactate (AMLACTIN) 12 % cream Apply topically daily as needed        Family History  Mother has hyperparathyroidism  Maternal aunt has hyperthyroidism  Brother has testicular cancer  Maternal uncle has stomach and bone cancer  Maternal aunts has leukemia  Another maternal aunt and grandmother have breast cancer    Social History  Social History   Substance Use Topics     Smoking status: Never Smoker     Smokeless tobacco: Never Used     Alcohol use 0.0 oz/week     0 Standard drinks or equivalent per week      Comment: 1-2 drinks a week   no smoking  Works as a     ROS  Constitutional: gradual weight gain recently due to lack of exercise  Eyes: no vision change, diplopia or red eyes   Neck: no difficulty swallowing, no choking, no neck pain, +noticed slight  "enlargement of the thyroid gland  Cardiovascular: no chest pain, palpitations  Respiratory: no dyspnea, cough, shortness of breath  GI: no nausea, vomiting, diarrhea or constipation, no abdominal pain   : no change in urine, no dysuria or hematuria, +recent diagnosis of early stage of prostate cancer  Musculoskeletal: no joint or muscle pain or swelling   Integumentary: no concerning lesions  Neuro: no loss of strength or sensation, no numbness or tingling, no tremor, +dizziness episode --> BPPV, no headache   Endo: no polyuria or polydipsia, no temperature intolerance   Heme/Lymph: no concerning bumps, no bleeding problems   Allergy: no environmental allergies   Psych: +restless leg syndrome, +chronic insomnia    Physical Exam  BP (!) 154/95 (BP Location: Right arm, Patient Position: Sitting, Cuff Size: Adult Regular)  Pulse 73  Ht 1.753 m (5' 9\")  Wt 90.1 kg (198 lb 9.6 oz)  BMI 29.33 kg/m2  Body mass index is 29.33 kg/(m^2).  Constitutional: no distress, comfortable, pleasant   Eyes: anicteric, normal extra-ocular movements, no lid lag or retraction  Neck: +visible and palpable enlarged thyroid, left>right. No discrete nodule   Cardiovascular: regular rate and rhythm, normal S1 and S2, no murmurs  Respiratory: clear to auscultation, no wheezes or crackles, normal breath sounds   Gastrointestinal:  nontender, no hepatomegaly, no masses   Musculoskeletal: no edema   Skin: no concerning lesions, no jaundice   Neurological: cranial nerves intact, 2+reflexes at patella, normal gait, no tremor on outstretched hands bilaterally  Psychological: appropriate mood   Lymphatic: no cervical  lymphadenopathy.      RESULTS  ENDO THYROID LABS-P Latest Ref Rng 8/1/2011 8/23/2010 5/7/2010   CALCITONIN  10.5 (H) 11.6 (H) 11.4 (H)     FNA 5/2010  Thyroid, right, FNA:  Negative for Malignancy  consistent with benign colloid nodule.  Specimen Adequacy: Satisfactory for evaluation.    COMMENT:  Smears show abundant colloid and " rare groups (approximately 15) of benign-appearing follicular epithelial cells.  The epithelial cells are  arranged in a predominantly macrofollicular pattern.  Cytologic features of papillary, medullary or anaplastic carcinoma are not present.  Occasional background histiocytes and rare giant cells are seen. These cytomorphologic findings are consistent with a benign colloid nodule.    4/2010    3/2010      5/2010    8/2010      US thyroid 3/2010  Findings: The right lobe of the thyroid measures 7.7 x 3 x 2.4 cm. The left lobe of the thyroid measures 8.2 x 3.6 x 3.6 cm. The isthmus measures 0.9 cm. The right lobe of the thyroid demonstrates 3 heterogeneous nodules,demonstrating flow on color Doppler, the largest in the inferior pole measuring 3.3 x 2.2 x 3.3 cm and demonstrating punctate echogenic areas that may represent microcalcifications and chaotic internal vessels, and the small nodules in the mid right lobe measuring 0.7 x 1.1 x 1 cm and 1 x 0.8 x 1 cm. The left lobe of the thyroid demonstrates a large heterogeneous cystic nodule in the superior to mid left lobe measuring 2.7 x 2.1 x 3.3 cm. An adjacent smaller 0.7 x 0.9 x 1 cm nodule is also seen. Both hese nodules demonstrate vascular flow in the periphery and not internally on color Doppler. The left inferior pole demonstrates heterogeneous appearance suggesting multiple nodules.    Impression: Multinodular goiter. The right inferior nodule measuring 3.3 x 2.3 x 3 cm demonstrates microcalcifications and chaotic internal vasculature, worrisome for malignancy. Consider biopsy.    US thyroid 8/2011  Findings: The right lobe of the thyroid measures 7 x 2.3 x 2.3 cm. The left lobe of the thyroid measures 8.5 x 3.2 x 2.3 cm. The isthmus measures 0.7 cm in width. Multiple heterogeneous mixed echogenicity nodules throughout the both lobes of the thyroid gland. Right upper  nodule measures 1.6 x 1.2 x 1.3 cm, previously was measuring 0.7 x 1.1 x 1 cm. The second  nodule at the midportion of the right lobe measures 1 x 0.6 x 0.5 cm, previously was measuring 1 x 0.8 x 1 cm. The largest nodule in the inferior pole of the right lobe measures 3.1 x 3.1 x 2.1  cm, previously was measuring 3.3 x 2.2 x 3.3 cm. Heterogeneous nodule in the upper pole of the left lobe measures 3.6 x 2.4 x 1.9 cm, previously was measuring 2.7 x 2.1 x 3.3 cm. Smaller nodule at the midportion of the left lobe measures 0.9 x 1.1 x 0.7 cm, unchanged.   Heterogeneous nodule in the inferior medial aspect of the left lobe of the thyroid gland measures 1.9 x 1.8 x 1.1 cm.      Impression:   1. Multinodular goiter. Some of the nodules in both lobes are slightly larger compared to prior study.  2. Heterogeneous nodule seen in the inferior medial aspect of the left lobe of the thyroid gland was not seen on the prior study. This could be a new thyroid nodule. Consider ultrasound guided fine needle biopsy.    US thyroid 9/26/2016  Thyroid parenchyma: heterogenous     The right lobe of the thyroid measures: 2.8 x 2.4 x 9.6 cm ,  previously 6.9 x 2.3 x 2.3 cm     The thyroid isthmus measures: 3.7 mm, previously 7.4 mm       The left lobe of the thyroid measures: 5.2 x 4.4 x 10.1 cm, previously  8.5 x 3.2 x 3.3 cm     Right lobe:  Nodule 1:  Nodule measurement: 1.5 x 1.3 , 1.6 cm  Echogenicity: Isoechoic  Consistency: mixed  Calcifications: no  Hypervascular: yes  Interval growth (>20%): no     Nodule 2:  Nodule measurement: 1.0 x 0.7 , 1.2 cm  Echogenicity: Hypoechoic  Consistency: cystic  Calcifications: no  Hypervascular: no  Interval growth (>20%): no     Nodule 3:  Nodule measurement: 3.2 x 2.0 , 3.1 cm  Echogenicity: Isoechoic  Consistency: solid  Calcifications: no  Hypervascular: yes  Interval growth (>20%): no     Nodule 4:  Nodule measurement: 0.8 x 0.7 , 0.8 cm  Echogenicity: Isoechoic  Consistency: spongiform  Calcifications: no  Hypervascular: yes  Interval growth (>20%): Not previously  documented     Isthmus: No nodules.     Left Lobe:    Nodule 1:  Nodule measurement: 2.9 x 0.9 , 2.8 cm  Echogenicity: Isoechoic  Consistency: mixed  Calcifications: no  Hypervascular: no  Interval growth (>20%): yes, new from prior     Nodule 2:  Nodule measurement: 2.2 x 2.2 , 2.9 cm  Echogenicity: Isoechoic  Consistency: mixed  Calcifications: no  Hypervascular: no  Interval growth (>20%): no     Nodule 3:  Nodule measurement: 1.6 x 1.4 , 1.9 cm  Echogenicity: Isoechoic  Consistency: mixed  Calcifications: no  Hypervascular: no  Interval growth (>20%): no     Nodule 4:  Nodule measurement: 1.1 x 0.8 , 1.5 cm  Echogenicity: Isoechoic  Consistency: solid  Calcifications: yes, rim calcified  Hypervascular: no  Interval growth (>20%): no                                                                    Impression:  Multinodular goiter. Changes since 2011. New 2.9 cm nodule with mixed cystic and solid components in the left lobe of the thyroid. New nodule on the right that is less than 1 cm in size.    US 9/26/2016  Thyroid parenchyma: heterogenous     The right lobe of the thyroid measures: 2.8 x 2.4 x 9.6 cm , previously 6.9 x 2.3 x 2.3 cm     The thyroid isthmus measures: 3.7 mm, previously 7.4 mm      The left lobe of the thyroid measures: 5.2 x 4.4 x 10.1 cm, previously 8.5 x 3.2 x 3.3 cm     Right lobe:  Nodule 1:  Nodule measurement: 1.5 x 1.3 , 1.6 cm  Echogenicity: Isoechoic  Consistency: mixed  Calcifications: no  Hypervascular: yes  Interval growth (>20%): no     Nodule 2:  Nodule measurement: 1.0 x 0.7 , 1.2 cm  Echogenicity: Hypoechoic  Consistency: cystic  Calcifications: no  Hypervascular: no  Interval growth (>20%): no     Nodule 3:  Nodule measurement: 3.2 x 2.0 , 3.1 cm  Echogenicity: Isoechoic  Consistency: solid  Calcifications: no  Hypervascular: yes  Interval growth (>20%): no     Nodule 4:  Nodule measurement: 0.8 x 0.7 , 0.8 cm  Echogenicity: Isoechoic  Consistency: spongiform  Calcifications:  no  Hypervascular: yes  Interval growth (>20%): Not previously documented     Isthmus: No nodules.     Left Lobe:   Nodule 1:  Nodule measurement: 2.9 x 0.9 , 2.8 cm  Echogenicity: Isoechoic  Consistency: mixed  Calcifications: no  Hypervascular: no  Interval growth (>20%): yes, new from prior     Nodule 2:  Nodule measurement: 2.2 x 2.2 , 2.9 cm  Echogenicity: Isoechoic  Consistency: mixed  Calcifications: no  Hypervascular: no  Interval growth (>20%): no     Nodule 3:  Nodule measurement: 1.6 x 1.4 , 1.9 cm  Echogenicity: Isoechoic  Consistency: mixed  Calcifications: no  Hypervascular: no  Interval growth (>20%): no     Nodule 4:  Nodule measurement: 1.1 x 0.8 , 1.5 cm  Echogenicity: Isoechoic  Consistency: solid  Calcifications: yes, rim calcified  Hypervascular: no  Interval growth (>20%): no         Impression:  Multinodular goiter. Changes since 2011. New 2.9 cm nodule with mixed cystic and solid components in the left lobe of the thyroid. New nodule on the right that is less than 1 cm in size.    FNA thyroid   CYTOLOGIC INTERPRETATION:     A. Thyroid, Left #1, Ultrasound-guided Fine Needle Aspiration:- Benign   - Consistent with a benign nodule (includes adenomatoid nodule, colloid nodule, etc.)     The Sebastian Implied Risk of Malignancy and Recommended Clinical Management:   Benign has a 0-3% risk of malignancy, recommended management is clinical follow-up     Specimen Adequacy: Satisfactory for evaluation.     B. Thyroid, Left #2, Ultrasound-guided Fine Needle Aspiration:   - Benign   - Consistent with a benign nodule (includes adenomatoid nodule, colloid nodule, etc.)     Specimen Adequacy: Satisfactory for evaluation.     C. Thyroid, Left #4, Ultrasound-guided Fine Needle Aspiration:   - Benign   - Consistent with a benign nodule (includes adenomatoid nodule, colloid nodule, etc.)   Specimen Adequacy: Satisfactory for evaluation.       ASSESSMENT:    Miles Valencia is a 51 years old male with hx of  T1N0M0 prostate cancer who is here for multinodular goiter.    1) multinodular goiter: noted since 2010 during physical examination. I can palpate enlargement of the thyroid, left>right. Ultrasound showed multiple nodules ranged 2-3 cm throughout both thyroid lobes. He underwent right sided nodule FNA in 2010 which was benign.     He did have FNA of left thyroid nodule #1, #2 and #4 which were all benign in 10/2016    He noted recent enlargement of the thyroid but no neck compressive symptoms. I will repeat US thyroid this year along with TFT and calcitonin given previous elevation of calcitonin.    2) mild elevation of calcitonin: his calcitonin ranged in 10-11. He denied taking any PPI. Will follow up today.    3) previous elevation of urine normetanephrine: secondary to medication interference (he was on TCA). After stopping TCA, lab was normalized. MRI abdomen in 2010 showed normal adrenal gland.     PLAN:   Lab for TFT, calcitriol  Ultrasound thyroid    Silvia Villalobos MD     Division of Diabetes and Endocrinology  Department of Medicine  [phone]

## 2018-01-12 NOTE — NURSING NOTE
"Chief Complaint   Patient presents with     RECHECK     MULTINODULAR GOITER        Initial BP (!) 154/95 (BP Location: Right arm, Patient Position: Sitting, Cuff Size: Adult Regular)  Pulse 73  Ht 1.753 m (5' 9\")  Wt 90.1 kg (198 lb 9.6 oz)  BMI 29.33 kg/m2 Estimated body mass index is 29.33 kg/(m^2) as calculated from the following:    Height as of this encounter: 1.753 m (5' 9\").    Weight as of this encounter: 90.1 kg (198 lb 9.6 oz).  Medication Reconciliation: complete    "

## 2018-01-12 NOTE — LETTER
1/12/2018       RE: Miles Valencia  [address] [place] [address]     Dear Colleague,    Thank you for referring your patient, Miles Valencia, to the [facility] ENDOCRINOLOGY at [facility]. Please see a copy of my visit note below.         Endocrinology Note         Miles is a 52 year old male presents today for multinodular goiter    HPI  Miles Valencia is a 52 years old male with hx of T1N0M0 prostate cancer who is here for multinodular goiter.    He was previously seen  and  for multinodular goiter and mild elevation of calcitonin in 2010. I have reviewed previous medical record.    In 2010, he was noted to have multinodular goiter and palpitation during physical examination. Given intermittent headache and palpitation, urine metanephrine was checked. At that time, he was noted to have mild elevation of urine normetanephrine which was thought to be secondary to amitriptyline. After stopping amitriptyline, he has not had any palpitation anymore and repeated urine metanephrine was normalized. At the same visit, he was also evaluated for multinodular goiter. US thyroid in 2010 showed multiple thyroid nodules throughout both lobes (detail in result section). The largest nodules on the right lobe was 3.3x2.2.x3.3 cm heterogeneous nodule with microcalcification and the largest left sided nodule was 2.7x2.1x3.3 cm heterogeneous nodule. He underwent right thyroid nodule which was benign. He did have follow-up thyroid ultrasound in 2011 which showed slight enlargement of thyroid nodules. Since then, he has not had any follow-up endocrine visit or ultrasound until 2016.    He had routine physical exam in January 2016 and was told to follow on multinodular goiter.     He did f/u thyroid ultrasound in 10/2016 which showed growth in thyroid nodules and new 2.9 cm left thyroid nodule. Therefore she underwent FNA of left thyroid nodule #1, #2 and #4 which  were all benign in 10/2016.    Interim history  He is here for follow up. He stated that he has been feeling well except that he noticed intermittent pressure in his neck. He also felt that his neck is getting slightly larger particularly on the left side. He denied difficulty swallowing or breathing. He denied chest pain, and palpitation. He always had chronic migraine and tension headache particularly after episode of dizziness.     Past Medical History  Past Medical History:   Diagnosis Date     Achilles bursitis or tendinitis 5/4/2014     Allergic rhinitis      Asthma      Congestion      Hypertension      Migraine      Multinodular goiter      Osteoarthritis      Pain in joint, shoulder region 4/18/2014     Prostate cancer (H)      RLS (restless legs syndrome)      Sleep problems    T1N0M0 prostate cancer with close monitoring with urologist.  Migraine headache    Allergies  Allergies   Allergen Reactions     Amoxicillin Diarrhea        Medications  Current Outpatient Prescriptions   Medication Sig Dispense Refill     clindamycin (CLEOCIN) 300 MG capsule Take 2 capsules (600 mg) by mouth 1 hour before dental work. 2 capsule 3     ondansetron (ZOFRAN-ODT) 4 MG ODT tab   0     Morphine Sulfate (MORPHINE, PF,) 4 MG/ML injection   0     gabapentin (NEURONTIN) 100 MG capsule TK 4 CS PO D HS  4     oxyCODONE IR (ROXICODONE) 5 MG tablet   0     MELOXICAM PO Take by mouth as needed       MELATONIN PO Take 1 mg by mouth nightly as needed       gabapentin (NEURONTIN) 300 MG capsule Take 1 capsule (300 mg) by mouth 3 times daily (Patient taking differently: Take 600 mg by mouth 2 times daily ) 60 capsule 0     aspirin-acetaminophen-caffeine (EXCEDRIN MIGRAINE) 250-250-65 MG per tablet Take 2 tablets by mouth every 6 hours as needed for headaches       hydrocortisone (CORTAID) 1 % cream Apply topically 2 times daily as needed       camphor-menthol (DERMASARRA) 0.5-0.5 % LOTN Apply topically every 6 hours as needed for skin  care       albuterol (PROAIR HFA/PROVENTIL HFA/VENTOLIN HFA) 108 (90 BASE) MCG/ACT Inhaler Inhale 2 puffs into the lungs every 4 hours as needed for shortness of breath / dyspnea or wheezing       Cyanocobalamin (VITAMIN B-12 PO) Take 1 tablet by mouth every evening        QVAR 80 MCG/ACT Inhaler Inhale 2 puffs twice daily as needed when allergies flare up  0     meclizine (ANTIVERT) 12.5 MG tablet Take 2 tablets (25 mg) by mouth 4 times daily as needed for dizziness 30 tablet 0     CARTIA  MG 24 hr CD capsule Take 120 mg by mouth every morning   0     rizatriptan (MAXALT-MLT) 10 MG disintegrating tablet Take 10 mg by mouth at onset of headache   6     SUMAtriptan Succinate Refill (IMITREX) 6 MG/0.5ML SOCT Inject 6 mg into the muscle every 12 hours as needed   1     hydrochlorothiazide (HYDRODIURIL) 25 MG tablet Take 25 mg by mouth every morning        Omega-3 Fatty Acids (OMEGA-3 FISH OIL) 1000 MG CAPS Take 1 gram by mouth in the morning and take 2 grams by mouth in the evening.       cholecalciferol (VITAMIN D3 MAXIMUM STRENGTH) 5000 UNITS CAPS Take 5,000 Units by mouth every morning        Acetaminophen (TYLENOL PO) Take 1,000 mg by mouth every 6 hours as needed        zolpidem (AMBIEN) 5 MG tablet Take 5 mg by mouth nightly as needed for sleep       Cetirizine HCl 10 MG CAPS Take 10 mg by mouth daily        DiphenhydrAMINE HCl (BENADRYL ALLERGY PO) Take 25-50 mg by mouth daily as needed (allergies)        ammonium lactate (AMLACTIN) 12 % cream Apply topically daily as needed        Family History  Mother has hyperparathyroidism  Maternal aunt has hyperthyroidism  Brother has testicular cancer  Maternal uncle has stomach and bone cancer  Maternal aunts has leukemia  Another maternal aunt and grandmother have breast cancer    Social History  Social History   Substance Use Topics     Smoking status: Never Smoker     Smokeless tobacco: Never Used     Alcohol use 0.0 oz/week     0 Standard drinks or equivalent  "per week      Comment: 1-2 drinks a week   no smoking  Works as a     ROS  Constitutional: gradual weight gain recently due to lack of exercise  Eyes: no vision change, diplopia or red eyes   Neck: no difficulty swallowing, no choking, no neck pain, +noticed slight enlargement of the thyroid gland  Cardiovascular: no chest pain, palpitations  Respiratory: no dyspnea, cough, shortness of breath  GI: no nausea, vomiting, diarrhea or constipation, no abdominal pain   : no change in urine, no dysuria or hematuria, +recent diagnosis of early stage of prostate cancer  Musculoskeletal: no joint or muscle pain or swelling   Integumentary: no concerning lesions  Neuro: no loss of strength or sensation, no numbness or tingling, no tremor, +dizziness episode --> BPPV, no headache   Endo: no polyuria or polydipsia, no temperature intolerance   Heme/Lymph: no concerning bumps, no bleeding problems   Allergy: no environmental allergies   Psych: +restless leg syndrome, +chronic insomnia    Physical Exam  BP (!) 154/95 (BP Location: Right arm, Patient Position: Sitting, Cuff Size: Adult Regular)  Pulse 73  Ht 1.753 m (5' 9\")  Wt 90.1 kg (198 lb 9.6 oz)  BMI 29.33 kg/m2  Body mass index is 29.33 kg/(m^2).  Constitutional: no distress, comfortable, pleasant   Eyes: anicteric, normal extra-ocular movements, no lid lag or retraction  Neck: +visible and palpable enlarged thyroid, left>right. No discrete nodule   Cardiovascular: regular rate and rhythm, normal S1 and S2, no murmurs  Respiratory: clear to auscultation, no wheezes or crackles, normal breath sounds   Gastrointestinal:  nontender, no hepatomegaly, no masses   Musculoskeletal: no edema   Skin: no concerning lesions, no jaundice   Neurological: cranial nerves intact, 2+reflexes at patella, normal gait, no tremor on outstretched hands bilaterally  Psychological: appropriate mood   Lymphatic: no cervical  lymphadenopathy.      RESULTS  ENDO THYROID LABS-[facility] Latest " Ref Rng 8/1/2011 8/23/2010 5/7/2010   CALCITONIN  10.5 (H) 11.6 (H) 11.4 (H)     FNA 5/2010  Thyroid, right, FNA:  Negative for Malignancy  consistent with benign colloid nodule.  Specimen Adequacy: Satisfactory for evaluation.    COMMENT:  Smears show abundant colloid and rare groups (approximately 15) of benign-appearing follicular epithelial cells.  The epithelial cells are  arranged in a predominantly macrofollicular pattern.  Cytologic features of papillary, medullary or anaplastic carcinoma are not present.  Occasional background histiocytes and rare giant cells are seen. These cytomorphologic findings are consistent with a benign colloid nodule.    4/2010    3/2010      5/2010    8/2010      US thyroid 3/2010  Findings: The right lobe of the thyroid measures 7.7 x 3 x 2.4 cm. The left lobe of the thyroid measures 8.2 x 3.6 x 3.6 cm. The isthmus measures 0.9 cm. The right lobe of the thyroid demonstrates 3 heterogeneous nodules,demonstrating flow on color Doppler, the largest in the inferior pole measuring 3.3 x 2.2 x 3.3 cm and demonstrating punctate echogenic areas that may represent microcalcifications and chaotic internal vessels, and the small nodules in the mid right lobe measuring 0.7 x 1.1 x 1 cm and 1 x 0.8 x 1 cm. The left lobe of the thyroid demonstrates a large heterogeneous cystic nodule in the superior to mid left lobe measuring 2.7 x 2.1 x 3.3 cm. An adjacent smaller 0.7 x 0.9 x 1 cm nodule is also seen. Both hese nodules demonstrate vascular flow in the periphery and not internally on color Doppler. The left inferior pole demonstrates heterogeneous appearance suggesting multiple nodules.    Impression: Multinodular goiter. The right inferior nodule measuring 3.3 x 2.3 x 3 cm demonstrates microcalcifications and chaotic internal vasculature, worrisome for malignancy. Consider biopsy.    US thyroid 8/2011  Findings: The right lobe of the thyroid measures 7 x 2.3 x 2.3 cm. The left lobe of the  thyroid measures 8.5 x 3.2 x 2.3 cm. The isthmus measures 0.7 cm in width. Multiple heterogeneous mixed echogenicity nodules throughout the both lobes of the thyroid gland. Right upper  nodule measures 1.6 x 1.2 x 1.3 cm, previously was measuring 0.7 x 1.1 x 1 cm. The second nodule at the midportion of the right lobe measures 1 x 0.6 x 0.5 cm, previously was measuring 1 x 0.8 x 1 cm. The largest nodule in the inferior pole of the right lobe measures 3.1 x 3.1 x 2.1  cm, previously was measuring 3.3 x 2.2 x 3.3 cm. Heterogeneous nodule in the upper pole of the left lobe measures 3.6 x 2.4 x 1.9 cm, previously was measuring 2.7 x 2.1 x 3.3 cm. Smaller nodule at the midportion of the left lobe measures 0.9 x 1.1 x 0.7 cm, unchanged.   Heterogeneous nodule in the inferior medial aspect of the left lobe of the thyroid gland measures 1.9 x 1.8 x 1.1 cm.      Impression:   1. Multinodular goiter. Some of the nodules in both lobes are slightly larger compared to prior study.  2. Heterogeneous nodule seen in the inferior medial aspect of the left lobe of the thyroid gland was not seen on the prior study. This could be a new thyroid nodule. Consider ultrasound guided fine needle biopsy.    US thyroid 9/26/2016  Thyroid parenchyma: heterogenous     The right lobe of the thyroid measures: 2.8 x 2.4 x 9.6 cm ,  previously 6.9 x 2.3 x 2.3 cm     The thyroid isthmus measures: 3.7 mm, previously 7.4 mm       The left lobe of the thyroid measures: 5.2 x 4.4 x 10.1 cm, previously  8.5 x 3.2 x 3.3 cm     Right lobe:  Nodule 1:  Nodule measurement: 1.5 x 1.3 , 1.6 cm  Echogenicity: Isoechoic  Consistency: mixed  Calcifications: no  Hypervascular: yes  Interval growth (>20%): no     Nodule 2:  Nodule measurement: 1.0 x 0.7 , 1.2 cm  Echogenicity: Hypoechoic  Consistency: cystic  Calcifications: no  Hypervascular: no  Interval growth (>20%): no     Nodule 3:  Nodule measurement: 3.2 x 2.0 , 3.1 cm  Echogenicity: Isoechoic  Consistency:  solid  Calcifications: no  Hypervascular: yes  Interval growth (>20%): no     Nodule 4:  Nodule measurement: 0.8 x 0.7 , 0.8 cm  Echogenicity: Isoechoic  Consistency: spongiform  Calcifications: no  Hypervascular: yes  Interval growth (>20%): Not previously documented     Isthmus: No nodules.     Left Lobe:    Nodule 1:  Nodule measurement: 2.9 x 0.9 , 2.8 cm  Echogenicity: Isoechoic  Consistency: mixed  Calcifications: no  Hypervascular: no  Interval growth (>20%): yes, new from prior     Nodule 2:  Nodule measurement: 2.2 x 2.2 , 2.9 cm  Echogenicity: Isoechoic  Consistency: mixed  Calcifications: no  Hypervascular: no  Interval growth (>20%): no     Nodule 3:  Nodule measurement: 1.6 x 1.4 , 1.9 cm  Echogenicity: Isoechoic  Consistency: mixed  Calcifications: no  Hypervascular: no  Interval growth (>20%): no     Nodule 4:  Nodule measurement: 1.1 x 0.8 , 1.5 cm  Echogenicity: Isoechoic  Consistency: solid  Calcifications: yes, rim calcified  Hypervascular: no  Interval growth (>20%): no                                                                    Impression:  Multinodular goiter. Changes since 2011. New 2.9 cm nodule with mixed cystic and solid components in the left lobe of the thyroid. New nodule on the right that is less than 1 cm in size.    US 9/26/2016  Thyroid parenchyma: heterogenous     The right lobe of the thyroid measures: 2.8 x 2.4 x 9.6 cm , previously 6.9 x 2.3 x 2.3 cm     The thyroid isthmus measures: 3.7 mm, previously 7.4 mm      The left lobe of the thyroid measures: 5.2 x 4.4 x 10.1 cm, previously 8.5 x 3.2 x 3.3 cm     Right lobe:  Nodule 1:  Nodule measurement: 1.5 x 1.3 , 1.6 cm  Echogenicity: Isoechoic  Consistency: mixed  Calcifications: no  Hypervascular: yes  Interval growth (>20%): no     Nodule 2:  Nodule measurement: 1.0 x 0.7 , 1.2 cm  Echogenicity: Hypoechoic  Consistency: cystic  Calcifications: no  Hypervascular: no  Interval growth (>20%): no     Nodule 3:  Nodule  measurement: 3.2 x 2.0 , 3.1 cm  Echogenicity: Isoechoic  Consistency: solid  Calcifications: no  Hypervascular: yes  Interval growth (>20%): no     Nodule 4:  Nodule measurement: 0.8 x 0.7 , 0.8 cm  Echogenicity: Isoechoic  Consistency: spongiform  Calcifications: no  Hypervascular: yes  Interval growth (>20%): Not previously documented     Isthmus: No nodules.     Left Lobe:   Nodule 1:  Nodule measurement: 2.9 x 0.9 , 2.8 cm  Echogenicity: Isoechoic  Consistency: mixed  Calcifications: no  Hypervascular: no  Interval growth (>20%): yes, new from prior     Nodule 2:  Nodule measurement: 2.2 x 2.2 , 2.9 cm  Echogenicity: Isoechoic  Consistency: mixed  Calcifications: no  Hypervascular: no  Interval growth (>20%): no     Nodule 3:  Nodule measurement: 1.6 x 1.4 , 1.9 cm  Echogenicity: Isoechoic  Consistency: mixed  Calcifications: no  Hypervascular: no  Interval growth (>20%): no     Nodule 4:  Nodule measurement: 1.1 x 0.8 , 1.5 cm  Echogenicity: Isoechoic  Consistency: solid  Calcifications: yes, rim calcified  Hypervascular: no  Interval growth (>20%): no         Impression:  Multinodular goiter. Changes since 2011. New 2.9 cm nodule with mixed cystic and solid components in the left lobe of the thyroid. New nodule on the right that is less than 1 cm in size.    FNA thyroid   CYTOLOGIC INTERPRETATION:     A. Thyroid, Left #1, Ultrasound-guided Fine Needle Aspiration:- Benign   - Consistent with a benign nodule (includes adenomatoid nodule, colloid nodule, etc.)     The Snyder Implied Risk of Malignancy and Recommended Clinical Management:   Benign has a 0-3% risk of malignancy, recommended management is clinical follow-up     Specimen Adequacy: Satisfactory for evaluation.     B. Thyroid, Left #2, Ultrasound-guided Fine Needle Aspiration:   - Benign   - Consistent with a benign nodule (includes adenomatoid nodule, colloid nodule, etc.)     Specimen Adequacy: Satisfactory for evaluation.     C. Thyroid, Left #4,  Ultrasound-guided Fine Needle Aspiration:   - Benign   - Consistent with a benign nodule (includes adenomatoid nodule, colloid nodule, etc.)   Specimen Adequacy: Satisfactory for evaluation.       ASSESSMENT:    Miles Valencia is a 51 years old male with hx of T1N0M0 prostate cancer who is here for multinodular goiter.    1) multinodular goiter: noted since 2010 during physical examination. I can palpate enlargement of the thyroid, left>right. Ultrasound showed multiple nodules ranged 2-3 cm throughout both thyroid lobes. He underwent right sided nodule FNA in 2010 which was benign.     He did have FNA of left thyroid nodule #1, #2 and #4 which were all benign in 10/2016    He noted recent enlargement of the thyroid but no neck compressive symptoms. I will repeat US thyroid this year along with TFT and calcitonin given previous elevation of calcitonin.    2) mild elevation of calcitonin: his calcitonin ranged in 10-11. He denied taking any PPI. Will follow up today.    3) previous elevation of urine normetanephrine: secondary to medication interference (he was on TCA). After stopping TCA, lab was normalized. MRI abdomen in 2010 showed normal adrenal gland.     PLAN:   Lab for TFT, calcitriol  Ultrasound thyroid    Silvia Villalobos MD     Division of Diabetes and Endocrinology  Department of Medicine  [phone]

## 2018-01-15 ENCOUNTER — MYC MEDICAL ADVICE (OUTPATIENT)
Dept: ENDOCRINOLOGY | Facility: CLINIC | Age: 53
End: 2018-01-15

## 2018-01-15 DIAGNOSIS — E04.2 MULTIPLE THYROID NODULES: ICD-10-CM

## 2018-01-15 DIAGNOSIS — E05.90 SUBCLINICAL HYPERTHYROIDISM: Primary | ICD-10-CM

## 2018-01-15 LAB
CALCIT SERPL-MCNC: 7.4 PG/ML (ref 0–7.5)
TESTOST SERPL-MCNC: 334 NG/DL (ref 240–950)

## 2018-02-01 PROBLEM — Z47.1 AFTERCARE FOLLOWING LEFT SHOULDER JOINT REPLACEMENT SURGERY: Status: RESOLVED | Noted: 2017-07-31 | Resolved: 2018-02-01

## 2018-02-01 PROBLEM — Z96.612 AFTERCARE FOLLOWING LEFT SHOULDER JOINT REPLACEMENT SURGERY: Status: RESOLVED | Noted: 2017-07-31 | Resolved: 2018-02-01

## 2018-02-08 ENCOUNTER — HOSPITAL ENCOUNTER (OUTPATIENT)
Dept: NUCLEAR MEDICINE | Facility: CLINIC | Age: 53
Setting detail: NUCLEAR MEDICINE
Discharge: HOME | End: 2018-02-09
Attending: INTERNAL MEDICINE | Admitting: INTERNAL MEDICINE
Payer: COMMERCIAL

## 2018-02-08 DIAGNOSIS — E05.90 SUBCLINICAL HYPERTHYROIDISM: ICD-10-CM

## 2018-02-08 DIAGNOSIS — E04.2 MULTIPLE THYROID NODULES: ICD-10-CM

## 2018-02-08 PROCEDURE — 34300033 ZZH RX 343: Performed by: INTERNAL MEDICINE

## 2018-02-08 PROCEDURE — A9516 IODINE I-123 SOD IODIDE MIC: HCPCS | Performed by: INTERNAL MEDICINE

## 2018-02-08 RX ADMIN — Medication 283 UCI.: at 09:04

## 2018-02-09 ENCOUNTER — HOSPITAL ENCOUNTER (OUTPATIENT)
Dept: NUCLEAR MEDICINE | Facility: CLINIC | Age: 53
Setting detail: NUCLEAR MEDICINE
End: 2018-02-09
Attending: INTERNAL MEDICINE
Payer: COMMERCIAL

## 2018-02-09 PROCEDURE — 78014 THYROID IMAGING W/BLOOD FLOW: CPT

## 2018-02-14 ENCOUNTER — MYC MEDICAL ADVICE (OUTPATIENT)
Dept: ENDOCRINOLOGY | Facility: CLINIC | Age: 53
End: 2018-02-14

## 2018-02-14 DIAGNOSIS — E05.90 SUBCLINICAL HYPERTHYROIDISM: Primary | ICD-10-CM

## 2018-03-09 DIAGNOSIS — E05.90 SUBCLINICAL HYPERTHYROIDISM: ICD-10-CM

## 2018-03-09 LAB
T3 SERPL-MCNC: 115 NG/DL (ref 60–181)
T4 FREE SERPL-MCNC: 1.17 NG/DL (ref 0.76–1.46)
TSH SERPL DL<=0.005 MIU/L-ACNC: 0.39 MU/L (ref 0.4–4)

## 2018-03-14 ENCOUNTER — MYC MEDICAL ADVICE (OUTPATIENT)
Dept: ENDOCRINOLOGY | Facility: CLINIC | Age: 53
End: 2018-03-14

## 2018-03-14 DIAGNOSIS — E05.20 TOXIC MULTINODULAR GOITER: Primary | ICD-10-CM

## 2018-03-14 LAB — TSI SER-ACNC: <1 TSI INDEX

## 2018-07-02 DIAGNOSIS — Z09 SURGICAL FOLLOW-UP CARE: Primary | ICD-10-CM

## 2018-07-13 ENCOUNTER — RADIANT APPOINTMENT (OUTPATIENT)
Dept: GENERAL RADIOLOGY | Facility: CLINIC | Age: 53
End: 2018-07-13
Attending: ORTHOPAEDIC SURGERY
Payer: COMMERCIAL

## 2018-07-13 ENCOUNTER — OFFICE VISIT (OUTPATIENT)
Dept: ORTHOPEDICS | Facility: CLINIC | Age: 53
End: 2018-07-13
Payer: COMMERCIAL

## 2018-07-13 DIAGNOSIS — Z09 SURGICAL FOLLOW-UP CARE: ICD-10-CM

## 2018-07-13 DIAGNOSIS — Z96.60 S/P JOINT REPLACEMENT: Primary | ICD-10-CM

## 2018-07-13 NOTE — LETTER
7/13/2018       RE: Miles Valencia  [address] [place] [address]     Dear Colleague,    Thank you for referring your patient, Miles Valencia, to the [facility] at [facility]. Please see a copy of my visit note below.    CHIEF CONCERN:  Status Post Left Total Shoulder Arthroplasty  DATE OF SURGERY: 6/13/17    HISTORY OF PRESENT ILLNESS:  Mr. Valencia is a 53 year old gentleman who is 1 year s/p above procedure. He reports he is doing well and denies limitations. He has no concerns today.    PHYSICAL EXAM:    Pleasant male, NAD  Focused left upper extremity exam: Skin intact. No atrophy. Incision well healed. Shoulder active ROM is FE to 175, ER to 65, IR to T12. 5/5 strength with deltoid, FE and ER. Sensation intact and normal.     IMAGING:  Left shoulder xrays today demonstrate components are in good position. There are no lucencies. The joint is concentrically reduced.    ASSESSMENT:  1. One year s/p above procedure    PLAN:  I reviewed the imaging results and patient activities.  He will continue with the 50# restriction.  He will return in 1 year with xrays    Again, thank you for allowing me to participate in the care of your patient.      Sincerely,    Jossy Swanson MD

## 2018-07-13 NOTE — NURSING NOTE
Reason For Visit:   Chief Complaint   Patient presents with     Surgical Followup     1 year follow up Left TSA.  DOS: 6/13/2017       PCP: Oneyda Jones  Ref: Established     ?  No  Occupation .  Currently working? Yes.  Work status?  Full time.  Date of injury: *ongoing     Date of surgery: 6/13/2017  Type of surgery: Left TSA.  Smoker: No  Request smoking cessation information: No     Right hand dominant    SANE score  Affected shoulder: Left  Right shoulder SANE: 100  Left shoulder SANE: 90    There were no vitals taken for this visit.      Pain Assessment  Patient Currently in Pain: Bogdan Link, ATC

## 2018-07-13 NOTE — PROGRESS NOTES
CHIEF CONCERN:  Status Post Left Total Shoulder Arthroplasty  DATE OF SURGERY: 6/13/17    HISTORY OF PRESENT ILLNESS:  Mr. Valencia is a 53 year old gentleman who is 1 year s/p above procedure. He reports he is doing well and denies limitations. He has no concerns today.    PHYSICAL EXAM:    Pleasant male, NAD  Focused left upper extremity exam: Skin intact. No atrophy. Incision well healed. Shoulder active ROM is FE to 175, ER to 65, IR to T12. 5/5 strength with deltoid, FE and ER. Sensation intact and normal.     IMAGING:  Left shoulder xrays today demonstrate components are in good position. There are no lucencies. The joint is concentrically reduced.    ASSESSMENT:  1. One year s/p above procedure    PLAN:  I reviewed the imaging results and patient activities.  He will continue with the 50# restriction.  He will return in 1 year with xrays

## 2018-07-13 NOTE — LETTER
[facility]  1st Floor, [address]  [address] [address]  Phone: [phone]  Fax: [phone]      Patient:  Miles Valencia  :   1965    Mr.Scott CHICHO Valencia  [address] [place] [address]      2018    To Whom It May Concern:    Mr. Valencia may undergo dental cleanings without antibiotic prophylaxis. If more involved/invasive dental work is planned, we would use Clindamycin for antibiotic prophylaxis (patenthas Amoxicillin allergy).          Jossy Swanson

## 2018-07-13 NOTE — MR AVS SNAPSHOT
After Visit Summary   7/13/2018    Miles Valencia    MRN: [id number]           Patient Information     Date Of Birth          1965        Visit Information        Provider Department      7/13/2018 2:45 PM Jossy Swanson MD [facility]        Today's Diagnoses     S/P joint replacement    -  1       Follow-ups after your visit        Who to contact     Please call your clinic at [phone] to:    Ask questions about your health    Make or cancel appointments    Discuss your medicines    Learn about your test results    Speak to your doctor            Additional Information About Your Visit        MyChart Information     [employer] gives you secure access to your electronic health record. If you see a primary care provider, you can also send messages to your care team and make appointments. If you have questions, please call your primary care clinic.  If you do not have a primary care provider, please call [phone] and they will assist you.      [employer] is an electronic gateway that provides easy, online access to your medical records. With [employer], you can request a clinic appointment, read your test results, renew a prescription or communicate with your care team.     To access your existing account, please contact your [facility] or call [phone] for assistance.        Care EveryWhere ID     This is your Care EveryWhere ID. This could be used by other organizations to access your [facility] medical records  [id number]         Blood Pressure from Last 3 Encounters:   01/12/18 (!) 154/95   12/14/17 122/85   08/20/17 155/82    Weight from Last 3 Encounters:   01/12/18 90.1 kg (198 lb 9.6 oz)   12/14/17 88.6 kg (195 lb 4.8 oz)   08/20/17 87.1 kg (192 lb)              Today, you had the following     No orders found for display         Today's Medication Changes          These changes are accurate as of 7/13/18  4:16 PM.  If you have any  questions, ask your nurse or doctor.               These medicines have changed or have updated prescriptions.        Dose/Directions    * gabapentin 300 MG capsule   Commonly known as:  NEURONTIN   This may have changed:    - how much to take  - when to take this   Used for:  Status post total shoulder arthroplasty, left        Dose:  300 mg   Take 1 capsule (300 mg) by mouth 3 times daily   Quantity:  60 capsule   Refills:  0       * gabapentin 100 MG capsule   Commonly known as:  NEURONTIN   This may have changed:  Another medication with the same name was changed. Make sure you understand how and when to take each.        TK 4 CS PO D HS   Refills:  4       * Notice:  This list has 2 medication(s) that are the same as other medications prescribed for you. Read the directions carefully, and ask your doctor or other care provider to review them with you.             Primary Care Provider Office Phone # Fax #    Oneyda Jones [phone]       [place] [address]        Equal Access to Services     SHANNAN SCHROEDER : Hadii aad ku hadasho Socrystal, waaxda luqadaha, qaybta kaalmada adeegyada, lalo szymanski. So [facility] [phone].    ATENCIÓN: Si habla español, tiene a wilson disposición servicios gratuitos de asistencia lingüística. Anthony al [phone].    We comply with applicable federal civil rights laws and [place] laws. We do not discriminate on the basis of race, color, national origin, age, disability, sex, sexual orientation, or gender identity.            Thank you!     Thank you for choosing [facility]  for your care. Our goal is always to provide you with excellent care. Hearing back from our patients is one way we can continue to improve our services. Please take a few minutes to complete the written survey that you may receive in the mail after your visit with us. Thank you!             Your Updated  Medication List - Protect others around you: Learn how to safely use, store and throw away your medicines at www.disposemymeds.org.          This list is accurate as of 7/13/18  4:16 PM.  Always use your most recent med list.                   Brand Name Dispense Instructions for use Diagnosis    albuterol 108 (90 Base) MCG/ACT Inhaler    PROAIR HFA/PROVENTIL HFA/VENTOLIN HFA     Inhale 2 puffs into the lungs every 4 hours as needed for shortness of breath / dyspnea or wheezing        AMBIEN 5 MG tablet   Generic drug:  zolpidem      Take 5 mg by mouth nightly as needed for sleep        ammonium lactate 12 % cream    AMLACTIN     Apply topically daily as needed        aspirin-acetaminophen-caffeine 250-250-65 MG per tablet    EXCEDRIN MIGRAINE     Take 2 tablets by mouth every 6 hours as needed for headaches        BENADRYL ALLERGY PO      Take 25-50 mg by mouth daily as needed (allergies)        camphor-menthol 0.5-0.5 % Lotn    DERMASARRA     Apply topically every 6 hours as needed for skin care        CARTIA  MG 24 hr capsule   Generic drug:  diltiazem      Take 120 mg by mouth every morning    Elevated prostate specific antigen (PSA)       cetirizine HCl 10 MG Caps      Take 10 mg by mouth daily        clindamycin 300 MG capsule    CLEOCIN    2 capsule    Take 2 capsules (600 mg) by mouth 1 hour before dental work.    S/P joint replacement       fish oil-omega-3 fatty acids 1000 MG capsule      Take 1 gram by mouth in the morning and take 2 grams by mouth in the evening.        * gabapentin 300 MG capsule    NEURONTIN    60 capsule    Take 1 capsule (300 mg) by mouth 3 times daily    Status post total shoulder arthroplasty, left       * gabapentin 100 MG capsule    NEURONTIN     TK 4 CS PO D HS        hydrochlorothiazide 25 MG tablet    HYDRODIURIL     Take 25 mg by mouth every morning        hydrocortisone 1 % cream    CORTAID     Apply topically 2 times daily as needed        meclizine 12.5 MG tablet     ANTIVERT    30 tablet    Take 2 tablets (25 mg) by mouth 4 times daily as needed for dizziness        MELATONIN PO      Take 1 mg by mouth nightly as needed        MELOXICAM PO      Take by mouth as needed        morphine (PF) 4 MG/ML injection           ondansetron 4 MG ODT tab    ZOFRAN-ODT          oxyCODONE IR 5 MG tablet    ROXICODONE          QVAR 80 MCG/ACT Inhaler   Generic drug:  beclomethasone      Inhale 2 puffs twice daily as needed when allergies flare up        rizatriptan 10 MG ODT tab    MAXALT-MLT     Take 10 mg by mouth at onset of headache    Elevated prostate specific antigen (PSA)       SUMAtriptan Succinate Refill 6 MG/0.5ML Soct    IMITREX     Inject 6 mg into the muscle every 12 hours as needed    Elevated prostate specific antigen (PSA)       TYLENOL PO      Take 1,000 mg by mouth every 6 hours as needed        VITAMIN B-12 PO      Take 1 tablet by mouth every evening        VITAMIN D3 MAXIMUM STRENGTH 5000 units Caps   Generic drug:  cholecalciferol      Take 5,000 Units by mouth every morning        * Notice:  This list has 2 medication(s) that are the same as other medications prescribed for you. Read the directions carefully, and ask your doctor or other care provider to review them with you.

## 2018-10-12 ENCOUNTER — TRANSFERRED RECORDS (OUTPATIENT)
Dept: HEALTH INFORMATION MANAGEMENT | Facility: CLINIC | Age: 53
End: 2018-10-12

## 2018-10-12 ENCOUNTER — MEDICAL CORRESPONDENCE (OUTPATIENT)
Dept: HEALTH INFORMATION MANAGEMENT | Facility: CLINIC | Age: 53
End: 2018-10-12

## 2018-10-13 ENCOUNTER — TRANSFERRED RECORDS (OUTPATIENT)
Dept: HEALTH INFORMATION MANAGEMENT | Facility: CLINIC | Age: 53
End: 2018-10-13

## 2018-10-18 ENCOUNTER — TELEPHONE (OUTPATIENT)
Dept: DERMATOLOGY | Facility: CLINIC | Age: 53
End: 2018-10-18

## 2018-10-19 DIAGNOSIS — C61 PROSTATE CANCER (H): Primary | ICD-10-CM

## 2018-10-22 ENCOUNTER — OFFICE VISIT (OUTPATIENT)
Dept: DERMATOLOGY | Facility: CLINIC | Age: 53
End: 2018-10-22
Payer: COMMERCIAL

## 2018-10-22 DIAGNOSIS — L82.0 INFLAMED SEBORRHEIC KERATOSIS: Primary | ICD-10-CM

## 2018-10-22 ASSESSMENT — PAIN SCALES - GENERAL
PAINLEVEL: MODERATE PAIN (4)
PAINLEVEL: NO PAIN (0)

## 2018-10-22 NOTE — LETTER
10/22/2018       RE: Miles Valencia  [address] [place] [address]     Dear Colleague,    Thank you for referring your patient, Miles Valencia, to the [facility] at [facility]. Please see a copy of my visit note below.    [facility] Note      Dermatology Problem List:    Specialty Problems     None          CC:   Skin Check (Miles is here today to have a spot on his left wrist looked at- referred by Dr. Benitez. )        Encounter Date: Oct 22, 2018    History of Present Illness:  Mr. Miles Valencia is a 53 year old male who presents as a referral from Robert.    Past Medical History:   Patient Active Problem List   Diagnosis     Pain in joint, shoulder region     Achilles bursitis or tendinitis     History of total shoulder replacement, left     Past Medical History:   Diagnosis Date     Achilles bursitis or tendinitis 5/4/2014     Allergic rhinitis      Asthma      Congestion      Hypertension      Migraine      Multinodular goiter      Osteoarthritis      Pain in joint, shoulder region 4/18/2014     Prostate cancer (H)      RLS (restless legs syndrome)      Sleep problems        Allergy History:     Allergies   Allergen Reactions     Amoxicillin Diarrhea       Social History:     reports that he has never smoked. He has never used smokeless tobacco. He reports that he drinks alcohol. He reports that he does not use illicit drugs.      Family History:  Family History   Problem Relation Age of Onset     Skin Cancer Mother      Other - See Comments Brother      Factor V Leiden     Deep Vein Thrombosis (DVT) Brother      Other - See Comments Niece      Von Willebrands     Other - See Comments Nephew      Kain Willebrands, Factor V Leiden       Medications:  Current Outpatient Prescriptions   Medication Sig Dispense Refill     Acetaminophen (TYLENOL PO) Take 1,000 mg by mouth every 6 hours as needed        albuterol (PROAIR  HFA/PROVENTIL HFA/VENTOLIN HFA) 108 (90 BASE) MCG/ACT Inhaler Inhale 2 puffs into the lungs every 4 hours as needed for shortness of breath / dyspnea or wheezing       ammonium lactate (AMLACTIN) 12 % cream Apply topically daily as needed        aspirin-acetaminophen-caffeine (EXCEDRIN MIGRAINE) 250-250-65 MG per tablet Take 2 tablets by mouth every 6 hours as needed for headaches       camphor-menthol (DERMASARRA) 0.5-0.5 % LOTN Apply topically every 6 hours as needed for skin care       CARTIA  MG 24 hr CD capsule Take 120 mg by mouth every morning   0     Cetirizine HCl 10 MG CAPS Take 10 mg by mouth daily        cholecalciferol (VITAMIN D3 MAXIMUM STRENGTH) 5000 UNITS CAPS Take 5,000 Units by mouth every morning        clindamycin (CLEOCIN) 300 MG capsule Take 2 capsules (600 mg) by mouth 1 hour before dental work. 2 capsule 3     Cyanocobalamin (VITAMIN B-12 PO) Take 1 tablet by mouth every evening        DiphenhydrAMINE HCl (BENADRYL ALLERGY PO) Take 25-50 mg by mouth daily as needed (allergies)        gabapentin (NEURONTIN) 100 MG capsule TK 4 CS PO D HS  4     gabapentin (NEURONTIN) 300 MG capsule Take 1 capsule (300 mg) by mouth 3 times daily (Patient taking differently: Take 600 mg by mouth 2 times daily ) 60 capsule 0     hydrochlorothiazide (HYDRODIURIL) 25 MG tablet Take 25 mg by mouth every morning        hydrocortisone (CORTAID) 1 % cream Apply topically 2 times daily as needed       meclizine (ANTIVERT) 12.5 MG tablet Take 2 tablets (25 mg) by mouth 4 times daily as needed for dizziness 30 tablet 0     MELATONIN PO Take 1 mg by mouth nightly as needed       MELOXICAM PO Take by mouth as needed       Morphine Sulfate (MORPHINE, PF,) 4 MG/ML injection   0     Omega-3 Fatty Acids (OMEGA-3 FISH OIL) 1000 MG CAPS Take 1 gram by mouth in the morning and take 2 grams by mouth in the evening.       ondansetron (ZOFRAN-ODT) 4 MG ODT tab   0     oxyCODONE IR (ROXICODONE) 5 MG tablet   0     QVAR 80  MCG/ACT Inhaler Inhale 2 puffs twice daily as needed when allergies flare up  0     rizatriptan (MAXALT-MLT) 10 MG disintegrating tablet Take 10 mg by mouth at onset of headache   6     SUMAtriptan Succinate Refill (IMITREX) 6 MG/0.5ML SOCT Inject 6 mg into the muscle every 12 hours as needed   1     zolpidem (AMBIEN) 5 MG tablet Take 5 mg by mouth nightly as needed for sleep             Review of Systems:  -As per HPI  -Constitutional: The patient denies fatigue, fevers, chills, unintended weight loss, and night sweats.  -HEENT: Patient denies nonhealing oral sores.  -Skin: As above in HPI. No additional skin concerns.    Physical exam:  Vitals: There were no vitals taken for this visit.  GEN: This is a well developed, well-nourished male in no acute distress, in a pleasant mood.    SKIN: Waist-up skin, which includes the head/face, neck, both arms, chest, back, abdomen, digits and/or nails was examined.  --> wrist left on the border of a steroid induced atrophy (steroid injection for tendinitis) since about 6 months a about 4mm papule, in photos pigmented. Got inflamed and now only unpigmented rest lesion left --> in dermatoscope some indications for seborrhoic keratosis. Comes and goes regularly!  --> on back several seborrhoic keratoses, but no suspicious mole or any signs for skin cancer    -No other lesions of concern on areas examined.     Impression/Plan:    1) recurrent seborrhoic keratosis (inflamed) on wrist left  Cryotherapy procedure note: After verbal consent and discussion of risks and benefits including but no limited to dyspigmentation/scar, blister, and pain, lesion on the left wrist was treated with 1-2mm freeze border for 2 cycles with liquid nitrogen. Post cryotherapy instructions were provided.     2) melanocytic naevi and seborrhoic keratoses on trunk    observe            Again, thank you for allowing me to participate in the care of your patient.      Sincerely,    Adam Ness MD

## 2018-10-22 NOTE — NURSING NOTE
Dermatology Rooming Note    Miles Valencia's goals for this visit include:   Chief Complaint   Patient presents with     Skin Check     Miles is here today to have a spot on his left wrist looked at- referred by Dr. Benitez.      Tamera Teran A

## 2018-10-22 NOTE — PROGRESS NOTES
[facility] Note      Dermatology Problem List:    Specialty Problems     None          CC:   Skin Check (Miles is here today to have a spot on his left wrist looked at- referred by Dr. Benitez. )        Encounter Date: Oct 22, 2018    History of Present Illness:  Mr. Miles Valencia is a 53 year old male who presents as a referral from Robert.    Past Medical History:   Patient Active Problem List   Diagnosis     Pain in joint, shoulder region     Achilles bursitis or tendinitis     History of total shoulder replacement, left     Past Medical History:   Diagnosis Date     Achilles bursitis or tendinitis 5/4/2014     Allergic rhinitis      Asthma      Congestion      Hypertension      Migraine      Multinodular goiter      Osteoarthritis      Pain in joint, shoulder region 4/18/2014     Prostate cancer (H)      RLS (restless legs syndrome)      Sleep problems        Allergy History:     Allergies   Allergen Reactions     Amoxicillin Diarrhea       Social History:     reports that he has never smoked. He has never used smokeless tobacco. He reports that he drinks alcohol. He reports that he does not use illicit drugs.      Family History:  Family History   Problem Relation Age of Onset     Skin Cancer Mother      Other - See Comments Brother      Factor V Leiden     Deep Vein Thrombosis (DVT) Brother      Other - See Comments Niece      Von Willebrands     Other - See Comments Nephew      Von Willebrands, Factor V Leiden       Medications:  Current Outpatient Prescriptions   Medication Sig Dispense Refill     Acetaminophen (TYLENOL PO) Take 1,000 mg by mouth every 6 hours as needed        albuterol (PROAIR HFA/PROVENTIL HFA/VENTOLIN HFA) 108 (90 BASE) MCG/ACT Inhaler Inhale 2 puffs into the lungs every 4 hours as needed for shortness of breath / dyspnea or wheezing       ammonium lactate (AMLACTIN) 12 % cream Apply topically daily as needed        aspirin-acetaminophen-caffeine  (EXCEDRIN MIGRAINE) 250-250-65 MG per tablet Take 2 tablets by mouth every 6 hours as needed for headaches       camphor-menthol (DERMASARRA) 0.5-0.5 % LOTN Apply topically every 6 hours as needed for skin care       CARTIA  MG 24 hr CD capsule Take 120 mg by mouth every morning   0     Cetirizine HCl 10 MG CAPS Take 10 mg by mouth daily        cholecalciferol (VITAMIN D3 MAXIMUM STRENGTH) 5000 UNITS CAPS Take 5,000 Units by mouth every morning        clindamycin (CLEOCIN) 300 MG capsule Take 2 capsules (600 mg) by mouth 1 hour before dental work. 2 capsule 3     Cyanocobalamin (VITAMIN B-12 PO) Take 1 tablet by mouth every evening        DiphenhydrAMINE HCl (BENADRYL ALLERGY PO) Take 25-50 mg by mouth daily as needed (allergies)        gabapentin (NEURONTIN) 100 MG capsule TK 4 CS PO D HS  4     gabapentin (NEURONTIN) 300 MG capsule Take 1 capsule (300 mg) by mouth 3 times daily (Patient taking differently: Take 600 mg by mouth 2 times daily ) 60 capsule 0     hydrochlorothiazide (HYDRODIURIL) 25 MG tablet Take 25 mg by mouth every morning        hydrocortisone (CORTAID) 1 % cream Apply topically 2 times daily as needed       meclizine (ANTIVERT) 12.5 MG tablet Take 2 tablets (25 mg) by mouth 4 times daily as needed for dizziness 30 tablet 0     MELATONIN PO Take 1 mg by mouth nightly as needed       MELOXICAM PO Take by mouth as needed       Morphine Sulfate (MORPHINE, PF,) 4 MG/ML injection   0     Omega-3 Fatty Acids (OMEGA-3 FISH OIL) 1000 MG CAPS Take 1 gram by mouth in the morning and take 2 grams by mouth in the evening.       ondansetron (ZOFRAN-ODT) 4 MG ODT tab   0     oxyCODONE IR (ROXICODONE) 5 MG tablet   0     QVAR 80 MCG/ACT Inhaler Inhale 2 puffs twice daily as needed when allergies flare up  0     rizatriptan (MAXALT-MLT) 10 MG disintegrating tablet Take 10 mg by mouth at onset of headache   6     SUMAtriptan Succinate Refill (IMITREX) 6 MG/0.5ML SOCT Inject 6 mg into the muscle every 12  hours as needed   1     zolpidem (AMBIEN) 5 MG tablet Take 5 mg by mouth nightly as needed for sleep             Review of Systems:  -As per HPI  -Constitutional: The patient denies fatigue, fevers, chills, unintended weight loss, and night sweats.  -HEENT: Patient denies nonhealing oral sores.  -Skin: As above in HPI. No additional skin concerns.    Physical exam:  Vitals: There were no vitals taken for this visit.  GEN: This is a well developed, well-nourished male in no acute distress, in a pleasant mood.    SKIN: Waist-up skin, which includes the head/face, neck, both arms, chest, back, abdomen, digits and/or nails was examined.  --> wrist left on the border of a steroid induced atrophy (steroid injection for tendinitis) since about 6 months a about 4mm papule, in photos pigmented. Got inflamed and now only unpigmented rest lesion left --> in dermatoscope some indications for seborrhoic keratosis. Comes and goes regularly!  --> on back several seborrhoic keratoses, but no suspicious mole or any signs for skin cancer    -No other lesions of concern on areas examined.     Impression/Plan:    1) recurrent seborrhoic keratosis (inflamed) on wrist left  Cryotherapy procedure note: After verbal consent and discussion of risks and benefits including but no limited to dyspigmentation/scar, blister, and pain, lesion on the left wrist was treated with 1-2mm freeze border for 2 cycles with liquid nitrogen. Post cryotherapy instructions were provided.     2) melanocytic naevi and seborrhoic keratoses on trunk    observe

## 2018-10-22 NOTE — MR AVS SNAPSHOT
After Visit Summary   10/22/2018    Miles Valencia    MRN: [id number]           Patient Information     Date Of Birth          1965        Visit Information        Provider Department      10/22/2018 5:00 PM Adam Ness MD [facility] Dermatology        Today's Diagnoses     Inflamed seborrheic keratosis    -  1      Care Instructions    Cryotherapy    What is it?    Use of a very cold liquid, such as liquid nitrogen, to freeze and destroy abnormal skin cells that need to be removed    What should I expect?    Tenderness and redness    A small blister that might grow and fill with dark purple blood. There may be crusting.    More than one treatment may be needed if the lesions do not go away.    How do I care for the treated area?    Gently wash the area with your hands when bathing.    Use a thin layer of Vaseline to help with healing. You may use a Band-Aid.     The area should heal within 7-10 days and may leave behind a pink or lighter color.     Do not use an antibiotic or Neosporin ointment.     You may take acetaminophen (Tylenol) for pain.     Call your Doctor if you have:    Severe pain    Signs of infection (warmth, redness, cloudy yellow drainage, and or a bad smell)    Questions or concerns    Who should I call with questions?       [facility]: [phone]       [facility]: [phone]       For urgent needs outside of business hours call the [facility] at [phone]        and ask for the dermatology resident on call            Follow-ups after your visit        Your next 10 appointments already scheduled     Nov 09, 2018  2:00 PM CST   MR PROSTATE  WO & W CONTRAST with [id number]   [facility] ([facility])    [address] [address]   [phone]           How do I prepare for my exam? (Food and drink instructions) **If you will be receiving sedation  or general anesthesia, please see special notes below.**  How do I prepare for my exam? (Other instructions) Take your medicines as usual, unless your doctor tells you not to. You may or may not receive intravenous (IV) contrast for this exam pending the discretion of the Radiologist.  You do not need to do anything special to prepare.  **If you will be receiving sedation or general anesthesia, please see special notes below.**  What should I wear: The MRI machine uses a strong magnet. Please wear clothes without metal (snaps, zippers). A sweatsuit works well, or we may give you a hospital gown. Please remove any body piercings and hair extensions before you arrive. You will also remove watches, jewelry, hairpins, wallets, dentures, partial dental plates and hearing aids. You may wear contact lenses, and you may be able to wear your rings. We have a safe place to keep your personal items, but it is safer to leave them at home.  How long does the exam take: Most tests take 30 to 60 minutes.  HOWEVER, IF YOUR DOCTOR PRESCRIBES ANESTHESIA please plan on spending four to five hours in the recovery room.  What should I bring:  Bring a list of your current medicines to your exam (including vitamins, minerals and over-the-counter drugs).  Do I need a :  **If you will be receiving sedation or general anesthesia, please see special notes below.**  What should I do after the exam: No Restrictions, You may resume normal activities.  What is this test: MRI (magnetic resonance imaging) uses a strong magnet and radio waves to look inside the body. An MRA (magnetic resonance angiogram) does the same thing, but it lets us look at your blood vessels. A computer turns the radio waves into pictures showing cross sections of the body, much like slices of bread. This helps us see any problems more clearly. You may receive fluid (called  contrast ) before or during your scan. The fluid helps us see the pictures better. We give the  fluid through an IV (small needle in your arm).  Who should I call with questions:  Please call the Imaging Department at your exam site with any questions. Directions, parking instructions, and other information is available on our website, "[email or web address].  How do I prepare if I m having sedation or anesthesia? **IMPORTANT** THE INSTRUCTIONS BELOW ARE ONLY FOR THOSE PATIENTS WHO HAVE BEEN TOLD THEY WILL RECEIVE SEDATION OR GENERAL ANESTHESIA DURING THEIR MRI PROCEDURE:  IF YOU WILL RECEIVE SEDATION (take medicine to help you relax during your exam): You must get the medicine from your doctor before you arrive. Bring the medicine to the exam. Do not take it at home. Arrive one hour early. Bring someone who can take you home after the test. Your medicine will make you sleepy. After the exam, you may not drive, take a bus or take a taxi by yourself. No eating 8 hours before your exam. You may have clear liquids up until 4 hours before your exam. (Clear liquids include water, clear tea, black coffee and fruit juice without pulp.)  IF YOU WILL RECEIVE ANESTHESIA (be asleep for your exam): Arrive 1 1/2 hours early. Bring someone who can take you home after the test. You may not drive, take a bus or take a taxi by yourself. No eating 8 hours before your exam. You may have clear liquids up until 4 hours before your exam. (Clear liquids include water, clear tea, black coffee and fruit juice without pulp.)            Dec 14, 2018  8:50 AM CST   (Arrive by 8:35 AM)   [facility] with Silvia Villalobos MD   [facility] Endocrinology ([facility])    [address] [place] [id number] [phone]              Who to contact     Please call your clinic at [phone] to:    Ask questions about your health    Make or cancel appointments    Discuss your medicines    Learn about your test results    Speak to your doctor            Additional Information About  Your Visit        Course [employer] Information     [employer] gives you secure access to your electronic health record. If you see a primary care provider, you can also send messages to your care team and make appointments. If you have questions, please call your primary care clinic.  If you do not have a primary care provider, please call [phone] and they will assist you.      [employer] is an electronic gateway that provides easy, online access to your medical records. With [employer], you can request a clinic appointment, read your test results, renew a prescription or communicate with your care team.     To access your existing account, please contact your [facility] or call [phone] for assistance.        Care EveryWhere ID     This is your Care EveryWhere ID. This could be used by other organizations to access your [place] medical records  [id number]         Blood Pressure from Last 3 Encounters:   01/12/18 (!) 154/95   12/14/17 122/85   08/20/17 155/82    Weight from Last 3 Encounters:   01/12/18 90.1 kg (198 lb 9.6 oz)   12/14/17 88.6 kg (195 lb 4.8 oz)   08/20/17 87.1 kg (192 lb)              We Performed the Following     DESTRUCT BENIGN LESION, UP TO 14          Today's Medication Changes          These changes are accurate as of 10/22/18  5:30 PM.  If you have any questions, ask your nurse or doctor.               These medicines have changed or have updated prescriptions.        Dose/Directions    * gabapentin 300 MG capsule   Commonly known as:  NEURONTIN   This may have changed:    - how much to take  - when to take this   Used for:  Status post total shoulder arthroplasty, left        Dose:  300 mg   Take 1 capsule (300 mg) by mouth 3 times daily   Quantity:  60 capsule   Refills:  0       * gabapentin 100 MG capsule   Commonly known as:  NEURONTIN   This may have changed:  Another medication with the same name was changed. Make sure you understand how and when to take each.         TK 4 CS PO D HS   Refills:  4       * Notice:  This list has 2 medication(s) that are the same as other medications prescribed for you. Read the directions carefully, and ask your doctor or other care provider to review them with you.             Primary Care Provider Office Phone # Fax #    Oneyda Jones [phone]       [address] [address]        Equal Access to Services     SHANNAN SCHROEDER : Hadii aad ku hadasho Soomaali, waaxda luqadaha, qaybta kaalmada adeegyada, waxay idiin hayaan adeeg kharash la'daryn . So [facility] [phone].    ATENCIÓN: Si noelle gomez, tiene a wilson disposición servicios gratuitos de asistencia lingüística. Llame al [phone].    We comply with applicable federal civil rights laws and [place] laws. We do not discriminate on the basis of race, color, national origin, age, disability, sex, sexual orientation, or gender identity.            Thank you!     Thank you for choosing [facility] DERMATOLOGY  for your care. Our goal is always to provide you with excellent care. Hearing back from our patients is one way we can continue to improve our services. Please take a few minutes to complete the written survey that you may receive in the mail after your visit with us. Thank you!             Your Updated Medication List - Protect others around you: Learn how to safely use, store and throw away your medicines at www.disposemymeds.org.          This list is accurate as of 10/22/18  5:30 PM.  Always use your most recent med list.                   Brand Name Dispense Instructions for use Diagnosis    albuterol 108 (90 Base) MCG/ACT inhaler    PROAIR HFA/PROVENTIL HFA/VENTOLIN HFA     Inhale 2 puffs into the lungs every 4 hours as needed for shortness of breath / dyspnea or wheezing        AMBIEN 5 MG tablet   Generic drug:  zolpidem      Take 5 mg by mouth nightly as needed for sleep        ammonium lactate 12 % cream    AMLACTIN      Apply topically daily as needed        aspirin-acetaminophen-caffeine 250-250-65 MG per tablet    EXCEDRIN MIGRAINE     Take 2 tablets by mouth every 6 hours as needed for headaches        BENADRYL ALLERGY PO      Take 25-50 mg by mouth daily as needed (allergies)        camphor-menthol 0.5-0.5 % Lotn    DERMASARRA     Apply topically every 6 hours as needed for skin care        CARTIA  MG 24 hr capsule   Generic drug:  diltiazem      Take 120 mg by mouth every morning    Elevated prostate specific antigen (PSA)       cetirizine HCl 10 MG Caps      Take 10 mg by mouth daily        clindamycin 300 MG capsule    CLEOCIN    2 capsule    Take 2 capsules (600 mg) by mouth 1 hour before dental work.    S/P joint replacement       fish oil-omega-3 fatty acids 1000 MG capsule      Take 1 gram by mouth in the morning and take 2 grams by mouth in the evening.        * gabapentin 300 MG capsule    NEURONTIN    60 capsule    Take 1 capsule (300 mg) by mouth 3 times daily    Status post total shoulder arthroplasty, left       * gabapentin 100 MG capsule    NEURONTIN     TK 4 CS PO D HS        hydrochlorothiazide 25 MG tablet    HYDRODIURIL     Take 25 mg by mouth every morning        hydrocortisone 1 % cream    CORTAID     Apply topically 2 times daily as needed        meclizine 12.5 MG tablet    ANTIVERT    30 tablet    Take 2 tablets (25 mg) by mouth 4 times daily as needed for dizziness        MELATONIN PO      Take 1 mg by mouth nightly as needed        MELOXICAM PO      Take by mouth as needed        morphine (PF) 4 MG/ML injection           ondansetron 4 MG ODT tab    ZOFRAN-ODT          oxyCODONE IR 5 MG tablet    ROXICODONE          QVAR 80 MCG/ACT Aers IS A DISCONTINUED MEDICATION   Generic drug:  beclomethasone      Inhale 2 puffs twice daily as needed when allergies flare up        rizatriptan 10 MG ODT tab    MAXALT-MLT     Take 10 mg by mouth at onset of headache    Elevated prostate specific antigen (PSA)        SUMAtriptan Succinate Refill 6 MG/0.5ML Soct    IMITREX     Inject 6 mg into the muscle every 12 hours as needed    Elevated prostate specific antigen (PSA)       TYLENOL PO      Take 1,000 mg by mouth every 6 hours as needed        VITAMIN B-12 PO      Take 1 tablet by mouth every evening        VITAMIN D3 MAXIMUM STRENGTH 5000 units Caps   Generic drug:  cholecalciferol      Take 5,000 Units by mouth every morning        * Notice:  This list has 2 medication(s) that are the same as other medications prescribed for you. Read the directions carefully, and ask your doctor or other care provider to review them with you.

## 2018-10-22 NOTE — PATIENT INSTRUCTIONS
Cryotherapy    What is it?    Use of a very cold liquid, such as liquid nitrogen, to freeze and destroy abnormal skin cells that need to be removed    What should I expect?    Tenderness and redness    A small blister that might grow and fill with dark purple blood. There may be crusting.    More than one treatment may be needed if the lesions do not go away.    How do I care for the treated area?    Gently wash the area with your hands when bathing.    Use a thin layer of Vaseline to help with healing. You may use a Band-Aid.     The area should heal within 7-10 days and may leave behind a pink or lighter color.     Do not use an antibiotic or Neosporin ointment.     You may take acetaminophen (Tylenol) for pain.     Call your Doctor if you have:    Severe pain    Signs of infection (warmth, redness, cloudy yellow drainage, and or a bad smell)    Questions or concerns    Who should I call with questions?       [facility]: [phone]       [facility]: [phone]       For urgent needs outside of business hours call the [facility] at [phone]        and ask for the dermatology resident on call

## 2018-11-09 ENCOUNTER — RADIANT APPOINTMENT (OUTPATIENT)
Dept: MRI IMAGING | Facility: CLINIC | Age: 53
End: 2018-11-09
Attending: UROLOGY
Payer: COMMERCIAL

## 2018-11-09 DIAGNOSIS — C61 PROSTATE CANCER (H): ICD-10-CM

## 2018-11-09 RX ORDER — GADOBUTROL 604.72 MG/ML
0.1 INJECTION INTRAVENOUS ONCE
Status: COMPLETED | OUTPATIENT
Start: 2018-11-09 | End: 2018-11-09

## 2018-11-09 RX ADMIN — GADOBUTROL 7 ML: 604.72 INJECTION INTRAVENOUS at 15:11

## 2018-12-07 DIAGNOSIS — E05.20 TOXIC MULTINODULAR GOITER: ICD-10-CM

## 2018-12-07 DIAGNOSIS — C61 PROSTATE CANCER (H): ICD-10-CM

## 2018-12-07 LAB
PSA SERPL-MCNC: 8.57 UG/L (ref 0–4)
T4 FREE SERPL-MCNC: 1.04 NG/DL (ref 0.76–1.46)
TSH SERPL DL<=0.005 MIU/L-ACNC: 0.42 MU/L (ref 0.4–4)

## 2018-12-14 ENCOUNTER — OFFICE VISIT (OUTPATIENT)
Dept: ENDOCRINOLOGY | Facility: CLINIC | Age: 53
End: 2018-12-14
Payer: COMMERCIAL

## 2018-12-14 VITALS
BODY MASS INDEX: 28.63 KG/M2 | HEART RATE: 80 BPM | SYSTOLIC BLOOD PRESSURE: 132 MMHG | DIASTOLIC BLOOD PRESSURE: 87 MMHG | WEIGHT: 193.3 LBS | HEIGHT: 69 IN

## 2018-12-14 DIAGNOSIS — E04.2 MULTIPLE THYROID NODULES: Primary | ICD-10-CM

## 2018-12-14 DIAGNOSIS — E04.2 MULTIPLE THYROID NODULES: ICD-10-CM

## 2018-12-14 LAB
ALBUMIN SERPL-MCNC: 4 G/DL (ref 3.4–5)
CALCIUM SERPL-MCNC: 9.7 MG/DL (ref 8.5–10.1)
DEPRECATED CALCIDIOL+CALCIFEROL SERPL-MC: 88 UG/L (ref 20–75)
PTH-INTACT SERPL-MCNC: 32 PG/ML (ref 18–80)

## 2018-12-14 RX ORDER — FEXOFENADINE HCL 180 MG/1
180 TABLET ORAL EVERY MORNING
COMMUNITY

## 2018-12-14 ASSESSMENT — MIFFLIN-ST. JEOR: SCORE: 1712.18

## 2018-12-14 NOTE — PROGRESS NOTES
Endocrinology Note         Miles is a 53 year old male presents today for multinodular goiter    HPI  Miles Valencai is a 53 years old male with hx of T1N0M0 prostate cancer who is here for multinodular goiter.    He was previously seen  and  for multinodular goiter and mild elevation of calcitonin in 2010. I have reviewed previous medical record. Last seen by me 1/2018.    In 2010, he was noted to have multinodular goiter and palpitation during physical examination. Given intermittent headache and palpitation, urine metanephrine was checked. At that time, he was noted to have mild elevation of urine normetanephrine which was thought to be secondary to amitriptyline. After stopping amitriptyline, he has not had any palpitation anymore and repeated urine metanephrine was normalized. At the same visit, he was also evaluated for multinodular goiter. US thyroid in 2010 showed multiple thyroid nodules throughout both lobes (detail in result section). The largest nodules on the right lobe was 3.3x2.2.x3.3 cm heterogeneous nodule with microcalcification and the largest left sided nodule was 2.7x2.1x3.3 cm heterogeneous nodule. He underwent FNA of the right thyroid nodule which was benign. Follow-up thyroid ultrasound in 2011 showed slight enlargement of thyroid nodules. Since then, he has not had any follow-up endocrine visit or ultrasound until 2016.    Thyroid ultrasound in 10/2016 showed growth in thyroid nodules and new 2.9 cm left thyroid nodule. Therefore, he underwent FNA of left thyroid nodule #1, #2 and #4 which were all benign in 10/2016.    Interim history  Last visit was 1/2018. At that visit, he did have some intermittent neck pressure and enlargement.  Repeated ultrasound thyroid in 1/2018 showed again multiple thyroid nodules which were stable. Because of subclinical hypothyroidism, he underwent thyroid uptake and scan showed 18.2% uptake at 24 hours with diffuse inhomogeneity  and enlargement of the thyroid gland.  No autonomous nodules were identified.     He stated that he has been feeling well except that he noticed intermittent compression symptoms with certain position. He denied difficulty swallowing or breathing. He denied chest pain, and palpitation.     Past Medical History  Past Medical History:   Diagnosis Date     Achilles bursitis or tendinitis 5/4/2014     Allergic rhinitis      Asthma      Congestion      Hypertension      Migraine      Multinodular goiter      Osteoarthritis      Pain in joint, shoulder region 4/18/2014     Prostate cancer (H)      RLS (restless legs syndrome)      Sleep problems    T1N0M0 prostate cancer with close monitoring with urologist.  Migraine headache    Allergies  Allergies   Allergen Reactions     Amoxicillin Diarrhea        Medications  Current Outpatient Medications   Medication Sig Dispense Refill     Acetaminophen (TYLENOL PO) Take 1,000 mg by mouth every 6 hours as needed        albuterol (PROAIR HFA/PROVENTIL HFA/VENTOLIN HFA) 108 (90 BASE) MCG/ACT Inhaler Inhale 2 puffs into the lungs every 4 hours as needed for shortness of breath / dyspnea or wheezing       ammonium lactate (AMLACTIN) 12 % cream Apply topically daily as needed        aspirin-acetaminophen-caffeine (EXCEDRIN MIGRAINE) 250-250-65 MG per tablet Take 2 tablets by mouth every 6 hours as needed for headaches       camphor-menthol (DERMASARRA) 0.5-0.5 % LOTN Apply topically every 6 hours as needed for skin care       CARTIA  MG 24 hr CD capsule Take 120 mg by mouth every morning   0     Cetirizine HCl 10 MG CAPS Take 10 mg by mouth daily        cholecalciferol (VITAMIN D3 MAXIMUM STRENGTH) 5000 UNITS CAPS Take 5,000 Units by mouth every morning        clindamycin (CLEOCIN) 300 MG capsule Take 2 capsules (600 mg) by mouth 1 hour before dental work. 2 capsule 3     Cyanocobalamin (VITAMIN B-12 PO) Take 1 tablet by mouth every evening        DiphenhydrAMINE HCl (BENADRYL  ALLERGY PO) Take 25-50 mg by mouth daily as needed (allergies)        gabapentin (NEURONTIN) 100 MG capsule TK 4 CS PO D HS  4     gabapentin (NEURONTIN) 100 MG capsule Take 4 capsules (400 mg) by mouth At Bedtime 120 capsule 0     gabapentin (NEURONTIN) 300 MG capsule Take 1 capsule (300 mg) by mouth 3 times daily (Patient taking differently: Take 600 mg by mouth 2 times daily ) 60 capsule 0     hydrochlorothiazide (HYDRODIURIL) 25 MG tablet Take 25 mg by mouth every morning        hydrocortisone (CORTAID) 1 % cream Apply topically 2 times daily as needed       meclizine (ANTIVERT) 12.5 MG tablet Take 2 tablets (25 mg) by mouth 4 times daily as needed for dizziness 30 tablet 0     MELATONIN PO Take 1 mg by mouth nightly as needed       MELOXICAM PO Take by mouth as needed       Omega-3 Fatty Acids (OMEGA-3 FISH OIL) 1000 MG CAPS Take 1 gram by mouth in the morning and take 2 grams by mouth in the evening.       ondansetron (ZOFRAN-ODT) 4 MG ODT tab   0     oxyCODONE IR (ROXICODONE) 5 MG tablet   0     QVAR 80 MCG/ACT Inhaler Inhale 2 puffs twice daily as needed when allergies flare up  0     rizatriptan (MAXALT-MLT) 10 MG disintegrating tablet Take 10 mg by mouth at onset of headache   6     SUMAtriptan Succinate Refill (IMITREX) 6 MG/0.5ML SOCT Inject 6 mg into the muscle every 12 hours as needed   1     zolpidem (AMBIEN) 5 MG tablet Take 5 mg by mouth nightly as needed for sleep       Family History  Mother has hyperparathyroidism  Maternal aunt has hyperthyroidism  Brother has testicular cancer  Maternal uncle has stomach and bone cancer  Maternal aunts has leukemia  Another maternal aunt and grandmother have breast cancer    Social History  Social History     Tobacco Use     Smoking status: Never Smoker     Smokeless tobacco: Never Used   Substance Use Topics     Alcohol use: Yes     Alcohol/week: 0.0 oz     Comment: 1-2 drinks a week     Drug use: No   no smoking  Works as a     ROS  Constitutional:  "feeling well   Eyes: no vision change, diplopia or red eyes   Neck: no difficulty swallowing, no choking, no neck pain, +noticed slight enlargement of the thyroid gland  Cardiovascular: no chest pain, palpitations  Respiratory: no dyspnea, cough, shortness of breath  GI: no nausea, vomiting, diarrhea or constipation, no abdominal pain   : no change in urine, no dysuria or hematuria, +recent diagnosis of early stage of prostate cancer  Musculoskeletal:+muscle spasm particularly in the neck area  Integumentary: no concerning lesions   Neuro: no loss of strength or sensation, no numbness or tingling, no tremor,  no headache   Endo: no polyuria or polydipsia, no temperature intolerance   Heme/Lymph: no concerning bumps, no bleeding problems   Allergy: no environmental allergies   sych: +restless leg syndrome, +chronic insomnia    Physical Exam  /87   Pulse 80   Ht 1.753 m (5' 9\")   Wt 87.7 kg (193 lb 4.8 oz)   BMI 28.55 kg/m    Body mass index is 28.55 kg/m .  Constitutional: no distress, comfortable, pleasant   Eyes: anicteric, normal extra-ocular movements, no lid lag or retraction  Neck: +visible and palpable enlarged thyroid, left>right. No discrete nodule   Cardiovascular: regular rate and rhythm, normal S1 and S2, no murmurs  Respiratory: clear to auscultation, no wheezes or crackles, normal breath sounds   Gastrointestinal:  nontender, no hepatomegaly, no masses   Musculoskeletal: no edema   Skin: no concerning lesions, no jaundice   Neurological: cranial nerves intact, 2+reflexes at patella, normal gait, no tremor on outstretched hands bilaterally  Psychological: appropriate mood   Lymphatic: no cervical  lymphadenopathy.      RESULTS  FNA 5/2010  Thyroid, right, FNA:  Negative for Malignancy  consistent with benign colloid nodule.  Specimen Adequacy: Satisfactory for evaluation.    COMMENT:  Smears show abundant colloid and rare groups (approximately 15) of benign-appearing follicular epithelial " cells.  The epithelial cells are arranged in a predominantly macrofollicular pattern. Cytologic features of papillary, medullary or anaplastic carcinoma are not present.  Occasional background histiocytes and rare giant cells are seen. These cytomorphologic findings are consistent with a benign colloid nodule.    4/2010    3/2010      5/2010    8/2010      US thyroid 3/2010  Findings: The right lobe of the thyroid measures 7.7 x 3 x 2.4 cm. The left lobe of the thyroid measures 8.2 x 3.6 x 3.6 cm. The isthmus measures 0.9 cm. The right lobe of the thyroid demonstrates 3 heterogeneous nodules,demonstrating flow on color Doppler, the largest in the inferior pole measuring 3.3 x 2.2 x 3.3 cm and demonstrating punctate echogenic areas that may represent microcalcifications and chaotic internal vessels, and the small nodules in the mid right lobe measuring 0.7 x 1.1 x 1 cm and 1 x 0.8 x 1 cm. The left lobe of the thyroid demonstrates a large heterogeneous cystic nodule in the superior to mid left lobe measuring 2.7 x 2.1 x 3.3 cm. An adjacent smaller 0.7 x 0.9 x 1 cm nodule is also seen. Both hese nodules demonstrate vascular flow in the periphery and not internally on color Doppler. The left inferior pole demonstrates heterogeneous appearance suggesting multiple nodules.    Impression: Multinodular goiter. The right inferior nodule measuring 3.3 x 2.3 x 3 cm demonstrates microcalcifications and chaotic internal vasculature, worrisome for malignancy. Consider biopsy.    US thyroid 8/2011  Findings: The right lobe of the thyroid measures 7 x 2.3 x 2.3 cm. The left lobe of the thyroid measures 8.5 x 3.2 x 2.3 cm. The isthmus measures 0.7 cm in width. Multiple heterogeneous mixed echogenicity nodules throughout the both lobes of the thyroid gland. Right upper  nodule measures 1.6 x 1.2 x 1.3 cm, previously was measuring 0.7 x 1.1 x 1 cm. The second nodule at the midportion of the right lobe measures 1 x 0.6 x 0.5 cm,  previously was measuring 1 x 0.8 x 1 cm. The largest nodule in the inferior pole of the right lobe measures 3.1 x 3.1 x 2.1  cm, previously was measuring 3.3 x 2.2 x 3.3 cm. Heterogeneous nodule in the upper pole of the left lobe measures 3.6 x 2.4 x 1.9 cm, previously was measuring 2.7 x 2.1 x 3.3 cm. Smaller nodule at the midportion of the left lobe measures 0.9 x 1.1 x 0.7 cm, unchanged.   Heterogeneous nodule in the inferior medial aspect of the left lobe of the thyroid gland measures 1.9 x 1.8 x 1.1 cm.      Impression:   1. Multinodular goiter. Some of the nodules in both lobes are slightly larger compared to prior study.  2. Heterogeneous nodule seen in the inferior medial aspect of the left lobe of the thyroid gland was not seen on the prior study. This could be a new thyroid nodule. Consider ultrasound guided fine needle biopsy.    US thyroid 9/26/2016  Thyroid parenchyma: heterogenous  The right lobe of the thyroid measures: 2.8 x 2.4 x 9.6 cm ,previously 6.9 x 2.3 x 2.3 cm  The thyroid isthmus measures: 3.7 mm, previously 7.4 mm    The left lobe of the thyroid measures: 5.2 x 4.4 x 10.1 cm, previously 8.5 x 3.2 x 3.3 cm     Right lobe:  Nodule 1:  Nodule measurement: 1.5 x 1.3 , 1.6 cm  Echogenicity: Isoechoic  Consistency: mixed  Calcifications: no  Hypervascular: yes  Interval growth (>20%): no     Nodule 2:  Nodule measurement: 1.0 x 0.7 , 1.2 cm  Echogenicity: Hypoechoic  Consistency: cystic  Calcifications: no  Hypervascular: no  Interval growth (>20%): no     Nodule 3:  Nodule measurement: 3.2 x 2.0 , 3.1 cm  Echogenicity: Isoechoic  Consistency: solid  Calcifications: no  Hypervascular: yes  Interval growth (>20%): no     Nodule 4:  Nodule measurement: 0.8 x 0.7 , 0.8 cm  Echogenicity: Isoechoic  Consistency: spongiform  Calcifications: no  Hypervascular: yes  Interval growth (>20%): Not previously documented     Isthmus: No nodules.     Left Lobe:    Nodule 1:  Nodule measurement: 2.9 x 0.9 , 2.8  cm  Echogenicity: Isoechoic  Consistency: mixed  Calcifications: no  Hypervascular: no  Interval growth (>20%): yes, new from prior     Nodule 2:  Nodule measurement: 2.2 x 2.2 , 2.9 cm  Echogenicity: Isoechoic  Consistency: mixed  Calcifications: no  Hypervascular: no  Interval growth (>20%): no     Nodule 3:  Nodule measurement: 1.6 x 1.4 , 1.9 cm  Echogenicity: Isoechoic  Consistency: mixed  Calcifications: no  Hypervascular: no  Interval growth (>20%): no     Nodule 4:  Nodule measurement: 1.1 x 0.8 , 1.5 cm  Echogenicity: Isoechoic  Consistency: solid  Calcifications: yes, rim calcified  Hypervascular: no  Interval growth (>20%): no                                                                    Impression:  Multinodular goiter. Changes since 2011. New 2.9 cm nodule with mixed cystic and solid components in the left lobe of the thyroid. New nodule on the right that is less than 1 cm in size.    FNA thyroid 2016  CYTOLOGIC INTERPRETATION:     A. Thyroid, Left #1, Ultrasound-guided Fine Needle Aspiration:- Benign   - Consistent with a benign nodule (includes adenomatoid nodule, colloid nodule, etc.)     The [place] Implied Risk of Malignancy and Recommended Clinical Management:   Benign has a 0-3% risk of malignancy, recommended management is clinical follow-up     Specimen Adequacy: Satisfactory for evaluation.     B. Thyroid, Left #2, Ultrasound-guided Fine Needle Aspiration:   - Benign   - Consistent with a benign nodule (includes adenomatoid nodule, colloid nodule, etc.)     Specimen Adequacy: Satisfactory for evaluation.     C. Thyroid, Left #4, Ultrasound-guided Fine Needle Aspiration:   - Benign   - Consistent with a benign nodule (includes adenomatoid nodule, colloid nodule, etc.)   Specimen Adequacy: Satisfactory for evaluation.     US thyroid 1/12/2018  Right lobe measures 3.1 x 2.4 x 6.4 cm.  It shows normal echogenicity. Multiple discrete nodules as as follows:     Nodule #1: Predominantly solid  "isoechoic nodule with cystic spaces measures 1.3 x 1.4 x 1.7 cm. Previously 1.5 x 1.3 x 1.6 cm. Minimal areas of internal color Doppler flow. No worrisome calcifications.    Nodule #2: Cystic and solid nodule located medially measures 1.1 x 0.8 x 0.9 cm. Previously  this nodule was labeled as \"nodule #4\".and measured 1.0 x 0.7 x 1.1 cm.     Nodule #3: Predominantly solid isoechoic nodule measures 3.3 x 2.0 x 3.4 cm. There appear to be small internal calcifications. Moderate hyperemia. Previously, this nodule measured 3.1 x 3.2 x 2.0 cm.     A fourth nodule which was previously seen is not well visualized on the current examination.      Left lobe measures 5.9 x 3.9 x 7.1 cm. It shows normal echogenicity. Multiple discrete nodules as follows:     Nodule #1: Prominently solid isoechoic nodule with cystic components measures 2.2 x 2.0 x 2.4 cm. Minimal internal color Doppler flow. Previously, this nodule was labeled as \"nodule #1\" and measured 2.2 x 2.0 x 2.9 cm, slightly exaggerated by measurement technique. This nodule was previously sampled and returned benign.    Nodule #2: Predominantly solid isoechoic nodule measures 1.6 x 1.5 x 1.4 cm. There is internal shadowing calcification. Minimal internal color Doppler vascularity. Previously, this nodule was labeled as \"nodule #3\" and measured 1.6 x 1.4 x 1.9 cm.    Nodule #3: Probably solid and peripherally calcified nodule is isoechoic, measuring 0.8 x 0.6 x by 1.0 cm. Minimal. Nodular flow. Previously, this nodule was labeled as \"nodule #4\"and measured 1.1 x  0.8 x 1.5 cm. This nodule was previously sampled and returned benign.     A fourth nodule which was seen previously is not well visualized on the current examination.  This nodule was sampled previously and returned benign however.     Isthmus thickness is 0.6 cm.                                                                       Impression: Multiple grossly stable thyroid nodules as above. Several of the left " sided nodules have been sampled and returned benign.  Two nodules, one in each lobe, seen on the prior ultrasound are not well visualized on the current examination.        ASSESSMENT:    Miles Valencia is a 53 years old male with hx of T1N0M0 prostate cancer who is here for multinodular goiter.    1) multinodular goiter: noted since 2010 during physical examination. I can palpate enlargement of the thyroid, left>right. Ultrasound showed multiple nodules ranged 2-3 cm throughout both thyroid lobes. He underwent right sided nodule FNA in 2010 which was benign. He did have FNA of left thyroid nodule #1, #2 and #4 which were all benign in 10/2016    He noted recent enlargement of the thyroid but no neck compressive symptoms. I will repeat US thyroid this year along with TFT and calcitonin given previous elevation of calcitonin.    Discussed surgical option due to intermittent compressive symptoms. He will consider it.     2) mild elevation of calcitonin: his calcitonin ranged in 10-11. The last checked on normal in 1/2018. He denied taking any PPI. Will follow up today.    3) previous elevation of urine normetanephrine: secondary to medication interference (he was on TCA). After stopping TCA, lab was normalized. MRI abdomen in 2010 showed normal adrenal gland. No symptoms. Will check plasma metanephrine today    PLAN:   - check calcitonin, calcium, PTH, 25OH vitamin D  - check plasma metanephrine    Silvia Villalobos MD     Division of Diabetes and Endocrinology  Department of Medicine  [phone]

## 2018-12-14 NOTE — LETTER
12/14/2018       RE: Miles Valencia  [address] [place] [address]     Dear Colleague,    Thank you for referring your patient, Miles Valencia, to the [facility] ENDOCRINOLOGY at [facility]. Please see a copy of my visit note below.             Endocrinology Note         Miles is a 53 year old male presents today for multinodular goiter    HPI  Miles Valencia is a 53 years old male with hx of T1N0M0 prostate cancer who is here for multinodular goiter.    He was previously seen  and  for multinodular goiter and mild elevation of calcitonin in 2010. I have reviewed previous medical record. Last seen by me 1/2018.    In 2010, he was noted to have multinodular goiter and palpitation during physical examination. Given intermittent headache and palpitation, urine metanephrine was checked. At that time, he was noted to have mild elevation of urine normetanephrine which was thought to be secondary to amitriptyline. After stopping amitriptyline, he has not had any palpitation anymore and repeated urine metanephrine was normalized. At the same visit, he was also evaluated for multinodular goiter. US thyroid in 2010 showed multiple thyroid nodules throughout both lobes (detail in result section). The largest nodules on the right lobe was 3.3x2.2.x3.3 cm heterogeneous nodule with microcalcification and the largest left sided nodule was 2.7x2.1x3.3 cm heterogeneous nodule. He underwent FNA of the right thyroid nodule which was benign. Follow-up thyroid ultrasound in 2011 showed slight enlargement of thyroid nodules. Since then, he has not had any follow-up endocrine visit or ultrasound until 2016.    Thyroid ultrasound in 10/2016 showed growth in thyroid nodules and new 2.9 cm left thyroid nodule. Therefore, he underwent FNA of left thyroid nodule #1, #2 and #4 which were all benign in 10/2016.    Interim history  Last visit was 1/2018. At that visit, he did  have some intermittent neck pressure and enlargement.  Repeated ultrasound thyroid in 1/2018 showed again multiple thyroid nodules which were stable. Because of subclinical hypothyroidism, he underwent thyroid uptake and scan showed 18.2% uptake at 24 hours with diffuse inhomogeneity and enlargement of the thyroid gland.  No autonomous nodules were identified.     He stated that he has been feeling well except that he noticed intermittent compression symptoms with certain position. He denied difficulty swallowing or breathing. He denied chest pain, and palpitation.     Past Medical History  Past Medical History:   Diagnosis Date     Achilles bursitis or tendinitis 5/4/2014     Allergic rhinitis      Asthma      Congestion      Hypertension      Migraine      Multinodular goiter      Osteoarthritis      Pain in joint, shoulder region 4/18/2014     Prostate cancer (H)      RLS (restless legs syndrome)      Sleep problems    T1N0M0 prostate cancer with close monitoring with urologist.  Migraine headache    Allergies  Allergies   Allergen Reactions     Amoxicillin Diarrhea        Medications  Current Outpatient Medications   Medication Sig Dispense Refill     Acetaminophen (TYLENOL PO) Take 1,000 mg by mouth every 6 hours as needed        albuterol (PROAIR HFA/PROVENTIL HFA/VENTOLIN HFA) 108 (90 BASE) MCG/ACT Inhaler Inhale 2 puffs into the lungs every 4 hours as needed for shortness of breath / dyspnea or wheezing       ammonium lactate (AMLACTIN) 12 % cream Apply topically daily as needed        aspirin-acetaminophen-caffeine (EXCEDRIN MIGRAINE) 250-250-65 MG per tablet Take 2 tablets by mouth every 6 hours as needed for headaches       camphor-menthol (DERMASARRA) 0.5-0.5 % LOTN Apply topically every 6 hours as needed for skin care       CARTIA  MG 24 hr CD capsule Take 120 mg by mouth every morning   0     Cetirizine HCl 10 MG CAPS Take 10 mg by mouth daily        cholecalciferol (VITAMIN D3 MAXIMUM STRENGTH)  5000 UNITS CAPS Take 5,000 Units by mouth every morning        clindamycin (CLEOCIN) 300 MG capsule Take 2 capsules (600 mg) by mouth 1 hour before dental work. 2 capsule 3     Cyanocobalamin (VITAMIN B-12 PO) Take 1 tablet by mouth every evening        DiphenhydrAMINE HCl (BENADRYL ALLERGY PO) Take 25-50 mg by mouth daily as needed (allergies)        gabapentin (NEURONTIN) 100 MG capsule TK 4 CS PO D HS  4     gabapentin (NEURONTIN) 100 MG capsule Take 4 capsules (400 mg) by mouth At Bedtime 120 capsule 0     gabapentin (NEURONTIN) 300 MG capsule Take 1 capsule (300 mg) by mouth 3 times daily (Patient taking differently: Take 600 mg by mouth 2 times daily ) 60 capsule 0     hydrochlorothiazide (HYDRODIURIL) 25 MG tablet Take 25 mg by mouth every morning        hydrocortisone (CORTAID) 1 % cream Apply topically 2 times daily as needed       meclizine (ANTIVERT) 12.5 MG tablet Take 2 tablets (25 mg) by mouth 4 times daily as needed for dizziness 30 tablet 0     MELATONIN PO Take 1 mg by mouth nightly as needed       MELOXICAM PO Take by mouth as needed       Omega-3 Fatty Acids (OMEGA-3 FISH OIL) 1000 MG CAPS Take 1 gram by mouth in the morning and take 2 grams by mouth in the evening.       ondansetron (ZOFRAN-ODT) 4 MG ODT tab   0     oxyCODONE IR (ROXICODONE) 5 MG tablet   0     QVAR 80 MCG/ACT Inhaler Inhale 2 puffs twice daily as needed when allergies flare up  0     rizatriptan (MAXALT-MLT) 10 MG disintegrating tablet Take 10 mg by mouth at onset of headache   6     SUMAtriptan Succinate Refill (IMITREX) 6 MG/0.5ML SOCT Inject 6 mg into the muscle every 12 hours as needed   1     zolpidem (AMBIEN) 5 MG tablet Take 5 mg by mouth nightly as needed for sleep       Family History  Mother has hyperparathyroidism  Maternal aunt has hyperthyroidism  Brother has testicular cancer  Maternal uncle has stomach and bone cancer  Maternal aunts has leukemia  Another maternal aunt and grandmother have breast cancer    Social  "History  Social History     Tobacco Use     Smoking status: Never Smoker     Smokeless tobacco: Never Used   Substance Use Topics     Alcohol use: Yes     Alcohol/week: 0.0 oz     Comment: 1-2 drinks a week     Drug use: No   no smoking  Works as a     ROS  Constitutional: feeling well   Eyes: no vision change, diplopia or red eyes   Neck: no difficulty swallowing, no choking, no neck pain, +noticed slight enlargement of the thyroid gland  Cardiovascular: no chest pain, palpitations  Respiratory: no dyspnea, cough, shortness of breath  GI: no nausea, vomiting, diarrhea or constipation, no abdominal pain   : no change in urine, no dysuria or hematuria, +recent diagnosis of early stage of prostate cancer  Musculoskeletal:+muscle spasm particularly in the neck area  Integumentary: no concerning lesions   Neuro: no loss of strength or sensation, no numbness or tingling, no tremor,  no headache   Endo: no polyuria or polydipsia, no temperature intolerance   Heme/Lymph: no concerning bumps, no bleeding problems   Allergy: no environmental allergies   sych: +restless leg syndrome, +chronic insomnia    Physical Exam  /87   Pulse 80   Ht 1.753 m (5' 9\")   Wt 87.7 kg (193 lb 4.8 oz)   BMI 28.55 kg/m     Body mass index is 28.55 kg/m .  Constitutional: no distress, comfortable, pleasant   Eyes: anicteric, normal extra-ocular movements, no lid lag or retraction  Neck: +visible and palpable enlarged thyroid, left>right. No discrete nodule   Cardiovascular: regular rate and rhythm, normal S1 and S2, no murmurs  Respiratory: clear to auscultation, no wheezes or crackles, normal breath sounds   Gastrointestinal:  nontender, no hepatomegaly, no masses   Musculoskeletal: no edema   Skin: no concerning lesions, no jaundice   Neurological: cranial nerves intact, 2+reflexes at patella, normal gait, no tremor on outstretched hands bilaterally  Psychological: appropriate mood   Lymphatic: no cervical  " lymphadenopathy.      RESULTS  FNA 5/2010  Thyroid, right, FNA:  Negative for Malignancy  consistent with benign colloid nodule.  Specimen Adequacy: Satisfactory for evaluation.    COMMENT:  Smears show abundant colloid and rare groups (approximately 15) of benign-appearing follicular epithelial cells.  The epithelial cells are arranged in a predominantly macrofollicular pattern. Cytologic features of papillary, medullary or anaplastic carcinoma are not present.  Occasional background histiocytes and rare giant cells are seen. These cytomorphologic findings are consistent with a benign colloid nodule.    4/2010    3/2010      5/2010    8/2010      US thyroid 3/2010  Findings: The right lobe of the thyroid measures 7.7 x 3 x 2.4 cm. The left lobe of the thyroid measures 8.2 x 3.6 x 3.6 cm. The isthmus measures 0.9 cm. The right lobe of the thyroid demonstrates 3 heterogeneous nodules,demonstrating flow on color Doppler, the largest in the inferior pole measuring 3.3 x 2.2 x 3.3 cm and demonstrating punctate echogenic areas that may represent microcalcifications and chaotic internal vessels, and the small nodules in the mid right lobe measuring 0.7 x 1.1 x 1 cm and 1 x 0.8 x 1 cm. The left lobe of the thyroid demonstrates a large heterogeneous cystic nodule in the superior to mid left lobe measuring 2.7 x 2.1 x 3.3 cm. An adjacent smaller 0.7 x 0.9 x 1 cm nodule is also seen. Both hese nodules demonstrate vascular flow in the periphery and not internally on color Doppler. The left inferior pole demonstrates heterogeneous appearance suggesting multiple nodules.    Impression: Multinodular goiter. The right inferior nodule measuring 3.3 x 2.3 x 3 cm demonstrates microcalcifications and chaotic internal vasculature, worrisome for malignancy. Consider biopsy.    US thyroid 8/2011  Findings: The right lobe of the thyroid measures 7 x 2.3 x 2.3 cm. The left lobe of the thyroid measures 8.5 x 3.2 x 2.3 cm. The isthmus  measures 0.7 cm in width. Multiple heterogeneous mixed echogenicity nodules throughout the both lobes of the thyroid gland. Right upper  nodule measures 1.6 x 1.2 x 1.3 cm, previously was measuring 0.7 x 1.1 x 1 cm. The second nodule at the midportion of the right lobe measures 1 x 0.6 x 0.5 cm, previously was measuring 1 x 0.8 x 1 cm. The largest nodule in the inferior pole of the right lobe measures 3.1 x 3.1 x 2.1  cm, previously was measuring 3.3 x 2.2 x 3.3 cm. Heterogeneous nodule in the upper pole of the left lobe measures 3.6 x 2.4 x 1.9 cm, previously was measuring 2.7 x 2.1 x 3.3 cm. Smaller nodule at the midportion of the left lobe measures 0.9 x 1.1 x 0.7 cm, unchanged.   Heterogeneous nodule in the inferior medial aspect of the left lobe of the thyroid gland measures 1.9 x 1.8 x 1.1 cm.      Impression:   1. Multinodular goiter. Some of the nodules in both lobes are slightly larger compared to prior study.  2. Heterogeneous nodule seen in the inferior medial aspect of the left lobe of the thyroid gland was not seen on the prior study. This could be a new thyroid nodule. Consider ultrasound guided fine needle biopsy.    US thyroid 9/26/2016  Thyroid parenchyma: heterogenous  The right lobe of the thyroid measures: 2.8 x 2.4 x 9.6 cm ,previously 6.9 x 2.3 x 2.3 cm  The thyroid isthmus measures: 3.7 mm, previously 7.4 mm    The left lobe of the thyroid measures: 5.2 x 4.4 x 10.1 cm, previously 8.5 x 3.2 x 3.3 cm     Right lobe:  Nodule 1:  Nodule measurement: 1.5 x 1.3 , 1.6 cm  Echogenicity: Isoechoic  Consistency: mixed  Calcifications: no  Hypervascular: yes  Interval growth (>20%): no     Nodule 2:  Nodule measurement: 1.0 x 0.7 , 1.2 cm  Echogenicity: Hypoechoic  Consistency: cystic  Calcifications: no  Hypervascular: no  Interval growth (>20%): no     Nodule 3:  Nodule measurement: 3.2 x 2.0 , 3.1 cm  Echogenicity: Isoechoic  Consistency: solid  Calcifications: no  Hypervascular: yes  Interval growth  (>20%): no     Nodule 4:  Nodule measurement: 0.8 x 0.7 , 0.8 cm  Echogenicity: Isoechoic  Consistency: spongiform  Calcifications: no  Hypervascular: yes  Interval growth (>20%): Not previously documented     Isthmus: No nodules.     Left Lobe:    Nodule 1:  Nodule measurement: 2.9 x 0.9 , 2.8 cm  Echogenicity: Isoechoic  Consistency: mixed  Calcifications: no  Hypervascular: no  Interval growth (>20%): yes, new from prior     Nodule 2:  Nodule measurement: 2.2 x 2.2 , 2.9 cm  Echogenicity: Isoechoic  Consistency: mixed  Calcifications: no  Hypervascular: no  Interval growth (>20%): no     Nodule 3:  Nodule measurement: 1.6 x 1.4 , 1.9 cm  Echogenicity: Isoechoic  Consistency: mixed  Calcifications: no  Hypervascular: no  Interval growth (>20%): no     Nodule 4:  Nodule measurement: 1.1 x 0.8 , 1.5 cm  Echogenicity: Isoechoic  Consistency: solid  Calcifications: yes, rim calcified  Hypervascular: no  Interval growth (>20%): no                                                                    Impression:  Multinodular goiter. Changes since 2011. New 2.9 cm nodule with mixed cystic and solid components in the left lobe of the thyroid. New nodule on the right that is less than 1 cm in size.    FNA thyroid 2016  CYTOLOGIC INTERPRETATION:     A. Thyroid, Left #1, Ultrasound-guided Fine Needle Aspiration:- Benign   - Consistent with a benign nodule (includes adenomatoid nodule, colloid nodule, etc.)     The [place] Implied Risk of Malignancy and Recommended Clinical Management:   Benign has a 0-3% risk of malignancy, recommended management is clinical follow-up     Specimen Adequacy: Satisfactory for evaluation.     B. Thyroid, Left #2, Ultrasound-guided Fine Needle Aspiration:   - Benign   - Consistent with a benign nodule (includes adenomatoid nodule, colloid nodule, etc.)     Specimen Adequacy: Satisfactory for evaluation.     C. Thyroid, Left #4, Ultrasound-guided Fine Needle Aspiration:   - Benign   - Consistent  "with a benign nodule (includes adenomatoid nodule, colloid nodule, etc.)   Specimen Adequacy: Satisfactory for evaluation.     US thyroid 1/12/2018  Right lobe measures 3.1 x 2.4 x 6.4 cm.  It shows normal echogenicity. Multiple discrete nodules as as follows:     Nodule #1: Predominantly solid isoechoic nodule with cystic spaces measures 1.3 x 1.4 x 1.7 cm. Previously 1.5 x 1.3 x 1.6 cm. Minimal areas of internal color Doppler flow. No worrisome calcifications.    Nodule #2: Cystic and solid nodule located medially measures 1.1 x 0.8 x 0.9 cm. Previously  this nodule was labeled as \"nodule #4\".and measured 1.0 x 0.7 x 1.1 cm.     Nodule #3: Predominantly solid isoechoic nodule measures 3.3 x 2.0 x 3.4 cm. There appear to be small internal calcifications. Moderate hyperemia. Previously, this nodule measured 3.1 x 3.2 x 2.0 cm.     A fourth nodule which was previously seen is not well visualized on the current examination.      Left lobe measures 5.9 x 3.9 x 7.1 cm. It shows normal echogenicity. Multiple discrete nodules as follows:     Nodule #1: Prominently solid isoechoic nodule with cystic components measures 2.2 x 2.0 x 2.4 cm. Minimal internal color Doppler flow. Previously, this nodule was labeled as \"nodule #1\" and measured 2.2 x 2.0 x 2.9 cm, slightly exaggerated by measurement technique. This nodule was previously sampled and returned benign.    Nodule #2: Predominantly solid isoechoic nodule measures 1.6 x 1.5 x 1.4 cm. There is internal shadowing calcification. Minimal internal color Doppler vascularity. Previously, this nodule was labeled as \"nodule #3\" and measured 1.6 x 1.4 x 1.9 cm.    Nodule #3: Probably solid and peripherally calcified nodule is isoechoic, measuring 0.8 x 0.6 x by 1.0 cm. Minimal. Nodular flow. Previously, this nodule was labeled as \"nodule #4\"and measured 1.1 x  0.8 x 1.5 cm. This nodule was previously sampled and returned benign.     A fourth nodule which was seen previously is " not well visualized on the current examination.  This nodule was sampled previously and returned benign however.     Isthmus thickness is 0.6 cm.                                                                       Impression: Multiple grossly stable thyroid nodules as above. Several of the left sided nodules have been sampled and returned benign.  Two nodules, one in each lobe, seen on the prior ultrasound are not well visualized on the current examination.        ASSESSMENT:    Miles Valencia is a 53 years old male with hx of T1N0M0 prostate cancer who is here for multinodular goiter.    1) multinodular goiter: noted since 2010 during physical examination. I can palpate enlargement of the thyroid, left>right. Ultrasound showed multiple nodules ranged 2-3 cm throughout both thyroid lobes. He underwent right sided nodule FNA in 2010 which was benign. He did have FNA of left thyroid nodule #1, #2 and #4 which were all benign in 10/2016    He noted recent enlargement of the thyroid but no neck compressive symptoms. I will repeat US thyroid this year along with TFT and calcitonin given previous elevation of calcitonin.    Discussed surgical option due to intermittent compressive symptoms. He will consider it.     2) mild elevation of calcitonin: his calcitonin ranged in 10-11. The last checked on normal in 1/2018. He denied taking any PPI. Will follow up today.    3) previous elevation of urine normetanephrine: secondary to medication interference (he was on TCA). After stopping TCA, lab was normalized. MRI abdomen in 2010 showed normal adrenal gland. No symptoms. Will check plasma metanephrine today    PLAN:   - check calcitonin, calcium, PTH, 25OH vitamin D  - check plasma metanephrine    Silvia Villalobos MD     Division of Diabetes and Endocrinology  Department of Medicine  [phone]

## 2018-12-14 NOTE — PATIENT INSTRUCTIONS
Lab today  Return in 1 year    If you have any questions, please do not hesitate to call [phone] and ask for Endocrinology clinic.      After clinic hours, please contact [phone] and ask for Endocrinologist-on call    Sincerely,    Silvia Villalobos MD  Endocrinology

## 2018-12-15 LAB — CALCIT SERPL-MCNC: 8.6 PG/ML (ref 0–7.5)

## 2018-12-19 DIAGNOSIS — E04.2 MULTIPLE THYROID NODULES: ICD-10-CM

## 2018-12-24 LAB
ANNOTATION COMMENT IMP: NORMAL
METANEPHS SERPL-SCNC: <0.1 NMOL/L (ref 0–0.49)
NORMETANEPHRINE SERPL-SCNC: 0.46 NMOL/L (ref 0–0.89)

## 2019-01-21 ENCOUNTER — PRE VISIT (OUTPATIENT)
Dept: UROLOGY | Facility: CLINIC | Age: 54
End: 2019-01-21

## 2019-01-24 ENCOUNTER — OFFICE VISIT (OUTPATIENT)
Dept: UROLOGY | Facility: CLINIC | Age: 54
End: 2019-01-24
Payer: COMMERCIAL

## 2019-01-24 VITALS
HEIGHT: 69 IN | HEART RATE: 85 BPM | WEIGHT: 190 LBS | DIASTOLIC BLOOD PRESSURE: 82 MMHG | BODY MASS INDEX: 28.14 KG/M2 | SYSTOLIC BLOOD PRESSURE: 133 MMHG

## 2019-01-24 DIAGNOSIS — C61 PROSTATE CANCER (H): Primary | ICD-10-CM

## 2019-01-24 ASSESSMENT — MIFFLIN-ST. JEOR: SCORE: 1697.21

## 2019-01-24 ASSESSMENT — PAIN SCALES - GENERAL: PAINLEVEL: NO PAIN (0)

## 2019-01-24 NOTE — PATIENT INSTRUCTIONS
Follow up with Dr. Mcmillan with PSA in one year.        It was a pleasure meeting with you today.  Thank you for allowing me and my team the privilege of caring for you today.  YOU are the reason we are here, and I truly hope we provided you with the excellent service you deserve.  Please let us know if there is anything else we can do for you so that we can be sure you are leaving completely satisfied with your care experience.

## 2019-01-24 NOTE — LETTER
2019       RE: Miles Valencia  [address] [place] [address]     Dear Colleague,    Thank you for referring your patient, Miles Valencia, to the [facility] UROLOGY AND INST FOR PROSTATE AND UROLOGIC CANCERS at [facility]. Please see a copy of my visit note below.    [facility]  Urology Clinic  Eber Mcmillan MD  2019     Name: Miles Valencia  MRN: [id number]  Age: 53 year old  : 1965  Referring provider: Eber Mcmillan     Assessment and Plan:  Assessment presumed localized prostate cancer GS=6 on  cores on two separate biopsies on active surveillance since  with no evidence of disease progression. PSA has increased from 7.44 to 8.57 between 2017 and 2018, although it was previously evaluated at 9.56 in 2016. PSA density has remained stable between  and .     Plan      Since there is no sign of disease progression, will plan to continue with active surveillance    Follow up in 1 year.     PSA (tumor marker)    Clinic Exam    Attestation:  This patient was seen and evaluated by me, with a scribe taking notes.  I have reviewed the note above and agree.  The physical exam and or any procedures were performed by me and the pertinant details are outlined below.       Eber Mcmillan MD  Department of Urology  [facility]      HPI  Miles Valencia is a very pleasant 53 year old male who presents with a history of prostate cancer and being followed by [facility].      Initial PSA:2.8  Biopsy [place] Score was 3 + 3 on 16 in 3/12 cores right apex  Pathologic Stage T1c     Risk Group: Low  Normal MRI   Sacral lesion (confirmed negative with bone scan)    Repeat Bx   2017     Initial PSA: 6.9  Biopsy Rafael Score was 3 + 3 in 1 of 12 cores right apex  Pathologic Stage T1c     Risk Group: Low     MRI 2016  PSA density is 0.21.   Prostate volume 35   PIRADS 1-2 lesions only    MRI 2018:   PSA  "density is 0.22   Prostate volume 39  Based on the most suspicious abnormality, this exam is characterized as PIRADS 2 - Low probability.  Clinically significant cancer is unlikely to be present.      There is no evidence of disease progression to date.    Today he denies any urinary symptoms.     Review of Systems:   Pertinent items are noted in HPI or as below, remainder of complete ROS is negative.       PHYSICAL EXAM  Vitals:    01/24/19 1642   BP: 133/82   Pulse: 85   Weight: 86.2 kg (190 lb)   Height: 1.753 m (5' 9\")     Abd is soft, non-distended, incisions are healing well, no evidence of hernias    Laboratory:   PSA   Date Value Ref Range Status   12/07/2018 8.57 (H) 0 - 4 ug/L Final     Comment:     Assay Method:  Chemiluminescence using Siemens Vista analyzer   12/13/2017 7.44 (H) 0 - 4 ug/L Final     Comment:     Assay Method:  Chemiluminescence using Siemens Vista analyzer   05/31/2017 6.91 (H) 0 - 4 ug/L Final     Comment:     Assay Method:  Chemiluminescence using Siemens Vista analyzer   02/06/2017 7.39 (H) 0 - 4 ug/L Final     Comment:     Assay Method:  Chemiluminescence using Siemens Vista analyzer   11/01/2016 9.56 (H) 0 - 4 ug/L Final     Comment:     Assay Method:  Chemiluminescence using Siemens Vista analyzer   09/23/2016 6.53 (H) 0 - 4 ug/L Final     Comment:     Assay Method:  Chemiluminescence using Siemens Vista analyzer   08/23/2010 2.28 0 - 4 ug/L Final       We went over pathology results    Scribe Disclosure:   I, Radha Barahona, am serving as a scribe to document services personally performed by Eber Mcmillan MD at this visit, based upon the provider's statements to me. All documentation has been reviewed by the aforementioned provider prior to being entered into the official medical record.     Portions of this medical record were completed by a scribe. UPON MY REVIEW AND AUTHENTICATION BY ELECTRONIC SIGNATURE, this confirms (a) I performed the applicable clinical " services, and (b) the record is accurate.        Again, thank you for allowing me to participate in the care of your patient.      Sincerely,    Eber Mcmillan MD

## 2019-01-24 NOTE — NURSING NOTE
"Chief Complaint   Patient presents with     Follow Up     PSA review       Blood pressure 133/82, pulse 85, height 1.753 m (5' 9\"), weight 86.2 kg (190 lb). Body mass index is 28.06 kg/m .    Patient Active Problem List   Diagnosis     Pain in joint, shoulder region     Achilles bursitis or tendinitis     History of total shoulder replacement, left       Allergies   Allergen Reactions     Amoxicillin Diarrhea       Current Outpatient Medications   Medication Sig Dispense Refill     Acetaminophen (TYLENOL PO) Take 1,000 mg by mouth every 6 hours as needed        albuterol (PROAIR HFA/PROVENTIL HFA/VENTOLIN HFA) 108 (90 BASE) MCG/ACT Inhaler Inhale 2 puffs into the lungs every 4 hours as needed for shortness of breath / dyspnea or wheezing       ammonium lactate (AMLACTIN) 12 % cream Apply topically daily as needed        aspirin-acetaminophen-caffeine (EXCEDRIN MIGRAINE) 250-250-65 MG per tablet Take 2 tablets by mouth every 6 hours as needed for headaches       camphor-menthol (DERMASARRA) 0.5-0.5 % LOTN Apply topically every 6 hours as needed for skin care       CARTIA  MG 24 hr CD capsule Take 120 mg by mouth every morning   0     cholecalciferol (VITAMIN D3 MAXIMUM STRENGTH) 5000 UNITS CAPS Take 5,000 Units by mouth every morning        clindamycin (CLEOCIN) 300 MG capsule Take 2 capsules (600 mg) by mouth 1 hour before dental work. (Patient not taking: Reported on 12/14/2018) 2 capsule 3     Cyanocobalamin (VITAMIN B-12 PO) Take 1 tablet by mouth every evening        DiphenhydrAMINE HCl (BENADRYL ALLERGY PO) Take 25-50 mg by mouth daily as needed (allergies)        fexofenadine (ALLEGRA) 180 MG tablet Take 180 mg by mouth daily       gabapentin (NEURONTIN) 100 MG capsule TK 4 CS PO D HS  4     gabapentin (NEURONTIN) 100 MG capsule Take 4 capsules (400 mg) by mouth At Bedtime 120 capsule 0     gabapentin (NEURONTIN) 300 MG capsule Take 1 capsule (300 mg) by mouth 3 times daily (Patient taking differently: " Take 600 mg by mouth 2 times daily ) 60 capsule 0     hydrochlorothiazide (HYDRODIURIL) 25 MG tablet Take 25 mg by mouth every morning        hydrocortisone (CORTAID) 1 % cream Apply topically 2 times daily as needed       meclizine (ANTIVERT) 12.5 MG tablet Take 2 tablets (25 mg) by mouth 4 times daily as needed for dizziness 30 tablet 0     MELATONIN PO Take 1 mg by mouth nightly as needed       MELOXICAM PO Take by mouth as needed       Omega-3 Fatty Acids (OMEGA-3 FISH OIL) 1000 MG CAPS Take 1 gram by mouth in the morning and take 2 grams by mouth in the evening.       oxyCODONE IR (ROXICODONE) 5 MG tablet   0     rizatriptan (MAXALT-MLT) 10 MG disintegrating tablet Take 10 mg by mouth at onset of headache   6     SUMAtriptan Succinate Refill (IMITREX) 6 MG/0.5ML SOCT Inject 6 mg into the muscle every 12 hours as needed   1     zolpidem (AMBIEN) 5 MG tablet Take 5 mg by mouth nightly as needed for sleep         Social History     Tobacco Use     Smoking status: Never Smoker     Smokeless tobacco: Never Used   Substance Use Topics     Alcohol use: Yes     Alcohol/week: 0.0 oz     Comment: 1-2 drinks a week     Drug use: No       Rimma Berger LPN  1/24/2019  4:43 PM

## 2019-01-24 NOTE — PROGRESS NOTES
[facility]  Urology Clinic  Eber Mcmillan MD  2019     Name: Miles Valencia  MRN: [id number]  Age: 53 year old  : 1965  Referring provider: Eber Mcmillan     Assessment and Plan:  Assessment presumed localized prostate cancer GS=6 on  cores on two separate biopsies on active surveillance since  with no evidence of disease progression. PSA has increased from 7.44 to 8.57 between 2017 and 2018, although it was previously evaluated at 9.56 in 2016. PSA density has remained stable between  and .     Plan      Since there is no sign of disease progression, will plan to continue with active surveillance    Follow up in 1 year.     PSA (tumor marker)    Clinic Exam    Attestation:  This patient was seen and evaluated by me, with a scribe taking notes.  I have reviewed the note above and agree.  The physical exam and or any procedures were performed by me and the pertinant details are outlined below.       Eber Mcmillan MD  Department of Urology  [facility]      HPI  Miles Valencia is a very pleasant 53 year old male who presents with a history of prostate cancer and being followed by [facility].      Initial PSA:2.8  Biopsy Gonzales Score was 3 + 3 on 16 in 3/12 cores right apex  Pathologic Stage T1c     Risk Group: Low  Normal MRI   Sacral lesion (confirmed negative with bone scan)    Repeat Bx   2017     Initial PSA: 6.9  Biopsy Rafael Score was 3 + 3 in 1 of 12 cores right apex  Pathologic Stage T1c     Risk Group: Low     MRI 2016  PSA density is 0.21.   Prostate volume 35   PIRADS 1-2 lesions only    MRI 2018:   PSA density is 0.22   Prostate volume 39  Based on the most suspicious abnormality, this exam is characterized as PIRADS 2 - Low probability.  Clinically significant cancer is unlikely to be present.      There is no evidence of disease progression to date.    Today he denies any urinary symptoms.     Review of Systems:  "  Pertinent items are noted in HPI or as below, remainder of complete ROS is negative.       PHYSICAL EXAM  Vitals:    01/24/19 1642   BP: 133/82   Pulse: 85   Weight: 86.2 kg (190 lb)   Height: 1.753 m (5' 9\")     Abd is soft, non-distended, incisions are healing well, no evidence of hernias    Laboratory:   PSA   Date Value Ref Range Status   12/07/2018 8.57 (H) 0 - 4 ug/L Final     Comment:     Assay Method:  Chemiluminescence using Siemens Vista analyzer   12/13/2017 7.44 (H) 0 - 4 ug/L Final     Comment:     Assay Method:  Chemiluminescence using Siemens Vista analyzer   05/31/2017 6.91 (H) 0 - 4 ug/L Final     Comment:     Assay Method:  Chemiluminescence using Siemens Vista analyzer   02/06/2017 7.39 (H) 0 - 4 ug/L Final     Comment:     Assay Method:  Chemiluminescence using Siemens Vista analyzer   11/01/2016 9.56 (H) 0 - 4 ug/L Final     Comment:     Assay Method:  Chemiluminescence using Siemens Vista analyzer   09/23/2016 6.53 (H) 0 - 4 ug/L Final     Comment:     Assay Method:  Chemiluminescence using Siemens Vista analyzer   08/23/2010 2.28 0 - 4 ug/L Final       We went over pathology results    Scribe Disclosure:   I, Radha Barahona, am serving as a scribe to document services personally performed by Eber Mcmillan MD at this visit, based upon the provider's statements to me. All documentation has been reviewed by the aforementioned provider prior to being entered into the official medical record.     Portions of this medical record were completed by a scribe. UPON MY REVIEW AND AUTHENTICATION BY ELECTRONIC SIGNATURE, this confirms (a) I performed the applicable clinical services, and (b) the record is accurate.      "

## 2019-05-06 ENCOUNTER — TRANSFERRED RECORDS (OUTPATIENT)
Dept: HEALTH INFORMATION MANAGEMENT | Facility: CLINIC | Age: 54
End: 2019-05-06

## 2019-05-06 ENCOUNTER — MEDICAL CORRESPONDENCE (OUTPATIENT)
Dept: HEALTH INFORMATION MANAGEMENT | Facility: CLINIC | Age: 54
End: 2019-05-06

## 2019-05-14 ENCOUNTER — ANCILLARY PROCEDURE (OUTPATIENT)
Dept: CARDIOLOGY | Facility: CLINIC | Age: 54
End: 2019-05-14
Attending: FAMILY MEDICINE
Payer: COMMERCIAL

## 2019-05-14 DIAGNOSIS — Z02.5 ENCOUNTER FOR EXAMINATION FOR PARTICIPATION IN SPORT: ICD-10-CM

## 2019-07-30 DIAGNOSIS — Z96.612 S/P SHOULDER REPLACEMENT, LEFT: Primary | ICD-10-CM

## 2019-07-31 ENCOUNTER — ANCILLARY PROCEDURE (OUTPATIENT)
Dept: GENERAL RADIOLOGY | Facility: CLINIC | Age: 54
End: 2019-07-31
Attending: ORTHOPAEDIC SURGERY
Payer: COMMERCIAL

## 2019-07-31 ENCOUNTER — OFFICE VISIT (OUTPATIENT)
Dept: ORTHOPEDICS | Facility: CLINIC | Age: 54
End: 2019-07-31
Payer: COMMERCIAL

## 2019-07-31 DIAGNOSIS — Z96.612 S/P SHOULDER REPLACEMENT, LEFT: Primary | ICD-10-CM

## 2019-07-31 DIAGNOSIS — Z96.612 S/P SHOULDER REPLACEMENT, LEFT: ICD-10-CM

## 2019-07-31 LAB — RADIOLOGIST FLAGS: NORMAL

## 2019-07-31 NOTE — NURSING NOTE
Reason For Visit:   Chief Complaint   Patient presents with     Surgical Followup     Annual follow up s/p Left Total Shoulder Arthroplasty DATE OF SURGERY: 6/13/17        PCP: Oneyda Jones  Ref: Established      ?  No  Occupation .  Currently working? Yes.  Work status?  Full time.  Date of injury: *ongoing     Date of surgery: 6/13/2017  Type of surgery: Left TSA.  Smoker: No  Request smoking cessation information: No     Right hand dominant    SANE score  Affected shoulder: Left  Right shoulder SANE: 95  Left shoulder SANE: 90    There were no vitals taken for this visit.      Pain Assessment  Patient Currently in Pain: Bogdan Link, ATC

## 2019-07-31 NOTE — LETTER
7/31/2019    RE: Miles Valencia  [address] [place] [address]     Dear Colleague,    Thank you for referring your patient, Miles Valencia, [facility] the [facility]. Please see a copy of my visit note below.    CHIEF CONCERN:  Status Post Left Total Shoulder Arthroplasty  DATE OF SURGERY: 6/13/17    HISTORY OF PRESENT ILLNESS:  Mr. Valencia is a 54 year old gentleman who is now 2 years s/p above procedure. He reports he is doing well. His only difficulty is that is not able to sleep on that shoulder. Despite that he is doing active things like [employer].    PHYSICAL EXAM:    Pleasant male, NAD  Focused left upper extremity exam: Skin intact. No atrophy. Left shoulder active ROM is FE to 175, ER to 75, IR to T10. 5/5 strength with deltoid, FE and ER. Sensation intact and normal.     IMAGING:  Left shoulder xrays today demonstrate components remain in good position without lucencies. The joint is concentrically reduced.    ASSESSMENT:  1. Two years s/p above procedure    PLAN:  I reviewed today's imaging results and his ongoing activities.  He is aware of the 50# restriction and respects that.  He will return in 2-3 years with xrays or prn.    Again, thank you for allowing me to participate in the care of your patient.      Sincerely,    Jossy Swanson MD

## 2019-08-01 ENCOUNTER — TELEPHONE (OUTPATIENT)
Dept: ORTHOPEDICS | Facility: CLINIC | Age: 54
End: 2019-08-01

## 2019-08-01 NOTE — TELEPHONE ENCOUNTER
Incidental finding, nodular density in the left lung, was noted on the shoulder x-ray 07/31/19.  Dr Swanson was informed.  Patient and his primary clinic [facility] were also informed.  His primary provider will follow-up on recommendation.

## 2019-08-19 NOTE — PROGRESS NOTES
CHIEF CONCERN:  Status Post Left Total Shoulder Arthroplasty  DATE OF SURGERY: 6/13/17    HISTORY OF PRESENT ILLNESS:  Mr. Valencia is a 54 year old gentleman who is now 2 years s/p above procedure. He reports he is doing well. His only difficulty is that is not able to sleep on that shoulder. Despite that he is doing active things like [employer].    PHYSICAL EXAM:    Pleasant male, NAD  Focused left upper extremity exam: Skin intact. No atrophy. Left shoulder active ROM is FE to 175, ER to 75, IR to T10. 5/5 strength with deltoid, FE and ER. Sensation intact and normal.     IMAGING:  Left shoulder xrays today demonstrate components remain in good position without lucencies. The joint is concentrically reduced.    ASSESSMENT:  1. Two years s/p above procedure    PLAN:  I reviewed today's imaging results and his ongoing activities.  He is aware of the 50# restriction and respects that.  He will return in 2-3 years with xrays or prn.

## 2019-11-07 ENCOUNTER — HEALTH MAINTENANCE LETTER (OUTPATIENT)
Age: 54
End: 2019-11-07

## 2019-12-04 DIAGNOSIS — E04.2 MULTIPLE THYROID NODULES: Primary | ICD-10-CM

## 2019-12-05 ENCOUNTER — ANCILLARY PROCEDURE (OUTPATIENT)
Dept: ULTRASOUND IMAGING | Facility: CLINIC | Age: 54
End: 2019-12-05
Attending: INTERNAL MEDICINE
Payer: COMMERCIAL

## 2019-12-05 DIAGNOSIS — C61 PROSTATE CANCER (H): ICD-10-CM

## 2019-12-05 DIAGNOSIS — E04.2 MULTIPLE THYROID NODULES: ICD-10-CM

## 2019-12-05 LAB
PSA SERPL-MCNC: 9.85 UG/L (ref 0–4)
T3 SERPL-MCNC: 123 NG/DL (ref 60–181)
T4 FREE SERPL-MCNC: 1.09 NG/DL (ref 0.76–1.46)
TSH SERPL DL<=0.005 MIU/L-ACNC: 0.3 MU/L (ref 0.4–4)

## 2019-12-06 ENCOUNTER — OFFICE VISIT (OUTPATIENT)
Dept: ENDOCRINOLOGY | Facility: CLINIC | Age: 54
End: 2019-12-06
Payer: COMMERCIAL

## 2019-12-06 VITALS
WEIGHT: 194.4 LBS | SYSTOLIC BLOOD PRESSURE: 138 MMHG | HEART RATE: 74 BPM | BODY MASS INDEX: 28.79 KG/M2 | HEIGHT: 69 IN | DIASTOLIC BLOOD PRESSURE: 80 MMHG

## 2019-12-06 DIAGNOSIS — E04.2 MULTIPLE THYROID NODULES: Primary | ICD-10-CM

## 2019-12-06 DIAGNOSIS — E05.20 TOXIC MULTINODULAR GOITER: ICD-10-CM

## 2019-12-06 LAB — CALCIT SERPL-MCNC: 9.2 PG/ML (ref 0–7.5)

## 2019-12-06 RX ORDER — SODIUM PHOSPHATE,MONO-DIBASIC 19G-7G/118
1 ENEMA (ML) RECTAL DAILY
COMMUNITY
End: 2020-01-23

## 2019-12-06 ASSESSMENT — PAIN SCALES - GENERAL: PAINLEVEL: NO PAIN (0)

## 2019-12-06 ASSESSMENT — MIFFLIN-ST. JEOR: SCORE: 1712.17

## 2019-12-06 NOTE — LETTER
12/6/2019       RE: Miles Valencia  [address] [place] [address]     Dear Colleague,    Thank you for referring your patient, Miles Valencia, to the [facility] ENDOCRINOLOGY at [facility]. Please see a copy of my visit note below.         Endocrinology Note         Miles is a 54 year old male presents today for multinodular goiter    HPI  Miles Valencia is a 54 years old male with hx of T1N0M0 prostate cancer who is here for multinodular goiter.    He was previously seen  and  for multinodular goiter and mild elevation of calcitonin in 2010. I have reviewed previous medical record. Last seen by me 12/2018.    In 2010, he was noted to have multinodular goiter and palpitation during physical examination. Given intermittent headache and palpitation, urine metanephrine was checked. At that time, he was noted to have mild elevation of urine normetanephrine which was thought to be secondary to amitriptyline. After stopping amitriptyline, he has not had any palpitation anymore and repeated urine metanephrine was normalized. At the same visit, he was also evaluated for multinodular goiter. US thyroid in 2010 showed multiple thyroid nodules throughout both lobes (detail in result section). The largest nodules on the right lobe was 3.3x2.2.x3.3 cm heterogeneous nodule with microcalcification and the largest left sided nodule was 2.7x2.1x3.3 cm heterogeneous nodule. He underwent FNA of the right thyroid nodule which was benign. Follow-up thyroid ultrasound in 2011 showed slight enlargement of thyroid nodules. Since then, he has not had any follow-up endocrine visit or ultrasound until 2016.    Thyroid ultrasound in 10/2016 showed growth in thyroid nodules and new 2.9 cm left thyroid nodule. Therefore, he underwent FNA of left thyroid nodule #1, #2 and #4 which were all benign in 10/2016.    Ultrasound thyroid in 1/2018 showed again multiple thyroid  nodules which were stable. Because of subclinical hypothyroidism, he underwent thyroid uptake and scan showed 18.2% uptake at 24 hours with diffuse inhomogeneity and enlargement of the thyroid gland.  No autonomous nodules were identified.     Interim history  Last visit was 12/2018.  He has been doing well except that he noticed intermittent compression symptoms more often. He denied difficulty swallowing or breathing. He denied chest pain, and palpitation, altered bowel movement, temperature intolerance, tremor, insomnia.    Repeat ultrasound thyroid on 12/5/2019 showed multinodular goiter with all suspicious features and the site has been stable from the previous ultrasound in 2018.  Please see below at the results section.     Labs on 12/5/2019 showed TSH 0.3, free T4 1.09, total T3 123.    Past Medical History  Past Medical History:   Diagnosis Date     Achilles bursitis or tendinitis 5/4/2014     Allergic rhinitis      Asthma      Congestion      Hypertension      Migraine      Multinodular goiter      Osteoarthritis      Pain in joint, shoulder region 4/18/2014     Prostate cancer (H)      RLS (restless legs syndrome)      Sleep problems    T1N0M0 prostate cancer with close monitoring with urologist.  Migraine headache    Allergies  Allergies   Allergen Reactions     Amoxicillin Diarrhea        Medications  Current Outpatient Medications   Medication Sig Dispense Refill     Acetaminophen (TYLENOL PO) Take 1,000 mg by mouth every 6 hours as needed        albuterol (PROAIR HFA/PROVENTIL HFA/VENTOLIN HFA) 108 (90 BASE) MCG/ACT Inhaler Inhale 2 puffs into the lungs every 4 hours as needed for shortness of breath / dyspnea or wheezing       ammonium lactate (AMLACTIN) 12 % cream Apply topically daily as needed        aspirin-acetaminophen-caffeine (EXCEDRIN MIGRAINE) 250-250-65 MG per tablet Take 2 tablets by mouth every 6 hours as needed for headaches       camphor-menthol (DERMASARRA) 0.5-0.5 % LOTN Apply  topically every 6 hours as needed for skin care       CARTIA  MG 24 hr CD capsule Take 120 mg by mouth every morning   0     cholecalciferol (VITAMIN D3 MAXIMUM STRENGTH) 5000 UNITS CAPS Take 5,000 Units by mouth every morning        clindamycin (CLEOCIN) 300 MG capsule Take 2 capsules (600 mg) by mouth 1 hour before dental work. (Patient not taking: Reported on 12/14/2018) 2 capsule 3     Cyanocobalamin (VITAMIN B-12 PO) Take 1 tablet by mouth every evening        DiphenhydrAMINE HCl (BENADRYL ALLERGY PO) Take 25-50 mg by mouth daily as needed (allergies)        fexofenadine (ALLEGRA) 180 MG tablet Take 180 mg by mouth daily       gabapentin (NEURONTIN) 100 MG capsule TK 4 CS PO D HS  4     gabapentin (NEURONTIN) 100 MG capsule Take 4 capsules (400 mg) by mouth At Bedtime 120 capsule 0     gabapentin (NEURONTIN) 300 MG capsule Take 1 capsule (300 mg) by mouth 3 times daily (Patient taking differently: Take 600 mg by mouth 2 times daily ) 60 capsule 0     hydrochlorothiazide (HYDRODIURIL) 25 MG tablet Take 25 mg by mouth every morning        hydrocortisone (CORTAID) 1 % cream Apply topically 2 times daily as needed       meclizine (ANTIVERT) 12.5 MG tablet Take 2 tablets (25 mg) by mouth 4 times daily as needed for dizziness 30 tablet 0     MELATONIN PO Take 1 mg by mouth nightly as needed       MELOXICAM PO Take by mouth as needed       Omega-3 Fatty Acids (OMEGA-3 FISH OIL) 1000 MG CAPS Take 1 gram by mouth in the morning and take 2 grams by mouth in the evening.       oxyCODONE IR (ROXICODONE) 5 MG tablet   0     rizatriptan (MAXALT-MLT) 10 MG disintegrating tablet Take 10 mg by mouth at onset of headache   6     SUMAtriptan Succinate Refill (IMITREX) 6 MG/0.5ML SOCT Inject 6 mg into the muscle every 12 hours as needed   1     zolpidem (AMBIEN) 5 MG tablet Take 5 mg by mouth nightly as needed for sleep       Family History  Mother has hyperparathyroidism  Maternal aunt has hyperthyroidism  Brother has  "testicular cancer  Maternal uncle has stomach and bone cancer  Maternal aunts has leukemia  Another maternal aunt and grandmother have breast cancer    Social History  Social History     Tobacco Use     Smoking status: Never Smoker     Smokeless tobacco: Never Used   Substance Use Topics     Alcohol use: Yes     Alcohol/week: 0.0 standard drinks     Comment: 1-2 drinks a week     Drug use: No   no smoking  Works as a     ROS  Constitutional: feeling well   Eyes: no vision change, diplopia or red eyes   Neck: no difficulty swallowing, no choking, no neck pain, +noticed slight enlargement of the thyroid gland  Cardiovascular: no chest pain, palpitations  Respiratory: no dyspnea, cough, shortness of breath  GI: no nausea, vomiting, diarrhea or constipation, no abdominal pain   : no change in urine, no dysuria or hematuria, +recent diagnosis of early stage of prostate cancer  Musculoskeletal:+muscle spasm particularly in the neck area  Integumentary: no concerning lesions   Neuro: no loss of strength or sensation, no numbness or tingling, no tremor,  no headache   Endo: no polyuria or polydipsia, no temperature intolerance   Heme/Lymph: no concerning bumps, no bleeding problems   Allergy: no environmental allergies   sych: +restless leg syndrome, +chronic insomnia    Physical Exam  /80   Pulse 74   Ht 1.753 m (5' 9\")   Wt 88.2 kg (194 lb 6.4 oz)   BMI 28.71 kg/m     Body mass index is 28.71 kg/m .  Constitutional: no distress, comfortable, pleasant   Eyes: anicteric, normal extra-ocular movements, no lid lag or retraction  Neck: +visible and palpable enlarged thyroid, left>right. No discrete nodule   Cardiovascular: regular rate and rhythm, normal S1 and S2, no murmurs  Respiratory: clear to auscultation, no wheezes or crackles, normal breath sounds   Gastrointestinal:  nontender, no hepatomegaly, no masses   Musculoskeletal: no edema   Skin: no concerning lesions, no jaundice   Neurological: " cranial nerves intact, 2+reflexes at patella, normal gait, no tremor on outstretched hands bilaterally  Psychological: appropriate mood   Lymphatic: no cervical  lymphadenopathy.      RESULTS  FNA 5/2010  Thyroid, right, FNA:  Negative for Malignancy  consistent with benign colloid nodule.  Specimen Adequacy: Satisfactory for evaluation.    COMMENT:  Smears show abundant colloid and rare groups (approximately 15) of benign-appearing follicular epithelial cells.  The epithelial cells are arranged in a predominantly macrofollicular pattern. Cytologic features of papillary, medullary or anaplastic carcinoma are not present.  Occasional background histiocytes and rare giant cells are seen. These cytomorphologic findings are consistent with a benign colloid nodule.    4/2010    3/2010      5/2010    8/2010      12/19/2018      US thyroid 3/2010  Findings: The right lobe of the thyroid measures 7.7 x 3 x 2.4 cm. The left lobe of the thyroid measures 8.2 x 3.6 x 3.6 cm. The isthmus measures 0.9 cm. The right lobe of the thyroid demonstrates 3 heterogeneous nodules,demonstrating flow on color Doppler, the largest in the inferior pole measuring 3.3 x 2.2 x 3.3 cm and demonstrating punctate echogenic areas that may represent microcalcifications and chaotic internal vessels, and the small nodules in the mid right lobe measuring 0.7 x 1.1 x 1 cm and 1 x 0.8 x 1 cm. The left lobe of the thyroid demonstrates a large heterogeneous cystic nodule in the superior to mid left lobe measuring 2.7 x 2.1 x 3.3 cm. An adjacent smaller 0.7 x 0.9 x 1 cm nodule is also seen. Both hese nodules demonstrate vascular flow in the periphery and not internally on color Doppler. The left inferior pole demonstrates heterogeneous appearance suggesting multiple nodules.    Impression: Multinodular goiter. The right inferior nodule measuring 3.3 x 2.3 x 3 cm demonstrates microcalcifications and chaotic internal vasculature, worrisome for malignancy.  Consider biopsy.    US thyroid 8/2011  Findings: The right lobe of the thyroid measures 7 x 2.3 x 2.3 cm. The left lobe of the thyroid measures 8.5 x 3.2 x 2.3 cm. The isthmus measures 0.7 cm in width. Multiple heterogeneous mixed echogenicity nodules throughout the both lobes of the thyroid gland. Right upper  nodule measures 1.6 x 1.2 x 1.3 cm, previously was measuring 0.7 x 1.1 x 1 cm. The second nodule at the midportion of the right lobe measures 1 x 0.6 x 0.5 cm, previously was measuring 1 x 0.8 x 1 cm. The largest nodule in the inferior pole of the right lobe measures 3.1 x 3.1 x 2.1  cm, previously was measuring 3.3 x 2.2 x 3.3 cm. Heterogeneous nodule in the upper pole of the left lobe measures 3.6 x 2.4 x 1.9 cm, previously was measuring 2.7 x 2.1 x 3.3 cm. Smaller nodule at the midportion of the left lobe measures 0.9 x 1.1 x 0.7 cm, unchanged.   Heterogeneous nodule in the inferior medial aspect of the left lobe of the thyroid gland measures 1.9 x 1.8 x 1.1 cm.      Impression:   1. Multinodular goiter. Some of the nodules in both lobes are slightly larger compared to prior study.  2. Heterogeneous nodule seen in the inferior medial aspect of the left lobe of the thyroid gland was not seen on the prior study. This could be a new thyroid nodule. Consider ultrasound guided fine needle biopsy.    US thyroid 9/26/2016  Thyroid parenchyma: heterogenous  The right lobe of the thyroid measures: 2.8 x 2.4 x 9.6 cm ,previously 6.9 x 2.3 x 2.3 cm  The thyroid isthmus measures: 3.7 mm, previously 7.4 mm    The left lobe of the thyroid measures: 5.2 x 4.4 x 10.1 cm, previously 8.5 x 3.2 x 3.3 cm     Right lobe:  Nodule 1:  Nodule measurement: 1.5 x 1.3 , 1.6 cm  Echogenicity: Isoechoic  Consistency: mixed  Calcifications: no  Hypervascular: yes  Interval growth (>20%): no     Nodule 2:  Nodule measurement: 1.0 x 0.7 , 1.2 cm  Echogenicity: Hypoechoic  Consistency: cystic  Calcifications: no  Hypervascular:  no  Interval growth (>20%): no     Nodule 3:  Nodule measurement: 3.2 x 2.0 , 3.1 cm  Echogenicity: Isoechoic  Consistency: solid  Calcifications: no  Hypervascular: yes  Interval growth (>20%): no     Nodule 4:  Nodule measurement: 0.8 x 0.7 , 0.8 cm  Echogenicity: Isoechoic  Consistency: spongiform  Calcifications: no  Hypervascular: yes  Interval growth (>20%): Not previously documented     Isthmus: No nodules.     Left Lobe:    Nodule 1:  Nodule measurement: 2.9 x 0.9 , 2.8 cm  Echogenicity: Isoechoic  Consistency: mixed  Calcifications: no  Hypervascular: no  Interval growth (>20%): yes, new from prior     Nodule 2:  Nodule measurement: 2.2 x 2.2 , 2.9 cm  Echogenicity: Isoechoic  Consistency: mixed  Calcifications: no  Hypervascular: no  Interval growth (>20%): no     Nodule 3:  Nodule measurement: 1.6 x 1.4 , 1.9 cm  Echogenicity: Isoechoic  Consistency: mixed  Calcifications: no  Hypervascular: no  Interval growth (>20%): no     Nodule 4:  Nodule measurement: 1.1 x 0.8 , 1.5 cm  Echogenicity: Isoechoic  Consistency: solid  Calcifications: yes, rim calcified  Hypervascular: no  Interval growth (>20%): no                                                                    Impression:  Multinodular goiter. Changes since 2011. New 2.9 cm nodule with mixed cystic and solid components in the left lobe of the thyroid. New nodule on the right that is less than 1 cm in size.    FNA thyroid 2016  CYTOLOGIC INTERPRETATION:     A. Thyroid, Left #1, Ultrasound-guided Fine Needle Aspiration:- Benign   - Consistent with a benign nodule (includes adenomatoid nodule, colloid nodule, etc.)     The [place] Implied Risk of Malignancy and Recommended Clinical Management:   Benign has a 0-3% risk of malignancy, recommended management is clinical follow-up     Specimen Adequacy: Satisfactory for evaluation.     B. Thyroid, Left #2, Ultrasound-guided Fine Needle Aspiration:   - Benign   - Consistent with a benign nodule (includes  "adenomatoid nodule, colloid nodule, etc.)     Specimen Adequacy: Satisfactory for evaluation.     C. Thyroid, Left #4, Ultrasound-guided Fine Needle Aspiration:   - Benign   - Consistent with a benign nodule (includes adenomatoid nodule, colloid nodule, etc.)   Specimen Adequacy: Satisfactory for evaluation.     US thyroid 1/12/2018  Right lobe measures 3.1 x 2.4 x 6.4 cm.  It shows normal echogenicity. Multiple discrete nodules as as follows:     Nodule #1: Predominantly solid isoechoic nodule with cystic spaces measures 1.3 x 1.4 x 1.7 cm. Previously 1.5 x 1.3 x 1.6 cm. Minimal areas of internal color Doppler flow. No worrisome calcifications.    Nodule #2: Cystic and solid nodule located medially measures 1.1 x 0.8 x 0.9 cm. Previously  this nodule was labeled as \"nodule #4\".and measured 1.0 x 0.7 x 1.1 cm.     Nodule #3: Predominantly solid isoechoic nodule measures 3.3 x 2.0 x 3.4 cm. There appear to be small internal calcifications. Moderate hyperemia. Previously, this nodule measured 3.1 x 3.2 x 2.0 cm.     A fourth nodule which was previously seen is not well visualized on the current examination.      Left lobe measures 5.9 x 3.9 x 7.1 cm. It shows normal echogenicity. Multiple discrete nodules as follows:     Nodule #1: Prominently solid isoechoic nodule with cystic components measures 2.2 x 2.0 x 2.4 cm. Minimal internal color Doppler flow. Previously, this nodule was labeled as \"nodule #1\" and measured 2.2 x 2.0 x 2.9 cm, slightly exaggerated by measurement technique. This nodule was previously sampled and returned benign.    Nodule #2: Predominantly solid isoechoic nodule measures 1.6 x 1.5 x 1.4 cm. There is internal shadowing calcification. Minimal internal color Doppler vascularity. Previously, this nodule was labeled as \"nodule #3\" and measured 1.6 x 1.4 x 1.9 cm.    Nodule #3: Probably solid and peripherally calcified nodule is isoechoic, measuring 0.8 x 0.6 x by 1.0 cm. Minimal. Nodular flow. " "Previously, this nodule was labeled as \"nodule #4\"and measured 1.1 x  0.8 x 1.5 cm. This nodule was previously sampled and returned benign.     A fourth nodule which was seen previously is not well visualized on the current examination.  This nodule was sampled previously and returned benign however.     Isthmus thickness is 0.6 cm.                                                                       Impression: Multiple grossly stable thyroid nodules as above. Several of the left sided nodules have been sampled and returned benign.  Two nodules, one in each lobe, seen on the prior ultrasound are not well visualized on the current examination.    US thyroid 12/5/2019  Thyroid parenchyma: Heterogenous thyroid with nodules  The right lobe of the thyroid measures: 2.9 x 2.8 x 6.1 cm   The thyroid isthmus measures: 0.72 cm   The left lobe of the thyroid measures: 5.4 x 4.2 x 8.3 cm     Right lobe:  Nodule 1:  Largest diameter: 2.1 cm  Shape: Wider-than-tall  Composition: Predominantly solid  Echogenicity: Isoechoic  Margins: Smooth  Internal Echogenic Foci: None  TIRADS category:This stable nodule was previously sampled with no malignancy identified.     Nodule 2:  Largest diameter: 1.2 cm  Shape: Wider-than-tall  Composition: Predominantly solid  Echogenicity: Isoechoic  Margins: Smooth  Internal Echogenic Foci: None  TIRADS category: 3     Nodule 3:  Largest diameter: 3.5 cm  Shape: Wider-than-tall  Composition: Predominantly solid  Echogenicity: Isoechoic  Margins: Smooth  Internal Echogenic Foci: None  TIRADS category: 3     Left lobe:  Nodule 1:  Largest diameter: 2.0 cm  Shape: Wider-than-tall  Composition: Predominantly solid with cystic components  Echogenicity: Isoechoic  Margins: Smooth  Internal Echogenic Foci: None  TIRADS category: 3     Nodule 2:  Largest diameter: 2.2 cm  Shape: Wider-than-tall  Composition: Predominantly solid  Echogenicity: Isoechoic  Margins: Smooth  Internal Echogenic Foci: " None  TIRADS category: 3     Nodule 3:  Largest diameter: 0.9 cm  Shape: Wider-than-tall  Composition: Predominantly solid  Echogenicity: Hypoechoic  Margins: Smooth  Internal Echogenic Foci: None  TIRADS category: Does not apply - measures less than 1 cm                                                                    IMPRESSION:   Heterogenous thyroid with nodules without suspicious features and grossly unchanged since 1/12/2018.    ASSESSMENT:    Miles Valencia is a 54 years old male with hx of T1N0M0 prostate cancer who is here for toxic multinodular goiter.    1) toxic multinodular goiter: noted since 2010 during physical examination. I can palpate enlargement of the thyroid, left>right. Ultrasound showed multiple nodules ranged 2-3 cm throughout both thyroid lobes.  FNA of the right sided nodule in 2010 was benign. FNA of left thyroid nodule #1, #2 and #4 were all benign in 10/2016    He noted more neck compressive symptoms. Ultrasound thyroid this year continues to show multinodular goiter without significant changes in size or suspicious feature from the previous US in 2018.    Discussed surgical option -- he would like to meet and discuss with surgeon    2) mild elevation of calcitonin: his calcitonin ranged in 10-11. The last checked on normal in 1/2018. He denied taking any PPI. Will follow up today.    3) previous elevation of urine normetanephrine: secondary to medication interference (he was on TCA). After stopping TCA, lab was normalized. MRI abdomen in 2010 showed normal adrenal gland. No symptoms. Plasma metanephrine and normetanephrine were normal in 12/2018.    PLAN:   Refer to Dr. Omayra Villalobos MD     Division of Diabetes and Endocrinology  Department of Medicine  [phone]

## 2019-12-06 NOTE — PROGRESS NOTES
Endocrinology Note         Miles is a 54 year old male presents today for multinodular goiter    HPI  Miles Valencia is a 54 years old male with hx of T1N0M0 prostate cancer who is here for multinodular goiter.    He was previously seen  and  for multinodular goiter and mild elevation of calcitonin in 2010. I have reviewed previous medical record. Last seen by me 12/2018.    In 2010, he was noted to have multinodular goiter and palpitation during physical examination. Given intermittent headache and palpitation, urine metanephrine was checked. At that time, he was noted to have mild elevation of urine normetanephrine which was thought to be secondary to amitriptyline. After stopping amitriptyline, he has not had any palpitation anymore and repeated urine metanephrine was normalized. At the same visit, he was also evaluated for multinodular goiter. US thyroid in 2010 showed multiple thyroid nodules throughout both lobes (detail in result section). The largest nodules on the right lobe was 3.3x2.2.x3.3 cm heterogeneous nodule with microcalcification and the largest left sided nodule was 2.7x2.1x3.3 cm heterogeneous nodule. He underwent FNA of the right thyroid nodule which was benign. Follow-up thyroid ultrasound in 2011 showed slight enlargement of thyroid nodules. Since then, he has not had any follow-up endocrine visit or ultrasound until 2016.    Thyroid ultrasound in 10/2016 showed growth in thyroid nodules and new 2.9 cm left thyroid nodule. Therefore, he underwent FNA of left thyroid nodule #1, #2 and #4 which were all benign in 10/2016.    Ultrasound thyroid in 1/2018 showed again multiple thyroid nodules which were stable. Because of subclinical hypothyroidism, he underwent thyroid uptake and scan showed 18.2% uptake at 24 hours with diffuse inhomogeneity and enlargement of the thyroid gland.  No autonomous nodules were identified.     Interim history  Last visit was 12/2018.  He  has been doing well except that he noticed intermittent compression symptoms more often. He denied difficulty swallowing or breathing. He denied chest pain, and palpitation, altered bowel movement, temperature intolerance, tremor, insomnia.    Repeat ultrasound thyroid on 12/5/2019 showed multinodular goiter with all suspicious features and the site has been stable from the previous ultrasound in 2018.  Please see below at the results section.     Labs on 12/5/2019 showed TSH 0.3, free T4 1.09, total T3 123.    Past Medical History  Past Medical History:   Diagnosis Date     Achilles bursitis or tendinitis 5/4/2014     Allergic rhinitis      Asthma      Congestion      Hypertension      Migraine      Multinodular goiter      Osteoarthritis      Pain in joint, shoulder region 4/18/2014     Prostate cancer (H)      RLS (restless legs syndrome)      Sleep problems    T1N0M0 prostate cancer with close monitoring with urologist.  Migraine headache    Allergies  Allergies   Allergen Reactions     Amoxicillin Diarrhea        Medications  Current Outpatient Medications   Medication Sig Dispense Refill     Acetaminophen (TYLENOL PO) Take 1,000 mg by mouth every 6 hours as needed        albuterol (PROAIR HFA/PROVENTIL HFA/VENTOLIN HFA) 108 (90 BASE) MCG/ACT Inhaler Inhale 2 puffs into the lungs every 4 hours as needed for shortness of breath / dyspnea or wheezing       ammonium lactate (AMLACTIN) 12 % cream Apply topically daily as needed        aspirin-acetaminophen-caffeine (EXCEDRIN MIGRAINE) 250-250-65 MG per tablet Take 2 tablets by mouth every 6 hours as needed for headaches       camphor-menthol (DERMASARRA) 0.5-0.5 % LOTN Apply topically every 6 hours as needed for skin care       CARTIA  MG 24 hr CD capsule Take 120 mg by mouth every morning   0     cholecalciferol (VITAMIN D3 MAXIMUM STRENGTH) 5000 UNITS CAPS Take 5,000 Units by mouth every morning        clindamycin (CLEOCIN) 300 MG capsule Take 2 capsules  (600 mg) by mouth 1 hour before dental work. (Patient not taking: Reported on 12/14/2018) 2 capsule 3     Cyanocobalamin (VITAMIN B-12 PO) Take 1 tablet by mouth every evening        DiphenhydrAMINE HCl (BENADRYL ALLERGY PO) Take 25-50 mg by mouth daily as needed (allergies)        fexofenadine (ALLEGRA) 180 MG tablet Take 180 mg by mouth daily       gabapentin (NEURONTIN) 100 MG capsule TK 4 CS PO D HS  4     gabapentin (NEURONTIN) 100 MG capsule Take 4 capsules (400 mg) by mouth At Bedtime 120 capsule 0     gabapentin (NEURONTIN) 300 MG capsule Take 1 capsule (300 mg) by mouth 3 times daily (Patient taking differently: Take 600 mg by mouth 2 times daily ) 60 capsule 0     hydrochlorothiazide (HYDRODIURIL) 25 MG tablet Take 25 mg by mouth every morning        hydrocortisone (CORTAID) 1 % cream Apply topically 2 times daily as needed       meclizine (ANTIVERT) 12.5 MG tablet Take 2 tablets (25 mg) by mouth 4 times daily as needed for dizziness 30 tablet 0     MELATONIN PO Take 1 mg by mouth nightly as needed       MELOXICAM PO Take by mouth as needed       Omega-3 Fatty Acids (OMEGA-3 FISH OIL) 1000 MG CAPS Take 1 gram by mouth in the morning and take 2 grams by mouth in the evening.       oxyCODONE IR (ROXICODONE) 5 MG tablet   0     rizatriptan (MAXALT-MLT) 10 MG disintegrating tablet Take 10 mg by mouth at onset of headache   6     SUMAtriptan Succinate Refill (IMITREX) 6 MG/0.5ML SOCT Inject 6 mg into the muscle every 12 hours as needed   1     zolpidem (AMBIEN) 5 MG tablet Take 5 mg by mouth nightly as needed for sleep       Family History  Mother has hyperparathyroidism  Maternal aunt has hyperthyroidism  Brother has testicular cancer  Maternal uncle has stomach and bone cancer  Maternal aunts has leukemia  Another maternal aunt and grandmother have breast cancer    Social History  Social History     Tobacco Use     Smoking status: Never Smoker     Smokeless tobacco: Never Used   Substance Use Topics      "Alcohol use: Yes     Alcohol/week: 0.0 standard drinks     Comment: 1-2 drinks a week     Drug use: No   no smoking  Works as a     ROS  Constitutional: feeling well   Eyes: no vision change, diplopia or red eyes   Neck: no difficulty swallowing, no choking, no neck pain, +noticed slight enlargement of the thyroid gland  Cardiovascular: no chest pain, palpitations  Respiratory: no dyspnea, cough, shortness of breath  GI: no nausea, vomiting, diarrhea or constipation, no abdominal pain   : no change in urine, no dysuria or hematuria, +recent diagnosis of early stage of prostate cancer  Musculoskeletal:+muscle spasm particularly in the neck area  Integumentary: no concerning lesions   Neuro: no loss of strength or sensation, no numbness or tingling, no tremor,  no headache   Endo: no polyuria or polydipsia, no temperature intolerance   Heme/Lymph: no concerning bumps, no bleeding problems   Allergy: no environmental allergies   sych: +restless leg syndrome, +chronic insomnia    Physical Exam  /80   Pulse 74   Ht 1.753 m (5' 9\")   Wt 88.2 kg (194 lb 6.4 oz)   BMI 28.71 kg/m    Body mass index is 28.71 kg/m .  Constitutional: no distress, comfortable, pleasant   Eyes: anicteric, normal extra-ocular movements, no lid lag or retraction  Neck: +visible and palpable enlarged thyroid, left>right. No discrete nodule   Cardiovascular: regular rate and rhythm, normal S1 and S2, no murmurs  Respiratory: clear to auscultation, no wheezes or crackles, normal breath sounds   Gastrointestinal:  nontender, no hepatomegaly, no masses   Musculoskeletal: no edema   Skin: no concerning lesions, no jaundice   Neurological: cranial nerves intact, 2+reflexes at patella, normal gait, no tremor on outstretched hands bilaterally  Psychological: appropriate mood   Lymphatic: no cervical  lymphadenopathy.      RESULTS  FNA 5/2010  Thyroid, right, FNA:  Negative for Malignancy  consistent with benign colloid nodule.  Specimen " Adequacy: Satisfactory for evaluation.    COMMENT:  Smears show abundant colloid and rare groups (approximately 15) of benign-appearing follicular epithelial cells.  The epithelial cells are arranged in a predominantly macrofollicular pattern. Cytologic features of papillary, medullary or anaplastic carcinoma are not present.  Occasional background histiocytes and rare giant cells are seen. These cytomorphologic findings are consistent with a benign colloid nodule.    4/2010    3/2010      5/2010    8/2010      12/19/2018      US thyroid 3/2010  Findings: The right lobe of the thyroid measures 7.7 x 3 x 2.4 cm. The left lobe of the thyroid measures 8.2 x 3.6 x 3.6 cm. The isthmus measures 0.9 cm. The right lobe of the thyroid demonstrates 3 heterogeneous nodules,demonstrating flow on color Doppler, the largest in the inferior pole measuring 3.3 x 2.2 x 3.3 cm and demonstrating punctate echogenic areas that may represent microcalcifications and chaotic internal vessels, and the small nodules in the mid right lobe measuring 0.7 x 1.1 x 1 cm and 1 x 0.8 x 1 cm. The left lobe of the thyroid demonstrates a large heterogeneous cystic nodule in the superior to mid left lobe measuring 2.7 x 2.1 x 3.3 cm. An adjacent smaller 0.7 x 0.9 x 1 cm nodule is also seen. Both hese nodules demonstrate vascular flow in the periphery and not internally on color Doppler. The left inferior pole demonstrates heterogeneous appearance suggesting multiple nodules.    Impression: Multinodular goiter. The right inferior nodule measuring 3.3 x 2.3 x 3 cm demonstrates microcalcifications and chaotic internal vasculature, worrisome for malignancy. Consider biopsy.    US thyroid 8/2011  Findings: The right lobe of the thyroid measures 7 x 2.3 x 2.3 cm. The left lobe of the thyroid measures 8.5 x 3.2 x 2.3 cm. The isthmus measures 0.7 cm in width. Multiple heterogeneous mixed echogenicity nodules throughout the both lobes of the thyroid gland. Right  upper  nodule measures 1.6 x 1.2 x 1.3 cm, previously was measuring 0.7 x 1.1 x 1 cm. The second nodule at the midportion of the right lobe measures 1 x 0.6 x 0.5 cm, previously was measuring 1 x 0.8 x 1 cm. The largest nodule in the inferior pole of the right lobe measures 3.1 x 3.1 x 2.1  cm, previously was measuring 3.3 x 2.2 x 3.3 cm. Heterogeneous nodule in the upper pole of the left lobe measures 3.6 x 2.4 x 1.9 cm, previously was measuring 2.7 x 2.1 x 3.3 cm. Smaller nodule at the midportion of the left lobe measures 0.9 x 1.1 x 0.7 cm, unchanged.   Heterogeneous nodule in the inferior medial aspect of the left lobe of the thyroid gland measures 1.9 x 1.8 x 1.1 cm.      Impression:   1. Multinodular goiter. Some of the nodules in both lobes are slightly larger compared to prior study.  2. Heterogeneous nodule seen in the inferior medial aspect of the left lobe of the thyroid gland was not seen on the prior study. This could be a new thyroid nodule. Consider ultrasound guided fine needle biopsy.    US thyroid 9/26/2016  Thyroid parenchyma: heterogenous  The right lobe of the thyroid measures: 2.8 x 2.4 x 9.6 cm ,previously 6.9 x 2.3 x 2.3 cm  The thyroid isthmus measures: 3.7 mm, previously 7.4 mm    The left lobe of the thyroid measures: 5.2 x 4.4 x 10.1 cm, previously 8.5 x 3.2 x 3.3 cm     Right lobe:  Nodule 1:  Nodule measurement: 1.5 x 1.3 , 1.6 cm  Echogenicity: Isoechoic  Consistency: mixed  Calcifications: no  Hypervascular: yes  Interval growth (>20%): no     Nodule 2:  Nodule measurement: 1.0 x 0.7 , 1.2 cm  Echogenicity: Hypoechoic  Consistency: cystic  Calcifications: no  Hypervascular: no  Interval growth (>20%): no     Nodule 3:  Nodule measurement: 3.2 x 2.0 , 3.1 cm  Echogenicity: Isoechoic  Consistency: solid  Calcifications: no  Hypervascular: yes  Interval growth (>20%): no     Nodule 4:  Nodule measurement: 0.8 x 0.7 , 0.8 cm  Echogenicity: Isoechoic  Consistency:  spongiform  Calcifications: no  Hypervascular: yes  Interval growth (>20%): Not previously documented     Isthmus: No nodules.     Left Lobe:    Nodule 1:  Nodule measurement: 2.9 x 0.9 , 2.8 cm  Echogenicity: Isoechoic  Consistency: mixed  Calcifications: no  Hypervascular: no  Interval growth (>20%): yes, new from prior     Nodule 2:  Nodule measurement: 2.2 x 2.2 , 2.9 cm  Echogenicity: Isoechoic  Consistency: mixed  Calcifications: no  Hypervascular: no  Interval growth (>20%): no     Nodule 3:  Nodule measurement: 1.6 x 1.4 , 1.9 cm  Echogenicity: Isoechoic  Consistency: mixed  Calcifications: no  Hypervascular: no  Interval growth (>20%): no     Nodule 4:  Nodule measurement: 1.1 x 0.8 , 1.5 cm  Echogenicity: Isoechoic  Consistency: solid  Calcifications: yes, rim calcified  Hypervascular: no  Interval growth (>20%): no                                                                    Impression:  Multinodular goiter. Changes since 2011. New 2.9 cm nodule with mixed cystic and solid components in the left lobe of the thyroid. New nodule on the right that is less than 1 cm in size.    FNA thyroid 2016  CYTOLOGIC INTERPRETATION:     A. Thyroid, Left #1, Ultrasound-guided Fine Needle Aspiration:- Benign   - Consistent with a benign nodule (includes adenomatoid nodule, colloid nodule, etc.)     The [place] Implied Risk of Malignancy and Recommended Clinical Management:   Benign has a 0-3% risk of malignancy, recommended management is clinical follow-up     Specimen Adequacy: Satisfactory for evaluation.     B. Thyroid, Left #2, Ultrasound-guided Fine Needle Aspiration:   - Benign   - Consistent with a benign nodule (includes adenomatoid nodule, colloid nodule, etc.)     Specimen Adequacy: Satisfactory for evaluation.     C. Thyroid, Left #4, Ultrasound-guided Fine Needle Aspiration:   - Benign   - Consistent with a benign nodule (includes adenomatoid nodule, colloid nodule, etc.)   Specimen Adequacy:  "Satisfactory for evaluation.     US thyroid 1/12/2018  Right lobe measures 3.1 x 2.4 x 6.4 cm.  It shows normal echogenicity. Multiple discrete nodules as as follows:     Nodule #1: Predominantly solid isoechoic nodule with cystic spaces measures 1.3 x 1.4 x 1.7 cm. Previously 1.5 x 1.3 x 1.6 cm. Minimal areas of internal color Doppler flow. No worrisome calcifications.    Nodule #2: Cystic and solid nodule located medially measures 1.1 x 0.8 x 0.9 cm. Previously  this nodule was labeled as \"nodule #4\".and measured 1.0 x 0.7 x 1.1 cm.     Nodule #3: Predominantly solid isoechoic nodule measures 3.3 x 2.0 x 3.4 cm. There appear to be small internal calcifications. Moderate hyperemia. Previously, this nodule measured 3.1 x 3.2 x 2.0 cm.     A fourth nodule which was previously seen is not well visualized on the current examination.      Left lobe measures 5.9 x 3.9 x 7.1 cm. It shows normal echogenicity. Multiple discrete nodules as follows:     Nodule #1: Prominently solid isoechoic nodule with cystic components measures 2.2 x 2.0 x 2.4 cm. Minimal internal color Doppler flow. Previously, this nodule was labeled as \"nodule #1\" and measured 2.2 x 2.0 x 2.9 cm, slightly exaggerated by measurement technique. This nodule was previously sampled and returned benign.    Nodule #2: Predominantly solid isoechoic nodule measures 1.6 x 1.5 x 1.4 cm. There is internal shadowing calcification. Minimal internal color Doppler vascularity. Previously, this nodule was labeled as \"nodule #3\" and measured 1.6 x 1.4 x 1.9 cm.    Nodule #3: Probably solid and peripherally calcified nodule is isoechoic, measuring 0.8 x 0.6 x by 1.0 cm. Minimal. Nodular flow. Previously, this nodule was labeled as \"nodule #4\"and measured 1.1 x  0.8 x 1.5 cm. This nodule was previously sampled and returned benign.     A fourth nodule which was seen previously is not well visualized on the current examination.  This nodule was sampled previously and returned " benign however.     Isthmus thickness is 0.6 cm.                                                                       Impression: Multiple grossly stable thyroid nodules as above. Several of the left sided nodules have been sampled and returned benign.  Two nodules, one in each lobe, seen on the prior ultrasound are not well visualized on the current examination.    US thyroid 12/5/2019  Thyroid parenchyma: Heterogenous thyroid with nodules  The right lobe of the thyroid measures: 2.9 x 2.8 x 6.1 cm   The thyroid isthmus measures: 0.72 cm   The left lobe of the thyroid measures: 5.4 x 4.2 x 8.3 cm     Right lobe:  Nodule 1:  Largest diameter: 2.1 cm  Shape: Wider-than-tall  Composition: Predominantly solid  Echogenicity: Isoechoic  Margins: Smooth  Internal Echogenic Foci: None  TIRADS category:This stable nodule was previously sampled with no malignancy identified.     Nodule 2:  Largest diameter: 1.2 cm  Shape: Wider-than-tall  Composition: Predominantly solid  Echogenicity: Isoechoic  Margins: Smooth  Internal Echogenic Foci: None  TIRADS category: 3     Nodule 3:  Largest diameter: 3.5 cm  Shape: Wider-than-tall  Composition: Predominantly solid  Echogenicity: Isoechoic  Margins: Smooth  Internal Echogenic Foci: None  TIRADS category: 3     Left lobe:  Nodule 1:  Largest diameter: 2.0 cm  Shape: Wider-than-tall  Composition: Predominantly solid with cystic components  Echogenicity: Isoechoic  Margins: Smooth  Internal Echogenic Foci: None  TIRADS category: 3     Nodule 2:  Largest diameter: 2.2 cm  Shape: Wider-than-tall  Composition: Predominantly solid  Echogenicity: Isoechoic  Margins: Smooth  Internal Echogenic Foci: None  TIRADS category: 3     Nodule 3:  Largest diameter: 0.9 cm  Shape: Wider-than-tall  Composition: Predominantly solid  Echogenicity: Hypoechoic  Margins: Smooth  Internal Echogenic Foci: None  TIRADS category: Does not apply - measures less than 1 cm                                                                     IMPRESSION:   Heterogenous thyroid with nodules without suspicious features and grossly unchanged since 1/12/2018.    ASSESSMENT:    Miles Valencia is a 54 years old male with hx of T1N0M0 prostate cancer who is here for toxic multinodular goiter.    1) toxic multinodular goiter: noted since 2010 during physical examination. I can palpate enlargement of the thyroid, left>right. Ultrasound showed multiple nodules ranged 2-3 cm throughout both thyroid lobes.  FNA of the right sided nodule in 2010 was benign. FNA of left thyroid nodule #1, #2 and #4 were all benign in 10/2016    He noted more neck compressive symptoms. Ultrasound thyroid this year continues to show multinodular goiter without significant changes in size or suspicious feature from the previous US in 2018.    Discussed surgical option -- he would like to meet and discuss with surgeon    2) mild elevation of calcitonin: his calcitonin ranged in 10-11. The last checked on normal in 1/2018. He denied taking any PPI. Will follow up today.    3) previous elevation of urine normetanephrine: secondary to medication interference (he was on TCA). After stopping TCA, lab was normalized. MRI abdomen in 2010 showed normal adrenal gland. No symptoms. Plasma metanephrine and normetanephrine were normal in 12/2018.    PLAN:   Refer to Dr. Omayra Villalobos MD     Division of Diabetes and Endocrinology  Department of Medicine  [phone]

## 2019-12-09 ENCOUNTER — TELEPHONE (OUTPATIENT)
Dept: OTOLARYNGOLOGY | Facility: CLINIC | Age: 54
End: 2019-12-09

## 2019-12-10 NOTE — TELEPHONE ENCOUNTER
FUTURE VISIT INFORMATION      FUTURE VISIT INFORMATION:    Date: 1/13/20    Time: 3 PM    [place] [facility]  REFERRAL INFORMATION:    Referring provider:  Dr. Villalobos    Referring providers clinic:  [facility]    Reason for visit/diagnosis: Multiple Thyroid Nodules    RECORDS REQUESTED FROM:       Clinic name Comments Records Status Imaging Status   [facility] 12/6/19 - OV with Dr. Villalobos  8/1/11 - ENT OV with Dr. Cutler  5/7/10 - ENDO OV with Dr. Laurie Javier    [facility] - Imaging 12/5/19 - US Thyroid  2/8/18 - NM Thyroid Uptake and Scan  11/18/16 - NM Bone Scan Whole body  10/7/16 - US Biopsy FNA Thyroid  9/26/16 - US Thyroid  8/1/11 - US Thyroid [facility] PACs

## 2020-01-08 ENCOUNTER — TELEPHONE (OUTPATIENT)
Dept: UROLOGY | Facility: CLINIC | Age: 55
End: 2020-01-08

## 2020-01-08 DIAGNOSIS — C61 MALIGNANT NEOPLASM OF PROSTATE (H): Primary | ICD-10-CM

## 2020-01-08 NOTE — TELEPHONE ENCOUNTER
----- Message from GEORGE Green sent at 1/8/2020 12:11 PM CST -----  Regarding: lab appt 1.13.2020  In order to offer our patients the best possible experience, the lab schedule has been reviewed and found that this patient does not have any lab orders.   Please place lab orders ASAP.   Thank you, your [facility] [phone]

## 2020-01-10 ENCOUNTER — PRE VISIT (OUTPATIENT)
Dept: UROLOGY | Facility: CLINIC | Age: 55
End: 2020-01-10

## 2020-01-10 NOTE — TELEPHONE ENCOUNTER
Chief Complaint : year PSA check     Records/Orders: PSA is on 1/13    Pt Contacted: no    At Rooming: normal

## 2020-01-13 ENCOUNTER — OFFICE VISIT (OUTPATIENT)
Dept: OTOLARYNGOLOGY | Facility: CLINIC | Age: 55
End: 2020-01-13
Attending: INTERNAL MEDICINE
Payer: COMMERCIAL

## 2020-01-13 ENCOUNTER — PRE VISIT (OUTPATIENT)
Dept: OTOLARYNGOLOGY | Facility: CLINIC | Age: 55
End: 2020-01-13

## 2020-01-13 VITALS
HEIGHT: 69 IN | HEART RATE: 70 BPM | RESPIRATION RATE: 19 BRPM | BODY MASS INDEX: 28.44 KG/M2 | WEIGHT: 192 LBS | DIASTOLIC BLOOD PRESSURE: 90 MMHG | SYSTOLIC BLOOD PRESSURE: 145 MMHG | TEMPERATURE: 98.1 F

## 2020-01-13 DIAGNOSIS — E04.2 NON-TOXIC MULTINODULAR GOITER: Primary | ICD-10-CM

## 2020-01-13 DIAGNOSIS — C61 MALIGNANT NEOPLASM OF PROSTATE (H): ICD-10-CM

## 2020-01-13 LAB — PSA SERPL-MCNC: 10.4 UG/L (ref 0–4)

## 2020-01-13 RX ORDER — DEXAMETHASONE SODIUM PHOSPHATE 4 MG/ML
10 INJECTION, SOLUTION INTRA-ARTICULAR; INTRALESIONAL; INTRAMUSCULAR; INTRAVENOUS; SOFT TISSUE ONCE
Status: CANCELLED | OUTPATIENT
Start: 2020-01-13 | End: 2020-01-13

## 2020-01-13 ASSESSMENT — MIFFLIN-ST. JEOR: SCORE: 1699.67

## 2020-01-13 ASSESSMENT — PAIN SCALES - GENERAL: PAINLEVEL: NO PAIN (0)

## 2020-01-13 NOTE — NURSING NOTE
"Chief Complaint   Patient presents with     Consult     multiple thyroid nodules      Blood pressure (!) 145/90, pulse 70, temperature 98.1  F (36.7  C), resp. rate 19, height 1.75 m (5' 8.9\"), weight 87.1 kg (192 lb).    Anoop Paris LPN    "

## 2020-01-13 NOTE — NURSING NOTE
Teaching Flowsheet - ENT  Teaching Topic: Thyroidectomy, Total  Relevant Diagnosis: Non-Toxic Multinodular Goiter  Person(s) involved in teaching: Patient    Motivation Level  Asks Questions: Yes  Eager to Learn: Yes  Cooperative: Yes  Receptive (willing/able to accept information): Yes  Comments: Reviewed pre-op H and P, NPO prior to surgery, pre-op scrub (given Hibiclens), reviewed post-op cares, activity and pain.    Patient Demonstrates Understanding of the Following:  Reason for the appointment, diagnosis, and treatment plan: Yes  Knowledge of proper use of medications and conditions for which they are ordered (with special attention to potential side effects or drug interactions) - Stop aspirin products 1 week before surgery: Yes  Which situations necessitate calling provider and whom to contact: Yes  Nutritional needs and diet plan: Yes  Pain management techniques: Yes  Patient instructed on hand hygiene: Yes  How and when to access community resources: Yes    Infection Prevention  Patient demonstrates understanding of the following:  Surgical procedure site care taught: Yes  Signs and symptoms of infection taught: Yes  Wound care taught: Yes  Instructional Materials Used/Given: Pre-op booklet, verbal instruction      Jessi Roberson LPN

## 2020-01-13 NOTE — LETTER
1/13/2020       RE: Miles Valencia  [address] [place] [address]     Dear Colleague,    Thank you for referring your patient, Miles Valencia, to the [facility] EAR NOSE AND THROAT at [facility]. Please see a copy of my visit note below.    This is a patient whom I saw in August 2011 for asymptomatic multinodular goiter. At that time he was essentially asymptomatic, thyroid function tests were normal and biopsies of the thyroid nodules were benign. We elected to monitor the nodules instead of proceeding at that time with thyroidectomy. He has been followed closely by endocrinology. At His most recent visit with Dr. Villalobos, he complained of increased compressive sx-he can feel some pressure with swallowing. US in 12/2019-showed an enlarged thyroid gland and minimal change of the thyroid nodules. Although his thyroid is quite large. TFT's are WNL    SX include-pressure with swallowing. No change in voice quality. No difficulty with inspiration. No sx of hypo or hyperthyroidism. No family hx of PTC. No head and neck XRT.    PMH/PSH/Medications reviewed in EMR    10pt ROS pertinent for that noted in HPI    PE:  Diffusely enlarged thyroid gland measuring about 7-8cm in height and 8 cm in width (total). The thyroid is dense. Trachea is midline. No cervical lymphadenopathy.    US:  FINDINGS:     Technique: Grayscale and color ultrasound imaging of the thyroid was  performed.     Findings:    Thyroid parenchyma: Heterogenous thyroid with nodules     The right lobe of the thyroid measures: 2.9 x 2.8 x 6.1 cm      The thyroid isthmus measures: 0.72 cm      The left lobe of the thyroid measures: 5.4 x 4.2 x 8.3 cm     Right lobe:  Nodule 1:  Largest diameter: 2.1 cm  Shape: Wider-than-tall  Composition: Predominantly solid  Echogenicity: Isoechoic  Margins: Smooth  Internal Echogenic Foci: None  TIRADS category:This stable nodule was previously sampled with no  malignancy  identified.     Nodule 2:  Largest diameter: 1.2 cm  Shape: Wider-than-tall  Composition: Predominantly solid  Echogenicity: Isoechoic  Margins: Smooth  Internal Echogenic Foci: None  TIRADS category: 3     Nodule 3:  Largest diameter: 3.5 cm  Shape: Wider-than-tall  Composition: Predominantly solid  Echogenicity: Isoechoic  Margins: Smooth  Internal Echogenic Foci: None  TIRADS category: 3     Left lobe:  Nodule 1:  Largest diameter: 2.0 cm  Shape: Wider-than-tall  Composition: Predominantly solid with cystic components  Echogenicity: Isoechoic  Margins: Smooth  Internal Echogenic Foci: None  TIRADS category: 3     Nodule 2:  Largest diameter: 2.2 cm  Shape: Wider-than-tall  Composition: Predominantly solid  Echogenicity: Isoechoic  Margins: Smooth  Internal Echogenic Foci: None  TIRADS category: 3     Nodule 3:  Largest diameter: 0.9 cm  Shape: Wider-than-tall  Composition: Predominantly solid  Echogenicity: Hypoechoic  Margins: Smooth  Internal Echogenic Foci: None  TIRADS category: Does not apply - measures less than 1 cm                                                                      IMPRESSION:   Heterogenous thyroid with nodules without suspicious features and  grossly unchanged since 1/12/2018.     Asses:  Diffusely enlarged MNG    Plan:  I recommend total thyroidectomy. Reviewed procedure as well as possibility of having a drain post operatively-will schedule this surgery to be done at the Hospital. Discussed risks including but not limited bleeding, infection, injury to the recurrent laryngeal nerve(s), loss of airway, hypocalcemia. Post operative care discussed.     I spent >30 minutes in the care, exam and consultation of this patient for surgical options for non-toxic multinodular goiter.     Evelin Cutler MD

## 2020-01-14 ENCOUNTER — TELEPHONE (OUTPATIENT)
Dept: OTOLARYNGOLOGY | Facility: CLINIC | Age: 55
End: 2020-01-14

## 2020-01-14 ENCOUNTER — HOSPITAL ENCOUNTER (OUTPATIENT)
Facility: CLINIC | Age: 55
End: 2020-01-14
Attending: SURGERY | Admitting: SURGERY
Payer: COMMERCIAL

## 2020-01-14 DIAGNOSIS — E04.2 NON-TOXIC MULTINODULAR GOITER: ICD-10-CM

## 2020-01-14 NOTE — TELEPHONE ENCOUNTER
Left message regarding scheduling surgery with Dr. Cutler. Call back number provided, [phone].     Helen Johnson   Perioperative Coordinator  Department of Otolaryngology    Clinics and Surgery [address]  [address]  Office: [phone]  Fax: [phone]  [email or web address]

## 2020-01-14 NOTE — TELEPHONE ENCOUNTER
Patient is scheduled for surgery with Dr. Cutler. Spoke to Patient.     Date of Surgery: 4/8/2020    Location: [facility]    H&P:  PCP    Post op: 2 weeks post op     Surgery packet:    Received in clinic by JUANA Bowen    Prior Authorization: sent to [facility] 1/13         Helen Johnson

## 2020-01-21 NOTE — PROGRESS NOTES
Urology Clinic    Eber Mcmillan MD  Date of Service: 2020     Name: Miles Valencia  MRN: [id number]  Age: 54 year old  : 1965  Referring provider: Oneyda Jones     Assessment:  Miles Valencia  is a 54 year old male with a history of GS 3+3, Grade Group 1 prostate cancer on active surveillance. PSA at diagnosis was 4.5 in 2016. Last biopsy was stable in 2017 (PSA of 6.91). Last prostate MRI was PIRADS 2 in 2018 (PSA of 7.44 and density of 0.19). Since, his PSAs have continued to rise (8.57 in 2018, 9.85 in 2019 & 10.40 today).     Based on this rise we discussed it may be good for him to have an updated prostate MRI to make sure PSA density is stable. Discussed if there is progression on the MRI, then we may discuss either biopsy or proceed straight treatment.     We very briefly discussed options for treatment of a localized prostate cancer including active surveillance (reviewing the [place] experience), brachytherapy, external beam, and proton beam radiation therapy, as well as surgical extirpation. We briefly mentioned cryotherapy, but the results are not great in the primary setting and they almost uniformly lead to loss of erections.     Plan:  -Prostate MRI. (If reassuring then PSA in 6 months). Otherwise return for biopsy or treatment discussion.     Attestation:  This patient was seen and evaluated by me, with a scribe taking notes.  I have reviewed the note above and agree.  The physical exam and or any procedures were performed by me and the pertinant details are outlined below.       Rising PSA and PSA density concerning in this young man, if MRI shows worsening would lean towards treatment.      Eber Mcmillan MD  Department of Urology  [facility]    ______________________________________________________________________    HPI  Miles Valencia is a 54 year old male with a history of prostate cancer that is being followed with active  "surveillance. Last PSA of 10.40 in 01/2020.        Initial PSA at diagnosis: 4.5 in May 2016   1st Biopsy (Date 07/2016) [place] Score was 3 + 3  In 3 of 12 cores.  With volume affected from 10% to 15%  Grade Group:1    PSA at time of biopsy: 6.91   2nd Biopsy (Date 06/2017) [place] Score was 3 + 3  In 1 of 12 cores.  With 20% volume affected.   Grade Group:1       MRI 11/01/2016: PSA of 9.56  PSA density is 0.27  Prostate volume 35   PIRADS 2 exam.   Sacral lesion (confirmed negative with bone scan)    MRI 11/2018: PSA of 7.44  PSA density is 0.19   Prostate volume 39  PIRADS 2 exam.       Today, he reports doing well. Denies any new symptoms.     Review of Systems:   Pertinent items are noted in HPI or as below, remainder of complete ROS is negative.      Physical Exam:   Patient is a 54 year old  male   Vitals: Blood pressure 134/83, pulse 83, height 1.753 m (5' 9\"), weight 87.1 kg (192 lb).  General Appearance Adult: Alert, no acute distress, oriented  HENT: throat/mouth:normal, good dentition  Lungs: no respiratory distress, or pursed lip breathing  Heart: No obvious jugular venous distension present  Abdomen: Body mass index is 28.35 kg/m .  Musculoskeltal: extremities normal  Skin: no visible suspicious lesions or rashes  Neuro: Alert, oriented, speech and mentation normal  Psych: affect and mood normal  Gait: Normal  : deferred    Laboratory:   I reviewed all applicable laboratory and pathology data and went over findings with patient  Significant for     Lab Results   Component Value Date    CR 0.85 05/31/2017    CR 0.92 04/28/2010       PSA   Date Value Ref Range Status   01/13/2020 10.40 (H) 0 - 4 ug/L Final     Comment:     Assay Method:  Chemiluminescence using Siemens Vista analyzer   12/05/2019 9.85 (H) 0 - 4 ug/L Final     Comment:     Assay Method:  Chemiluminescence using Siemens Vista analyzer   12/07/2018 8.57 (H) 0 - 4 ug/L Final     Comment:     Assay Method:  Chemiluminescence using Siemens " [place] analyzer   12/13/2017 7.44 (H) 0 - 4 ug/L Final     Comment:     Assay Method:  Chemiluminescence using Siemens Vista analyzer   05/31/2017 6.91 (H) 0 - 4 ug/L Final     Comment:     Assay Method:  Chemiluminescence using Siemens Vista analyzer   02/06/2017 7.39 (H) 0 - 4 ug/L Final     Comment:     Assay Method:  Chemiluminescence using Siemens Vista analyzer   11/01/2016 9.56 (H) 0 - 4 ug/L Final     Comment:     Assay Method:  Chemiluminescence using Siemens Vista analyzer   09/23/2016 6.53 (H) 0 - 4 ug/L Final     Comment:     Assay Method:  Chemiluminescence using Siemens Vista analyzer   08/23/2010 2.28 0 - 4 ug/L Final   05/2016- 4.5   01/2016-4.4   02/2014-3.5   06/2011-2.27     Scribe Disclosure:  I, Rita Garcia, am serving as a scribe to document services personally performed by Eber Mcmillan MD at this visit, based upon the provider's statements to me. All documentation has been reviewed by the aforementioned provider prior to being entered into the official medical record.

## 2020-01-23 ENCOUNTER — OFFICE VISIT (OUTPATIENT)
Dept: UROLOGY | Facility: CLINIC | Age: 55
End: 2020-01-23
Payer: COMMERCIAL

## 2020-01-23 VITALS
BODY MASS INDEX: 28.44 KG/M2 | HEIGHT: 69 IN | DIASTOLIC BLOOD PRESSURE: 83 MMHG | WEIGHT: 192 LBS | HEART RATE: 83 BPM | SYSTOLIC BLOOD PRESSURE: 134 MMHG

## 2020-01-23 DIAGNOSIS — C61 MALIGNANT NEOPLASM OF PROSTATE (H): Primary | ICD-10-CM

## 2020-01-23 ASSESSMENT — MIFFLIN-ST. JEOR: SCORE: 1701.29

## 2020-01-23 ASSESSMENT — PAIN SCALES - GENERAL: PAINLEVEL: NO PAIN (0)

## 2020-01-23 NOTE — PATIENT INSTRUCTIONS
Please get MRI if it look good   Please follow up in 6 months with a PSA before       It was a pleasure meeting with you today.  Thank you for allowing me and my team the privilege of caring for you today.  YOU are the reason we are here, and I truly hope we provided you with the excellent service you deserve.  Please let us know if there is anything else we can do for you so that we can be sure you are leaving completely satisfied with your care experience.

## 2020-01-23 NOTE — NURSING NOTE
Chief Complaint   Patient presents with     RECHECK     Prostate cancer follow up with PSA     Taryn Deshpande, CMA

## 2020-01-23 NOTE — LETTER
2020       RE: Miles Valencia  [address] [place] [address]     Dear Colleague,    Thank you for referring your patient, Miles Valencia, to the [facility] UROLOGY AND INST FOR PROSTATE AND UROLOGIC CANCERS at [facility]. Please see a copy of my visit note below.      Urology Clinic    Eber Mcmillan MD  Date of Service: 2020     Name: Miles Valencia  MRN: [id number]  Age: 54 year old  : 1965  Referring provider: Oneyda Jones     Assessment:  Miles Valencia  is a 54 year old male with a history of GS 3+3, Grade Group 1 prostate cancer on active surveillance. PSA at diagnosis was 4.5 in 2016. Last biopsy was stable in 2017 (PSA of 6.91). Last prostate MRI was PIRADS 2 in 2018 (PSA of 7.44 and density of 0.19). Since, his PSAs have continued to rise (8.57 in 2018, 9.85 in 2019 & 10.40 today).     Based on this rise we discussed it may be good for him to have an updated prostate MRI to make sure PSA density is stable. Discussed if there is progression on the MRI, then we may discuss either biopsy or proceed straight treatment.     We very briefly discussed options for treatment of a localized prostate cancer including active surveillance (reviewing the [place] experience), brachytherapy, external beam, and proton beam radiation therapy, as well as surgical extirpation. We briefly mentioned cryotherapy, but the results are not great in the primary setting and they almost uniformly lead to loss of erections.     Plan:  -Prostate MRI. (If reassuring then PSA in 6 months). Otherwise return for biopsy or treatment discussion.     Attestation:  This patient was seen and evaluated by me, with a scribe taking notes.  I have reviewed the note above and agree.  The physical exam and or any procedures were performed by me and the pertinant details are outlined below.       Rising PSA and PSA density concerning in this young man, if  "MRI shows worsening would lean towards treatment.      Eber Mcmillan MD  Department of Urology  [facility]    ______________________________________________________________________    HPI  Miles Valencia is a 54 year old male with a history of prostate cancer that is being followed with active surveillance. Last PSA of 10.40 in 01/2020.        Initial PSA at diagnosis: 4.5 in May 2016   1st Biopsy (Date 07/2016) [place] Score was 3 + 3  In 3 of 12 cores.  With volume affected from 10% to 15%  Grade Group:1    PSA at time of biopsy: 6.91   2nd Biopsy (Date 06/2017) [place] Score was 3 + 3  In 1 of 12 cores.  With 20% volume affected.   Grade Group:1       MRI 11/01/2016: PSA of 9.56  PSA density is 0.27  Prostate volume 35   PIRADS 2 exam.   Sacral lesion (confirmed negative with bone scan)    MRI 11/2018: PSA of 7.44  PSA density is 0.19   Prostate volume 39  PIRADS 2 exam.       Today, he reports doing well. Denies any new symptoms.     Review of Systems:   Pertinent items are noted in HPI or as below, remainder of complete ROS is negative.      Physical Exam:   Patient is a 54 year old  male   Vitals: Blood pressure 134/83, pulse 83, height 1.753 m (5' 9\"), weight 87.1 kg (192 lb).  General Appearance Adult: Alert, no acute distress, oriented  HENT: throat/mouth:normal, good dentition  Lungs: no respiratory distress, or pursed lip breathing  Heart: No obvious jugular venous distension present  Abdomen: Body mass index is 28.35 kg/m .  Musculoskeltal: extremities normal  Skin: no visible suspicious lesions or rashes  Neuro: Alert, oriented, speech and mentation normal  Psych: affect and mood normal  Gait: Normal  : deferred    Laboratory:   I reviewed all applicable laboratory and pathology data and went over findings with patient  Significant for     Lab Results   Component Value Date    CR 0.85 05/31/2017    CR 0.92 04/28/2010       PSA   Date Value Ref Range Status   01/13/2020 10.40 (H) 0 " - 4 ug/L Final     Comment:     Assay Method:  Chemiluminescence using Siemens Vista analyzer   12/05/2019 9.85 (H) 0 - 4 ug/L Final     Comment:     Assay Method:  Chemiluminescence using Siemens Vista analyzer   12/07/2018 8.57 (H) 0 - 4 ug/L Final     Comment:     Assay Method:  Chemiluminescence using Siemens Vista analyzer   12/13/2017 7.44 (H) 0 - 4 ug/L Final     Comment:     Assay Method:  Chemiluminescence using Siemens Vista analyzer   05/31/2017 6.91 (H) 0 - 4 ug/L Final     Comment:     Assay Method:  Chemiluminescence using Siemens Vista analyzer   02/06/2017 7.39 (H) 0 - 4 ug/L Final     Comment:     Assay Method:  Chemiluminescence using Siemens Vista analyzer   11/01/2016 9.56 (H) 0 - 4 ug/L Final     Comment:     Assay Method:  Chemiluminescence using Siemens Vista analyzer   09/23/2016 6.53 (H) 0 - 4 ug/L Final     Comment:     Assay Method:  Chemiluminescence using Siemens Vista analyzer   08/23/2010 2.28 0 - 4 ug/L Final   05/2016- 4.5   01/2016-4.4   02/2014-3.5   06/2011-2.27     Scribe Disclosure:  I, Rita Garcia, am serving as a scribe to document services personally performed by Eber Mcmillan MD at this visit, based upon the provider's statements to me. All documentation has been reviewed by the aforementioned provider prior to being entered into the official medical record.       Again, thank you for allowing me to participate in the care of your patient.      Sincerely,    Eber Mcmillan MD

## 2020-02-01 NOTE — PROGRESS NOTES
This is a patient whom I saw in August 2011 for asymptomatic multinodular goiter. At that time he was essentially asymptomatic, thyroid function tests were normal and biopsies of the thyroid nodules were benign. We elected to monitor the nodules instead of proceeding at that time with thyroidectomy. He has been followed closely by endocrinology. At His most recent visit with Dr. Villalobos, he complained of increased compressive sx-he can feel some pressure with swallowing. US in 12/2019-showed an enlarged thyroid gland and minimal change of the thyroid nodules. Although his thyroid is quite large. TFT's are WNL    SX include-pressure with swallowing. No change in voice quality. No difficulty with inspiration. No sx of hypo or hyperthyroidism. No family hx of PTC. No head and neck XRT.    PMH/PSH/Medications reviewed in EMR    10pt ROS pertinent for that noted in HPI    PE:  Diffusely enlarged thyroid gland measuring about 7-8cm in height and 8 cm in width (total). The thyroid is dense. Trachea is midline. No cervical lymphadenopathy.    US:  FINDINGS:     Technique: Grayscale and color ultrasound imaging of the thyroid was  performed.     Findings:    Thyroid parenchyma: Heterogenous thyroid with nodules     The right lobe of the thyroid measures: 2.9 x 2.8 x 6.1 cm      The thyroid isthmus measures: 0.72 cm      The left lobe of the thyroid measures: 5.4 x 4.2 x 8.3 cm     Right lobe:  Nodule 1:  Largest diameter: 2.1 cm  Shape: Wider-than-tall  Composition: Predominantly solid  Echogenicity: Isoechoic  Margins: Smooth  Internal Echogenic Foci: None  TIRADS category:This stable nodule was previously sampled with no  malignancy identified.     Nodule 2:  Largest diameter: 1.2 cm  Shape: Wider-than-tall  Composition: Predominantly solid  Echogenicity: Isoechoic  Margins: Smooth  Internal Echogenic Foci: None  TIRADS category: 3     Nodule 3:  Largest diameter: 3.5 cm  Shape: Wider-than-tall  Composition:  Predominantly solid  Echogenicity: Isoechoic  Margins: Smooth  Internal Echogenic Foci: None  TIRADS category: 3     Left lobe:  Nodule 1:  Largest diameter: 2.0 cm  Shape: Wider-than-tall  Composition: Predominantly solid with cystic components  Echogenicity: Isoechoic  Margins: Smooth  Internal Echogenic Foci: None  TIRADS category: 3     Nodule 2:  Largest diameter: 2.2 cm  Shape: Wider-than-tall  Composition: Predominantly solid  Echogenicity: Isoechoic  Margins: Smooth  Internal Echogenic Foci: None  TIRADS category: 3     Nodule 3:  Largest diameter: 0.9 cm  Shape: Wider-than-tall  Composition: Predominantly solid  Echogenicity: Hypoechoic  Margins: Smooth  Internal Echogenic Foci: None  TIRADS category: Does not apply - measures less than 1 cm                                                                      IMPRESSION:   Heterogenous thyroid with nodules without suspicious features and  grossly unchanged since 1/12/2018.     Asses:  Diffusely enlarged MNG    Plan:  I recommend total thyroidectomy. Reviewed procedure as well as possibility of having a drain post operatively-will schedule this surgery to be done at the Hospital. Discussed risks including but not limited bleeding, infection, injury to the recurrent laryngeal nerve(s), loss of airway, hypocalcemia. Post operative care discussed.     I spent >30 minutes in the care, exam and consultation of this patient for surgical options for non-toxic multinodular goiter.     Evelin Cutler MD

## 2020-02-03 ENCOUNTER — ANCILLARY PROCEDURE (OUTPATIENT)
Dept: MRI IMAGING | Facility: CLINIC | Age: 55
End: 2020-02-03
Attending: UROLOGY
Payer: COMMERCIAL

## 2020-02-03 DIAGNOSIS — C61 MALIGNANT NEOPLASM OF PROSTATE (H): ICD-10-CM

## 2020-02-03 RX ORDER — GADOBUTROL 604.72 MG/ML
10 INJECTION INTRAVENOUS ONCE
Status: COMPLETED | OUTPATIENT
Start: 2020-02-03 | End: 2020-02-03

## 2020-02-03 RX ADMIN — GADOBUTROL 9 ML: 604.72 INJECTION INTRAVENOUS at 15:50

## 2020-02-03 NOTE — DISCHARGE INSTRUCTIONS
MRI Contrast Discharge Instructions    The IV contrast you received today will pass out of your body in your  urine. This will happen in the next 24 hours. You will not feel this process.  Your urine will not change color.    Drink at least 4 extra glasses of water or juice today (unless your doctor  has restricted your fluids). This reduces the stress on your kidneys.  You may take your regular medicines.    If you are on dialysis: It is best to have dialysis today.    If you have a reaction: Most reactions happen right away. If you have  any new symptoms after leaving the hospital (such as hives or swelling),  call your hospital at the correct number below. Or call your family doctor.  If you have breathing distress or wheezing, call 911.    Special instructions: ***    I have read and understand the above information.    Signature:______________________________________ Date:___________    Staff:__________________________________________ Date:___________     Time:__________    [facility] Departments:    ___[place]: [phone]  ___[facility]: [phone]  ___[place]: [phone] ___[facility]: [phone]  ___[facility]: [phone]  ___[facility]: [phone]  ___[phone]  ___[facility]: [phone]  ___[phone]

## 2020-03-18 NOTE — LETTER
7/26/2017       RE: Miles Valencia  [address] [place] [address]     Dear Colleague,    Thank you for referring your patient, Miles Valencia, to the [facility] ORTHOPAEDIC CLINIC at [facility]. Please see a copy of my visit note below.    CHIEF COMPLAINT: Status post left total shoulder arthroplasty   DATE OF SURGERY: 6/13/17    HISTORY OF PRESENT ILLNESS: Miles is an extremely pleasant 52 year-old gentlamen who is now 6 weeks status post the above procedure. He states that he is anxious to be out of his sling but worried also about doing so. He has been working with his partner on range of motion and has made excellent gains. He is another physical therapy appointment coming up next week.    EXAM:  Pleasant adult male in no distress. Accompanied by his partner today.  Respirations even and unlabored.  Left upper extremity: Incision well healing. No erythema. No drainage. Sens intact Ax/Musc/Med/Rad/Uln nerves. Motor intact EPL, FPL, and Intrinsics. Shoulder range of motion tolerates passive FE to at least 155 and ER at side to 60.    ASSESSMENT:  1. Six weeks status post above procedure    PLAN:  We discussed the progression of his activities. He will discontinue his sling at this time for wean out of it. He will progress to active range of motion. He may do very gentle light strengthening with regards to deltoid and periscapular work. He is to avoid stretching in internal rotation up the back for 3 months postop.  I will see the patient back in 6 weeks.    Again, thank you for allowing me to participate in the care of your patient.    Sincerely,    Jossy Swanson MD      
7/26/2017      RE: Miles Valencia  [address] [place] [address]       CHIEF COMPLAINT: Status post left total shoulder arthroplasty   DATE OF SURGERY: 6/13/17    HISTORY OF PRESENT ILLNESS: Miles is an extremely pleasant 52 year-old gentlamen who is now 6 weeks status post the above procedure. He states that he is anxious to be out of his sling but worried also about doing so. He has been working with his partner on range of motion and has made excellent gains. He is another physical therapy appointment coming up next week.    EXAM:  Pleasant adult male in no distress. Accompanied by his partner today.  Respirations even and unlabored.  Left upper extremity: Incision well healing. No erythema. No drainage. Sens intact Ax/Musc/Med/Rad/Uln nerves. Motor intact EPL, FPL, and Intrinsics. Shoulder range of motion tolerates passive FE to at least 155 and ER at side to 60.    ASSESSMENT:  1. Six weeks status post above procedure    PLAN:  We discussed the progression of his activities. He will discontinue his sling at this time for wean out of it. He will progress to active range of motion. He may do very gentle light strengthening with regards to deltoid and periscapular work. He is to avoid stretching in internal rotation up the back for 3 months postop.  I will see the patient back in 6 weeks.    Jossy Swanson MD      
Is This A New Presentation, Or A Follow-Up?: Skin Lesions
How Severe Is Your Skin Lesion?: mild
Have Your Skin Lesions Been Treated?: not been treated

## 2020-03-19 ENCOUNTER — TELEPHONE (OUTPATIENT)
Dept: OTOLARYNGOLOGY | Facility: CLINIC | Age: 55
End: 2020-03-19

## 2020-03-19 NOTE — TELEPHONE ENCOUNTER
Talked with patient regarding rescheduling surgery due to COVID-19 at patient requested. Patient was rescheduled to May 27, 2020 at [place] [facility]. Understands that we will be in contact if we have to reschedule surgery due to COVID-19 precautions.   Understanding he needs a pre-op within 30 days.       Helen Johnson   Perioperative Coordinator   Department of Otolaryngology    Office: [phone]

## 2020-03-19 NOTE — TELEPHONE ENCOUNTER
"----- Message from Anushka Gomez LPN sent at 3/19/2020 12:33 PM CDT -----  Regarding: Surgery scheduling  Hey, this patient called and was wondering if his surgery for 4/8 is still on or if it is going to get re-scheduled. He is having a total thyroidectomy and he states that the surgery is \"non-urgent.\"     "

## 2020-04-16 ENCOUNTER — TELEPHONE (OUTPATIENT)
Dept: UROLOGY | Facility: CLINIC | Age: 55
End: 2020-04-16

## 2020-04-16 NOTE — TELEPHONE ENCOUNTER
[facility] Call Center    Phone Message    May a detailed message be left on voicemail: yes     Reason for Call: Other: Patient called said he recvd a message, informing him to call to get set up for a phone appointment with Weight. I did not see any notes and patient is scheduled for July please call to discuss     Action Taken: Other: ump Urology    Travel Screening: Not Applicable

## 2020-04-22 ENCOUNTER — PRE VISIT (OUTPATIENT)
Dept: UROLOGY | Facility: CLINIC | Age: 55
End: 2020-04-22

## 2020-04-22 NOTE — TELEPHONE ENCOUNTER
Chief Complaint : MRI and PSA    Records/Orders: both in epic     Pt Contacted: no    At Rooming: video

## 2020-04-22 NOTE — TELEPHONE ENCOUNTER
Called patient and made a virtual rtn appointment with Dr Mcmillan for 3:30 4/23. Patient is aware of the anatoliy.

## 2020-04-23 ENCOUNTER — VIRTUAL VISIT (OUTPATIENT)
Dept: UROLOGY | Facility: CLINIC | Age: 55
End: 2020-04-23
Payer: COMMERCIAL

## 2020-04-23 DIAGNOSIS — C61 MALIGNANT NEOPLASM OF PROSTATE (H): Primary | ICD-10-CM

## 2020-04-23 ASSESSMENT — PAIN SCALES - GENERAL: PAINLEVEL: NO PAIN (0)

## 2020-04-23 NOTE — PROGRESS NOTES
"Miles Valencia is a 55 year old male who is being evaluated via a billable video visit.      The patient has been notified of following:     \"This video visit will be conducted via a call between you and your physician/provider. We have found that certain health care needs can be provided without the need for an in-person physical exam.  This service lets us provide the care you need with a video conversation.  If a prescription is necessary we can send it directly to your pharmacy.  If lab work is needed we can place an order for that and you can then stop by our lab to have the test done at a later time.    Video visits are billed at different rates depending on your insurance coverage.  Please reach out to your insurance provider with any questions.    If during the course of the call the physician/provider feels a video visit is not appropriate, you will not be charged for this service.\"    Patient has given verbal consent for Video visit? Yes    How would you like to obtain your AVS? Torey    Patient would like the video invitation sent by: Send to e-mail at: [email or web address]      "

## 2020-04-23 NOTE — PATIENT INSTRUCTIONS
Please schedule a PSA and then an appointment for a MRI guided biopsy. If PSA declines, biopsy appointment will be converted to a normal appointment for treatment discussion.     It was a pleasure meeting with you today.  Thank you for allowing me and my team the privilege of caring for you today.  YOU are the reason we are here, and I truly hope we provided you with the excellent service you deserve.  Please let us know if there is anything else we can do for you so that we can be sure you are leaving completely satisfied with your care experience.        Barrington Veras, EMT

## 2020-04-23 NOTE — PROGRESS NOTES
Urology Clinic    Eber Mcmillan MD  Date of Service: 2020     Name: Miles Valencia  MRN: [id number]  Age: 54 year old  : 1965  Referring provider: Oneyda Jones     Assessment:  Miles Valencia  is a 54 year old male with a history of GS 3+3, Grade Group 1 prostate cancer on active surveillance. PSA at diagnosis was 4.5 in 2016. Last biopsy was stable in 2017 (PSA of 6.91). Prostate MRI was PIRADS 2 in 2018 (PSA of 7.44 and density of 0.19). Since, his PSAs have continued to rise (8.57 in 2018, 9.85 in 2019 & 10.40 today). Prostate MRI 2020 showed PIRADS 4 lesion and volume of 53.9  Therefore PSA density is 0.2 ng/mL      Plan:    Rising PSA and PSA density concerning in this young man, MRI also shows progression from pirads 2-3, but in the area of known gerard 6 cancer.  Would lean towards repeat biopsy this summer if continued rise and treatment if progression noted    Also a good candidate for decipher    Eber Mcmillan MD  Department of Urology  [facility]    ______________________________________________________________________    HPI  Miles Valencia is a 54 year old male with a history of prostate cancer that is being followed with active surveillance. Last PSA of 10.40 in 2020.        Initial PSA at diagnosis: 4.5 in May 2016   1st Biopsy (Date 2016) [place] Score was 3 + 3  In 3 of 12 cores.  With volume affected from 10% to 15%  Grade Group:1    PSA at time of biopsy: 6.91   2nd Biopsy (Date 2017) [place] Score was 3 + 3  In 1 of 12 cores.  With 20% volume affected.   Grade Group:1       MRI 2016: PSA of 9.56  PSA density is 0.27  Prostate volume 35   PIRADS 2 exam.   Sacral lesion (confirmed negative with bone scan)    MRI 2018: PSA of 7.44  PSA density is 0.19   Prostate volume 39  PIRADS 2 exam.     MRI 2020 PSA 10.4  PSA density 0.2  Prostate Volume 52  Pirads 4 lesion apex (same area as previous Grade group 1  "biopsy       Today, he reports doing well. Denies any new symptoms.     Physical Exam:  VIdeo visit  Patient is a 54 year old  male   Vitals: Blood pressure 134/83, pulse 83, height 1.753 m (5' 9\"), weight 87.1 kg (192 lb).  General Appearance Adult: Alert, no acute distress, oriented  HENT: throat/mouth:normal, good dentition  Lungs: no respiratory distress, or pursed lip breathing  Heart: No obvious jugular venous distension present      Laboratory:   I reviewed all applicable laboratory and pathology data and went over findings with patient  Significant for     Lab Results   Component Value Date    CR 0.85 05/31/2017    CR 0.92 04/28/2010       PSA   Date Value Ref Range Status   01/13/2020 10.40 (H) 0 - 4 ug/L Final     Comment:     Assay Method:  Chemiluminescence using Siemens Vista analyzer   12/05/2019 9.85 (H) 0 - 4 ug/L Final     Comment:     Assay Method:  Chemiluminescence using Siemens Vista analyzer   12/07/2018 8.57 (H) 0 - 4 ug/L Final     Comment:     Assay Method:  Chemiluminescence using Siemens Vista analyzer   12/13/2017 7.44 (H) 0 - 4 ug/L Final     Comment:     Assay Method:  Chemiluminescence using Siemens Vista analyzer   05/31/2017 6.91 (H) 0 - 4 ug/L Final     Comment:     Assay Method:  Chemiluminescence using Siemens Vista analyzer   02/06/2017 7.39 (H) 0 - 4 ug/L Final     Comment:     Assay Method:  Chemiluminescence using Siemens Vista analyzer   11/01/2016 9.56 (H) 0 - 4 ug/L Final     Comment:     Assay Method:  Chemiluminescence using Siemens Vista analyzer   09/23/2016 6.53 (H) 0 - 4 ug/L Final     Comment:     Assay Method:  Chemiluminescence using Siemens Vista analyzer   08/23/2010 2.28 0 - 4 ug/L Final   05/2016- 4.5   01/2016-4.4   02/2014-3.5   06/2011-2.27       Miles Valencia is a 55 year old male who is being evaluated via a billable telephone visit.      The patient has been notified of following:     \"This telephone visit will be conducted via a call between you and " "your physician/provider. We have found that certain health care needs can be provided without the need for a physical exam.  This service lets us provide the care you need with a short phone conversation.  If a prescription is necessary we can send it directly to your pharmacy.  If lab work is needed we can place an order for that and you can then stop by our lab to have the test done at a later time.    Telephone visits are billed at different rates depending on your insurance coverage. During this emergency period, for some insurers they may be billed the same as an in-person visit.  Please reach out to your insurance provider with any questions.    If during the course of the call the physician/provider feels a telephone visit is not appropriate, you will not be charged for this service.\"    Patient has given verbal consent for Telephone visit?  Yes    How would you like to obtain your AVS? Torey    Phone call duration: 16 minutes    Eber Mcmillan MD        "

## 2020-04-27 NOTE — TELEPHONE ENCOUNTER
FUTURE VISIT INFORMATION      SURGERY INFORMATION:    Date: 20    Location: [facility] OR    Surgeon:  Evelin Cutler MD     Anesthesia Type:  General    Procedure: THYROIDECTOMY, TOTAL     Consult: 20- Omayra    RECORDS REQUESTED FROM:       [facility]    Most recent EKG+ Tracin17    Most recent Cardiac Stress Test: 19

## 2020-04-28 DIAGNOSIS — C61 MALIGNANT NEOPLASM OF PROSTATE (H): ICD-10-CM

## 2020-04-28 LAB — PSA SERPL-MCNC: 10.1 UG/L (ref 0–4)

## 2020-05-04 ENCOUNTER — TELEPHONE (OUTPATIENT)
Dept: OTOLARYNGOLOGY | Facility: CLINIC | Age: 55
End: 2020-05-04

## 2020-05-04 DIAGNOSIS — Z11.59 ENCOUNTER FOR SCREENING FOR OTHER VIRAL DISEASES: Primary | ICD-10-CM

## 2020-05-04 NOTE — TELEPHONE ENCOUNTER
Called patient to confirm surgery date of 5/27/2020 at [place] OR with Dr. Cutler. Informed patient that he will receive a phone call a couple days prior with exact arrival and to review instructions. Patient has pre-op scheduled for 5/18/2020. Informed patient we are pre-op covid-19 testing all patients prior to surgery. Patient understood and will wait for phone call to set up. Aware that this is done curbside and will receive a phone call to set it up. Verbalized understanding of self isolation and quarantine after testing. Will await phone call from PAN.

## 2020-05-07 ENCOUNTER — VIRTUAL VISIT (OUTPATIENT)
Dept: UROLOGY | Facility: CLINIC | Age: 55
End: 2020-05-07
Payer: COMMERCIAL

## 2020-05-07 DIAGNOSIS — C61 MALIGNANT NEOPLASM OF PROSTATE (H): Primary | ICD-10-CM

## 2020-05-07 NOTE — PATIENT INSTRUCTIONS
Please schedule and complete PSA in August. If PSA is up again, please call to schedule MRI guided prostate biopsy and decipher test in clinic.     It was a pleasure meeting with you today.  Thank you for allowing me and my team the privilege of caring for you today.  YOU are the reason we are here, and I truly hope we provided you with the excellent service you deserve.  Please let us know if there is anything else we can do for you so that we can be sure you are leaving completely satisfied with your care experience.        Barrington Veras, EMT

## 2020-05-07 NOTE — PROGRESS NOTES
"Miles Valencia is a 55 year old male who is being evaluated via a billable video visit.      THE VIDEO WILL BE CONDUCTED OVER: amwell    The patient has been notified of following:     \"This video visit will be conducted via a call between you and your physician/provider. We have found that certain health care needs can be provided without the need for an in-person physical exam.  This service lets us provide the care you need with a video conversation.  If a prescription is necessary we can send it directly to your pharmacy.  If lab work is needed we can place an order for that and you can then stop by our lab to have the test done at a later time.    Video visits are billed at different rates depending on your insurance coverage.  Please reach out to your insurance provider with any questions.    If during the course of the call the physician/provider feels a video visit is not appropriate, you will not be charged for this service.\"    Patient has given verbal consent for Video visit? Yes    How would you like to obtain your AVS? [facility]    Patient would like the video invitation sent by: by text      Video Start Time: 303 pm        Video-Visit Details    Type of service:  Video Visit    Video End Time (time video stopped): 309 pm    Originating Location (pt. Location): Home    Distant Location (provider location):  [facility] UROLOGY AND [facility] FOR PROSTATE AND UROLOGIC CANCERS     Mode of Communication:  Video Conference via [facility]      Eber Mcmillan MD      "

## 2020-05-07 NOTE — PROGRESS NOTES
Urology Clinic    Eber Mcmillan MD  Date of Service: 2020     Name: Miles Valencia  MRN: [id number]  Age: 54 year old  : 1965  Referring provider: Oneyda Jones     Assessment:  Miles Valencia  is a 54 year old male with a history of GS 3+3, Grade Group 1 prostate cancer on active surveillance. PSA at diagnosis was 4.5 in 2016. Last biopsy was stable in 2017 (PSA of 6.91). Prostate MRI was PIRADS 2 in 2018 (PSA of 7.44 and density of 0.19). Since, his PSAs have continued to rise (8.57 in 2018, 9.85 in 2019 & 10.40 2020 and now dropping on the most recent one: 10.1 2020). Prostate MRI 2020 showed PIRADS 4 lesion and volume of 53.9  Therefore PSA density is 0.18 ng/mL      Plan:    Rising PSA and PSA density concerning in this young man though down from last time, MRI also shows progression from pirads 2-3, but in the area of known gerard 6 cancer.  Would lean towards repeat biopsy this summer if continued rise and treatment if progression noted    Also a good candidate for decipher    Repeat PSA in AUGUST -Needs to be rescheduled    Would recommend a biopsy if PSA up again, then decipher from that biopsy.  Would be MRI targeted in August if PSA is up    Eber Mcmillan MD  Department of Urology  [facility]    ______________________________________________________________________    HPI  Miles Valencia is a 54 year old male with a history of prostate cancer that is being followed with active surveillance. Last PSA of 10.40 in 2020.        Initial PSA at diagnosis: 4.5 in May 2016   1st Biopsy (Date 2016) [place] Score was 3 + 3  In 3 of 12 cores.  With volume affected from 10% to 15%  Grade Group:1    PSA at time of biopsy: 6.91   2nd Biopsy (Date 2017) [place] Score was 3 + 3  In 1 of 12 cores.  With 20% volume affected.   Grade Group:1       MRI 2016: PSA of 9.56  PSA density is 0.27  Prostate volume 35   PIRADS 2 exam.  "  Sacral lesion (confirmed negative with bone scan)    MRI 11/2018: PSA of 7.44  PSA density is 0.19   Prostate volume 39  PIRADS 2 exam.     MRI 2/2020 PSA 10.4  PSA density 0.2  Prostate Volume 52  Pirads 4 lesion apex (same area as previous Grade group 1 biopsy       Today, he reports doing well. Denies any new symptoms.     Physical Exam:  VIdeo visit  Patient is a 54 year old  male   Vitals: Blood pressure 134/83, pulse 83, height 1.753 m (5' 9\"), weight 87.1 kg (192 lb).  General Appearance Adult: Alert, no acute distress, oriented  HENT: throat/mouth:normal, good dentition  Lungs: no respiratory distress, or pursed lip breathing  Heart: No obvious jugular venous distension present      Laboratory:   I reviewed all applicable laboratory and pathology data and went over findings with patient  Significant for     Lab Results   Component Value Date    CR 0.85 05/31/2017    CR 0.92 04/28/2010       PSA   Date Value Ref Range Status   01/13/2020 10.40 (H) 0 - 4 ug/L Final     Comment:     Assay Method:  Chemiluminescence using Siemens Vista analyzer   12/05/2019 9.85 (H) 0 - 4 ug/L Final     Comment:     Assay Method:  Chemiluminescence using Siemens Vista analyzer   12/07/2018 8.57 (H) 0 - 4 ug/L Final     Comment:     Assay Method:  Chemiluminescence using Siemens Vista analyzer   12/13/2017 7.44 (H) 0 - 4 ug/L Final     Comment:     Assay Method:  Chemiluminescence using Siemens Vista analyzer   05/31/2017 6.91 (H) 0 - 4 ug/L Final     Comment:     Assay Method:  Chemiluminescence using Siemens Vista analyzer   02/06/2017 7.39 (H) 0 - 4 ug/L Final     Comment:     Assay Method:  Chemiluminescence using Siemens Vista analyzer   11/01/2016 9.56 (H) 0 - 4 ug/L Final     Comment:     Assay Method:  Chemiluminescence using Siemens Vista analyzer   09/23/2016 6.53 (H) 0 - 4 ug/L Final     Comment:     Assay Method:  Chemiluminescence using Siemens Vista analyzer   08/23/2010 2.28 0 - 4 ug/L Final   05/2016- 4.5 " "  01/2016-4.4   02/2014-3.5   06/2011-2.27       Miles Valencia is a 55 year old male who is being evaluated via a billable telephone visit.      The patient has been notified of following:     \"This telephone visit will be conducted via a call between you and your physician/provider. We have found that certain health care needs can be provided without the need for a physical exam.  This service lets us provide the care you need with a short phone conversation.  If a prescription is necessary we can send it directly to your pharmacy.  If lab work is needed we can place an order for that and you can then stop by our lab to have the test done at a later time.    Telephone visits are billed at different rates depending on your insurance coverage. During this emergency period, for some insurers they may be billed the same as an in-person visit.  Please reach out to your insurance provider with any questions.    If during the course of the call the physician/provider feels a telephone visit is not appropriate, you will not be charged for this service.\"    Patient has given verbal consent for Telephone visit?  Yes    How would you like to obtain your AVS? MyChart    Phone call duration: 6 minutes    Eber Mcmillan MD      "

## 2020-05-18 ENCOUNTER — PRE VISIT (OUTPATIENT)
Dept: SURGERY | Facility: CLINIC | Age: 55
End: 2020-05-18

## 2020-05-18 ENCOUNTER — ANESTHESIA EVENT (OUTPATIENT)
Dept: SURGERY | Facility: CLINIC | Age: 55
End: 2020-05-18
Payer: COMMERCIAL

## 2020-05-18 ENCOUNTER — OFFICE VISIT (OUTPATIENT)
Dept: SURGERY | Facility: CLINIC | Age: 55
End: 2020-05-18
Payer: COMMERCIAL

## 2020-05-18 VITALS
TEMPERATURE: 98 F | HEART RATE: 85 BPM | DIASTOLIC BLOOD PRESSURE: 89 MMHG | RESPIRATION RATE: 16 BRPM | OXYGEN SATURATION: 96 % | BODY MASS INDEX: 27.85 KG/M2 | SYSTOLIC BLOOD PRESSURE: 135 MMHG | HEIGHT: 69 IN | WEIGHT: 188 LBS

## 2020-05-18 DIAGNOSIS — Z01.818 PRE-OP EXAMINATION: Primary | ICD-10-CM

## 2020-05-18 ASSESSMENT — PAIN SCALES - GENERAL: PAINLEVEL: SEVERE PAIN (7)

## 2020-05-18 ASSESSMENT — MIFFLIN-ST. JEOR: SCORE: 1678.14

## 2020-05-18 ASSESSMENT — LIFESTYLE VARIABLES: TOBACCO_USE: 0

## 2020-05-18 NOTE — H&P
Pre-Operative H & P     CC:  Preoperative exam to assess for increased cardiopulmonary risk while undergoing surgery and anesthesia.    Date of Encounter: 5/18/2020  Primary Care Physician:  Oneyda Jones  Miles Valencia is a 55 year old male who presents for pre-operative H & P in preparation for THYROIDECTOMY, TOTAL on 5/27/20 with Dr. Cutler at [facility] under general anesthesia. Mr. Valencia has a past medical history of:  asthma, seasonal allergies, hypertension, migraines, osteoarthritis, prostate cancer followed by Dr. Mcmillan and RLS.  Mr. Valencia has followed with Dr. Villalobos  since 2010 for his asymptomatic multinodular goiter.  When the he followed up with Dr. Villalobos on 12/6/19, he reported increased compressive symptoms described as he can feel some pressure with swallowing.  Mr. Valencia was referred to Dr. Cutler on 1/13/20.  After her evaluation, she recommended the above procedure.  Mr. Valencia has opted to proceed.     Diagnosis   Non-toxic multinodular goiter       History is obtained from the patient and electronic health record.     Past Medical History  Past Medical History:   Diagnosis Date     Achilles bursitis or tendinitis 5/4/2014     Allergic rhinitis      Asthma      Congestion      Hypertension      Migraine      Multinodular goiter      Osteoarthritis      Pain in joint, shoulder region 4/18/2014     Prostate cancer (H)      RLS (restless legs syndrome)      Sleep problems        Past Surgical History  Past Surgical History:   Procedure Laterality Date     ARTHROPLASTY SHOULDER Left 6/13/2017    Procedure: ARTHROPLASTY SHOULDER;  Left Total Shoulder Arthroplasty  ;  Surgeon: Jossy Swanson MD;  [facility]     BIOPSY      Prostate     COLONOSCOPY N/A 3/21/2016    Procedure: COLONOSCOPY;  Surgeon: Toy Lima MD;  [facility]       Hx of Blood transfusions/reactions: denies     Hx of abnormal bleeding  or anti-platelet use: Excedrin (ASA 65 mg) as needed for migraines/tension headaches    Steroid use in the last year: denies oral steroids    Personal or FH with difficulty with Anesthesia:  denies    Prior to Admission Medications  Current Outpatient Medications   Medication Sig Dispense Refill     Acetaminophen (TYLENOL PO) Take 1,000 mg by mouth every 6 hours as needed        albuterol (PROAIR HFA/PROVENTIL HFA/VENTOLIN HFA) 108 (90 BASE) MCG/ACT Inhaler Inhale 2 puffs into the lungs every 4 hours as needed for shortness of breath / dyspnea or wheezing       ammonium lactate (AMLACTIN) 12 % cream Apply topically daily as needed        aspirin-acetaminophen-caffeine (EXCEDRIN MIGRAINE) 250-250-65 MG per tablet Take 2 tablets by mouth every 6 hours as needed for headaches       camphor-menthol (DERMASARRA) 0.5-0.5 % LOTN Apply topically every 6 hours as needed for skin care       cholecalciferol (VITAMIN D3 MAXIMUM STRENGTH) 5000 UNITS CAPS Take 5,000 Units by mouth every morning        Cyanocobalamin (VITAMIN B-12 PO) Take 1 tablet by mouth every evening        cyclobenzaprine (FLEXERIL) 10 MG tablet Take 10 mg by mouth 3 times daily as needed for muscle spasms       diltiazem ER COATED BEADS (CARDIZEM CD/CARTIA XT) 240 MG 24 hr capsule Take 240 mg by mouth daily       fexofenadine (ALLEGRA) 180 MG tablet Take 180 mg by mouth daily       gabapentin (NEURONTIN) 300 MG capsule Take 1 capsule (300 mg) by mouth 3 times daily (Patient taking differently: Take 600 mg by mouth At Bedtime ) 60 capsule 0     HEMP OIL OR EXTRACT OR OTHER CBD CANNABINOID, NOT MEDICAL CANNABIS, Take 1 capsule by mouth every evening       hydrochlorothiazide (HYDRODIURIL) 25 MG tablet Take 25 mg by mouth every morning        hydrocortisone (CORTAID) 1 % cream Apply topically 2 times daily as needed       ibuprofen (ADVIL/MOTRIN) 200 MG tablet Take 800 mg by mouth every 8 hours as needed for mild pain       meclizine (ANTIVERT) 12.5 MG tablet  Take 2 tablets (25 mg) by mouth 4 times daily as needed for dizziness (Patient not taking: Reported on 5/15/2020) 30 tablet 0     MELATONIN PO Take 2.5 mg by mouth At Bedtime        Menthol, Topical Analgesic, (BIOFREEZE EX) Externally apply topically as needed       NONFORMULARY Magnesium/MSM lotion - Brand: Aincient minerals  2 pumps every evening applied to feet/legs    20 mg elemental magnesium and 25 mg MSM Per 1 pump       rizatriptan (MAXALT-MLT) 10 MG ODT Take 10 mg by mouth at onset of headache for migraine       zolpidem (AMBIEN) 5 MG tablet Take 5 mg by mouth nightly as needed for sleep         Allergies  Allergies   Allergen Reactions     Amoxicillin Diarrhea       Social History  Social History     Socioeconomic History     Marital status:      Spouse name: Not on file     Number of children: Not on file     Years of education: Not on file     Highest education level: Not on file   Occupational History     Occupation:    Social Needs     Financial resource strain: Not on file     Food insecurity     Worry: Not on file     Inability: Not on file     Transportation needs     Medical: Not on file     Non-medical: Not on file   Tobacco Use     Smoking status: Never Smoker     Smokeless tobacco: Never Used   Substance and Sexual Activity     Alcohol use: Yes     Alcohol/week: 0.0 standard drinks     Comment: 1-2 drinks a week     Drug use: No     Sexual activity: Not on file   Lifestyle     Physical activity     Days per week: Not on file     Minutes per session: Not on file     Stress: Not on file   Relationships     Social connections     Talks on phone: Not on file     Gets together: Not on file     Attends [employer] service: Not on file     Active member of club or organization: Not on file     Attends meetings of clubs or organizations: Not on file     Relationship status: Not on file     Intimate partner violence     Fear of current or ex partner: Not on file     Emotionally abused: Not  on file     Physically abused: Not on file     Forced sexual activity: Not on file   Other Topics Concern     Parent/sibling w/ CABG, MI or angioplasty before 65F 55M? Not Asked   Social History Narrative     Not on file       Family History  Family History   Problem Relation Age of Onset     Skin Cancer Mother      Other - See Comments Brother         Factor V Leiden     Deep Vein Thrombosis (DVT) Brother      Other - See Comments Niece         [place]     Other - See Comments Nephew         [place], Factor V Leiden         ROS/MED HX    ENT/Pulmonary:     (+)JULIÁN risk factors hypertension, Intermittent asthma Treatment: Inhaler prn,  , . .   (-) tobacco use   Neurologic:     (+)migraines (Migraine/tension headache typically once a month. ),     Cardiovascular: Comment: Underwent stress testing in 2019 in preparation for a scuba   diving adventure in the [facility].  [facility] requires a stress test in any   persons with a h/o asthma prior to scuba diving.     (+) hypertension----. : . . . :. . Previous cardiac testing date:results:  _____________________________________________________________________________ date: results: date: results:         (-) taking anticoagulants/antiplatelets   METS/Exercise Tolerance: Comment: [employer] three times per week prior to COVID19. >4 METS   Hematologic: Comments: Brother h/o DVT while being treated for testicular cancer.         Musculoskeletal: Comment: Reports new low back pain radiating down left leg.  Will be seeing his chiropractor later this week.     Left shoulder replacement 2017.            GI/Hepatic:  - neg GI/hepatic ROS       Renal/Genitourinary:  - ROS Renal section negative       Endo:     (+) thyroid problem .      Psychiatric:  - neg psychiatric ROS       Infectious Disease:  - neg infectious disease ROS       Malignancy:   (+) Malignancy History of Prostate  Prostate CA Active status post,         Other:    (+) No chance of  "pregnancy C-spine cleared: Yes, no H/O Chronic Pain,no other significant disability            Temp: 98  F (36.7  C) Temp src: Oral BP: 135/89 Pulse: 85   Resp: 16 SpO2: 96 %         188 lbs 0 oz  5' 9\"   Body mass index is 27.76 kg/m .       Physical Exam  Constitutional: Awake, alert, cooperative, no apparent distress, and appears stated age.  Eyes: Pupils equal, round and reactive to light, extra ocular muscles intact, sclera clear, conjunctiva normal.  HENT: Normocephalic, oral pharynx with moist mucus membranes, good dentition. Well groomed beard. Palpable goiter.     Respiratory: Clear to auscultation bilaterally, no crackles or wheezing.  Cardiovascular: Regular rate and rhythm, normal S1 and S2, and no murmur noted.  Carotids +2, no bruits. No edema. Palpable pulses to radial  DP and PT arteries.   GI: Normal bowel sounds, soft, non-distended, non-tender, no masses palpated, no hepatosplenomegaly.    Lymph/Hematologic: No cervical lymphadenopathy and no supraclavicular lymphadenopathy.  Genitourinary:  deferred  Skin: Warm and dry.  No rashes at anticipated surgical site.   Musculoskeletal: Full ROM of neck. There is no redness, warmth, or swelling of the joints. Gross motor strength is normal.    Neurologic: Awake, alert, oriented to name, place and time. Cranial nerves II-XII are grossly intact. Gait is normal.   Neuropsychiatric: Calm, cooperative. Normal affect.     PROCEDURES  Stress Testdate:2019 results:Interpretation Summary     Normal exercise stress test.  The target heart rate was achieved. Heart rate and blood pressure response to  exercise were normal.  Normal functional capacity. No subjective symptoms to suggest ischemia.  No ECG evidence of ischemia at rest or with exercise.        The patient reported no symptoms with exericse. The test was terminated due to  a technical error with the treadmill (treadmill spontaneously ended protocol  and stopped) Patient stated he may have been able to go " one more minute due to  fatigue.    ASSESSMENT and PLAN  Miles Valencia is a 55 year old male scheduled to undergo THYROIDECTOMY, TOTAL on 5/27/20 with Dr. Cutler at [facility] under general anesthesia.      He has the following specific operative considerations:   - JULIÁN # of risks 3/8 = intermediate  - VTE risk:  4.5% (male, family hx and current cancer)  - Risk of PONV score = 1-2.  If > 2, anti-emetic intervention recommended.      #  Cardiology - Denies known coronary artery disease.  Denies cardiac symptoms.  Normal exercise stress test, 2019.  METS:  >4. RCRI : No serious cardiac risks.  0.4 % risk of major adverse cardiac event. No further cardiac evaluation needed per 2014 ACC/AHA guidelines for non-cardiac surgery.     - HTN, take CCB but hold diuretic DOS.      #  Pulmonary - no smoking hx    - Intermittent asthma, seasonal allergy induced.  Using rescue inhaler a couple time a week due to trigger of Spring environmental allergens.  Denies nocturnal symptoms.      - Seasonal allergies, take Allegra and fluticasone nasal spray as needed DOS.     #  Endocrine - previously asymptomatic multinodular goiter now with increased compressive symptoms described as he can feel some pressure with swallowing.  Above surgery planned.     #  Musculoskeletal - New low back pain radiating down left leg planning to see his chiropractor later this week. Taking gabapentin and muscle relaxant without much relief.  Will follow-up with PCP for further evaluation if symptoms persist after above surgery. Recommend careful positioning during surgery.     - Osteoarthritis, H/O left shoulder replacement, 2017    #  Neuro - RLS, take gabapentin as prescribed    - Migraines, hold Maxalt and Excedrin per [facility] protocol    #  Urological - prostate cancer, followed with active surveillance by Dr. Mcmillan. Last PSA of 10.10 in 04/28/20.    - Anesthesia considerations:  Refer to PAC assessment in anesthesia records  - If  labs needed, per surgeon.   - Arrival time, NPO, shower and medication instructions provided by nursing staff today.  Preparing For Your Surgery handout given.     Patient is optimized and is acceptable candidate for the proposed procedure.  No further diagnostic evaluation is needed.       FIDELIA Pierre CNP  Preoperative Assessment Center  [facility]  Clinic and Surgery Center  Phone: [phone]  Fax: [phone]

## 2020-05-18 NOTE — PATIENT INSTRUCTIONS
Preparing for Your Surgery      Name:  Miles Valencia   MRN:  [id number]   :  1965   Today's Date:  2020     Arriving for surgery:  Surgery date:  20  Arrival time:  05:45 am  Due to the COVID 19 crisis, we are trying to keep our patients safe from others who might have respiratory illnesses so the hospital is implementing a no visitor policy.  Also, at this time  parking is not available.  Please come to:       [facility] [address]  [address] [place]  [address]    - ? parking is available in front of the hospital      -    Please proceed to the Surgery Lounge on the 3rd floor. [phone]?     - ?If you are in need of directions, wheelchair or escort please stop at the Information Desk in the lobby.  Inform the information person that you are here for surgery; a wheelchair and escort will be provided to the Surgery Lounge .?     What can I eat or drink?  -  You may have solid food or milk products until 8 hours prior to your surgery.  -  You may have water, apple juice or 7up/Sprite until 2 hours prior to your surgery.    Which medicines can I take?  Hold Aspirin + excedrin, vitamins and supplements one week prior to surgery.  Hold Ibuprofen + maxalt + cannabis for 24 hours and/or Naproxen for 48 hours prior to surgery.   -  Please take these medications the day of surgery:  Tylenol if needed; take all scheduled medications normally taken in the morning.    How do I prepare myself?  -  Take two showers: one the night before surgery; and one the morning of surgery.         Use Scrubcare or Hibiclens to wash from neck down, leave soap on your skin for up to one minute.  Do not get soap in your eyes or ears.  You may use your own shampoo and conditioner; no other hair products.   -  Do NOT use lotion, powder, deodorant, or antiperspirant the day of your surgery.  -  Do NOT wear any makeup, fingernail polish or jewelry.  - Do not bring your own  medications to the hospital, except for inhalers and eye drops.  -  Bring your ID and insurance card.    -If you are scheduled to go home the Same Day as surgery you must have a responsible adult as a  and to stay with you overnight the first 24 hours after surgery.     Questions or Concerns:  -If you are scheduled on the East or West campus and have questions or concerns regarding the day of surgery, please call Preadmission Nursing at [phone].   -If you have health changes between today and your surgery please call your surgeon. For questions after surgery please call your surgeons office.

## 2020-05-18 NOTE — ANESTHESIA PREPROCEDURE EVALUATION
"Anesthesia Pre-Procedure Evaluation    Patient: Miles Valencia   MRN:     [id number] Gender:   male   Age:    55 year old :      1965        Preoperative Diagnosis: * No surgery found *        LABS:  CBC:   Lab Results   Component Value Date    WBC 6.6 2017    HGB 15.8 2017    HCT 45.6 2017     2017     BMP:   Lab Results   Component Value Date     2017     2010    POTASSIUM 3.4 2017    POTASSIUM 4.2 2010    CHLORIDE 104 2017    CHLORIDE 104 2010    CO2 26 2017    CO2 27 2010    BUN 15 2017    BUN 14 2010    CR 0.85 2017    CR 0.92 2010     (H) 2017    GLC 94 2010     COAGS: No results found for: PTT, INR, FIBR  POC: No results found for: BGM, HCG, HCGS  OTHER:   Lab Results   Component Value Date    OTILIO 9.7 2018    PHOS 3.6 2010    MAG 2.1 2010    ALBUMIN 4.0 2018    TSH 0.30 (L) 2019    T4 1.09 2019    T3 123 2019        Preop Vitals    BP Readings from Last 3 Encounters:   20 (!) 160/84   20 134/83   20 (!) 145/90    Pulse Readings from Last 3 Encounters:   20 83   20 83   20 70      Resp Readings from Last 3 Encounters:   20 16   20 19   17 16    SpO2 Readings from Last 3 Encounters:   20 96%   17 95%   17 95%      Temp Readings from Last 1 Encounters:   20 98  F (36.7  C) (Oral)    Ht Readings from Last 1 Encounters:   20 1.753 m (5' 9\")      Wt Readings from Last 1 Encounters:   20 85.3 kg (188 lb)    Estimated body mass index is 27.76 kg/m  as calculated from the following:    Height as of this encounter: 1.753 m (5' 9\").    Weight as of this encounter: 85.3 kg (188 lb).     LDA:  Peripheral IV 17 Right Hand (Active)   Number of days: 1070       Closed/Suction Drain 1 Left Shoulder (Active)   Number of days: 1070        Past Medical " History:   Diagnosis Date     Achilles bursitis or tendinitis 5/4/2014     Allergic rhinitis      Asthma      Congestion      Hypertension      Migraine      Multinodular goiter      Osteoarthritis      Pain in joint, shoulder region 4/18/2014     Prostate cancer (H)      RLS (restless legs syndrome)      Sleep problems       Past Surgical History:   Procedure Laterality Date     ARTHROPLASTY SHOULDER Left 6/13/2017    Procedure: ARTHROPLASTY SHOULDER;  Left Total Shoulder Arthroplasty  ;  Surgeon: Jossy Swanson MD;  [facility]     BIOPSY      Prostate     COLONOSCOPY N/A 3/21/2016    Procedure: COLONOSCOPY;  Surgeon: Toy Lima MD;  Location:  GI      Allergies   Allergen Reactions     Amoxicillin Diarrhea        Anesthesia Evaluation     . Pt has had prior anesthetic. Type: General and MAC    No history of anesthetic complications          ROS/MED HX    ENT/Pulmonary:     (+)JULIÁN risk factors hypertension, Intermittent asthma Treatment: Inhaler prn,  , . .   (-) tobacco use   Neurologic:     (+)migraines (Migraine/tension headache typically once a month. ),     Cardiovascular: Comment: Underwent stress testing in 2019 in preparation for a scuba diving adventure in the [facility].  [facility] requires a stress test in any persons with a h/o asthma prior to scuba diving.     (+) hypertension----. : . . . :. . Previous cardiac testing date:results:Stress Testdate:2019 results:Interpretation Summary     Normal exercise stress test.  The target heart rate was achieved. Heart rate and blood pressure response to  exercise were normal.  Normal functional capacity. No subjective symptoms to suggest ischemia.  No ECG evidence of ischemia at rest or with exercise.        The patient reported no symptoms with exericse. The test was terminated due to  a technical error with the treadmill (treadmill spontaneously ended protocol  and stopped) Patient stated he may have been able to go one more  minute due to  fatigue.  _____________________________________________________________________________ date: results: date: results:         (-) taking anticoagulants/antiplatelets   METS/Exercise Tolerance: Comment: [employer] three times per week prior to COVID19. >4 METS   Hematologic: Comments: Brother h/o DVT while being treated for testicular cancer.         Musculoskeletal: Comment: Reports new low back pain radiating down left leg.  Will be seeing his chiropractor later this week.     Left shoulder replacement 2017.            GI/Hepatic:  - neg GI/hepatic ROS       Renal/Genitourinary:  - ROS Renal section negative       Endo:     (+) thyroid problem .      Psychiatric:  - neg psychiatric ROS       Infectious Disease:  - neg infectious disease ROS       Malignancy:   (+) Malignancy History of Prostate  Prostate CA Active status post,         Other:    (+) No chance of pregnancy C-spine cleared: Yes, no H/O Chronic Pain,no other significant disability                        PHYSICAL EXAM:   Mental Status/Neuro: A/A/O   Airway: Facies: Feasible  Mallampati: I  Mouth/Opening: Full  TM distance: > 6 cm  Neck ROM: Full   Respiratory: Auscultation: CTAB     Resp. Rate: Normal     Resp. Effort: Normal      CV: Rhythm: Regular  Rate: Age appropriate  Heart: Normal Sounds  Edema: None   Comments: Palpable goiter     Dental: Normal Dentition                Assessment:   ASA SCORE: 3            Plan:   Anes. Type:  General   Pre-Medication: Acetaminophen; Midazolam   Induction:  IV (Standard)   Airway: ETT; Oral; CMAC/VL ([facility])   Access/Monitoring: PIV   Maintenance: Balanced     Drips/Meds: Remifentanil     Postop Plan:   Postop Pain: Opioids  Postop Sedation/Airway: Not planned  Disposition: Inpatient/Admit     PONV Management:   Adult Risk Factors:, Postop Opioids   Prevention: Ondansetron, Dexamethasone                PAC Discussion and Assessment    ASA Classification: 3  Case is suitable for: [place]  Anesthetic techniques and relevant risks discussed: GA  Invasive monitoring and risk discussed:   Types:   Possibility and Risk of blood transfusion discussed:   NPO instructions given:   Additional anesthetic preparation and risks discussed:   Needs early admission to pre-op area:   Other:     PAC Resident/NP Anesthesia Assessment:  Miles Valencia is a 55-year-old male scheduled for THYROIDECTOMY, TOTAL on 5/27/20 with Dr. Cutler at [facility] under general anesthesia. Mr. Valencia has a past medical history of:  asthma, seasonal allergies, hypertension, migraines, osteoarthritis, prostate cancer followed by Dr. Mcmillan and RLS.  Mr. Valencia has followed with Dr. Villalobos  since 2010 for his asymptomatic multinodular goiter.  When the he followed up with Dr. Villalobos on 12/6/19, he reported increased compressive symptoms described as he can feel some pressure with swallowing.  Mr. Valencia was referred to Dr. Cutler on 1/13/20.  After her evaluation, she recommended the above procedure.  Mr. Valencia has opted to proceed.     Diagnosis   Non-toxic multinodular goiter      He has the following specific operative considerations:   - JULIÁN # of risks 3/8 = intermediate  - VTE risk:  4.5% (male, family hx and current cancer)  - Risk of PONV score = 1-2.  If > 2, anti-emetic intervention recommended.      #  Cardiology - Denies known coronary artery disease.  Denies cardiac symptoms.  Normal exercise stress test, 2019.  METS:  >4. RCRI : No serious cardiac risks.  0.4 % risk of major adverse cardiac event. No further cardiac evaluation needed per 2014 ACC/AHA guidelines for non-cardiac surgery.     - HTN, take CCB but hold diuretic DOS.      #  Pulmonary - no smoking hx    - Intermittent asthma, seasonal allergy induced.  Using rescue inhaler a couple time a week with Spring environmental allergens.  Denies nocturnal symptoms.      - Seasonal allergies, take Allegra and  fluticasone nasal spray as needed DOS.     #  Endocrine - previously asymptomatic multinodular goiter now with increased compressive symptoms described as he can feel some pressure with swallowing.  Above surgery planned.     #  Musculoskeletal - New low back with pain radiating down left leg planning to see his chiropractor prior to above surgery.  Taking gabapentin and muscle relaxant without much relief.  Will follow-up with PCP for further evaluation if symptoms persist after above surgery. Recommend careful positioning during surgery.     - Osteoarthritis, H/O left shoulder replacement, 2017    #  Neuro - RLS, take gabapentin as prescribed    - Migraines, hold Maxalt and Excedrin per [facility] protocol    #  Urological - prostate cancer, followed with active surveillance by Dr. Mcmillan. Last PSA of 10.10 in 04/28/20.    - Anesthesia considerations:  Refer to PAC assessment in anesthesia records  - If labs needed, per surgeon.   - Arrival time, NPO, shower and medication instructions provided by nursing staff today.  Preparing For Your Surgery handout given.     Patient is optimized and is acceptable candidate for the proposed procedure.  No further diagnostic evaluation is needed.                 Reviewed and Signed by PAC Mid-Level Provider/Resident  Mid-Level Provider/Resident: Zainab MISTRY CNP  Date: 5/18/20  Time: 14:02    Attending Anesthesiologist Anesthesia Assessment:        Anesthesiologist:   Date:   Time:   Pass/Fail:   Disposition:     PAC Pharmacist Assessment:        Pharmacist:   Date:   Time:    FIDELIA Pierre CNP

## 2020-05-19 ENCOUNTER — AMBULATORY - HEALTHEAST (OUTPATIENT)
Dept: FAMILY MEDICINE | Facility: CLINIC | Age: 55
End: 2020-05-19

## 2020-05-19 DIAGNOSIS — Z11.59 ENCOUNTER FOR SCREENING FOR OTHER VIRAL DISEASES: ICD-10-CM

## 2020-05-22 ENCOUNTER — TELEPHONE (OUTPATIENT)
Dept: OTOLARYNGOLOGY | Facility: CLINIC | Age: 55
End: 2020-05-22

## 2020-05-22 NOTE — TELEPHONE ENCOUNTER
Informed patient of location change of [facility]. Gave patient check in information. Okay with this plan and understands he should be getting a phone call from pre-op team shortly.       Helen Johnson   Perioperative Coordinator   Department of Otolaryngology    Office: [phone]

## 2020-05-24 ENCOUNTER — AMBULATORY - HEALTHEAST (OUTPATIENT)
Dept: FAMILY MEDICINE | Facility: CLINIC | Age: 55
End: 2020-05-24

## 2020-05-24 DIAGNOSIS — Z11.59 ENCOUNTER FOR SCREENING FOR OTHER VIRAL DISEASES: ICD-10-CM

## 2020-05-27 ENCOUNTER — HOSPITAL ENCOUNTER (OUTPATIENT)
Facility: CLINIC | Age: 55
Discharge: HOME | End: 2020-05-27
Attending: SURGERY | Admitting: SURGERY
Payer: COMMERCIAL

## 2020-05-27 ENCOUNTER — ANESTHESIA (OUTPATIENT)
Dept: SURGERY | Facility: CLINIC | Age: 55
End: 2020-05-27
Payer: COMMERCIAL

## 2020-05-27 VITALS
HEART RATE: 103 BPM | HEIGHT: 68 IN | DIASTOLIC BLOOD PRESSURE: 103 MMHG | OXYGEN SATURATION: 98 % | WEIGHT: 185.41 LBS | BODY MASS INDEX: 28.1 KG/M2 | SYSTOLIC BLOOD PRESSURE: 152 MMHG | TEMPERATURE: 98.4 F | RESPIRATION RATE: 16 BRPM

## 2020-05-27 DIAGNOSIS — E04.2 NON-TOXIC MULTINODULAR GOITER: Primary | ICD-10-CM

## 2020-05-27 LAB
CREAT SERPL-MCNC: 0.84 MG/DL (ref 0.66–1.25)
GFR SERPL CREATININE-BSD FRML MDRD: >90 ML/MIN/{1.73_M2}
GLUCOSE BLDC GLUCOMTR-MCNC: 113 MG/DL (ref 70–99)
HGB BLD-MCNC: 15.1 G/DL (ref 13.3–17.7)

## 2020-05-27 PROCEDURE — 27210794 ZZH OR GENERAL SUPPLY STERILE: Performed by: SURGERY

## 2020-05-27 PROCEDURE — 88307 TISSUE EXAM BY PATHOLOGIST: CPT | Mod: 26 | Performed by: SURGERY

## 2020-05-27 PROCEDURE — 36415 COLL VENOUS BLD VENIPUNCTURE: CPT | Performed by: ANESTHESIOLOGY

## 2020-05-27 PROCEDURE — 71000027 ZZH RECOVERY PHASE 2 EACH 15 MINS: Performed by: SURGERY

## 2020-05-27 PROCEDURE — 25800030 ZZH RX IP 258 OP 636: Performed by: ANESTHESIOLOGY

## 2020-05-27 PROCEDURE — 25000128 H RX IP 250 OP 636

## 2020-05-27 PROCEDURE — 36000064 ZZH SURGERY LEVEL 4 EA 15 ADDTL MIN - UMMC: Performed by: SURGERY

## 2020-05-27 PROCEDURE — 71000015 ZZH RECOVERY PHASE 1 LEVEL 2 EA ADDTL HR: Performed by: SURGERY

## 2020-05-27 PROCEDURE — 88311 DECALCIFY TISSUE: CPT | Performed by: SURGERY

## 2020-05-27 PROCEDURE — 25000301 ZZH OR RX SURGIFLO W/THROMBIN KIT 2ML 1991 OPNP: Performed by: SURGERY

## 2020-05-27 PROCEDURE — 37000008 ZZH ANESTHESIA TECHNICAL FEE, 1ST 30 MIN: Performed by: SURGERY

## 2020-05-27 PROCEDURE — 93010 ELECTROCARDIOGRAM REPORT: CPT | Performed by: INTERNAL MEDICINE

## 2020-05-27 PROCEDURE — 25000125 ZZHC RX 250: Performed by: ANESTHESIOLOGY

## 2020-05-27 PROCEDURE — 82565 ASSAY OF CREATININE: CPT | Performed by: ANESTHESIOLOGY

## 2020-05-27 PROCEDURE — 85018 HEMOGLOBIN: CPT | Performed by: ANESTHESIOLOGY

## 2020-05-27 PROCEDURE — 25000566 ZZH SEVOFLURANE, EA 15 MIN: Performed by: SURGERY

## 2020-05-27 PROCEDURE — 25000132 ZZH RX MED GY IP 250 OP 250 PS 637: Performed by: ANESTHESIOLOGY

## 2020-05-27 PROCEDURE — 36000062 ZZH SURGERY LEVEL 4 1ST 30 MIN - UMMC: Performed by: SURGERY

## 2020-05-27 PROCEDURE — 27210995 ZZH RX 272: Performed by: SURGERY

## 2020-05-27 PROCEDURE — 25000128 H RX IP 250 OP 636: Performed by: ANESTHESIOLOGY

## 2020-05-27 PROCEDURE — 37000009 ZZH ANESTHESIA TECHNICAL FEE, EACH ADDTL 15 MIN: Performed by: SURGERY

## 2020-05-27 PROCEDURE — 25000125 ZZHC RX 250

## 2020-05-27 PROCEDURE — 25000132 ZZH RX MED GY IP 250 OP 250 PS 637: Performed by: SURGERY

## 2020-05-27 PROCEDURE — 88311 DECALCIFY TISSUE: CPT | Mod: 26 | Performed by: SURGERY

## 2020-05-27 PROCEDURE — 40000170 ZZH STATISTIC PRE-PROCEDURE ASSESSMENT II: Performed by: SURGERY

## 2020-05-27 PROCEDURE — 88307 TISSUE EXAM BY PATHOLOGIST: CPT | Performed by: SURGERY

## 2020-05-27 PROCEDURE — 82962 GLUCOSE BLOOD TEST: CPT

## 2020-05-27 PROCEDURE — 25800030 ZZH RX IP 258 OP 636

## 2020-05-27 PROCEDURE — 40000065 ZZH STATISTIC EKG NON-CHARGEABLE

## 2020-05-27 PROCEDURE — 71000014 ZZH RECOVERY PHASE 1 LEVEL 2 FIRST HR: Performed by: SURGERY

## 2020-05-27 RX ORDER — SODIUM CHLORIDE, SODIUM LACTATE, POTASSIUM CHLORIDE, CALCIUM CHLORIDE 600; 310; 30; 20 MG/100ML; MG/100ML; MG/100ML; MG/100ML
INJECTION, SOLUTION INTRAVENOUS CONTINUOUS
Status: DISCONTINUED | OUTPATIENT
Start: 2020-05-27 | End: 2020-05-27 | Stop reason: HOSPADM

## 2020-05-27 RX ORDER — IBUPROFEN 600 MG/1
600 TABLET, FILM COATED ORAL EVERY 6 HOURS PRN
Qty: 30 TABLET | Refills: 0 | COMMUNITY
Start: 2020-05-27 | End: 2021-03-19

## 2020-05-27 RX ORDER — OXYCODONE HYDROCHLORIDE 5 MG/1
5 TABLET ORAL EVERY 4 HOURS PRN
Status: DISCONTINUED | OUTPATIENT
Start: 2020-05-27 | End: 2020-05-27 | Stop reason: HOSPADM

## 2020-05-27 RX ORDER — OXYCODONE HYDROCHLORIDE 5 MG/1
5-10 TABLET ORAL
Qty: 12 TABLET | Refills: 0 | Status: SHIPPED | OUTPATIENT
Start: 2020-05-27 | End: 2020-09-28

## 2020-05-27 RX ORDER — EPHEDRINE SULFATE 50 MG/ML
INJECTION, SOLUTION INTRAMUSCULAR; INTRAVENOUS; SUBCUTANEOUS PRN
Status: DISCONTINUED | OUTPATIENT
Start: 2020-05-27 | End: 2020-05-27

## 2020-05-27 RX ORDER — ACETAMINOPHEN 325 MG/1
650 TABLET ORAL EVERY 4 HOURS PRN
Status: DISCONTINUED | OUTPATIENT
Start: 2020-05-27 | End: 2020-05-27 | Stop reason: HOSPADM

## 2020-05-27 RX ORDER — SODIUM CHLORIDE, SODIUM LACTATE, POTASSIUM CHLORIDE, CALCIUM CHLORIDE 600; 310; 30; 20 MG/100ML; MG/100ML; MG/100ML; MG/100ML
INJECTION, SOLUTION INTRAVENOUS CONTINUOUS PRN
Status: DISCONTINUED | OUTPATIENT
Start: 2020-05-27 | End: 2020-05-27

## 2020-05-27 RX ORDER — CEFAZOLIN SODIUM 1 G/3ML
INJECTION, POWDER, FOR SOLUTION INTRAMUSCULAR; INTRAVENOUS PRN
Status: DISCONTINUED | OUTPATIENT
Start: 2020-05-27 | End: 2020-05-27

## 2020-05-27 RX ORDER — NALOXONE HYDROCHLORIDE 0.4 MG/ML
.1-.4 INJECTION, SOLUTION INTRAMUSCULAR; INTRAVENOUS; SUBCUTANEOUS
Status: DISCONTINUED | OUTPATIENT
Start: 2020-05-27 | End: 2020-05-27 | Stop reason: HOSPADM

## 2020-05-27 RX ORDER — LIDOCAINE 40 MG/G
CREAM TOPICAL
Status: DISCONTINUED | OUTPATIENT
Start: 2020-05-27 | End: 2020-05-27 | Stop reason: HOSPADM

## 2020-05-27 RX ORDER — FENTANYL CITRATE 50 UG/ML
25-50 INJECTION, SOLUTION INTRAMUSCULAR; INTRAVENOUS
Status: DISCONTINUED | OUTPATIENT
Start: 2020-05-27 | End: 2020-05-27 | Stop reason: HOSPADM

## 2020-05-27 RX ORDER — OXYCODONE HYDROCHLORIDE 5 MG/1
5-10 TABLET ORAL
Status: CANCELLED | OUTPATIENT
Start: 2020-05-27

## 2020-05-27 RX ORDER — FENTANYL CITRATE 50 UG/ML
INJECTION, SOLUTION INTRAMUSCULAR; INTRAVENOUS PRN
Status: DISCONTINUED | OUTPATIENT
Start: 2020-05-27 | End: 2020-05-27

## 2020-05-27 RX ORDER — PROPOFOL 10 MG/ML
INJECTION, EMULSION INTRAVENOUS PRN
Status: DISCONTINUED | OUTPATIENT
Start: 2020-05-27 | End: 2020-05-27

## 2020-05-27 RX ORDER — ONDANSETRON 4 MG/1
4 TABLET, ORALLY DISINTEGRATING ORAL EVERY 30 MIN PRN
Status: COMPLETED | OUTPATIENT
Start: 2020-05-27 | End: 2020-05-27

## 2020-05-27 RX ORDER — ACETAMINOPHEN 325 MG/1
975 TABLET ORAL ONCE
Status: DISCONTINUED | OUTPATIENT
Start: 2020-05-27 | End: 2020-05-27 | Stop reason: HOSPADM

## 2020-05-27 RX ORDER — ACETAMINOPHEN 325 MG/1
650 TABLET ORAL EVERY 6 HOURS
Status: CANCELLED | OUTPATIENT
Start: 2020-05-27

## 2020-05-27 RX ORDER — DEXAMETHASONE SODIUM PHOSPHATE 4 MG/ML
INJECTION, SOLUTION INTRA-ARTICULAR; INTRALESIONAL; INTRAMUSCULAR; INTRAVENOUS; SOFT TISSUE PRN
Status: DISCONTINUED | OUTPATIENT
Start: 2020-05-27 | End: 2020-05-27

## 2020-05-27 RX ORDER — ONDANSETRON 2 MG/ML
4 INJECTION INTRAMUSCULAR; INTRAVENOUS EVERY 6 HOURS PRN
Status: CANCELLED | OUTPATIENT
Start: 2020-05-27

## 2020-05-27 RX ORDER — MEPERIDINE HYDROCHLORIDE 25 MG/ML
12.5 INJECTION INTRAMUSCULAR; INTRAVENOUS; SUBCUTANEOUS
Status: DISCONTINUED | OUTPATIENT
Start: 2020-05-27 | End: 2020-05-27 | Stop reason: HOSPADM

## 2020-05-27 RX ORDER — ONDANSETRON 4 MG/1
4 TABLET, ORALLY DISINTEGRATING ORAL EVERY 6 HOURS PRN
Status: CANCELLED | OUTPATIENT
Start: 2020-05-27

## 2020-05-27 RX ORDER — ALBUTEROL SULFATE 0.83 MG/ML
2.5 SOLUTION RESPIRATORY (INHALATION) EVERY 6 HOURS PRN
Status: COMPLETED | OUTPATIENT
Start: 2020-05-27 | End: 2020-05-27

## 2020-05-27 RX ORDER — ONDANSETRON 2 MG/ML
4 INJECTION INTRAMUSCULAR; INTRAVENOUS EVERY 30 MIN PRN
Status: COMPLETED | OUTPATIENT
Start: 2020-05-27 | End: 2020-05-27

## 2020-05-27 RX ORDER — CEFAZOLIN SODIUM 2 G/100ML
INJECTION, SOLUTION INTRAVENOUS PRN
Status: DISCONTINUED | OUTPATIENT
Start: 2020-05-27 | End: 2020-05-27

## 2020-05-27 RX ORDER — NALOXONE HYDROCHLORIDE 0.4 MG/ML
.1-.4 INJECTION, SOLUTION INTRAMUSCULAR; INTRAVENOUS; SUBCUTANEOUS
Status: CANCELLED | OUTPATIENT
Start: 2020-05-27

## 2020-05-27 RX ORDER — DEXAMETHASONE SODIUM PHOSPHATE 4 MG/ML
10 INJECTION, SOLUTION INTRA-ARTICULAR; INTRALESIONAL; INTRAMUSCULAR; INTRAVENOUS; SOFT TISSUE ONCE
Status: DISCONTINUED | OUTPATIENT
Start: 2020-05-27 | End: 2020-05-27 | Stop reason: HOSPADM

## 2020-05-27 RX ORDER — ACETAMINOPHEN 325 MG/1
650 TABLET ORAL EVERY 4 HOURS PRN
Qty: 30 TABLET | Refills: 0 | COMMUNITY
Start: 2020-05-27

## 2020-05-27 RX ORDER — LIDOCAINE HYDROCHLORIDE 20 MG/ML
INJECTION, SOLUTION INFILTRATION; PERINEURAL PRN
Status: DISCONTINUED | OUTPATIENT
Start: 2020-05-27 | End: 2020-05-27

## 2020-05-27 RX ADMIN — OXYCODONE HYDROCHLORIDE 5 MG: 5 TABLET ORAL at 12:50

## 2020-05-27 RX ADMIN — ONDANSETRON 4 MG: 2 INJECTION INTRAMUSCULAR; INTRAVENOUS at 13:24

## 2020-05-27 RX ADMIN — ACETAMINOPHEN 650 MG: 325 TABLET, FILM COATED ORAL at 14:45

## 2020-05-27 RX ADMIN — OXYCODONE HYDROCHLORIDE 5 MG: 5 TABLET ORAL at 15:45

## 2020-05-27 RX ADMIN — FENTANYL CITRATE 25 MCG: 50 INJECTION, SOLUTION INTRAMUSCULAR; INTRAVENOUS at 12:18

## 2020-05-27 RX ADMIN — SODIUM CHLORIDE, POTASSIUM CHLORIDE, SODIUM LACTATE AND CALCIUM CHLORIDE: 600; 310; 30; 20 INJECTION, SOLUTION INTRAVENOUS at 08:36

## 2020-05-27 RX ADMIN — Medication 5 MG: at 09:53

## 2020-05-27 RX ADMIN — Medication 5 MG: at 09:58

## 2020-05-27 RX ADMIN — ALBUTEROL SULFATE 2.5 MG: 2.5 SOLUTION RESPIRATORY (INHALATION) at 16:30

## 2020-05-27 RX ADMIN — PHENYLEPHRINE HYDROCHLORIDE 100 MCG: 10 INJECTION INTRAVENOUS at 10:00

## 2020-05-27 RX ADMIN — DEXAMETHASONE SODIUM PHOSPHATE 10 MG: 4 INJECTION, SOLUTION INTRAMUSCULAR; INTRAVENOUS at 08:55

## 2020-05-27 RX ADMIN — ONDANSETRON 4 MG: 2 INJECTION INTRAMUSCULAR; INTRAVENOUS at 11:15

## 2020-05-27 RX ADMIN — FENTANYL CITRATE 50 MCG: 50 INJECTION, SOLUTION INTRAMUSCULAR; INTRAVENOUS at 08:42

## 2020-05-27 RX ADMIN — LIDOCAINE HYDROCHLORIDE 80 MG: 20 INJECTION, SOLUTION INFILTRATION; PERINEURAL at 08:42

## 2020-05-27 RX ADMIN — FENTANYL CITRATE 25 MCG: 50 INJECTION, SOLUTION INTRAMUSCULAR; INTRAVENOUS at 12:04

## 2020-05-27 RX ADMIN — REMIFENTANIL HYDROCHLORIDE 0.2 MCG/KG/MIN: 1 INJECTION, POWDER, LYOPHILIZED, FOR SOLUTION INTRAVENOUS at 08:43

## 2020-05-27 RX ADMIN — CEFAZOLIN 1 G: 1 INJECTION, POWDER, FOR SOLUTION INTRAMUSCULAR; INTRAVENOUS at 10:49

## 2020-05-27 RX ADMIN — PHENYLEPHRINE HYDROCHLORIDE 50 MCG: 10 INJECTION INTRAVENOUS at 09:15

## 2020-05-27 RX ADMIN — PROPOFOL 200 MG: 10 INJECTION, EMULSION INTRAVENOUS at 08:42

## 2020-05-27 RX ADMIN — CEFAZOLIN 2 G: 10 INJECTION, POWDER, FOR SOLUTION INTRAVENOUS at 08:47

## 2020-05-27 RX ADMIN — PHENYLEPHRINE HYDROCHLORIDE 50 MCG: 10 INJECTION INTRAVENOUS at 09:38

## 2020-05-27 RX ADMIN — Medication 100 MG: at 08:42

## 2020-05-27 ASSESSMENT — MIFFLIN-ST. JEOR: SCORE: 1654.12

## 2020-05-27 NOTE — OR NURSING
"Call to DR Longoria for sign out.  BP reported 165/102 with heart rate 106.  No order for BP.  Pain \"better and would like to go home and move on with recovery.\"  Meets discharge criteria.  "

## 2020-05-27 NOTE — OR NURSING
Patient complains of slight chest pressure on the left side of his chest, above nipple line.  VSS stable .  Lungs clear per auscultation.  Dr. Lomax called and ordered an EKG to be done.

## 2020-05-27 NOTE — ANESTHESIA CARE TRANSFER NOTE
Patient: Miles Valencia    Procedure(s):  Left Lobe THYROIDECTOMY    Diagnosis: Non-toxic multinodular goiter [E04.2]  Diagnosis Additional Information: No value filed.    Anesthesia Type:   General     Note:  Airway :Face Mask  Patient transferred to:PACU  Comments: VSS. Breathing spontaneously at a regular rate with adequate tidal volumes and maintaining O2 sats on 6L face mask. Denies nausea or pain. No apparent complications from anesthesia. No stridor appreciated following extubation; patient moving air without issue.    Alfredo Morris MD, MSc.  Anesthesia Resident, CA-2  Handoff Report: Identifed the Patient, Identified the Reponsible Provider, Reviewed the pertinent medical history, Discussed the surgical course, Reviewed Intra-OP anesthesia mangement and issues during anesthesia, Set expectations for post-procedure period and Allowed opportunity for questions and acknowledgement of understanding      Vitals: (Last set prior to Anesthesia Care Transfer)    CRNA VITALS  5/27/2020 1114 - 5/27/2020 1152      5/27/2020             Pulse:  111    SpO2:  99 %                Electronically Signed By: Alfredo Morris MD  May 27, 2020  11:52 AM

## 2020-05-27 NOTE — OR NURSING
EKG done.  Dr Lomax here to read it.   wants patient to have a albuterol neb before discharge.  Then ok to go home.  VSS and O2 sats at 98%

## 2020-05-27 NOTE — ANESTHESIA POSTPROCEDURE EVALUATION
Anesthesia POST Procedure Evaluation    Patient: Miles Valencia   MRN:     [id number] Gender:   male   Age:    55 year old :      1965        Preoperative Diagnosis: Non-toxic multinodular goiter [E04.2]   Procedure(s):  Left Lobe THYROIDECTOMY   Postop Comments: No value filed.     Anesthesia Type: General       Disposition: Outpatient   Postop Pain Control: Uneventful            Sign Out: Well controlled pain   PONV: No   Neuro/Psych: Uneventful            Sign Out: Acceptable/Baseline neuro status   Airway/Respiratory: Uneventful            Sign Out: Acceptable/Baseline resp. status   CV/Hemodynamics: Uneventful            Sign Out: Acceptable CV status   Other NRE: NONE   DID A NON-ROUTINE EVENT OCCUR? No    Event details/Postop Comments:  Patient doing well post-operatively.  There was some concern during the surgery for injury to the recurrent laryngeal vs nerve monitoring issues.  Patient slightly hoarse in recovery, surgical team aware.  Hemodynamically stable, pain well controlled, nausea well controlled.  Stable for discharge from the PACU           Last Anesthesia Record Vitals:  CRNA VITALS  2020 1114 - 2020 1214      2020             Pulse:  111    SpO2:  99 %          Last PACU Vitals:  Vitals Value Taken Time   /102 2020  1:45 PM   Temp 36.4  C (97.5  F) 2020 12:15 PM   Pulse 106 2020  1:45 PM   Resp 12 2020  1:57 PM   SpO2 95 % 2020  1:57 PM   Temp src     NIBP     Pulse     SpO2     Resp     Temp     Ht Rate     Temp 2     Vitals shown include unvalidated device data.      Electronically Signed By: Bill Staples MD, May 27, 2020, 1:59 PM

## 2020-05-27 NOTE — BRIEF OP NOTE
[facility], [place]    Brief Operative Note    Pre-operative diagnosis: Non-toxic multinodular goiter [E04.2]  Post-operative diagnosis Same as pre-operative diagnosis    Procedure: Procedure(s):  Left Lobe THYROIDECTOMY  Surgeon: Surgeon(s) and Role:     * Evelin Cutler MD - Primary     * Adarsh Zurita MD - Resident - Assisting  Anesthesia: General   Estimated blood loss: 20 cc  Drains: None  Specimens:   ID Type Source Tests Collected by Time Destination   A : Left lobe Organ Thyroid, left SURGICAL PATHOLOGY EXAM Evelin Cutler MD 5/27/2020 10:48 AM      Findings:   Large multinodular goiter, left side dissected first. No signal detected from left RLN so did not proceed with the right side. However after extubation patient sounded fine without clinical evidence of nerve injury..  Complications: None.  Implants: * No implants in log *      PACU then discharge home.

## 2020-05-27 NOTE — OR NURSING
"Taking over care from Harish Haro RN.  Pt asking to \"get up to go to the bathroom.  Neck incision clear and dry and intact.  Pt is hoarse and states \"this is new.\"  Trachea in alinement. Nurse Tahir states Dr Staples informed of hoarse voice.  Pt unable to void when up.  Back to bed with nausea.  "

## 2020-05-27 NOTE — DISCHARGE INSTRUCTIONS
[facility]   Adult Discharge Orders & Instructions     For 24 hours after surgery    1. Get plenty of rest.  A responsible adult must stay with you for at least 24 hours after you leave the hospital.   2. Do not drive or use heavy equipment.  If you have weakness or tingling, don't drive or use heavy equipment until this feeling goes away.  3. Do not drink alcohol.  4. Avoid strenuous or risky activities.  Ask for help when climbing stairs.   5. You may feel lightheaded.  IF so, sit for a few minutes before standing.  Have someone help you get up.   6. If you have nausea (feel sick to your stomach): Drink only clear liquids such as apple juice, ginger ale, broth or 7-Up.  Rest may also help.  Be sure to drink enough fluids.  Move to a regular diet as you feel able.  7. You may have a slight fever. Call the doctor if your fever is over 100 F (37.7 C) (taken under the tongue) or lasts longer than 24 hours.  8. You may have a dry mouth, a sore throat, muscle aches or trouble sleeping.  These should go away after 24 hours.  9. Do not make important or legal decisions.   Call your doctor for any of the followin.  Signs of infection (fever, growing tenderness at the surgery site, a large amount of drainage or bleeding, severe pain, foul-smelling drainage, redness, swelling).    2. It has been over 8 to 10 hours since surgery and you are still not able to urinate (pass water).    3.  Headache for over 24 hours.    4.  Numbness, tingling or weakness the day after surgery (if you had spinal anesthesia).  To contact a doctor, call ________________________________________

## 2020-05-29 LAB — INTERPRETATION ECG - MUSE: NORMAL

## 2020-06-02 LAB — COPATH REPORT: NORMAL

## 2020-06-08 ENCOUNTER — VIRTUAL VISIT (OUTPATIENT)
Dept: OTOLARYNGOLOGY | Facility: CLINIC | Age: 55
End: 2020-06-08
Payer: COMMERCIAL

## 2020-06-08 VITALS — BODY MASS INDEX: 26.66 KG/M2 | HEIGHT: 69 IN | WEIGHT: 180 LBS

## 2020-06-08 DIAGNOSIS — E89.0 S/P PARTIAL THYROIDECTOMY: Primary | ICD-10-CM

## 2020-06-08 ASSESSMENT — MIFFLIN-ST. JEOR: SCORE: 1641.85

## 2020-06-08 ASSESSMENT — PAIN SCALES - GENERAL: PAINLEVEL: NO PAIN (0)

## 2020-06-08 NOTE — LETTER
6/8/2020       RE: Miles Valencia  [address] [place] [address]     Dear Colleague,    Thank you for referring your patient, Miles Valencia, to the [facility] EAR NOSE AND THROAT at [facility]. Please see a copy of my visit note below.    Miles Valencia is a 55 year old male who is being evaluated via a billable video visit.          Video-Visit Details    Type of service:  Video Visit    Video Start Time: 3:30pm  Video End Time: 3:50pm    Originating Location (pt. Location): Home    Distant Location (provider location):  [facility] EAR NOSE AND THROAT     Platform used for Video Visit: Wilfredo Cutler MD  Mr. Valencia is 2 weeks s/p resection very large left thyroid lobectomy. We did not proceed at that time with completion thyroidectomy because there was a weakened stimulation NIM signal from his left RLN. I have spoken with him several times prior to this visit.     Since the surgery, he initially had a weak voice-functioning about 70%. Now he feels as if he is at 90% strength compared to pre op. He denies any problems with swallowing or breathing. He has no sx of hypocalcemia or hypothyroidism.    On the video visit, I can see his wound is healing well. No evidence of hematoma, seroma or infection. His voice quality is quite good and he has a strong cough.     I reviewed the pathology with him: benign MNG     Plan:  I reviewed wound management and massage  Check TFT's at 6 weeks post op  He will likely need a completion thyroidectomy. However prior to doing so I want to make sure his  vocal cords are functioning -via flexible laryngoscopy.   Will likely have him come in for clinic scope visit in Sept or Oct.          Again, thank you for allowing me to participate in the care of your patient.      Sincerely,    Evelin Cutler MD

## 2020-06-08 NOTE — OP NOTE
Procedure Date: 05/27/2020      PREOPERATIVE DIAGNOSIS:  Thyroid goiter.      POSTOPERATIVE DIAGNOSIS:  Thyroid goiter.      SURGICAL PROCEDURE PERFORMED:  Left thyroid lobectomy with 45 minutes of intraoperative recurrent laryngeal nerve monitoring.      SURGEON:  Evelin Cutler MD      ASSISTANT:  Adarsh Zurita MD      ANESTHESIA:  General endotracheal with nerve monitoring endotracheal tube.      COMPLICATIONS:  None.      ESTIMATED BLOOD LOSS:  Less than 5 mL.      CLINICAL INDICATIONS FOR THE PROCEDURE:  This is a 55-year-old gentleman who was noted to have a large multinodular thyroid goiter.  Based on the size of the nodules and the goiter, it was recommended the patient have a total thyroidectomy.  The surgical procedure was discussed with the patient, including but not limited to the risks of bleeding, infection, injury to the recurrent laryngeal nerve or nerves, potential permanent hypocalcemia or loss of airway.  The patient is aware of this and agreed to proceed with surgery and consent was obtained.  The site was marked.      DETAILS OF PROCEDURE:  The patient was brought to the operating room in stable condition, placed on the operating table in the supine position.  After appropriate general anesthesia was obtained, the patient was prepped and draped in a sterile fashion.  A timeout was then performed.  A 6 cm incision was made over the anterior neck following a natural skin crease.  The platysma was then divided and subplatysmal planes were then created.  The strap muscles were divided at the midline and retracted laterally.  Based on the size of the thyroid on the left and the fact that the patient's trachea was deviated to his right, we elected to proceed with a left thyroid lobectomy first.  The thyroid goiter was quite large and it was necessary for us to divide the strap muscles on the left side using the LigaSure.  These were divided without difficulty and we then identified the middle  thyroid vein and its branches first.  These were separately skeletonized, clipped and/or tied and then divided using the LigaSure.  We then carried our dissection cephalad and identified the superior thyroidal vessels.  These were each separately skeletonized, tied and clipped and divided using a LigaSure.  This permitted us to mobilize the thyroid gland more easily up and out into the operative field; however, there was an extensive substernal component to this.  Therefore, we mobilized as much as we could inferiorly.  Again, we rotated the gland medially, and we were able to identify the left superior parathyroid gland.  This was dissected from the undersurface of the thyroid, maintaining its viability.  The left recurrent laryngeal nerve was identified in this location as well; however, it did not stimulate.  I then followed the gland inferiorly.  At no point did it stimulate.  We used up to 1 milliamps of electrocurrent and could still not get a stimulation.  I then dissected very carefully inferiorly to make sure that there was a low division of this nerve to the anterior and posterior branches and I could not see that there was any early division.  I then dissected more cephalad, identified an anterior and posterior branch were going into the cricothyroid joint; however, again could not get the nerve to stimulate.  Therefore, I continued my dissection inferiorly, identified the left inferior parathyroid gland and dissected it from the undersurface of the thyroid, maintaining its viability.  The inferior thyroidal vessels were quite large.  They were separately skeletonized, surgically tied and clipped and then divided using the LigaSure.  We continued rotating the gland from a lateral to medial manner, dissecting it over the anterior portion of the trachea.  There was a somewhat larger nodule over the isthmus measuring about 2.5 cm that was also resected with this specimen.  The thyroid gland was then divided  at the isthmus using the LigaSure.  The specimen was sent for permanent pathologic evaluation to include the left lobe of thyroid and isthmus.  The area was copiously irrigated.  Although I can follow the nerve the entire tract in the neck, I could not get the nerve to stimulate.  Both parathyroid glands appeared to be intact and viable at the conclusion of the case.  Because I could not get the nerve to stimulate despite me being present for endotracheal intubation, assuring that the nerve monitoring endotracheal tube was in good position and I know that I did not have any obvious nerve injury, I elected to stop the procedure.  Fibrillar was then placed in the operative bed.  No obvious bleeding was identified.  The previously-divided strap muscles on the left were reapproximated using a 3-0 silk horizontal mattress suture.  The strap muscles were then approximated at the midline using a 3-0 chromic interrupted suture.  The platysma was approximated using a 3-0 chromic interrupted suture.  The skin incision was approximated with a 5-0 Monocryl running subcuticular suture.  The wound was dressed with Dermabond.        The patient was then extubated and returned to the recovery room in stable condition.  I was present during the entire surgical procedure.  In the recovery room, the patient did have a subjective weak voice; however, he had an excellent cough as I was able to protect his airway with no stridor.         ERLINDA DANIEL MD             D: 2020   T: 2020   MT: JOCELIN      Name:     KOURTNEY PEDRAZA   MRN:      [id number]        Account:        [id number]   :      1965           Procedure Date: 2020      Document: [id number]

## 2020-06-08 NOTE — PROGRESS NOTES
"Miles Valencia is a 55 year old male who is being evaluated via a billable video visit.      The patient has been notified of following:     \"This video visit will be conducted via a call between you and your physician/provider. We have found that certain health care needs can be provided without the need for an in-person physical exam.  This service lets us provide the care you need with a video conversation.  If a prescription is necessary we can send it directly to your pharmacy.  If lab work is needed we can place an order for that and you can then stop by our lab to have the test done at a later time.    Video visits are billed at different rates depending on your insurance coverage.  Please reach out to your insurance provider with any questions.    If during the course of the call the physician/provider feels a video visit is not appropriate, you will not be charged for this service.\"    Patient has given verbal consent for Video visit? Yes    How would you like to obtain your AVS? Trahar    Patient would like the video invitation sent by: Send to e-mail at: [email or web address]    Will anyone else be joining your video visit? No        Video-Visit Details    Type of service:  Video Visit    Video Start Time: 3:30pm  Video End Time: 3:50pm    Originating Location (pt. Location): Home    Distant Location (provider location):  [facility] EAR NOSE AND THROAT     Platform used for Video Visit: Wilfredo Cutler MD  Mr. Valencia is 2 weeks s/p resection very large left thyroid lobectomy. We did not proceed at that time with completion thyroidectomy because there was a weakened stimulation NIM signal from his left RLN. I have spoken with him several times prior to this visit.     Since the surgery, he initially had a weak voice-functioning about 70%. Now he feels as if he is at 90% strength compared to pre op. He denies any problems with swallowing or breathing. He has no sx of hypocalcemia or " hypothyroidism.    On the video visit, I can see his wound is healing well. No evidence of hematoma, seroma or infection. His voice quality is quite good and he has a strong cough.     I reviewed the pathology with him: benign MNG     Plan:  I reviewed wound management and massage  Check TFT's at 6 weeks post op  He will likely need a completion thyroidectomy. However prior to doing so I want to make sure his  vocal cords are functioning -via flexible laryngoscopy.   Will likely have him come in for clinic scope visit in Sept or Oct.

## 2020-06-24 DIAGNOSIS — E04.2 NON-TOXIC MULTINODULAR GOITER: Primary | ICD-10-CM

## 2020-07-13 DIAGNOSIS — R97.20 ELEVATED PROSTATE SPECIFIC ANTIGEN (PSA): Primary | ICD-10-CM

## 2020-07-13 DIAGNOSIS — R97.20 ELEVATED PROSTATE SPECIFIC ANTIGEN (PSA): ICD-10-CM

## 2020-07-13 DIAGNOSIS — E04.2 NON-TOXIC MULTINODULAR GOITER: ICD-10-CM

## 2020-07-13 LAB
PSA SERPL-MCNC: 9.18 UG/L (ref 0–4)
TSH SERPL DL<=0.005 MIU/L-ACNC: 0.96 MU/L (ref 0.4–4)

## 2020-07-14 ENCOUNTER — PRE VISIT (OUTPATIENT)
Dept: UROLOGY | Facility: CLINIC | Age: 55
End: 2020-07-14

## 2020-07-14 NOTE — TELEPHONE ENCOUNTER
Visit Type : PSA check    Records/Orders: PSA in epic     Pt Contacted: no    At Rooming: video

## 2020-07-27 ENCOUNTER — VIRTUAL VISIT (OUTPATIENT)
Dept: UROLOGY | Facility: CLINIC | Age: 55
End: 2020-07-27
Payer: COMMERCIAL

## 2020-07-27 DIAGNOSIS — C61 MALIGNANT NEOPLASM OF PROSTATE (H): Primary | ICD-10-CM

## 2020-07-27 RX ORDER — DICLOFENAC SODIUM 75 MG/1
TABLET, DELAYED RELEASE ORAL
COMMUNITY
Start: 2020-05-04 | End: 2021-03-19

## 2020-07-27 ASSESSMENT — PAIN SCALES - GENERAL: PAINLEVEL: NO PAIN (0)

## 2020-07-27 NOTE — PATIENT INSTRUCTIONS
Schedule appointment with Dr. Pereyra for Fusion prostate biopsy- next available.      It was a pleasure meeting with you today.  Thank you for allowing me and my team the privilege of caring for you today.  YOU are the reason we are here, and I truly hope we provided you with the excellent service you deserve.  Please let us know if there is anything else we can do for you so that we can be sure you are leaving completely satisfied with your care experience.        Taryn Deshpande, CMA

## 2020-07-27 NOTE — PROGRESS NOTES
"Miles Valencia is a 55 year old male who is being evaluated via a billable telephone visit.      The patient has been notified of following:     \"This telephone visit will be conducted via a call between you and your physician/provider. We have found that certain health care needs can be provided without the need for a physical exam.  This service lets us provide the care you need with a short phone conversation.  If a prescription is necessary we can send it directly to your pharmacy.  If lab work is needed we can place an order for that and you can then stop by our lab to have the test done at a later time.    Telephone visits are billed at different rates depending on your insurance coverage. During this emergency period, for some insurers they may be billed the same as an in-person visit.  Please reach out to your insurance provider with any questions.    If during the course of the call the physician/provider feels a telephone visit is not appropriate, you will not be charged for this service.\"    Patient has given verbal consent for Telephone visit?  Yes    What phone number would you like to be contacted at? [phone]    How would you like to obtain your AVS? Torey   ADDITIONAL PROVIDER NOTES:   54 yo male on surveillance for low risk prostate cancer Dx in 2016 with repeat biopsy in 2017.  PSA between 6-7 in 2017 and now now between 9-10.  MRI in Feb 2020 showing volume of 54 ml and PI-RADS 4 lesion with question of involvement of the anterior fibromuscular stroma.  Patient had recent PSA again.    OBJECTIVE: PSA from 7/13/20 is 9.18 ng/mL    ASSESSMENT AND PLAN:   Over half of today's 15-minute visit was spent counseling the patient regarding his prostate cancer.  I counseled the patient that given his PSA being higher than it was at previous biopsy and MRI findings, we would suggest a prostate biopsy.  He is an agreement and we will get this scheduled shortly.    Phone call duration: 15 " minutes

## 2020-07-27 NOTE — NURSING NOTE
Chief Complaint   Patient presents with     RECHECK     Prostate cancer follow up     Taryn Deshpande, CMA

## 2020-07-27 NOTE — LETTER
"7/27/2020       RE: Miles Valencia  [address] [place] [address]     Dear Colleague,    Thank you for referring your patient, Miles Valencia, to the [facility] UROLOGY AND INST FOR PROSTATE AND UROLOGIC CANCERS at [facility]. Please see a copy of my visit note below.    Miles Valencia is a 55 year old male who is being evaluated via a billable telephone visit.      The patient has been notified of following:     \"This telephone visit will be conducted via a call between you and your physician/provider. We have found that certain health care needs can be provided without the need for a physical exam.  This service lets us provide the care you need with a short phone conversation.  If a prescription is necessary we can send it directly to your pharmacy.  If lab work is needed we can place an order for that and you can then stop by our lab to have the test done at a later time.    Telephone visits are billed at different rates depending on your insurance coverage. During this emergency period, for some insurers they may be billed the same as an in-person visit.  Please reach out to your insurance provider with any questions.    If during the course of the call the physician/provider feels a telephone visit is not appropriate, you will not be charged for this service.\"    Patient has given verbal consent for Telephone visit?  Yes    What phone number would you like to be contacted at? [phone]    How would you like to obtain your AVS? Torey   ADDITIONAL PROVIDER NOTES:   54 yo male on surveillance for low risk prostate cancer Dx in 2016 with repeat biopsy in 2017.  PSA between 6-7 in 2017 and now now between 9-10.  MRI in Feb 2020 showing volume of 54 ml and PI-RADS 4 lesion with question of involvement of the anterior fibromuscular stroma.  Patient had recent PSA again.    OBJECTIVE: PSA from 7/13/20 is 9.18 ng/mL    ASSESSMENT AND PLAN:   Over half of today's " 15-minute visit was spent counseling the patient regarding his prostate cancer.  I counseled the patient that given his PSA being higher than it was at previous biopsy and MRI findings, we would suggest a prostate biopsy.  He is an agreement and we will get this scheduled shortly.    Phone call duration: 15 minutes    Again, thank you for allowing me to participate in the care of your patient.      Sincerely,    Eber Pereyra MD

## 2020-09-02 ENCOUNTER — TELEPHONE (OUTPATIENT)
Dept: OTOLARYNGOLOGY | Facility: CLINIC | Age: 55
End: 2020-09-02

## 2020-09-28 ENCOUNTER — OFFICE VISIT (OUTPATIENT)
Dept: OTOLARYNGOLOGY | Facility: CLINIC | Age: 55
End: 2020-09-28
Payer: COMMERCIAL

## 2020-09-28 VITALS
TEMPERATURE: 98.1 F | SYSTOLIC BLOOD PRESSURE: 149 MMHG | BODY MASS INDEX: 28.44 KG/M2 | OXYGEN SATURATION: 98 % | HEART RATE: 75 BPM | HEIGHT: 69 IN | WEIGHT: 192 LBS | RESPIRATION RATE: 15 BRPM | DIASTOLIC BLOOD PRESSURE: 91 MMHG

## 2020-09-28 DIAGNOSIS — E04.2 NON-TOXIC MULTINODULAR GOITER: Primary | ICD-10-CM

## 2020-09-28 DIAGNOSIS — R49.0 DYSPHONIA: ICD-10-CM

## 2020-09-28 ASSESSMENT — PAIN SCALES - GENERAL: PAINLEVEL: NO PAIN (0)

## 2020-09-28 ASSESSMENT — MIFFLIN-ST. JEOR: SCORE: 1696.29

## 2020-09-28 NOTE — NURSING NOTE
"Chief Complaint   Patient presents with     RECHECK     follow up      Blood pressure (!) 149/91, pulse 75, temperature 98.1  F (36.7  C), resp. rate 15, height 1.753 m (5' 9\"), weight 87.1 kg (192 lb), SpO2 98 %.    Anoop Paris LPN    "

## 2020-09-28 NOTE — PATIENT INSTRUCTIONS
1. You were seen in the ENT Clinic today by Dr. Cutler.  If you have any questions or concerns after your appointment, please call   -  ENT Clinic: [phone]    Ashley Rodriguez LPN  [facility] Otolaryngology  [phone]

## 2020-09-28 NOTE — LETTER
9/28/2020       RE: Miles Valencia  [address] [place] [address]     Dear Colleague,    Thank you for referring your patient, Miles Valencia, to the [facility] at [facility]. Please see a copy of my visit note below.    Miles underwent a left thyroid lobectomy (9cm left thyroid nodule). The surgery was scheduled for total thyroidectomy for MNG but because there was minimal stimulation of an intact left RLN we did not proceed with removing the right lobe. He never had a post op laryngoscopy to evaluate the VC or RLN function due to COVID.     Since the surgery his voice quality has improved dramatically. However-he admits to voice fatigue and inability to sing especially high notes or for long periods of time. He has not problem with breathing, eating or drinking thin liquids. He feels as if he can exercise easier, lie on his back and overall feels much less neck tightness.     PE:   Wound is healing well. Right thyromegally about 5cm  Flexible laryngoscopy: NP,OP, and HP WNL. He has excellent true VC mobility bilaterally. There is a small 1-2mm posterior glottic gap with prolonged phonation. Arytenoids in good position.     US:Right thyroid nodule 2010: 3.3cm bx benign  2019: 3.5cm benign    Plan:  Because this nodule (or the right lobe nodules) have not changed in 10 years and are benign, the patient feels much better with the larger left lobe removed, and concerns for SLN weakness---I would not recommend completion thyroidectomy at this time. I think its reasonable to repeat US 2 years from last one, follow the nodules. If Nodules become >4cm or symptomatic proceed with surgery at that time,    Evelin Cutler MD

## 2020-09-28 NOTE — PROGRESS NOTES
Miles underwent a left thyroid lobectomy (9cm left thyroid nodule). The surgery was scheduled for total thyroidectomy for MNG but because there was minimal stimulation of an intact left RLN we did not proceed with removing the right lobe. He never had a post op laryngoscopy to evaluate the VC or RLN function due to COVID.     Since the surgery his voice quality has improved dramatically. However-he admits to voice fatigue and inability to sing especially high notes or for long periods of time. He has not problem with breathing, eating or drinking thin liquids. He feels as if he can exercise easier, lie on his back and overall feels much less neck tightness.     PE:   Wound is healing well. Right thyromegally about 5cm  Flexible laryngoscopy: NP,OP, and HP WNL. He has excellent true VC mobility bilaterally. There is a small 1-2mm posterior glottic gap with prolonged phonation. Arytenoids in good position.     US:Right thyroid nodule 2010: 3.3cm bx benign  2019: 3.5cm benign    Plan:  Because this nodule (or the right lobe nodules) have not changed in 10 years and are benign, the patient feels much better with the larger left lobe removed, and concerns for SLN weakness---I would not recommend completion thyroidectomy at this time. I think its reasonable to repeat US 2 years from last one, follow the nodules. If Nodules become >4cm or symptomatic proceed with surgery at that time,    Evelin Cutler MD

## 2020-10-15 ENCOUNTER — PRE VISIT (OUTPATIENT)
Dept: UROLOGY | Facility: CLINIC | Age: 55
End: 2020-10-15

## 2020-10-20 ENCOUNTER — PRE VISIT (OUTPATIENT)
Dept: UROLOGY | Facility: CLINIC | Age: 55
End: 2020-10-20

## 2020-10-20 DIAGNOSIS — C61 MALIGNANT NEOPLASM OF PROSTATE (H): Primary | ICD-10-CM

## 2020-10-20 RX ORDER — CIPROFLOXACIN 500 MG/1
500 TABLET, FILM COATED ORAL 2 TIMES DAILY
Qty: 6 TABLET | Refills: 0 | Status: SHIPPED | OUTPATIENT
Start: 2020-10-20 | End: 2021-03-19

## 2020-10-26 ENCOUNTER — OFFICE VISIT (OUTPATIENT)
Dept: UROLOGY | Facility: CLINIC | Age: 55
End: 2020-10-26
Payer: COMMERCIAL

## 2020-10-26 VITALS
WEIGHT: 188 LBS | BODY MASS INDEX: 27.85 KG/M2 | SYSTOLIC BLOOD PRESSURE: 152 MMHG | HEIGHT: 69 IN | HEART RATE: 110 BPM | DIASTOLIC BLOOD PRESSURE: 88 MMHG

## 2020-10-26 DIAGNOSIS — C61 MALIGNANT NEOPLASM OF PROSTATE (H): Primary | ICD-10-CM

## 2020-10-26 PROCEDURE — 76872 US TRANSRECTAL: CPT | Performed by: UROLOGY

## 2020-10-26 PROCEDURE — 88344 IMHCHEM/IMCYTCHM EA MLT ANTB: CPT | Performed by: PATHOLOGY

## 2020-10-26 PROCEDURE — 55700 PR US GUIDE FOR NEEDLE PLACEMENT: CPT | Performed by: UROLOGY

## 2020-10-26 PROCEDURE — 88305 TISSUE EXAM BY PATHOLOGIST: CPT | Performed by: PATHOLOGY

## 2020-10-26 PROCEDURE — 76999 ECHO EXAMINATION PROCEDURE: CPT | Performed by: UROLOGY

## 2020-10-26 RX ORDER — GENTAMICIN 40 MG/ML
80 INJECTION, SOLUTION INTRAMUSCULAR; INTRAVENOUS ONCE
Status: COMPLETED | OUTPATIENT
Start: 2020-10-26 | End: 2020-10-26

## 2020-10-26 RX ADMIN — GENTAMICIN 80 MG: 40 INJECTION, SOLUTION INTRAMUSCULAR; INTRAVENOUS at 11:28

## 2020-10-26 ASSESSMENT — MIFFLIN-ST. JEOR: SCORE: 1678.14

## 2020-10-26 ASSESSMENT — PAIN SCALES - GENERAL
PAINLEVEL: NO PAIN (0)
PAINLEVEL: NO PAIN (0)

## 2020-10-26 NOTE — PROGRESS NOTES
Reason for visit: MRI ultrasound fusion prostate biopsy    Indications: Mr. Valencia is a 55-year-old gentleman followed in clinic for prostate cancer. He presents today for  MRI/ultrasound fusion prostate biopsy.    Procedure: After informed consent was obtained and after confirming the patient has been off aspirin or aspirin like products for a week, took his antibiotics and perform his enema, the patient was placed left side down on the procedure table. Digital rectal exam was performed revealing a normal feeling prostate. The ultrasound probe was lubricated and placed into the patient's rectum without difficulty. Inspection of the prostate revealed a few calcifications, but no obvious hypoechoic regions.   Next 5 ccs of lidocaine were injected at the junction of the prostate and the seminal vesicles bilaterally.  Measurement of the prostate were then obtained revealing a volume of 43 ccs.  We then performed an ultrasound sweep of the prostate. These post-processed images were then utilized by the [employer] software to create a 3-D prostate volume.  These images were then overlaid on the patient's MRI from 2/3/20 and MRI/ ultrasound fusion was performed. We then obtained 2 cores from target #1, a lesion in the left apex/mid TZ 12:30 oclock. We then obtained another 12 cores in the standard sextant distribution with an additional core each from the left and right transition zones for a total of 16 cores obtained. The patient tolerated the procedure without difficulty.    The patient was counseled he could expect to see blood in his urine, semen, and stool for the next several days and should contact us should he develop any fevers chills or sweats. We'll see the patient back in a week to go over the results of his biopsy.

## 2020-10-26 NOTE — LETTER
"10/26/2020       RE: Miles Valencia  [address] [place] [address]     Dear Colleague,    Thank you for referring your patient, Miles Valencia, to the [facility] UROLOGY CLINIC [place] at [facility]. Please see a copy of my visit note below.    Chief Complaint   Patient presents with     RECHECK     prostate biopsy        Blood pressure (!) 152/88, pulse 110, height 1.753 m (5' 9\"), weight 85.3 kg (188 lb). Body mass index is 27.76 kg/m .    Patient Active Problem List   Diagnosis     Pain in joint, shoulder region     Achilles bursitis or tendinitis     History of total shoulder replacement, left     Non-toxic multinodular goiter       Allergies   Allergen Reactions     Amoxicillin Diarrhea       Current Outpatient Medications   Medication Sig Dispense Refill     acetaminophen (TYLENOL) 325 MG tablet Take 2 tablets (650 mg) by mouth every 4 hours as needed for other (mild pain) 30 tablet 0     albuterol (PROAIR HFA/PROVENTIL HFA/VENTOLIN HFA) 108 (90 BASE) MCG/ACT Inhaler Inhale 2 puffs into the lungs every 4 hours as needed for shortness of breath / dyspnea or wheezing       ammonium lactate (AMLACTIN) 12 % cream Apply topically daily as needed        aspirin-acetaminophen-caffeine (EXCEDRIN MIGRAINE) 250-250-65 MG per tablet Take 2 tablets by mouth every 6 hours as needed for headaches       camphor-menthol (DERMASARRA) 0.5-0.5 % LOTN Apply topically every 6 hours as needed for skin care       cholecalciferol (VITAMIN D3 MAXIMUM STRENGTH) 5000 UNITS CAPS Take 5,000 Units by mouth every morning        ciprofloxacin (CIPRO) 500 MG tablet Take 1 tablet (500 mg) by mouth 2 times daily 6 tablet 0     Cyanocobalamin (VITAMIN B-12 PO) Take 1 tablet by mouth every evening        cyclobenzaprine (FLEXERIL) 10 MG tablet Take 10 mg by mouth 3 times daily as needed for muscle spasms       diclofenac (VOLTAREN) 75 MG EC tablet        diltiazem ER COATED BEADS " (CARDIZEM CD/CARTIA XT) 240 MG 24 hr capsule Take 240 mg by mouth daily       fexofenadine (ALLEGRA) 180 MG tablet Take 180 mg by mouth daily       gabapentin (NEURONTIN) 300 MG capsule Take 1 capsule (300 mg) by mouth 3 times daily (Patient taking differently: Take 600 mg by mouth At Bedtime ) 60 capsule 0     HEMP OIL OR EXTRACT OR OTHER CBD CANNABINOID, NOT MEDICAL CANNABIS, Take 1 capsule by mouth every evening       hydrochlorothiazide (HYDRODIURIL) 25 MG tablet Take 25 mg by mouth every morning        hydrocortisone (CORTAID) 1 % cream Apply topically 2 times daily as needed       ibuprofen (ADVIL/MOTRIN) 200 MG tablet Take 800 mg by mouth every 8 hours as needed for mild pain       ibuprofen (ADVIL/MOTRIN) 600 MG tablet Take 1 tablet (600 mg) by mouth every 6 hours as needed for other (For mild pain or temperature greater than 102F) 30 tablet 0     meclizine (ANTIVERT) 12.5 MG tablet Take 2 tablets (25 mg) by mouth 4 times daily as needed for dizziness 30 tablet 0     MELATONIN PO Take 2.5 mg by mouth At Bedtime        Menthol, Topical Analgesic, (BIOFREEZE EX) Externally apply topically as needed       NONFORMULARY Magnesium/MSM lotion - Brand: Aincient minerals  2 pumps every evening applied to feet/legs    20 mg elemental magnesium and 25 mg MSM Per 1 pump       rizatriptan (MAXALT-MLT) 10 MG ODT Take 10 mg by mouth at onset of headache for migraine       zolpidem (AMBIEN) 5 MG tablet Take 5 mg by mouth nightly as needed for sleep         Social History     Tobacco Use     Smoking status: Never Smoker     Smokeless tobacco: Never Used   Substance Use Topics     Alcohol use: Yes     Alcohol/week: 0.0 standard drinks     Comment: 1-2 drinks a week     Drug use: No       Patient states he did all biopsy prep accordingly, including taking the antibiotics as directed, stopping blood thinners, and completing the enema shortly prior to his appointment.    Invasive Procedure Safety Checklist:    Procedure:  Prostate Biopsy    Action: Complete sections and checkboxes as appropriate.    Pre-procedure:  1. Patient ID Verified with 2 identifiers (Leigha and  or MRN) : YES    2. Procedure and site verified with patient/designee (when able) : YES    3. Accurate consent documentation in medical record : YES    4. H&P (or appropriate assessment) documented in medical record : N/A  H&P must be up to 30 days prior to procedure an updated within 24 hours of                 Procedure as applicable.     5. Relevant diagnostic and radiology test results appropriately labeled and displayed as applicable : YES    6. Blood products, implants, devices, and/or special equipment available for the procedure as applicable : YES    7. Procedure site(s) marked with provider initials [Exclusions: none] : NO    8. Marking not required. Reason : Yes  Procedure does not require site marking    Time Out:     Time-Out performed immediately prior to starting procedure, including verbal and active participation of all team members addressing: YES    1. Correct patient identity.  2. Confirmed that the correct side and site are marked.  3. An accurate procedure to be done.  4. Agreement on the procedure to be done.  5. Correct patient position.  6. Relevant images and results are properly labeled and appropriately displayed.  7. The need to administer antibiotics or fluids for irrigation purposes during the procedure as applicable.  8. Safety precautions based on patient history or medication use.    During Procedure: Verification of correct person, site, and procedure occurs any time the responsibility for care of the patient is transferred to another member of the care team.    The following medication was given:     MEDICATION:  Lidocaine 1%  ROUTE: Provider Administered  SITE: Provider Administered  DOSE: 10mL  LOT #: [id number]  :SpecifiedBy  EXPIRATION DATE: OCT 1 2021  NDC#: 75240-499312  Was there drug waste? Yes  Amount of drug  waste (10mL): 20.  Reason for waste:  As per MD    Prior to injection, verified patient identity using patient's name and date of birth.  Due to injection administration, patient instructed to remain in clinic for 15 minutes  afterwards, and to report any adverse reaction to me immediately.    Drug Amount Wasted:  Yes: 20 mg/ml   Vial/Syringe: Single dose vial    The following medication was given:     MEDICATION:  Gentamicin    ROUTE: IM  SITE: RUQ - Gluteus  DOSE: 80mg  LOT #: [id number]  : [employer]  EXPIRATION DATE: 8/21  NDC#: [id number]  Was there drug waste? No    Prior to injection, verified patient identity using patient's name and date of birth.  Due to injection administration, patient instructed to remain in clinic for 15 minutes  afterwards, and to report any adverse reaction to me immediately.    Drug Amount Wasted:  None.  Vial/Syringe: Single dose vial    Shasta Novak CMA  October 26, 2020    Reason for visit: MRI ultrasound fusion prostate biopsy    Indications: Mr. Valencia is a 55-year-old gentleman followed in clinic for prostate cancer. He presents today for  MRI/ultrasound fusion prostate biopsy.    Procedure: After informed consent was obtained and after confirming the patient has been off aspirin or aspirin like products for a week, took his antibiotics and perform his enema, the patient was placed left side down on the procedure table. Digital rectal exam was performed revealing a normal feeling prostate. The ultrasound probe was lubricated and placed into the patient's rectum without difficulty. Inspection of the prostate revealed a few calcifications, but no obvious hypoechoic regions.   Next 5 ccs of lidocaine were injected at the junction of the prostate and the seminal vesicles bilaterally.  Measurement of the prostate were then obtained revealing a volume of 43 ccs.  We then performed an ultrasound sweep of the prostate. These post-processed images were then utilized  by the [employer] software to create a 3-D prostate volume.  These images were then overlaid on the patient's MRI from 2/3/20 and MRI/ ultrasound fusion was performed. We then obtained 2 cores from target #1, a lesion in the left apex/mid TZ 12:30 oclock. We then obtained another 12 cores in the standard sextant distribution with an additional core each from the left and right transition zones for a total of 16 cores obtained. The patient tolerated the procedure without difficulty.    The patient was counseled he could expect to see blood in his urine, semen, and stool for the next several days and should contact us should he develop any fevers chills or sweats. We'll see the patient back in a week to go over the results of his biopsy.       Eber Pereyra MD

## 2020-10-26 NOTE — PROGRESS NOTES
"Chief Complaint   Patient presents with     RECHECK     prostate biopsy        Blood pressure (!) 152/88, pulse 110, height 1.753 m (5' 9\"), weight 85.3 kg (188 lb). Body mass index is 27.76 kg/m .    Patient Active Problem List   Diagnosis     Pain in joint, shoulder region     Achilles bursitis or tendinitis     History of total shoulder replacement, left     Non-toxic multinodular goiter       Allergies   Allergen Reactions     Amoxicillin Diarrhea       Current Outpatient Medications   Medication Sig Dispense Refill     acetaminophen (TYLENOL) 325 MG tablet Take 2 tablets (650 mg) by mouth every 4 hours as needed for other (mild pain) 30 tablet 0     albuterol (PROAIR HFA/PROVENTIL HFA/VENTOLIN HFA) 108 (90 BASE) MCG/ACT Inhaler Inhale 2 puffs into the lungs every 4 hours as needed for shortness of breath / dyspnea or wheezing       ammonium lactate (AMLACTIN) 12 % cream Apply topically daily as needed        aspirin-acetaminophen-caffeine (EXCEDRIN MIGRAINE) 250-250-65 MG per tablet Take 2 tablets by mouth every 6 hours as needed for headaches       camphor-menthol (DERMASARRA) 0.5-0.5 % LOTN Apply topically every 6 hours as needed for skin care       cholecalciferol (VITAMIN D3 MAXIMUM STRENGTH) 5000 UNITS CAPS Take 5,000 Units by mouth every morning        ciprofloxacin (CIPRO) 500 MG tablet Take 1 tablet (500 mg) by mouth 2 times daily 6 tablet 0     Cyanocobalamin (VITAMIN B-12 PO) Take 1 tablet by mouth every evening        cyclobenzaprine (FLEXERIL) 10 MG tablet Take 10 mg by mouth 3 times daily as needed for muscle spasms       diclofenac (VOLTAREN) 75 MG EC tablet        diltiazem ER COATED BEADS (CARDIZEM CD/CARTIA XT) 240 MG 24 hr capsule Take 240 mg by mouth daily       fexofenadine (ALLEGRA) 180 MG tablet Take 180 mg by mouth daily       gabapentin (NEURONTIN) 300 MG capsule Take 1 capsule (300 mg) by mouth 3 times daily (Patient taking differently: Take 600 mg by mouth At Bedtime ) 60 capsule 0     " HEMP OIL OR EXTRACT OR OTHER CBD CANNABINOID, NOT MEDICAL CANNABIS, Take 1 capsule by mouth every evening       hydrochlorothiazide (HYDRODIURIL) 25 MG tablet Take 25 mg by mouth every morning        hydrocortisone (CORTAID) 1 % cream Apply topically 2 times daily as needed       ibuprofen (ADVIL/MOTRIN) 200 MG tablet Take 800 mg by mouth every 8 hours as needed for mild pain       ibuprofen (ADVIL/MOTRIN) 600 MG tablet Take 1 tablet (600 mg) by mouth every 6 hours as needed for other (For mild pain or temperature greater than 102F) 30 tablet 0     meclizine (ANTIVERT) 12.5 MG tablet Take 2 tablets (25 mg) by mouth 4 times daily as needed for dizziness 30 tablet 0     MELATONIN PO Take 2.5 mg by mouth At Bedtime        Menthol, Topical Analgesic, (BIOFREEZE EX) Externally apply topically as needed       NONFORMULARY Magnesium/MSM lotion - Brand: Aincient minerals  2 pumps every evening applied to feet/legs    20 mg elemental magnesium and 25 mg MSM Per 1 pump       rizatriptan (MAXALT-MLT) 10 MG ODT Take 10 mg by mouth at onset of headache for migraine       zolpidem (AMBIEN) 5 MG tablet Take 5 mg by mouth nightly as needed for sleep         Social History     Tobacco Use     Smoking status: Never Smoker     Smokeless tobacco: Never Used   Substance Use Topics     Alcohol use: Yes     Alcohol/week: 0.0 standard drinks     Comment: 1-2 drinks a week     Drug use: No       Patient states he did all biopsy prep accordingly, including taking the antibiotics as directed, stopping blood thinners, and completing the enema shortly prior to his appointment.    Invasive Procedure Safety Checklist:    Procedure: Prostate Biopsy    Action: Complete sections and checkboxes as appropriate.    Pre-procedure:  1. Patient ID Verified with 2 identifiers (Leigha and  or MRN) : YES    2. Procedure and site verified with patient/designee (when able) : YES    3. Accurate consent documentation in medical record : YES    4. H&P (or  appropriate assessment) documented in medical record : N/A  H&P must be up to 30 days prior to procedure an updated within 24 hours of                 Procedure as applicable.     5. Relevant diagnostic and radiology test results appropriately labeled and displayed as applicable : YES    6. Blood products, implants, devices, and/or special equipment available for the procedure as applicable : YES    7. Procedure site(s) marked with provider initials [Exclusions: none] : NO    8. Marking not required. Reason : Yes  Procedure does not require site marking    Time Out:     Time-Out performed immediately prior to starting procedure, including verbal and active participation of all team members addressing: YES    1. Correct patient identity.  2. Confirmed that the correct side and site are marked.  3. An accurate procedure to be done.  4. Agreement on the procedure to be done.  5. Correct patient position.  6. Relevant images and results are properly labeled and appropriately displayed.  7. The need to administer antibiotics or fluids for irrigation purposes during the procedure as applicable.  8. Safety precautions based on patient history or medication use.    During Procedure: Verification of correct person, site, and procedure occurs any time the responsibility for care of the patient is transferred to another member of the care team.    The following medication was given:     MEDICATION:  Lidocaine 1%  ROUTE: Provider Administered  SITE: Provider Administered  DOSE: 10mL  LOT #: [id number]  :[employer]  EXPIRATION DATE: OCT 1 2021  NDC#: [id number]  Was there drug waste? Yes  Amount of drug waste (10mL): 20.  Reason for waste:  As per MD    Prior to injection, verified patient identity using patient's name and date of birth.  Due to injection administration, patient instructed to remain in clinic for 15 minutes  afterwards, and to report any adverse reaction to me immediately.    Drug Amount Wasted:   Yes: 20 mg/ml   Vial/Syringe: Single dose vial    The following medication was given:     MEDICATION:  Gentamicin    ROUTE: IM  SITE: RUQ - Gluteus  DOSE: 80mg  LOT #: [id number]  : [employer]  EXPIRATION DATE: 8/21  NDC#: [id number]  Was there drug waste? No    Prior to injection, verified patient identity using patient's name and date of birth.  Due to injection administration, patient instructed to remain in clinic for 15 minutes  afterwards, and to report any adverse reaction to me immediately.    Drug Amount Wasted:  None.  Vial/Syringe: Single dose vial    Shasta Novak CMA  October 26, 2020

## 2020-10-26 NOTE — PATIENT INSTRUCTIONS
[facility]  Precautions Following a Prostate Biopsy    There are four conditions that you should watch for after a prostate biopsy:    1. Excessive pain  2. Bleeding irregularities (passing numerous  dime sized  clots or if your urine looks like cranberry juice)  3. Fever of 100 degrees or more  4. If you are unable to urinate        If any of these occur, call the Urology Clinic during normal business hours (M-F, 8:00-4:30) at [phone].  If you experience a problem after normal business hours, call our 24-hour phone number at [phone] and ask for the Urology Resident on call to be paged.      If you experience any discomfort following the biopsy, you may take Tylenol.  DO NOT TAKE ASPIRIN unless specified by your physician.   If the discomfort becomes severe or uncontrolled by medication, contact the Urology Clinic or Urology Resident (after normal business hours).      Do not be alarmed if you have some blood in your stool, in your urine, or ejaculate (semen).  This occurrence is normal and may last up to three (3) or four (4) days, usually intermittently.  Blood in the ejaculate (semen) may last several weeks, up to about a dozen ejaculations.  The blood in your ejaculate may appear as brown streaks, blood tinged, and immediately following a biopsy, it may appear bright red.      If you run a fever above 100 degrees, call the Urology Clinic or Urology Resident (after normal business hours) immediately.  If you are unable to reach your physician or the Resident on call, go to the nearest emergency room.  Explain that you have had a transrectal biopsy of your prostate and what problems you are experiencing.        You should attempt to urinate following your biopsy before you leave the clinic.  If you are unable to urinate four (4) to six (6) hours after you leave the clinic, you will need to contact the Urology Clinic or the Resident on call.  If you are unable to reach  your physician or the Resident on call, go to the nearest emergency room.      If you have any questions or concerns after your biopsy, feel free to contact the Urology Clinic at [phone] during M-F, 8:00-5pm business hours.  If you need to speak with someone after normal business hours, call [phone] and ask for the Resident on call to be paged.

## 2020-10-28 LAB — COPATH REPORT: NORMAL

## 2020-11-02 ENCOUNTER — VIRTUAL VISIT (OUTPATIENT)
Dept: UROLOGY | Facility: CLINIC | Age: 55
End: 2020-11-02
Payer: COMMERCIAL

## 2020-11-02 DIAGNOSIS — C61 PROSTATE CANCER (H): Primary | ICD-10-CM

## 2020-11-02 PROCEDURE — 99214 OFFICE O/P EST MOD 30 MIN: CPT | Mod: TEL | Performed by: UROLOGY

## 2020-11-02 NOTE — PROGRESS NOTES
"Miles Valencia is a 55 year old male who is being evaluated via a billable telephone visit.      The patient has been notified of following:     \"This telephone visit will be conducted via a call between you and your physician/provider. We have found that certain health care needs can be provided without the need for a physical exam.  This service lets us provide the care you need with a short phone conversation.  If a prescription is necessary we can send it directly to your pharmacy.  If lab work is needed we can place an order for that and you can then stop by our lab to have the test done at a later time.    Telephone visits are billed at different rates depending on your insurance coverage. During this emergency period, for some insurers they may be billed the same as an in-person visit.  Please reach out to your insurance provider with any questions.    If during the course of the call the physician/provider feels a telephone visit is not appropriate, you will not be charged for this service.\"    Patient has given verbal consent for Telephone visit?  Yes    What phone number would you like to be contacted at? [phone]    How would you like to obtain your AVS? Torey     ADDITIONAL PROVIDER NOTES:   Over half of today's 25-minute visit was spent counseling the patient regarding his new diagnosis of prostate cancer.  The patient has intermediate risk prostate cancer and does not need any further staging exams.  He has options of observation, surgery or radiation.  Also rupa discussed cryo and HIFU. I counseled the patient that I did not think he was a good observation candidate given his young age and good health and therefore suggest definitive therapy.  We discussed risks of surgery including incontinence and erectile dysfunction.  At one year form surgery 90% of men will be dry and 70% of men in their early 50s with perfect erectile function going in who have bilat nerve sparing will be able to have " erections sufficient for intercourse with viagra.  We discussed risks of radiation including development of irritative urinary symptoms or blood in the urine or irritative stooling symptoms or blood in the stool as well as ED.  The rectal symptoms can be reduced with the placement of SpaceOar.  One can have raditaion after surgery but not in reverse.  Radiation may require 6 months hormone therapy given his intermediate disease.  The patient asked many good questions today and these were answered to his satisfaction.  I suggested he meet with radiation oncology, get Dr. Dubois's book and meet back with me in 6 weeks to make further decisions.         Phone call duration: 25 minutes

## 2020-11-02 NOTE — NURSING NOTE
Chief Complaint   Patient presents with     Follow Up     bx review       Patient Active Problem List   Diagnosis     Pain in joint, shoulder region     Achilles bursitis or tendinitis     History of total shoulder replacement, left     Non-toxic multinodular goiter       Allergies   Allergen Reactions     Amoxicillin Diarrhea       Current Outpatient Medications   Medication Sig Dispense Refill     acetaminophen (TYLENOL) 325 MG tablet Take 2 tablets (650 mg) by mouth every 4 hours as needed for other (mild pain) 30 tablet 0     albuterol (PROAIR HFA/PROVENTIL HFA/VENTOLIN HFA) 108 (90 BASE) MCG/ACT Inhaler Inhale 2 puffs into the lungs every 4 hours as needed for shortness of breath / dyspnea or wheezing       ammonium lactate (AMLACTIN) 12 % cream Apply topically daily as needed        aspirin-acetaminophen-caffeine (EXCEDRIN MIGRAINE) 250-250-65 MG per tablet Take 2 tablets by mouth every 6 hours as needed for headaches       camphor-menthol (DERMASARRA) 0.5-0.5 % LOTN Apply topically every 6 hours as needed for skin care       cholecalciferol (VITAMIN D3 MAXIMUM STRENGTH) 5000 UNITS CAPS Take 5,000 Units by mouth every morning        ciprofloxacin (CIPRO) 500 MG tablet Take 1 tablet (500 mg) by mouth 2 times daily 6 tablet 0     Cyanocobalamin (VITAMIN B-12 PO) Take 1 tablet by mouth every evening        cyclobenzaprine (FLEXERIL) 10 MG tablet Take 10 mg by mouth 3 times daily as needed for muscle spasms       diclofenac (VOLTAREN) 75 MG EC tablet        diltiazem ER COATED BEADS (CARDIZEM CD/CARTIA XT) 240 MG 24 hr capsule Take 240 mg by mouth daily       fexofenadine (ALLEGRA) 180 MG tablet Take 180 mg by mouth daily       gabapentin (NEURONTIN) 300 MG capsule Take 1 capsule (300 mg) by mouth 3 times daily (Patient taking differently: Take 600 mg by mouth At Bedtime ) 60 capsule 0     HEMP OIL OR EXTRACT OR OTHER CBD CANNABINOID, NOT MEDICAL CANNABIS, Take 1 capsule by mouth every evening        hydrochlorothiazide (HYDRODIURIL) 25 MG tablet Take 25 mg by mouth every morning        hydrocortisone (CORTAID) 1 % cream Apply topically 2 times daily as needed       ibuprofen (ADVIL/MOTRIN) 200 MG tablet Take 800 mg by mouth every 8 hours as needed for mild pain       ibuprofen (ADVIL/MOTRIN) 600 MG tablet Take 1 tablet (600 mg) by mouth every 6 hours as needed for other (For mild pain or temperature greater than 102F) 30 tablet 0     meclizine (ANTIVERT) 12.5 MG tablet Take 2 tablets (25 mg) by mouth 4 times daily as needed for dizziness 30 tablet 0     MELATONIN PO Take 2.5 mg by mouth At Bedtime        Menthol, Topical Analgesic, (BIOFREEZE EX) Externally apply topically as needed       NONFORMULARY Magnesium/MSM lotion - Brand: Aincient minerals  2 pumps every evening applied to feet/legs    20 mg elemental magnesium and 25 mg MSM Per 1 pump       rizatriptan (MAXALT-MLT) 10 MG ODT Take 10 mg by mouth at onset of headache for migraine       zolpidem (AMBIEN) 5 MG tablet Take 5 mg by mouth nightly as needed for sleep         Social History     Tobacco Use     Smoking status: Never Smoker     Smokeless tobacco: Never Used   Substance Use Topics     Alcohol use: Yes     Alcohol/week: 0.0 standard drinks     Comment: 1-2 drinks a week     Drug use: No       Rimma Berger LPN  11/2/2020  2:14 PM

## 2020-11-02 NOTE — LETTER
"11/2/2020       RE: Miles Valencia  [address] [place] [address]     Dear Colleague,    Thank you for referring your patient, Miles Valencia, to the [facility] [place] at [facility]. Please see a copy of my visit note below.    Miles Valencia is a 55 year old male who is being evaluated via a billable telephone visit.      The patient has been notified of following:     \"This telephone visit will be conducted via a call between you and your physician/provider. We have found that certain health care needs can be provided without the need for a physical exam.  This service lets us provide the care you need with a short phone conversation.  If a prescription is necessary we can send it directly to your pharmacy.  If lab work is needed we can place an order for that and you can then stop by our lab to have the test done at a later time.    Telephone visits are billed at different rates depending on your insurance coverage. During this emergency period, for some insurers they may be billed the same as an in-person visit.  Please reach out to your insurance provider with any questions.    If during the course of the call the physician/provider feels a telephone visit is not appropriate, you will not be charged for this service.\"    Patient has given verbal consent for Telephone visit?  Yes    What phone number would you like to be contacted at? [phone]    How would you like to obtain your AVS? Torey     ADDITIONAL PROVIDER NOTES:   Over half of today's 25-minute visit was spent counseling the patient regarding his new diagnosis of prostate cancer.  The patient has intermediate risk prostate cancer and does not need any further staging exams.  He has options of observation, surgery or radiation.  Also rupa discussed cryo and HIFU. I counseled the patient that I did not think he was a good observation candidate given his young age and " good health and therefore suggest definitive therapy.  We discussed risks of surgery including incontinence and erectile dysfunction.  At one year form surgery 90% of men will be dry and 70% of men in their early 50s with perfect erectile function going in who have bilat nerve sparing will be able to have erections sufficient for intercourse with viagra.  We discussed risks of radiation including development of irritative urinary symptoms or blood in the urine or irritative stooling symptoms or blood in the stool as well as ED.  The rectal symptoms can be reduced with the placement of SpaceOar.  One can have raditaion after surgery but not in reverse.  Radiation may require 6 months hormone therapy given his intermediate disease.  The patient asked many good questions today and these were answered to his satisfaction.  I suggested he meet with radiation oncology, get Dr. Dubois's book and meet back with me in 6 weeks to make further decisions.         Phone call duration: 25 minutes    Eber Pereyra MD

## 2020-11-03 NOTE — PATIENT INSTRUCTIONS
Patient needs to get in to see Cho in radiation oncology and then back with me in 6 weeks.       It was a pleasure meeting with you today.  Thank you for allowing me and my team the privilege of caring for you today.  YOU are the reason we are here, and I truly hope we provided you with the excellent service you deserve.  Please let us know if there is anything else we can do for you so that we can be sure you are leaving completely satisfied with your care experience.

## 2020-11-05 ENCOUNTER — TELEPHONE (OUTPATIENT)
Dept: UROLOGY | Facility: CLINIC | Age: 55
End: 2020-11-05

## 2020-11-05 NOTE — PROGRESS NOTES
RADIATION ONCOLOGY CONSULTATION  [facility]    DATE:  November 5, 2020    PATIENT NAME: Miles Valencia  MEDICAL RECORD NUMBER: [id number]    REFERRING PHYSICIAN:  Dr. Pereyra    REASON FOR CONSULTATION: Consideration of  definitive radiotherapy for management of adenocarcinoma of the prostate.    Staging:  Clinical  T1c N0 M0  Rafael's score:   Rafael's score: 3+4=7  PSA:     07/13/2020 9.18    04/28/2020 10.10    01/13/2020 10.40    12/05/2019 9.85    12/07/2018 8.57      Prostate Volume:    43 cc     NCCN :    Favorable intermediate risk(<50% cores+, [place] 3+4, 1 Intermediate risk)      ROX RISK:  OC: 71%      EPE+: 26%     SV+: 2%      LN+: 3%        HISTORY OF PRESENT ILLNESS: The patient is a 55 year old man who was found to have an elevated PSA with a prostate biopsy confirmation of adenocarcinoma of the prostate.    The patient as diagnosed with low risk prostate cancer in 2016(Rafael 3+3=6) with a repeat biopsy in 6/2017.The patient was continued on active surveillance. The prostate MRI showed PIRAD 2 findings.  His PSAs have continued to rise (8.57 in 12/2018, 9.85 in 12/2019 & 10.1 4/28/2020).      A prostate MRI(2/3/2020) showed PIRADS 4 abnormality located at the bilateral peripheral and transitional zone, apex and mid gland at the 11-1:00 position and there was a short segment capsular abutment with low likelihood of minimal extraprostatic extension. There was abutment of the external urethral sphincter and concern for involvement of the  anterior fibromuscular stroma. There was no suspicious adenopathy.    Because of the changes in PIRAD reading and rising PSA,the patient underwent a prostate biopsy with MRI/TRUS fusion on 10/26/2020. The pathology ([id number], [facility]) showed [place] 3+3=6 cancer from the left apex(1/2 cores+) and Rafael 3+4=7 cancer from left apex/mid (2/2 cores+). Thus there were 3/14 cores involved by cancer.      AUA Score: 5/ 35.  JULIA= 25/25      The patient was consulted today to discuss treatment options.      PMH:   Past Medical History:   Diagnosis Date     Achilles bursitis or tendinitis 5/4/2014     Allergic rhinitis      Asthma      Benign positional vertigo      Congestion      Hypertension      Migraine      Multinodular goiter      Osteoarthritis      Pain in joint, shoulder region 4/18/2014     Prostate cancer (H)      RLS (restless legs syndrome)      Sleep problems        PSH:  Past Surgical History:   Procedure Laterality Date     ARTHROPLASTY SHOULDER Left 6/13/2017    Procedure: ARTHROPLASTY SHOULDER;  Left Total Shoulder Arthroplasty  ;  Surgeon: Jossy Swanson MD;  Location: [facility] OR     BIOPSY      Prostate     COLONOSCOPY N/A 3/21/2016    Procedure: COLONOSCOPY;  Surgeon: Toy Lima MD;  Location: [facility] GI     THYROIDECTOMY Left 5/27/2020    Procedure: Left Lobe THYROIDECTOMY;  Surgeon: Evelin Cutler MD;  Location: [facility] OR       MEDICATIONS:  Current Outpatient Medications   Medication Sig Dispense Refill     acetaminophen (TYLENOL) 325 MG tablet Take 2 tablets (650 mg) by mouth every 4 hours as needed for other (mild pain) 30 tablet 0     albuterol (PROAIR HFA/PROVENTIL HFA/VENTOLIN HFA) 108 (90 BASE) MCG/ACT Inhaler Inhale 2 puffs into the lungs every 4 hours as needed for shortness of breath / dyspnea or wheezing       ammonium lactate (AMLACTIN) 12 % cream Apply topically daily as needed        aspirin-acetaminophen-caffeine (EXCEDRIN MIGRAINE) 250-250-65 MG per tablet Take 2 tablets by mouth every 6 hours as needed for headaches       camphor-menthol (DERMASARRA) 0.5-0.5 % LOTN Apply topically every 6 hours as needed for skin care       cholecalciferol (VITAMIN D3 MAXIMUM STRENGTH) 5000 UNITS CAPS Take 5,000 Units by mouth every morning        ciprofloxacin (CIPRO) 500 MG tablet Take 1 tablet (500 mg) by mouth 2 times daily 6 tablet 0     Cyanocobalamin (VITAMIN B-12 PO) Take 1 tablet by mouth every evening         cyclobenzaprine (FLEXERIL) 10 MG tablet Take 10 mg by mouth 3 times daily as needed for muscle spasms       diclofenac (VOLTAREN) 75 MG EC tablet        diltiazem ER COATED BEADS (CARDIZEM CD/CARTIA XT) 240 MG 24 hr capsule Take 240 mg by mouth daily       fexofenadine (ALLEGRA) 180 MG tablet Take 180 mg by mouth daily       gabapentin (NEURONTIN) 300 MG capsule Take 1 capsule (300 mg) by mouth 3 times daily (Patient taking differently: Take 600 mg by mouth At Bedtime ) 60 capsule 0     HEMP OIL OR EXTRACT OR OTHER CBD CANNABINOID, NOT MEDICAL CANNABIS, Take 1 capsule by mouth every evening       hydrochlorothiazide (HYDRODIURIL) 25 MG tablet Take 25 mg by mouth every morning        hydrocortisone (CORTAID) 1 % cream Apply topically 2 times daily as needed       ibuprofen (ADVIL/MOTRIN) 200 MG tablet Take 800 mg by mouth every 8 hours as needed for mild pain       ibuprofen (ADVIL/MOTRIN) 600 MG tablet Take 1 tablet (600 mg) by mouth every 6 hours as needed for other (For mild pain or temperature greater than 102F) 30 tablet 0     meclizine (ANTIVERT) 12.5 MG tablet Take 2 tablets (25 mg) by mouth 4 times daily as needed for dizziness 30 tablet 0     MELATONIN PO Take 2.5 mg by mouth At Bedtime        Menthol, Topical Analgesic, (BIOFREEZE EX) Externally apply topically as needed       NONFORMULARY Magnesium/MSM lotion - Brand: Aincient minerals  2 pumps every evening applied to feet/legs    20 mg elemental magnesium and 25 mg MSM Per 1 pump       rizatriptan (MAXALT-MLT) 10 MG ODT Take 10 mg by mouth at onset of headache for migraine       zolpidem (AMBIEN) 5 MG tablet Take 5 mg by mouth nightly as needed for sleep         ALLERGY:  Allergies   Allergen Reactions     Amoxicillin Diarrhea       FAMILY HISTORY:  Family History   Problem Relation Age of Onset     Skin Cancer Mother      Hypertension Mother      Other - See Comments Brother         Factor V Leiden     Deep Vein Thrombosis (DVT) Brother      Other -  See Comments Niece         Von [place]brands     Other - See Comments Nephew         [place], Factor V Leiden     Hypertension Father      Substance Abuse Father         Alcohol     Cancer Brother      Hypertension Brother      Bleeding Disorder Brother      Migraines Brother      Cancer Cousin      Cancer Maternal Grandmother         Breast     Cerebrovascular Disease Paternal Grandfather      Asthma Niece      Thyroid Disease Other        SOCIAL HISTORY  Social History     Tobacco Use     Smoking status: Never Smoker     Smokeless tobacco: Never Used   Substance Use Topics     Alcohol use: Yes     Alcohol/week: 0.0 standard drinks     Comment: 1-2 drinks a week        ROS: 10 point ROS reviewed as reported on the nursing assessment note.      IMPRESSION:  Favorable intermediate risk(<50% cores+, Rafael 3+4, 1 Intermediate risk) adenocarcinoma of prostate.     RECOMMENDATION: The patient has a relatively favorable risk prostate cancer. The patient has surgery, radiotherapy, or active surveillance as options for managing his prostate cancer.     There are several prospective data that indicated that early intervention may afford better outcome for men with prostate cancer. However, in the PROTECT study, the survival was not affected by the type of management scheme.A longer term(>10 years)data will be needed to predict the outcome in young patients.    I discussed radiotherapy in detail. I discussed preparation for radiotherapy including SpaceOAR and Fiducials, types of radiotherapy, and number of treatments. I also discussed what it's like to go through radiotherapy.    I reviewed my recommendations with the patient and answered all questions. I also discussed alternative therapies.     I explained the benefits of radiotherapy as well as related toxicities. I described the early and late toxicities associated with radiotherapy.     JJ Shaffer M.D.  Department of Radiation Oncology  [facility]  [facility]    Video consult  45 minutes for prep and online

## 2020-11-05 NOTE — TELEPHONE ENCOUNTER
[facility] Call Center    Phone Message    May a detailed message be left on voicemail: yes     Reason for Call: Other: Pt had an appt with Dr. Pereyra on 11/2 and was told to schedule a 6 week follow up. I attempted to schedule but did not have any openings in-clinic or virtual until mid Feb. Please call back pt to discuss times closer to Dr. Pereyra's request. Thank you     Action Taken: Message routed to:  [facility] ([facility] Clinic coordinators-uro    Travel Screening: Not Applicable

## 2020-11-11 ENCOUNTER — VIRTUAL VISIT (OUTPATIENT)
Dept: RADIATION ONCOLOGY | Facility: CLINIC | Age: 55
End: 2020-11-11
Attending: UROLOGY
Payer: COMMERCIAL

## 2020-11-11 DIAGNOSIS — C61 MALIGNANT NEOPLASM OF PROSTATE (H): Primary | ICD-10-CM

## 2020-11-11 PROCEDURE — 99204 OFFICE O/P NEW MOD 45 MIN: CPT | Performed by: RADIOLOGY

## 2020-11-11 ASSESSMENT — ENCOUNTER SYMPTOMS
DOUBLE VISION: 0
NAUSEA: 0
DEPRESSION: 0
BLURRED VISION: 0
HEARTBURN: 0
ABDOMINAL PAIN: 0
FEVER: 0
NECK PAIN: 0
WEIGHT LOSS: 0
CHILLS: 0
VOMITING: 0
BACK PAIN: 1
NERVOUS/ANXIOUS: 0
DIARRHEA: 0
DIZZINESS: 0
WHEEZING: 0
SHORTNESS OF BREATH: 0
COUGH: 0
SORE THROAT: 1
FALLS: 0
FREQUENCY: 0
CONSTIPATION: 0
HEADACHES: 0

## 2020-11-11 NOTE — TELEPHONE ENCOUNTER
Pt is calling again about the below request, he needs a 6wk follow up, and nothing available, please call maria de lrosario raman

## 2020-11-11 NOTE — LETTER
11/11/2020         RE: Miles Valencia  [address] [place] [address]        Dear Colleague,    Thank you for referring your patient, Miles Valencia, to the [facility] RADIATION ONCOLOGY. Please see a copy of my visit note below.      RADIATION ONCOLOGY CONSULTATION  [facility] PHYSICIANS    DATE:  November 5, 2020    PATIENT NAME: Miles Valencia  MEDICAL RECORD NUMBER: [id number]    REFERRING PHYSICIAN:  Dr. Pereyra    REASON FOR CONSULTATION: Consideration of  definitive radiotherapy for management of adenocarcinoma of the prostate.    Staging:  Clinical  T1c N0 M0  Rafael's score:   Rafael's score: 3+4=7  PSA:     07/13/2020 9.18    04/28/2020 10.10    01/13/2020 10.40    12/05/2019 9.85    12/07/2018 8.57      Prostate Volume:    43 cc     NCCN :    Favorable intermediate risk(<50% cores+, [place] 3+4, 1 Intermediate risk)      ROX RISK:  OC: 71%      EPE+: 26%     SV+: 2%      LN+: 3%        HISTORY OF PRESENT ILLNESS: The patient is a 55 year old man who was found to have an elevated PSA with a prostate biopsy confirmation of adenocarcinoma of the prostate.    The patient as diagnosed with low risk prostate cancer in 2016([place] 3+3=6) with a repeat biopsy in 6/2017.The patient was continued on active surveillance. The prostate MRI showed PIRAD 2 findings.  His PSAs have continued to rise (8.57 in 12/2018, 9.85 in 12/2019 & 10.1 4/28/2020).      A prostate MRI(2/3/2020) showed PIRADS 4 abnormality located at the bilateral peripheral and transitional zone, apex and mid gland at the 11-1:00 position and there was a short segment capsular abutment with low likelihood of minimal extraprostatic extension. There was abutment of the external urethral sphincter and concern for involvement of the  anterior fibromuscular stroma. There was no suspicious adenopathy.    Because of the changes in PIRAD reading and rising PSA,the patient underwent a prostate biopsy with  MRI/TRUS fusion on 10/26/2020. The pathology ([id number], [facility]) showed Rafael 3+3=6 cancer from the left apex(1/2 cores+) and [place] 3+4=7 cancer from left apex/mid (2/2 cores+). Thus there were 3/14 cores involved by cancer.      AUA Score: 5/ 35.  JULIA= 25/25     The patient was consulted today to discuss treatment options.      PMH:   Past Medical History:   Diagnosis Date     Achilles bursitis or tendinitis 5/4/2014     Allergic rhinitis      Asthma      Benign positional vertigo      Congestion      Hypertension      Migraine      Multinodular goiter      Osteoarthritis      Pain in joint, shoulder region 4/18/2014     Prostate cancer (H)      RLS (restless legs syndrome)      Sleep problems        PSH:  Past Surgical History:   Procedure Laterality Date     ARTHROPLASTY SHOULDER Left 6/13/2017    Procedure: ARTHROPLASTY SHOULDER;  Left Total Shoulder Arthroplasty  ;  Surgeon: Jossy Swanson MD;  [facility]: [facility] OR     BIOPSY      Prostate     COLONOSCOPY N/A 3/21/2016    Procedure: COLONOSCOPY;  Surgeon: Toy Lima MD;  Location: [facility] GI     THYROIDECTOMY Left 5/27/2020    Procedure: Left Lobe THYROIDECTOMY;  Surgeon: Evelin Cutler MD;  Location: [facility] OR       MEDICATIONS:  Current Outpatient Medications   Medication Sig Dispense Refill     acetaminophen (TYLENOL) 325 MG tablet Take 2 tablets (650 mg) by mouth every 4 hours as needed for other (mild pain) 30 tablet 0     albuterol (PROAIR HFA/PROVENTIL HFA/VENTOLIN HFA) 108 (90 BASE) MCG/ACT Inhaler Inhale 2 puffs into the lungs every 4 hours as needed for shortness of breath / dyspnea or wheezing       ammonium lactate (AMLACTIN) 12 % cream Apply topically daily as needed        aspirin-acetaminophen-caffeine (EXCEDRIN MIGRAINE) 250-250-65 MG per tablet Take 2 tablets by mouth every 6 hours as needed for headaches       camphor-menthol (DERMASARRA) 0.5-0.5 % LOTN Apply topically every 6 hours as needed for skin care        cholecalciferol (VITAMIN D3 MAXIMUM STRENGTH) 5000 UNITS CAPS Take 5,000 Units by mouth every morning        ciprofloxacin (CIPRO) 500 MG tablet Take 1 tablet (500 mg) by mouth 2 times daily 6 tablet 0     Cyanocobalamin (VITAMIN B-12 PO) Take 1 tablet by mouth every evening        cyclobenzaprine (FLEXERIL) 10 MG tablet Take 10 mg by mouth 3 times daily as needed for muscle spasms       diclofenac (VOLTAREN) 75 MG EC tablet        diltiazem ER COATED BEADS (CARDIZEM CD/CARTIA XT) 240 MG 24 hr capsule Take 240 mg by mouth daily       fexofenadine (ALLEGRA) 180 MG tablet Take 180 mg by mouth daily       gabapentin (NEURONTIN) 300 MG capsule Take 1 capsule (300 mg) by mouth 3 times daily (Patient taking differently: Take 600 mg by mouth At Bedtime ) 60 capsule 0     HEMP OIL OR EXTRACT OR OTHER CBD CANNABINOID, NOT MEDICAL CANNABIS, Take 1 capsule by mouth every evening       hydrochlorothiazide (HYDRODIURIL) 25 MG tablet Take 25 mg by mouth every morning        hydrocortisone (CORTAID) 1 % cream Apply topically 2 times daily as needed       ibuprofen (ADVIL/MOTRIN) 200 MG tablet Take 800 mg by mouth every 8 hours as needed for mild pain       ibuprofen (ADVIL/MOTRIN) 600 MG tablet Take 1 tablet (600 mg) by mouth every 6 hours as needed for other (For mild pain or temperature greater than 102F) 30 tablet 0     meclizine (ANTIVERT) 12.5 MG tablet Take 2 tablets (25 mg) by mouth 4 times daily as needed for dizziness 30 tablet 0     MELATONIN PO Take 2.5 mg by mouth At Bedtime        Menthol, Topical Analgesic, (BIOFREEZE EX) Externally apply topically as needed       NONFORMULARY Magnesium/MSM lotion - Brand: Aincient minerals  2 pumps every evening applied to feet/legs    20 mg elemental magnesium and 25 mg MSM Per 1 pump       rizatriptan (MAXALT-MLT) 10 MG ODT Take 10 mg by mouth at onset of headache for migraine       zolpidem (AMBIEN) 5 MG tablet Take 5 mg by mouth nightly as needed for sleep          ALLERGY:  Allergies   Allergen Reactions     Amoxicillin Diarrhea       FAMILY HISTORY:  Family History   Problem Relation Age of Onset     Skin Cancer Mother      Hypertension Mother      Other - See Comments Brother         Factor V Leiden     Deep Vein Thrombosis (DVT) Brother      Other - See Comments Niece         Von W[place]brands     Other - See Comments Nephew         [place], Factor V Leiden     Hypertension Father      Substance Abuse Father         Alcohol     Cancer Brother      Hypertension Brother      Bleeding Disorder Brother      Migraines Brother      Cancer Cousin      Cancer Maternal Grandmother         Breast     Cerebrovascular Disease Paternal Grandfather      Asthma Niece      Thyroid Disease Other        SOCIAL HISTORY  Social History     Tobacco Use     Smoking status: Never Smoker     Smokeless tobacco: Never Used   Substance Use Topics     Alcohol use: Yes     Alcohol/week: 0.0 standard drinks     Comment: 1-2 drinks a week        ROS: 10 point ROS reviewed as reported on the nursing assessment note.      IMPRESSION:  Favorable intermediate risk(<50% cores+, [place] 3+4, 1 Intermediate risk) adenocarcinoma of prostate.     RECOMMENDATION: The patient has a relatively favorable risk prostate cancer. The patient has surgery, radiotherapy, or active surveillance as options for managing his prostate cancer.     There are several prospective data that indicated that early intervention may afford better outcome for men with prostate cancer. However, in the PROTECT study, the survival was not affected by the type of management scheme.A longer term(>10 years)data will be needed to predict the outcome in young patients.    I discussed radiotherapy in detail. I discussed preparation for radiotherapy including SpaceOAR and Fiducials, types of radiotherapy, and number of treatments. I also discussed what it's like to go through radiotherapy.    I reviewed my recommendations with the  "patient and answered all questions. I also discussed alternative therapies.     I explained the benefits of radiotherapy as well as related toxicities. I described the early and late toxicities associated with radiotherapy.     JJ Shaffer M.D.  Department of Radiation Oncology  [facility]    Video consult  45 minutes for prep and online      HPI    Miles Valencia is a 55 year old male who is being evaluated via a billable telephone visit.      The patient has been notified of following:     \"This telephone visit will be conducted via a call between you and your physician/provider. We have found that certain health care needs can be provided without the need for a physical exam.  This service lets us provide the care you need with a short phone conversation.  If a prescription is necessary we can send it directly to your pharmacy.  If lab work is needed we can place an order for that and you can then stop by our lab to have the test done at a later time.    Telephone visits are billed at different rates depending on your insurance coverage. During this emergency period, for some insurers they may be billed the same as an in-person visit.  Please reach out to your insurance provider with any questions.    If during the course of the call the physician/provider feels a telephone visit is not appropriate, you will not be charged for this service.\"    Patient has given verbal consent for Telephone visit?  Yes    What phone number would you like to be contacted at? [phone]         Phone call duration: 10 minutes      INITIAL PATIENT ASSESSMENT    Diagnosis: prostate cancer    Prior radiation therapy: None    Prior chemotherapy: None    Prior hormonal therapy:No    Pain Eval:  Denies    Psychosocial  Living arrangements:   Fall Risk: independent   referral needs: Not needed    Advanced Directive: Yes - Location: home  Implantable Cardiac Device? No    Onset of " menarche:   LMP: No LMP for male patient.  Onset of menopause:   Abnormal vaginal bleeding/discharge:   Are you pregnant?   Reproductive note: none    Nurse face-to-face time: Level 4:  15 min face to face time  Review of Systems   Constitutional: Negative for chills, fever, malaise/fatigue and weight loss.   HENT: Positive for congestion and sore throat. Negative for hearing loss and tinnitus.    Eyes: Negative for blurred vision and double vision.   Respiratory: Negative for cough, shortness of breath and wheezing.    Cardiovascular: Negative for chest pain and leg swelling.   Gastrointestinal: Negative for abdominal pain, constipation, diarrhea, heartburn, nausea and vomiting.   Genitourinary: Negative for frequency and urgency.   Musculoskeletal: Positive for back pain. Negative for falls and neck pain.   Neurological: Negative for dizziness and headaches.   Endo/Heme/Allergies: Positive for environmental allergies.   Psychiatric/Behavioral: Negative for depression. The patient is not nervous/anxious.            Again, thank you for allowing me to participate in the care of your patient.        Sincerely,        Carolyn Shaffer MD

## 2020-11-11 NOTE — PROGRESS NOTES
"  HPI    Miles Valencia is a 55 year old male who is being evaluated via a billable telephone visit.      The patient has been notified of following:     \"This telephone visit will be conducted via a call between you and your physician/provider. We have found that certain health care needs can be provided without the need for a physical exam.  This service lets us provide the care you need with a short phone conversation.  If a prescription is necessary we can send it directly to your pharmacy.  If lab work is needed we can place an order for that and you can then stop by our lab to have the test done at a later time.    Telephone visits are billed at different rates depending on your insurance coverage. During this emergency period, for some insurers they may be billed the same as an in-person visit.  Please reach out to your insurance provider with any questions.    If during the course of the call the physician/provider feels a telephone visit is not appropriate, you will not be charged for this service.\"    Patient has given verbal consent for Telephone visit?  Yes    What phone number would you like to be contacted at? [phone]         Phone call duration: 10 minutes      INITIAL PATIENT ASSESSMENT    Diagnosis: prostate cancer    Prior radiation therapy: None    Prior chemotherapy: None    Prior hormonal therapy:No    Pain Eval:  Denies    Psychosocial  Living arrangements:   Fall Risk: independent   referral needs: Not needed    Advanced Directive: Yes - Location: home  Implantable Cardiac Device? No    Onset of menarche:   LMP: No LMP for male patient.  Onset of menopause:   Abnormal vaginal bleeding/discharge:   Are you pregnant?   Reproductive note: none    Nurse face-to-face time: Level 4:  15 min face to face time  Review of Systems   Constitutional: Negative for chills, fever, malaise/fatigue and weight loss.   HENT: Positive for congestion and sore throat. Negative for " hearing loss and tinnitus.    Eyes: Negative for blurred vision and double vision.   Respiratory: Negative for cough, shortness of breath and wheezing.    Cardiovascular: Negative for chest pain and leg swelling.   Gastrointestinal: Negative for abdominal pain, constipation, diarrhea, heartburn, nausea and vomiting.   Genitourinary: Negative for frequency and urgency.   Musculoskeletal: Positive for back pain. Negative for falls and neck pain.   Neurological: Negative for dizziness and headaches.   Endo/Heme/Allergies: Positive for environmental allergies.   Psychiatric/Behavioral: Negative for depression. The patient is not nervous/anxious.

## 2020-11-29 ENCOUNTER — HEALTH MAINTENANCE LETTER (OUTPATIENT)
Age: 55
End: 2020-11-29

## 2020-12-04 ENCOUNTER — PRE VISIT (OUTPATIENT)
Dept: UROLOGY | Facility: CLINIC | Age: 55
End: 2020-12-04

## 2020-12-21 ENCOUNTER — VIRTUAL VISIT (OUTPATIENT)
Dept: UROLOGY | Facility: CLINIC | Age: 55
End: 2020-12-21
Payer: COMMERCIAL

## 2020-12-21 ENCOUNTER — PREP FOR PROCEDURE (OUTPATIENT)
Dept: UROLOGY | Facility: CLINIC | Age: 55
End: 2020-12-21

## 2020-12-21 DIAGNOSIS — C61 PROSTATE CANCER (H): Primary | ICD-10-CM

## 2020-12-21 PROCEDURE — 99213 OFFICE O/P EST LOW 20 MIN: CPT | Mod: 95 | Performed by: UROLOGY

## 2020-12-21 RX ORDER — CEFAZOLIN SODIUM 2 G/50ML
2 SOLUTION INTRAVENOUS
Status: CANCELLED | OUTPATIENT
Start: 2020-12-21

## 2020-12-21 RX ORDER — CEFAZOLIN SODIUM 1 G/50ML
1 INJECTION, SOLUTION INTRAVENOUS SEE ADMIN INSTRUCTIONS
Status: CANCELLED | OUTPATIENT
Start: 2020-12-21

## 2020-12-21 NOTE — PROGRESS NOTES
"Video Visit Technology for this patient: Patient will attempt to join through My Chart. If not there, please send invite to patient's e-mail at: [email or web address]    Miles Valencia is a 55 year old male who is being evaluated via a billable video visit.      The patient has been notified of following:     \"This video visit will be conducted via a call between you and your physician/provider. We have found that certain health care needs can be provided without the need for an in-person physical exam.  This service lets us provide the care you need with a video conversation.  If a prescription is necessary we can send it directly to your pharmacy.  If lab work is needed we can place an order for that and you can then stop by our lab to have the test done at a later time.    Video visits are billed at different rates depending on your insurance coverage.  Please reach out to your insurance provider with any questions.    If during the course of the call the physician/provider feels a video visit is not appropriate, you will not be charged for this service.\"    Patient has given verbal consent for Video visit? Yes  How would you like to obtain your AVS? MyChart  If you are dropped from the video visit, the video invite should be resent to: Send to e-mail at: [email or web address]  Will anyone else be joining your video visit? No        Video-Visit Details    Type of service:  Video Visit    Video Start Time: 6:12pm  ADDITIONAL PROVIDER NOTES:  54 yo male with Gl 3+4=7 prostate cancer who has been counseled on various treatment options has visit today todiscuss further.  He has seen radiation oncology in the meantime.  He does describe having chronic back pain since April of this year.  He has not had a staging evaluation up to this point.    ASSESSMENT AND PLAN: Over half of today's 25-minute visit was spent counseling the patient regarding his prostate cancer.  We will go ahead and get a bone scan to exclude mets as " the source of back pain.  The patient notes he would otherwise like to move forward with surgery but would like to wait until July to do it.  I counseled him that this was a bit longer than we would ideally suggest waiting.  I suggested we check a PSA in a month or two and if stable, then while it is an incremental risk, the patient could pursue that.  If PSA is up further would strongly discourage waiting.  We will see the patient back in Feb with a PSA and bone scan and make final plans in terms of dates for surgery.      Video End Time: 6:35pm    Originating Location (pt. Location): Home    Distant Location (provider location):  [facility] UROLOGY CLINIC [place]     Platform used for Video Visit: [employer]

## 2020-12-21 NOTE — NURSING NOTE
Chief Complaint   Patient presents with     Follow Up     6 week follow up prostate cancer       Patient Active Problem List   Diagnosis     Pain in joint, shoulder region     Achilles bursitis or tendinitis     History of total shoulder replacement, left     Non-toxic multinodular goiter       Allergies   Allergen Reactions     Amoxicillin Diarrhea       Current Outpatient Medications   Medication Sig Dispense Refill     acetaminophen (TYLENOL) 325 MG tablet Take 2 tablets (650 mg) by mouth every 4 hours as needed for other (mild pain) 30 tablet 0     albuterol (PROAIR HFA/PROVENTIL HFA/VENTOLIN HFA) 108 (90 BASE) MCG/ACT Inhaler Inhale 2 puffs into the lungs every 4 hours as needed for shortness of breath / dyspnea or wheezing       ammonium lactate (AMLACTIN) 12 % cream Apply topically daily as needed        aspirin-acetaminophen-caffeine (EXCEDRIN MIGRAINE) 250-250-65 MG per tablet Take 2 tablets by mouth every 6 hours as needed for headaches       camphor-menthol (DERMASARRA) 0.5-0.5 % LOTN Apply topically every 6 hours as needed for skin care       cholecalciferol (VITAMIN D3 MAXIMUM STRENGTH) 5000 UNITS CAPS Take 5,000 Units by mouth every morning        ciprofloxacin (CIPRO) 500 MG tablet Take 1 tablet (500 mg) by mouth 2 times daily 6 tablet 0     Cyanocobalamin (VITAMIN B-12 PO) Take 1 tablet by mouth every evening        cyclobenzaprine (FLEXERIL) 10 MG tablet Take 10 mg by mouth 3 times daily as needed for muscle spasms       diclofenac (VOLTAREN) 75 MG EC tablet        diltiazem ER COATED BEADS (CARDIZEM CD/CARTIA XT) 240 MG 24 hr capsule Take 240 mg by mouth daily       fexofenadine (ALLEGRA) 180 MG tablet Take 180 mg by mouth daily       gabapentin (NEURONTIN) 300 MG capsule Take 1 capsule (300 mg) by mouth 3 times daily (Patient taking differently: Take 600 mg by mouth At Bedtime ) 60 capsule 0     HEMP OIL OR EXTRACT OR OTHER CBD CANNABINOID, NOT MEDICAL CANNABIS, Take 1 capsule by mouth every  evening       hydrochlorothiazide (HYDRODIURIL) 25 MG tablet Take 25 mg by mouth every morning        hydrocortisone (CORTAID) 1 % cream Apply topically 2 times daily as needed       ibuprofen (ADVIL/MOTRIN) 200 MG tablet Take 800 mg by mouth every 8 hours as needed for mild pain       ibuprofen (ADVIL/MOTRIN) 600 MG tablet Take 1 tablet (600 mg) by mouth every 6 hours as needed for other (For mild pain or temperature greater than 102F) 30 tablet 0     meclizine (ANTIVERT) 12.5 MG tablet Take 2 tablets (25 mg) by mouth 4 times daily as needed for dizziness 30 tablet 0     MELATONIN PO Take 2.5 mg by mouth At Bedtime        Menthol, Topical Analgesic, (BIOFREEZE EX) Externally apply topically as needed       NONFORMULARY Magnesium/MSM lotion - Brand: Aincient minerals  2 pumps every evening applied to feet/legs    20 mg elemental magnesium and 25 mg MSM Per 1 pump       rizatriptan (MAXALT-MLT) 10 MG ODT Take 10 mg by mouth at onset of headache for migraine       zolpidem (AMBIEN) 5 MG tablet Take 5 mg by mouth nightly as needed for sleep         Social History     Tobacco Use     Smoking status: Never Smoker     Smokeless tobacco: Never Used   Substance Use Topics     Alcohol use: Yes     Alcohol/week: 0.0 standard drinks     Comment: 1-2 drinks a week     Drug use: No       Rimma Berger LPN  12/21/2020  2:15 PM

## 2020-12-21 NOTE — LETTER
"12/21/2020       RE: Miles Valencia  [address] [place] [address]     Dear Colleague,    Thank you for referring your patient, Miles Valencia, to the [facility] UROLOGY CLINIC [place] at [facility]. Please see a copy of my visit note below.    Video Visit Technology for this patient: Patient will attempt to join through My Chart. If not there, please send invite to patient's e-mail at: [email or web address]    Miles Valencia is a 55 year old male who is being evaluated via a billable video visit.      The patient has been notified of following:     \"This video visit will be conducted via a call between you and your physician/provider. We have found that certain health care needs can be provided without the need for an in-person physical exam.  This service lets us provide the care you need with a video conversation.  If a prescription is necessary we can send it directly to your pharmacy.  If lab work is needed we can place an order for that and you can then stop by our lab to have the test done at a later time.    Video visits are billed at different rates depending on your insurance coverage.  Please reach out to your insurance provider with any questions.    If during the course of the call the physician/provider feels a video visit is not appropriate, you will not be charged for this service.\"    Patient has given verbal consent for Video visit? Yes  How would you like to obtain your AVS? MyChart  If you are dropped from the video visit, the video invite should be resent to: Send to e-mail at: [email or web address]  Will anyone else be joining your video visit? No        Video-Visit Details    Type of service:  Video Visit    Video Start Time: 6:12pm  ADDITIONAL PROVIDER NOTES:  54 yo male with Gl 3+4=7 prostate cancer who has been counseled on various treatment options has visit today todiscuss further.  He has seen radiation oncology in the meantime.  " He does describe having chronic back pain since April of this year.  He has not had a staging evaluation up to this point.    ASSESSMENT AND PLAN: Over half of today's 25-minute visit was spent counseling the patient regarding his prostate cancer.  We will go ahead and get a bone scan to exclude mets as the source of back pain.  The patient notes he would otherwise like to move forward with surgery but would like to wait until July to do it.  I counseled him that this was a bit longer than we would ideally suggest waiting.  I suggested we check a PSA in a month or two and if stable, then while it is an incremental risk, the patient could pursue that.  If PSA is up further would strongly discourage waiting.  We will see the patient back in Feb with a PSA and bone scan and make final plans in terms of dates for surgery.      Video End Time: 6:35pm    Originating Location (pt. Location): Home    Distant Location (provider location):  [facility] UROLOGY CLINIC [place]     Platform used for Video Visit: [employer]    Again, thank you for allowing me to participate in the care of your patient.      Sincerely,    Eber Pereyra MD

## 2020-12-29 ENCOUNTER — TELEPHONE (OUTPATIENT)
Dept: UROLOGY | Facility: CLINIC | Age: 55
End: 2020-12-29

## 2020-12-29 NOTE — TELEPHONE ENCOUNTER
Called patient to discuss scheduling surgery with Dr. Pereyra. Patient informed surgery scheduler he would like to wait until July and that he was to have a PSA in February. Informed patient I cannot schedule surgery in July yet but when I can I will call him to get him set up. He was agreeable to that plan. Also informed patient that I will let the clinic know to schedule a PSA and they will reach out to get him set up. Spoke to RN about plan and she stated she will work on getting the appointment set up.

## 2020-12-29 NOTE — PATIENT INSTRUCTIONS
Message sent to coordinators to schedulea bone scan and a PSA in Feb and then an appt with Dr. Pereyra.      It was a pleasure meeting with you today.  Thank you for allowing me and my team the privilege of caring for you today.  YOU are the reason we are here, and I truly hope we provided you with the excellent service you deserve.  Please let us know if there is anything else we can do for you so that we can be sure you are leaving completely satisfied with your care experience.

## 2020-12-31 ENCOUNTER — HOSPITAL ENCOUNTER (OUTPATIENT)
Dept: NUCLEAR MEDICINE | Facility: CLINIC | Age: 55
Setting detail: NUCLEAR MEDICINE
End: 2020-12-31
Attending: UROLOGY
Payer: COMMERCIAL

## 2020-12-31 DIAGNOSIS — C61 PROSTATE CANCER (H): ICD-10-CM

## 2020-12-31 LAB — RADIOLOGIST FLAGS: NORMAL

## 2020-12-31 PROCEDURE — 343N000001 HC RX 343: Performed by: UROLOGY

## 2020-12-31 PROCEDURE — A9503 TC99M MEDRONATE: HCPCS | Performed by: UROLOGY

## 2020-12-31 PROCEDURE — 78306 BONE IMAGING WHOLE BODY: CPT | Mod: 26

## 2020-12-31 PROCEDURE — 78306 BONE IMAGING WHOLE BODY: CPT

## 2020-12-31 RX ORDER — TC 99M MEDRONATE 20 MG/10ML
20-30 INJECTION, POWDER, LYOPHILIZED, FOR SOLUTION INTRAVENOUS ONCE
Status: COMPLETED | OUTPATIENT
Start: 2020-12-31 | End: 2020-12-31

## 2020-12-31 RX ADMIN — TC 99M MEDRONATE 25.5 MCI.: 20 INJECTION, POWDER, LYOPHILIZED, FOR SOLUTION INTRAVENOUS at 11:22

## 2021-01-12 ENCOUNTER — TELEPHONE (OUTPATIENT)
Dept: UROLOGY | Facility: CLINIC | Age: 56
End: 2021-01-12

## 2021-01-12 NOTE — TELEPHONE ENCOUNTER
Left message for pt to call and reschedule appt on 2/15 to 2/1 at 5:30PM with Dr. Pereyra. Pt also needs to move up his lab appt to 3 days prior to appt on 2/1. Schedule time per [place].

## 2021-01-13 ENCOUNTER — PRE VISIT (OUTPATIENT)
Dept: UROLOGY | Facility: CLINIC | Age: 56
End: 2021-01-13

## 2021-01-13 NOTE — TELEPHONE ENCOUNTER
Visit Type : imaging and PSA check     Records/Orders: PSA is on 1/27    Pt Contacted: no    At Rooming: video

## 2021-01-21 ENCOUNTER — VIRTUAL VISIT (OUTPATIENT)
Dept: UROLOGY | Facility: CLINIC | Age: 56
End: 2021-01-21
Payer: COMMERCIAL

## 2021-01-21 DIAGNOSIS — C61 PROSTATE CANCER (H): Primary | ICD-10-CM

## 2021-01-21 PROCEDURE — 99214 OFFICE O/P EST MOD 30 MIN: CPT | Mod: 95 | Performed by: UROLOGY

## 2021-01-21 NOTE — PROGRESS NOTES
ADDITIONAL PROVIDER NOTES:  54 yo male with Gl 3+4=7 prostate cancer who has been counseled on various treatment options has visit today todiscuss further.  He has seen radiation oncology in the meantime.  He does describe having chronic back pain since April of this year.  He has not had a staging evaluation up to this point.  We obtained a bone scan and the visit is to discuss the findings.    OBJECTIVE:  Bone scan from 12/31/20 show indeterminant uptake in skull and lumbar spine.  Largey stable compared to bone scan from 2016.  Follow up imaging suggested.     ASSESSMENT AND PLAN: Over half of today's 25-minute visit was spent counseling the patient regarding his prostate cancer.  We will go ahead and get plain film imaging of the skull and lumbar spine to rule out bony metastasis, though since these lesions were present in 2016 and are largely stable, it is less likely to be mets at this time.    We will see the patient back in Feb with his imaging to go over the results and make final plans in terms of dates for surgery.

## 2021-01-26 DIAGNOSIS — C61 MALIGNANT NEOPLASM OF PROSTATE (H): ICD-10-CM

## 2021-01-26 DIAGNOSIS — C61 PROSTATE CANCER (H): Primary | ICD-10-CM

## 2021-01-28 ENCOUNTER — ANCILLARY PROCEDURE (OUTPATIENT)
Dept: GENERAL RADIOLOGY | Facility: CLINIC | Age: 56
End: 2021-01-28
Attending: UROLOGY
Payer: COMMERCIAL

## 2021-01-28 DIAGNOSIS — C61 PROSTATE CANCER (H): ICD-10-CM

## 2021-01-28 DIAGNOSIS — C61 MALIGNANT NEOPLASM OF PROSTATE (H): ICD-10-CM

## 2021-01-28 PROCEDURE — 72100 X-RAY EXAM L-S SPINE 2/3 VWS: CPT | Performed by: RADIOLOGY

## 2021-01-28 PROCEDURE — 70260 X-RAY EXAM OF SKULL: CPT | Mod: GC | Performed by: STUDENT IN AN ORGANIZED HEALTH CARE EDUCATION/TRAINING PROGRAM

## 2021-02-01 ENCOUNTER — VIRTUAL VISIT (OUTPATIENT)
Dept: UROLOGY | Facility: CLINIC | Age: 56
End: 2021-02-01
Payer: COMMERCIAL

## 2021-02-01 DIAGNOSIS — C61 PROSTATE CANCER (H): Primary | ICD-10-CM

## 2021-02-01 PROCEDURE — 99213 OFFICE O/P EST LOW 20 MIN: CPT | Mod: 95 | Performed by: UROLOGY

## 2021-02-01 NOTE — PROGRESS NOTES
Video Visit Technology for this patient: CPower Video Visit- Patient was left in waiting room    Miles is a 55 year old who is being evaluated via a billable video visit.      How would you like to obtain your AVS? MyChart  If the video visit is dropped, the invitation should be resent by: Send to e-mail at: [email or web address]  Will anyone else be joining your video visit? No      Video Start Time: 6:39pm  Video-Visit Details      ADDITIONAL PROVIDER NOTES:  54 yo male with Gl 3+4=7 prostate cancer who has been counseled on various treatment options has visit today todiscuss further.  He has seen radiation oncology in the meantime.  He does describe having chronic back pain since April of this year.  He has not had a staging evaluation up to this point.  We obtained a bone scan that showed nonspecific finding in the skull and spine.  He underwent additional imaging to clarify things.   OBJECTIVE:  Skull and lumbar spine xrays from 1/28/21 show no lytic lesions concerning for metastases.     ASSESSMENT AND PLAN: Over half of today's 10-minute visit was spent reviewing the chart, results and  counseling the patient on video regarding his prostate cancer.  We are pleased to see no concern for mets on the imaging.  Will move forward with scheduling surgery.    Type of service:  Video Visit    Video End Time:6:45pm    Originating Location (pt. Location): Home    Distant Location (provider location):  [facility] [place]     Platform used for Video Visit: CPower

## 2021-02-01 NOTE — LETTER
2/1/2021       RE: Miles Valencia  [address] [place] [address]     Dear Colleague,    Thank you for referring your patient, Miles Valencia, to the [facility] [place] at [facility]. Please see a copy of my visit note below.    Video Visit Technology for this patient: Wilfredo Video Visit- Patient was left in waiting room    Miles is a 55 year old who is being evaluated via a billable video visit.      How would you like to obtain your AVS? MyChart  If the video visit is dropped, the invitation should be resent by: Send to e-mail at: [email or web address]  Will anyone else be joining your video visit? No      Video Start Time: 6:39pm  Video-Visit Details      ADDITIONAL PROVIDER NOTES:  54 yo male with Gl 3+4=7 prostate cancer who has been counseled on various treatment options has visit today todiscuss further.  He has seen radiation oncology in the meantime.  He does describe having chronic back pain since April of this year.  He has not had a staging evaluation up to this point.  We obtained a bone scan that showed nonspecific finding in the skull and spine.  He underwent additional imaging to clarify things.   OBJECTIVE:  Skull and lumbar spine xrays from 1/28/21 show no lytic lesions concerning for metastases.     ASSESSMENT AND PLAN: Over half of today's 10-minute visit was spent reviewing the chart, results and  counseling the patient on video regarding his prostate cancer.  We are pleased to see no concern for mets on the imaging.  Will move forward with scheduling surgery.    Type of service:  Video Visit    Video End Time:6:45pm    Originating Location (pt. Location): Home    Distant Location (provider location):  [facility]     Platform used for Video Visit: Wilfredo    Again, thank you for allowing me to participate in the care of your patient.      Sincerely,    Eber Pereyra MD

## 2021-02-01 NOTE — NURSING NOTE
Chief Complaint   Patient presents with     Follow Up     PSA review       Patient Active Problem List   Diagnosis     Pain in joint, shoulder region     Achilles bursitis or tendinitis     History of total shoulder replacement, left     Non-toxic multinodular goiter       Allergies   Allergen Reactions     Amoxicillin Diarrhea       Current Outpatient Medications   Medication Sig Dispense Refill     acetaminophen (TYLENOL) 325 MG tablet Take 2 tablets (650 mg) by mouth every 4 hours as needed for other (mild pain) 30 tablet 0     albuterol (PROAIR HFA/PROVENTIL HFA/VENTOLIN HFA) 108 (90 BASE) MCG/ACT Inhaler Inhale 2 puffs into the lungs every 4 hours as needed for shortness of breath / dyspnea or wheezing       ammonium lactate (AMLACTIN) 12 % cream Apply topically daily as needed        aspirin-acetaminophen-caffeine (EXCEDRIN MIGRAINE) 250-250-65 MG per tablet Take 2 tablets by mouth every 6 hours as needed for headaches       camphor-menthol (DERMASARRA) 0.5-0.5 % LOTN Apply topically every 6 hours as needed for skin care       cholecalciferol (VITAMIN D3 MAXIMUM STRENGTH) 5000 UNITS CAPS Take 5,000 Units by mouth every morning        ciprofloxacin (CIPRO) 500 MG tablet Take 1 tablet (500 mg) by mouth 2 times daily 6 tablet 0     Cyanocobalamin (VITAMIN B-12 PO) Take 1 tablet by mouth every evening        cyclobenzaprine (FLEXERIL) 10 MG tablet Take 10 mg by mouth 3 times daily as needed for muscle spasms       diclofenac (VOLTAREN) 75 MG EC tablet        diltiazem ER COATED BEADS (CARDIZEM CD/CARTIA XT) 240 MG 24 hr capsule Take 240 mg by mouth daily       fexofenadine (ALLEGRA) 180 MG tablet Take 180 mg by mouth daily       gabapentin (NEURONTIN) 300 MG capsule Take 1 capsule (300 mg) by mouth 3 times daily (Patient taking differently: Take 600 mg by mouth At Bedtime ) 60 capsule 0     HEMP OIL OR EXTRACT OR OTHER CBD CANNABINOID, NOT MEDICAL CANNABIS, Take 1 capsule by mouth every evening        hydrochlorothiazide (HYDRODIURIL) 25 MG tablet Take 25 mg by mouth every morning        hydrocortisone (CORTAID) 1 % cream Apply topically 2 times daily as needed       ibuprofen (ADVIL/MOTRIN) 200 MG tablet Take 800 mg by mouth every 8 hours as needed for mild pain       ibuprofen (ADVIL/MOTRIN) 600 MG tablet Take 1 tablet (600 mg) by mouth every 6 hours as needed for other (For mild pain or temperature greater than 102F) 30 tablet 0     meclizine (ANTIVERT) 12.5 MG tablet Take 2 tablets (25 mg) by mouth 4 times daily as needed for dizziness 30 tablet 0     MELATONIN PO Take 2.5 mg by mouth At Bedtime        Menthol, Topical Analgesic, (BIOFREEZE EX) Externally apply topically as needed       NONFORMULARY Magnesium/MSM lotion - Brand: Aincient minerals  2 pumps every evening applied to feet/legs    20 mg elemental magnesium and 25 mg MSM Per 1 pump       rizatriptan (MAXALT-MLT) 10 MG ODT Take 10 mg by mouth at onset of headache for migraine       zolpidem (AMBIEN) 5 MG tablet Take 5 mg by mouth nightly as needed for sleep         Social History     Tobacco Use     Smoking status: Never Smoker     Smokeless tobacco: Never Used   Substance Use Topics     Alcohol use: Yes     Alcohol/week: 0.0 standard drinks     Comment: 1-2 drinks a week     Drug use: No       Rimma Berger LPN  2/1/2021  12:24 PM

## 2021-02-02 ENCOUNTER — TELEPHONE (OUTPATIENT)
Dept: UROLOGY | Facility: CLINIC | Age: 56
End: 2021-02-02

## 2021-02-02 NOTE — TELEPHONE ENCOUNTER
After receiving the go ahead to schedule patient's surgery from Dr. Pereyra. Called patient and spoke to him about getting his surgery scheduled. Patient stated he would like the beginning of April. Informed him that we cannot currently schedule in April but when I receive the April schedule I will give him a call to get his surgery set up. Patient was agreeable to plan.

## 2021-02-22 ENCOUNTER — TELEPHONE (OUTPATIENT)
Dept: UROLOGY | Facility: CLINIC | Age: 56
End: 2021-02-22

## 2021-02-22 DIAGNOSIS — C61 PROSTATE CANCER (H): Primary | ICD-10-CM

## 2021-02-22 NOTE — TELEPHONE ENCOUNTER
Patient is scheduled for surgery with Dr. Pereyra      Spoke or left message with: kari with Miles    Date of Surgery: 4/7/2021    Location: [facility]    Informed patient they will need an adult  yes    Pre-op with surgeon (if applicable): n/a    H&P: Scheduled with PAC on 3/19/2021    Additional imaging/appointments: n/a    Surgery packet: mailed on 2/22/2021     Additional comments: COVID test scheduled at [facility] on 4/3/2021

## 2021-02-23 NOTE — TELEPHONE ENCOUNTER
FUTURE VISIT INFORMATION      SURGERY INFORMATION:    Date: 21    Location: [facility]    Surgeon:  Roddy    Anesthesia Type:  General    Procedure: Robot-assisted laparoscopic radical prostatectomy, possible pelvic lymphadenectomy,    Consult: 21     RECORDS REQUESTED FROM:       Primary Care Provider: Robert    Most recent EKG+ Tracin20    Most recent Cardiac Stress Test: 19 (stress adult)          Detail Level: Zone

## 2021-03-19 ENCOUNTER — ANESTHESIA EVENT (OUTPATIENT)
Dept: SURGERY | Facility: CLINIC | Age: 56
End: 2021-03-19

## 2021-03-19 ENCOUNTER — PRE VISIT (OUTPATIENT)
Dept: SURGERY | Facility: CLINIC | Age: 56
End: 2021-03-19

## 2021-03-19 ENCOUNTER — OFFICE VISIT (OUTPATIENT)
Dept: SURGERY | Facility: CLINIC | Age: 56
End: 2021-03-19
Payer: COMMERCIAL

## 2021-03-19 VITALS
BODY MASS INDEX: 28.73 KG/M2 | OXYGEN SATURATION: 97 % | DIASTOLIC BLOOD PRESSURE: 90 MMHG | RESPIRATION RATE: 12 BRPM | WEIGHT: 194 LBS | SYSTOLIC BLOOD PRESSURE: 136 MMHG | HEART RATE: 68 BPM | TEMPERATURE: 98.3 F | HEIGHT: 69 IN

## 2021-03-19 DIAGNOSIS — Z01.818 PREOP EXAMINATION: ICD-10-CM

## 2021-03-19 DIAGNOSIS — Z01.818 PREOP EXAMINATION: Primary | ICD-10-CM

## 2021-03-19 DIAGNOSIS — C61 PROSTATE CANCER (H): ICD-10-CM

## 2021-03-19 LAB
ANION GAP SERPL CALCULATED.3IONS-SCNC: 4 MMOL/L (ref 3–14)
BUN SERPL-MCNC: 14 MG/DL (ref 7–30)
CALCIUM SERPL-MCNC: 9.3 MG/DL (ref 8.5–10.1)
CHLORIDE SERPL-SCNC: 106 MMOL/L (ref 94–109)
CO2 SERPL-SCNC: 29 MMOL/L (ref 20–32)
CREAT SERPL-MCNC: 0.95 MG/DL (ref 0.66–1.25)
ERYTHROCYTE [DISTWIDTH] IN BLOOD BY AUTOMATED COUNT: 12.3 % (ref 10–15)
GFR SERPL CREATININE-BSD FRML MDRD: 90 ML/MIN/{1.73_M2}
GLUCOSE SERPL-MCNC: 100 MG/DL (ref 70–99)
HCT VFR BLD AUTO: 45.1 % (ref 40–53)
HGB BLD-MCNC: 15.1 G/DL (ref 13.3–17.7)
MCH RBC QN AUTO: 29 PG (ref 26.5–33)
MCHC RBC AUTO-ENTMCNC: 33.5 G/DL (ref 31.5–36.5)
MCV RBC AUTO: 87 FL (ref 78–100)
PLATELET # BLD AUTO: 301 10E9/L (ref 150–450)
POTASSIUM SERPL-SCNC: 3.6 MMOL/L (ref 3.4–5.3)
PSA SERPL-MCNC: 13.1 UG/L (ref 0–4)
RBC # BLD AUTO: 5.2 10E12/L (ref 4.4–5.9)
SODIUM SERPL-SCNC: 139 MMOL/L (ref 133–144)
WBC # BLD AUTO: 6.2 10E9/L (ref 4–11)

## 2021-03-19 PROCEDURE — 80048 BASIC METABOLIC PNL TOTAL CA: CPT | Performed by: PATHOLOGY

## 2021-03-19 PROCEDURE — 86850 RBC ANTIBODY SCREEN: CPT | Mod: 90 | Performed by: PATHOLOGY

## 2021-03-19 PROCEDURE — 85027 COMPLETE CBC AUTOMATED: CPT | Performed by: PATHOLOGY

## 2021-03-19 PROCEDURE — 99203 OFFICE O/P NEW LOW 30 MIN: CPT | Performed by: NURSE PRACTITIONER

## 2021-03-19 PROCEDURE — 36415 COLL VENOUS BLD VENIPUNCTURE: CPT | Performed by: PATHOLOGY

## 2021-03-19 PROCEDURE — 84153 ASSAY OF PSA TOTAL: CPT | Performed by: PATHOLOGY

## 2021-03-19 PROCEDURE — 86901 BLOOD TYPING SEROLOGIC RH(D): CPT | Mod: 90 | Performed by: PATHOLOGY

## 2021-03-19 PROCEDURE — 86900 BLOOD TYPING SEROLOGIC ABO: CPT | Mod: 90 | Performed by: PATHOLOGY

## 2021-03-19 RX ORDER — OMEGA-3 FATTY ACIDS/FISH OIL 300-1000MG
CAPSULE ORAL AT BEDTIME
COMMUNITY

## 2021-03-19 SDOH — ECONOMIC STABILITY: TRANSPORTATION INSECURITY
IN THE PAST 12 MONTHS, HAS LACK OF TRANSPORTATION KEPT YOU FROM MEETINGS, WORK, OR FROM GETTING THINGS NEEDED FOR DAILY LIVING?: NOT ASKED

## 2021-03-19 SDOH — ECONOMIC STABILITY: FOOD INSECURITY: WITHIN THE PAST 12 MONTHS, THE FOOD YOU BOUGHT JUST DIDN'T LAST AND YOU DIDN'T HAVE MONEY TO GET MORE.: NOT ASKED

## 2021-03-19 SDOH — ECONOMIC STABILITY: FOOD INSECURITY: WITHIN THE PAST 12 MONTHS, YOU WORRIED THAT YOUR FOOD WOULD RUN OUT BEFORE YOU GOT MONEY TO BUY MORE.: NOT ASKED

## 2021-03-19 SDOH — ECONOMIC STABILITY: INCOME INSECURITY: HOW HARD IS IT FOR YOU TO PAY FOR THE VERY BASICS LIKE FOOD, HOUSING, MEDICAL CARE, AND HEATING?: NOT ASKED

## 2021-03-19 SDOH — ECONOMIC STABILITY: TRANSPORTATION INSECURITY
IN THE PAST 12 MONTHS, HAS THE LACK OF TRANSPORTATION KEPT YOU FROM MEDICAL APPOINTMENTS OR FROM GETTING MEDICATIONS?: NOT ASKED

## 2021-03-19 ASSESSMENT — PAIN SCALES - GENERAL: PAINLEVEL: MILD PAIN (2)

## 2021-03-19 ASSESSMENT — MIFFLIN-ST. JEOR: SCORE: 1705.36

## 2021-03-19 NOTE — ANESTHESIA PREPROCEDURE EVALUATION
Anesthesia Pre-Procedure Evaluation    Patient: Miles Valencia   MRN: [id number] : 1965        Preoperative Diagnosis: * No surgery found *   Procedure :      Past Medical History:   Diagnosis Date     Achilles bursitis or tendinitis 2014     Allergic rhinitis      Asthma      Benign positional vertigo      Congestion      Hypertension      Low back pain      Migraine      Multinodular goiter      Osteoarthritis      Pain in joint, shoulder region 2014     Prostate cancer (H)      RLS (restless legs syndrome)      Sleep problems       Past Surgical History:   Procedure Laterality Date     ARTHROPLASTY SHOULDER Left 2017    Procedure: ARTHROPLASTY SHOULDER;  Left Total Shoulder Arthroplasty  ;  Surgeon: Jossy Swanson MD;  Location: [facility]     BIOPSY      Prostate     COLONOSCOPY N/A 3/21/2016    Procedure: COLONOSCOPY;  Surgeon: Toy Lima MD;  Location: [facility]     THYROIDECTOMY Left 2020    Procedure: Left Lobe THYROIDECTOMY;  Surgeon: Evelin Cutler MD;  Location: [facility]      Allergies   Allergen Reactions     Amoxicillin Diarrhea      Social History     Tobacco Use     Smoking status: Never Smoker     Smokeless tobacco: Never Used   Substance Use Topics     Alcohol use: Yes     Alcohol/week: 0.0 standard drinks     Comment: 1-2 drinks a week      Wt Readings from Last 1 Encounters:   10/26/20 85.3 kg (188 lb)        Anesthesia Evaluation   Pt has had prior anesthetic. Type: General and MAC.    No history of anesthetic complications       ROS/MED HX  ENT/Pulmonary:     (+) JULIÁN risk factors, hypertension, allergic rhinitis, Intermittent, asthma Last exacerbation: never,  Treatment: Inhaler prn,   (-) recent URI   Neurologic: Comment: RLS    (+) migraines,     Cardiovascular:     (+) hypertension-----Previous cardiac testing   Echo: Date: Results:    Stress Test: Date:  Results:  Interpretation Summary     Normal exercise stress test.  The target heart rate was  achieved. Heart rate and blood pressure response to  exercise were normal.  Normal functional capacity. No subjective symptoms to suggest ischemia.  No ECG evidence of ischemia at rest or with exercise.        The patient reported no symptoms with exericse. The test was terminated due to  a technical error with the treadmill (treadmill spontaneously ended protocol  and stopped) Patient stated he may have been able to go one more minute due to  fatigue.    ECG Reviewed: Date: 5/2020 Results:  Sinus tachycardia  Cath: Date: Results:      METS/Exercise Tolerance: >4 METS    Hematologic:  - neg hematologic  ROS  (-) history of blood clots and history of blood transfusion   Musculoskeletal: Comment: Low back pain with left sciatica - constant, chronic right hand pain and swelling x 1 year (s/p injury, thinks it might be tendonitis)      GI/Hepatic:  - neg GI/hepatic ROS     Renal/Genitourinary: Comment: Prostate cancer      Endo:     (+) thyroid problem,  s/p partial thyroidectomy, no thyroid meds indicated,     Psychiatric/Substance Use: Comment: insomnia    (+) psychiatric history other (comment)     Infectious Disease:  - neg infectious disease ROS  (-) Recent Fever   Malignancy:   (+) Malignancy, History of Prostate.Prostate CA Active status post.        Other:  - neg other ROS    (+) , H/O Chronic Pain,        Physical Exam    Airway  airway exam normal           Respiratory Devices and Support         Dental  no notable dental history         Cardiovascular   cardiovascular exam normal          Pulmonary   pulmonary exam normal                OUTSIDE LABS:  CBC:   Lab Results   Component Value Date    WBC 6.6 05/31/2017    HGB 15.1 05/27/2020    HGB 15.8 05/31/2017    HCT 45.6 05/31/2017     05/31/2017     BMP:   Lab Results   Component Value Date     05/31/2017     04/28/2010    POTASSIUM 3.4 05/31/2017    POTASSIUM 4.2 04/28/2010    CHLORIDE 104 05/31/2017    CHLORIDE 104 04/28/2010    CO2 26  05/31/2017    CO2 27 04/28/2010    BUN 15 05/31/2017    BUN 14 04/28/2010    CR 0.84 05/27/2020    CR 0.85 05/31/2017     (H) 05/31/2017    GLC 94 04/28/2010     COAGS: No results found for: PTT, INR, FIBR  POC:   Lab Results   Component Value Date     (H) 05/27/2020     HEPATIC:   Lab Results   Component Value Date    ALBUMIN 4.0 12/14/2018     OTHER:   Lab Results   Component Value Date    OTILIO 9.7 12/14/2018    PHOS 3.6 04/28/2010    MAG 2.1 04/28/2010    TSH 0.96 07/13/2020    T4 1.09 12/05/2019    T3 123 12/05/2019             PAC Discussion and Assessment    ASA Classification: 3  Case is suitable for: [facility]  Anesthetic techniques and relevant risks discussed: GA                  PAC Resident/NP Anesthesia Assessment: Miles Valencia 55 year old male scheduled for Robot-assisted laparoscopic radical prostatectomy, possible pelvic lymphadenectomy, possible open procedure on 4/7/21 by Dr. Pereyra in treatment of prostate cancer.  PAC referral for risk assessment and optimization for anesthesia with comorbid conditions of: hypertension, allergic rhinitis, asthma, migraines, RLS and insomnia.      Pre-operative considerations:  1.  Cardiac:  Functional status- METS >4.  He works out at [employer] regularly for exercise.  Hypertension is managed with diltiazem and hydrochlorothiazide. Hold hydrochlorothiazide DOS.  He had a negative stress test in 2019.  Intermediate risk surgery with 0.4% risk of major adverse cardiac event.   2.  Pulm:  Airway feasible.  JULIÁN risk: intermediate.  He was prescribed albuterol to use prn prior to exercise.  There is a diagnosis of asthma listed on his chart, but he notes he was never formally diagnosed with asthma.  Nonetheless, symptoms are only present with exercise and managed well with prn albuterol.    3.  GI:  Risk of PONV score = 2.  If > 2, anti-emetic intervention recommended.  4. Neuro:  Hold maxalt 24 hours prior to surgery.  Hold excedrin 7 days prior  to surgery.    5. Heme: VTE risk = 4.5% due to family history of brother with history of DVT secondary to factor V, current cancer and sex.  Patient reports he was tested for factor V and was negative.  Hold aspirin 7 days prior to surgery.  6. Musculoskeletal:  Pt reports right hand swelling and right middle finger trigger finger type symptoms x 1 year s/p a right hand injury.  Reports he didn't have evaluation due to covid.  Right hand in region of fingers 3-5 does appear to have some enlargement, but not particularly swelling.  No arm swelling or erythema.  Encouraged patient to follow up with pcp for evaluation.    7. Endo:  S/p partial thyroidectomy for goiter.  Not currently on any thyroid replacement.         Patient is optimized and is acceptable candidate for the proposed procedure.  No further diagnostic evaluation is needed.     **For further details of assessment, testing, and physical exam please see H and P completed on same date.          Rohini Johnson DNP, RN, APRN      Reviewed and Signed by [facility] Mid-Level Provider/Resident  Mid-Level Provider/Resident: Rohini Johnson DNP, RN, APRN  Date: 3/19/21  Time: 1100                               FIDELIA Angeles CNP

## 2021-03-19 NOTE — H&P
Pre-Operative H & P     CC:  Preoperative exam to assess for increased cardiopulmonary risk while undergoing surgery and anesthesia.    Date of Encounter: 3/19/2021  Primary Care Physician:  Oneyda Jones  Associated diagnosis: prostate cancer    HPI  Miles Valencia is a 55 year old male who presents for pre-operative H & P in preparation for a Robot-assisted laparoscopic radical prostatectomy, possible pelvic lymphadenectomy, possible open procedure on 4/7/21 by Dr. Pereyra at [facility].     Miles Valencia 55 year old male with hypertension, allergic rhinitis, asthma, migraines, RLS and insomnia that has prostate cancer.   He has been following with urology here for >2 years for continued prostate cancer surveillance.  PSA levels had been increasing over time.  The last biopsy was done on 10/26/21 and resulted as prostatic adenocarcinoma, acinar type, Rafael score 7 (3+4), with 10% pattern 4, Rafael grade 2.  The above listed procedure has now been recommended for treatment.           History is obtained from the patient and the medical record.     Past Medical History  Past Medical History:   Diagnosis Date     Achilles bursitis or tendinitis 5/4/2014     Allergic rhinitis      Asthma      Benign positional vertigo      Congestion      Hypertension      Low back pain      Migraine      Multinodular goiter      Osteoarthritis      Pain in joint, shoulder region 4/18/2014     Prostate cancer (H)      RLS (restless legs syndrome)      Sleep problems        Past Surgical History  Past Surgical History:   Procedure Laterality Date     ARTHROPLASTY SHOULDER Left 6/13/2017    Procedure: ARTHROPLASTY SHOULDER;  Left Total Shoulder Arthroplasty  ;  Surgeon: Jossy Swanson MD;  [facility]     BIOPSY      Prostate     COLONOSCOPY N/A 3/21/2016    Procedure: COLONOSCOPY;  Surgeon: Toy Lima MD;  [facility]     THYROIDECTOMY Left 5/27/2020     Procedure: Left Lobe THYROIDECTOMY;  Surgeon: Evelin Cutler MD;  [facility]: [facility] OR       Hx of Blood transfusions/reactions: none    Hx of abnormal bleeding or anti-platelet use: aspirin        Steroid use in the last year: none    Personal or FH with difficulty with Anesthesia:  None      Prior to Admission Medications  Current Outpatient Medications   Medication Sig Dispense Refill     acetaminophen (TYLENOL) 325 MG tablet Take 2 tablets (650 mg) by mouth every 4 hours as needed for other (mild pain) 30 tablet 0     albuterol (PROAIR HFA/PROVENTIL HFA/VENTOLIN HFA) 108 (90 BASE) MCG/ACT Inhaler Inhale 2 puffs into the lungs every 4 hours as needed for shortness of breath / dyspnea or wheezing       ammonium lactate (AMLACTIN) 12 % cream Apply topically daily as needed        camphor-menthol (DERMASARRA) 0.5-0.5 % LOTN Apply topically every 6 hours as needed for skin care       cholecalciferol (VITAMIN D3 MAXIMUM STRENGTH) 5000 UNITS CAPS Take 5,000 Units by mouth every morning        Cyanocobalamin (VITAMIN B-12 PO) Take 1 tablet by mouth every evening        cyclobenzaprine (FLEXERIL) 10 MG tablet Take 10 mg by mouth 3 times daily as needed for muscle spasms       diltiazem ER COATED BEADS (CARDIZEM CD/CARTIA XT) 240 MG 24 hr capsule Take 240 mg by mouth every morning        fexofenadine (ALLEGRA) 180 MG tablet Take 180 mg by mouth every morning        gabapentin (NEURONTIN) 300 MG capsule Take 1 capsule (300 mg) by mouth 3 times daily (Patient taking differently: Take 600 mg by mouth At Bedtime ) 60 capsule 0     HEMP OIL OR EXTRACT OR OTHER CBD CANNABINOID, NOT MEDICAL CANNABIS, Take 1 capsule by mouth every evening       hydrochlorothiazide (HYDRODIURIL) 25 MG tablet Take 25 mg by mouth every morning        hydrocortisone (CORTAID) 1 % cream Apply topically 2 times daily as needed       ibuprofen (ADVIL/MOTRIN) 200 MG tablet Take 800 mg by mouth every 8 hours as needed for mild pain        meclizine  (ANTIVERT) 12.5 MG tablet Take 2 tablets (25 mg) by mouth 4 times daily as needed for dizziness 30 tablet 0     MELATONIN PO Take 2.5 mg by mouth At Bedtime        Menthol, Topical Analgesic, (BIOFREEZE EX) Externally apply topically as needed       NONFORMULARY Magnesium/MSM lotion - Brand: [employer]ient minerals  2 pumps every evening applied to feet/legs    20 mg elemental magnesium and 25 mg MSM Per 1 pump       omega 3 1000 MG CAPS Take by mouth At Bedtime       rizatriptan (MAXALT-MLT) 10 MG ODT Take 10 mg by mouth at onset of headache for migraine       zolpidem (AMBIEN) 5 MG tablet Take 5 mg by mouth nightly as needed for sleep       aspirin-acetaminophen-caffeine (EXCEDRIN MIGRAINE) 250-250-65 MG per tablet Take 2 tablets by mouth every 6 hours as needed for headaches          Allergies  Allergies   Allergen Reactions     Amoxicillin Diarrhea       Social History  Social History     Socioeconomic History     Marital status:      Spouse name: Not on file     Number of children: 0     Years of education: Not on file     Highest education level: Not on file   Occupational History     Occupation:      Employer: [employer]   Social Needs     Financial resource strain: Not on file     Food insecurity     Worry: Not on file     Inability: Not on file     Transportation needs     Medical: Not on file     Non-medical: Not on file   Tobacco Use     Smoking status: Never Smoker     Smokeless tobacco: Never Used   Substance and Sexual Activity     Alcohol use: Yes     Alcohol/week: 0.0 standard drinks     Comment: 1-2 drinks a week     Drug use: No     Sexual activity: Not on file   Lifestyle     Physical activity     Days per week: Not on file     Minutes per session: Not on file     Stress: Not on file   Relationships     Social connections     Talks on phone: Not on file     Gets together: Not on file     Attends Sikhism service: Not on file     Active member of club or organization: Not on  file     Attends meetings of clubs or organizations: Not on file     Relationship status: Not on file     Intimate partner violence     Fear of current or ex partner: Not on file     Emotionally abused: Not on file     Physically abused: Not on file     Forced sexual activity: Not on file   Other Topics Concern     Parent/sibling w/ CABG, MI or angioplasty before 65F 55M? Not Asked   Social History Narrative     Not on file       Family History  Family History   Problem Relation Age of Onset     Skin Cancer Mother      Hypertension Mother      Other - See Comments Niece         Von W[place]brands     Other - See Comments Nephew         Von [place]ands, Factor V Leiden     Hypertension Father      Alcoholism Father      Hypertension Brother      Migraines Brother      Testicular cancer Brother      Factor V Leiden deficiency Brother      Deep Vein Thrombosis Brother      Cancer Cousin      Cancer Maternal Grandmother         Breast     Alcoholism Paternal Grandfather      Asthma Niece      Thyroid Disease Other      Alcoholism Maternal Grandfather      Cerebrovascular Disease Paternal Grandmother      Unknown/Adopted Paternal Half-Sister      Anesthesia Reaction No family hx of              ROS/MED HISTORY OF  The complete review of systems is negative other than noted in the HPI or here.   ENT/Pulmonary:     (+) JULIÁN risk factors, hypertension, allergic rhinitis, Intermittent, asthma Last exacerbation: never,  Treatment: Inhaler prn,   (-) recent URI   Neurologic: Comment: RLS    (+) migraines,     Cardiovascular:     (+) hypertension-----Previous cardiac testing   Echo: Date: Results:    Stress Test: Date: 2019 Results:  Interpretation Summary     Normal exercise stress test.  The target heart rate was achieved. Heart rate and blood pressure response to  exercise were normal.  Normal functional capacity. No subjective symptoms to suggest ischemia.  No ECG evidence of ischemia at rest or with exercise.        The  "patient reported no symptoms with exericse. The test was terminated due to  a technical error with the treadmill (treadmill spontaneously ended protocol  and stopped) Patient stated he may have been able to go one more minute due to  fatigue.    ECG Reviewed: Date: 5/2020 Results:  Sinus tachycardia  Cath: Date: Results:      METS/Exercise Tolerance: >4 METS    Hematologic:  - neg hematologic  ROS  (-) history of blood clots and history of blood transfusion   Musculoskeletal: Comment: Low back pain with left sciatica - constant, chronic right hand pain and swelling x 1 year (s/p injury, thinks it might be tendonitis)      GI/Hepatic:  - neg GI/hepatic ROS     Renal/Genitourinary: Comment: Prostate cancer      Endo:     (+) thyroid problem,  s/p partial thyroidectomy, no thyroid meds indicated,     Psychiatric/Substance Use: Comment: insomnia    (+) psychiatric history other (comment)     Infectious Disease:  - neg infectious disease ROS  (-) Recent Fever   Malignancy:   (+) Malignancy, History of Prostate.Prostate CA Active status post.        Other:  - neg other ROS    (+) , H/O Chronic Pain,          Temp: 98.3  F (36.8  C) Temp src: Oral BP: (!) 136/90 Pulse: 68   Resp: 12 SpO2: 97 %         194 lbs 0 oz  5' 9\"[PT REPORTED[   Body mass index is 28.65 kg/m .       Physical Exam  Constitutional: Awake, alert, cooperative, no apparent distress, and appears stated age.  Eyes: Pupils equal, round and reactive to light, extra ocular muscles intact, sclera clear, conjunctiva normal.  HENT: Normocephalic, oral pharynx with moist mucus membranes, good dentition. No goiter appreciated.   Respiratory: Clear to auscultation bilaterally, no crackles or wheezing.  Cardiovascular: Regular rate and rhythm, normal S1 and S2, and no murmur noted.  Carotids +2, no bruits. No edema. Palpable pulses to radial  DP and PT arteries.   GI: Normal bowel sounds, soft, non-distended, non-tender, no masses palpated, no hepatosplenomegaly.  "   Lymph/Hematologic: No cervical lymphadenopathy and no supraclavicular lymphadenopathy.  Genitourinary:  deferred  Skin: Warm and dry.    Musculoskeletal: Full ROM of neck. There is no redness, warmth, or swelling of the exposed joints. Mild right hand enlargement as noted below.  Gross motor strength is normal.    Neurologic: Awake, alert, oriented to name, place and time. Cranial nerves II-XII are grossly intact. Gait is normal.   Neuropsychiatric: Calm, cooperative. Normal affect.     Labs: (personally reviewed)   Component      Latest Ref Rng & Units 3/19/2021   Sodium      133 - 144 mmol/L 139   Potassium      3.4 - 5.3 mmol/L 3.6   Chloride      94 - 109 mmol/L 106   Carbon Dioxide      20 - 32 mmol/L 29   Anion Gap      3 - 14 mmol/L 4   Glucose      70 - 99 mg/dL 100 (H)   Urea Nitrogen      7 - 30 mg/dL 14   Creatinine      0.66 - 1.25 mg/dL 0.95   GFR Estimate      >60 mL/min/1.73:m2 90   GFR Estimate If Black      >60 mL/min/1.73:m2 >90   Calcium      8.5 - 10.1 mg/dL 9.3   WBC      4.0 - 11.0 10e9/L 6.2   RBC Count      4.4 - 5.9 10e12/L 5.20   Hemoglobin      13.3 - 17.7 g/dL 15.1   Hematocrit      40.0 - 53.0 % 45.1   MCV      78 - 100 fl 87   MCH      26.5 - 33.0 pg 29.0   MCHC      31.5 - 36.5 g/dL 33.5   RDW      10.0 - 15.0 % 12.3   Platelet Count      150 - 450 10e9/L 301         [id number]  Sinus tachycardia      Stress test:   2019  Interpretation Summary     Normal exercise stress test.  The target heart rate was achieved. Heart rate and blood pressure response to  exercise were normal.  Normal functional capacity. No subjective symptoms to suggest ischemia.  No ECG evidence of ischemia at rest or with exercise.        The patient reported no symptoms with exericse. The test was terminated due to  a technical error with the treadmill (treadmill spontaneously ended protocol  and stopped) Patient stated he may have been able to go one more minute due to  fatigue.        Outside records reviewed  from: Care Everywhere        ASSESSMENT and PLAN  Miles Valencia 55 year old male scheduled for Robot-assisted laparoscopic radical prostatectomy, possible pelvic lymphadenectomy, possible open procedure on 4/7/21 by Dr. Pereyra in treatment of prostate cancer.  PAC referral for risk assessment and optimization for anesthesia with comorbid conditions of: hypertension, allergic rhinitis, asthma, migraines, RLS and insomnia.      Pre-operative considerations:  1.  Cardiac:  Functional status- METS >4.  He works out at [employer] regularly for exercise.  Hypertension is managed with diltiazem and hydrochlorothiazide. Hold hydrochlorothiazide DOS.  He had a negative stress test in 2019.  Intermediate risk surgery with 0.4% risk of major adverse cardiac event.   2.  Pulm:  Airway feasible.  JULIÁN risk: intermediate.  He was prescribed albuterol to use prn prior to exercise.  There is a diagnosis of asthma listed on his chart, but he notes he was never formally diagnosed with asthma.  Nonetheless, symptoms are only present with exercise and managed well with prn albuterol.    3.  GI:  Risk of PONV score = 2.  If > 2, anti-emetic intervention recommended.  4. Neuro:  Hold maxalt 24 hours prior to surgery.  Hold excedrin 7 days prior to surgery.    5. Heme: VTE risk = 4.5% due to family history of brother with history of DVT secondary to factor V, current cancer and sex.  Patient reports he was tested for factor V and was negative.  Hold aspirin 7 days prior to surgery.  6. Musculoskeletal:  Pt reports right hand swelling and right middle finger trigger finger type symptoms x 1 year s/p a right hand injury.  Reports he didn't have evaluation due to covid.  Right hand in region of fingers 3-5 does appear to have some enlargement, but not particularly swelling.  No arm swelling or erythema.  Encouraged patient to follow up with pcp for evaluation.    7. Endo:  S/p partial thyroidectomy for goiter.  Not currently on any  thyroid replacement.         Patient is optimized and is acceptable candidate for the proposed procedure.  No further diagnostic evaluation is needed.               Rohini Johnson DNP, RN, APRN  Preoperative Assessment Center  [facility]  Phone: [phone]  Fax: [phone]

## 2021-03-19 NOTE — PATIENT INSTRUCTIONS
Preparing for Your Surgery      Name:  Miles Valencia   MRN:  [id number]   :  1965   Today's Date:  3/19/2021       Arriving for surgery:  Surgery date:  21  Arrival time:  05:45 am    Restrictions due to COVID 19:  One consistent visitor per patient is allowed.  The visitor will be allowed in the pre-op area.  Visitors are asked to leave the building during the surgery.  No ill visitors.  All visitors must wear face mask.    [employer] parking is available for anyone with mobility limitations or disabilities.  ([facility]  24 hours/ 7 days a week; [facility]  7 am- 3:30 pm, Mon- Fri)    Please come to:   [facility] [address]  [address] [place]  [address]  -Come in the front of [facility]. Park your car in the Green Lot.  -Proceed to the 3rd floor, check in at the Adult Surgery Waiting Lounge. [phone]    If an escort is needed stop at the Information Desk in the lobby. Inform the information person that you are here for surgery. An escort to the Adult Surgery Waiting Lounge will be provided.        What can I eat or drink?  -  You may eat and drink normally for up to 8 hours before your surgery.   -  You may have clear liquids until 2 hours before surgery.     Examples of clear liquids:  Water  Clear broth  Juices (apple, white grape, white cranberry  and cider) without pulp  Noncarbonated, powder based beverages  (lemonade and Anshu-Aid)  Sodas (Sprite, 7-Up, ginger ale and seltzer)  Coffee or tea (without milk or cream)  Gatorade    -  No Alcohol for at least 24 hours before surgery     Which medicines can I take?  Hold Aspirin (excedrin) for 7 days before surgery.   Hold Multivitamins for 7 days before surgery.  Hold Supplements for 7 days before surgery.  Hold Ibuprofen (Advil, Motrin) for 1 day before surgery--unless otherwise directed by surgeon.  Hold Naproxen (Aleve) for 4 days before surgery.  Hold maxalt and hemp  oil x 24 hours before surgery.  -  DO NOT take these medications the day of surgery:  Hydrodiuril (hydrochlorothiazide).  -  PLEASE TAKE these medications the day of surgery:  Tylenol if needed; take other morning medications.    How do I prepare myself?  - Please take 2 showers before surgery using Scrubcare or Hibiclens soap.    Use this soap only from the neck to your toes.     Leave the soap on your skin for one minute--then rinse thoroughly.      You may use your own shampoo and conditioner; no other hair products.   - Please remove all jewelry and body piercings.  - No lotions, deodorants or fragrance.  - No makeup or fingernail polish.   - Bring your ID and insurance card.    - All patients are required to have a Covid-19 test within 4 days of surgery/procedure.      -Patients will be contacted by the [facility] scheduling team within 1 week of surgery to make an appointment.      - Patients may call the Scheduling team at [phone] if they have not been scheduled within 4 days of  surgery.      Questions or Concerns:    - For any questions regarding the day of surgery or your hospital stay, please contact the Pre Admission Nursing Office at [phone].       - If you have health changes between today and your surgery please call your surgeon.       For questions after surgery please call your surgeons office.

## 2021-03-23 DIAGNOSIS — Z11.59 ENCOUNTER FOR SCREENING FOR OTHER VIRAL DISEASES: ICD-10-CM

## 2021-03-23 NOTE — RESULT ENCOUNTER NOTE
Philomena Aquino,    Your test results are attached.  All of your labs are good for surgery.         Rohini Johnson DNP, RN, ANP-C

## 2021-03-30 ENCOUNTER — PATIENT OUTREACH (OUTPATIENT)
Dept: UROLOGY | Facility: CLINIC | Age: 56
End: 2021-03-30

## 2021-03-30 DIAGNOSIS — C61 PROSTATE CANCER (H): ICD-10-CM

## 2021-03-30 DIAGNOSIS — C61 PROSTATE CANCER (H): Primary | ICD-10-CM

## 2021-03-30 LAB
ALBUMIN UR-MCNC: NEGATIVE MG/DL
APPEARANCE UR: CLEAR
BACTERIA #/AREA URNS HPF: ABNORMAL /HPF
BILIRUB UR QL STRIP: NEGATIVE
COLOR UR AUTO: YELLOW
GLUCOSE UR STRIP-MCNC: NEGATIVE MG/DL
HGB UR QL STRIP: NEGATIVE
KETONES UR STRIP-MCNC: NEGATIVE MG/DL
LEUKOCYTE ESTERASE UR QL STRIP: NEGATIVE
NITRATE UR QL: NEGATIVE
PH UR STRIP: 7 PH (ref 5–7)
RBC #/AREA URNS AUTO: 1 /HPF (ref 0–2)
SOURCE: ABNORMAL
SP GR UR STRIP: 1.01 (ref 1–1.03)
UROBILINOGEN UR STRIP-MCNC: 0 MG/DL (ref 0–2)
WBC #/AREA URNS AUTO: <1 /HPF (ref 0–5)

## 2021-03-30 PROCEDURE — 81001 URINALYSIS AUTO W/SCOPE: CPT | Performed by: PATHOLOGY

## 2021-04-03 DIAGNOSIS — Z11.59 ENCOUNTER FOR SCREENING FOR OTHER VIRAL DISEASES: ICD-10-CM

## 2021-04-03 LAB
LABORATORY COMMENT REPORT: NORMAL
SARS-COV-2 RNA RESP QL NAA+PROBE: NEGATIVE
SARS-COV-2 RNA RESP QL NAA+PROBE: NORMAL
SPECIMEN SOURCE: NORMAL
SPECIMEN SOURCE: NORMAL

## 2021-04-03 PROCEDURE — U0003 INFECTIOUS AGENT DETECTION BY NUCLEIC ACID (DNA OR RNA); SEVERE ACUTE RESPIRATORY SYNDROME CORONAVIRUS 2 (SARS-COV-2) (CORONAVIRUS DISEASE [COVID-19]), AMPLIFIED PROBE TECHNIQUE, MAKING USE OF HIGH THROUGHPUT TECHNOLOGIES AS DESCRIBED BY CMS-2020-01-R: HCPCS | Mod: 90 | Performed by: PATHOLOGY

## 2021-04-03 PROCEDURE — U0005 INFEC AGEN DETEC AMPLI PROBE: HCPCS | Mod: 90 | Performed by: PATHOLOGY

## 2021-04-06 ENCOUNTER — ANESTHESIA EVENT (OUTPATIENT)
Dept: SURGERY | Facility: CLINIC | Age: 56
DRG: 708 | End: 2021-04-06
Payer: COMMERCIAL

## 2021-04-07 ENCOUNTER — ANESTHESIA (OUTPATIENT)
Dept: SURGERY | Facility: CLINIC | Age: 56
DRG: 708 | End: 2021-04-07
Payer: COMMERCIAL

## 2021-04-07 ENCOUNTER — HOSPITAL ENCOUNTER (INPATIENT)
Facility: CLINIC | Age: 56
LOS: 1 days | Discharge: HOME | DRG: 708 | End: 2021-04-08
Attending: UROLOGY | Admitting: UROLOGY
Payer: COMMERCIAL

## 2021-04-07 DIAGNOSIS — C61 PROSTATE CANCER (H): ICD-10-CM

## 2021-04-07 LAB
ABO + RH BLD: NORMAL
ABO + RH BLD: NORMAL
BLD GP AB SCN SERPL QL: NORMAL
BLOOD BANK CMNT PATIENT-IMP: NORMAL
BLOOD BANK CMNT PATIENT-IMP: NORMAL
GLUCOSE BLDC GLUCOMTR-MCNC: 113 MG/DL (ref 70–99)
POTASSIUM SERPL-SCNC: 3.5 MMOL/L (ref 3.4–5.3)
SPECIMEN EXP DATE BLD: NORMAL

## 2021-04-07 PROCEDURE — 84132 ASSAY OF SERUM POTASSIUM: CPT | Performed by: ANESTHESIOLOGY

## 2021-04-07 PROCEDURE — 88309 TISSUE EXAM BY PATHOLOGIST: CPT | Mod: 26 | Performed by: PATHOLOGY

## 2021-04-07 PROCEDURE — 258N000003 HC RX IP 258 OP 636

## 2021-04-07 PROCEDURE — 88307 TISSUE EXAM BY PATHOLOGIST: CPT | Mod: 26 | Performed by: PATHOLOGY

## 2021-04-07 PROCEDURE — 8E0W4CZ ROBOTIC ASSISTED PROCEDURE OF TRUNK REGION, PERCUTANEOUS ENDOSCOPIC APPROACH: ICD-10-PCS | Performed by: UROLOGY

## 2021-04-07 PROCEDURE — 710N000010 HC RECOVERY PHASE 1, LEVEL 2, PER MIN: Performed by: UROLOGY

## 2021-04-07 PROCEDURE — 250N000011 HC RX IP 250 OP 636: Performed by: UROLOGY

## 2021-04-07 PROCEDURE — G0416 PROSTATE BIOPSY, ANY MTHD: HCPCS | Performed by: PATHOLOGY

## 2021-04-07 PROCEDURE — 0VT34ZZ RESECTION OF BILATERAL SEMINAL VESICLES, PERCUTANEOUS ENDOSCOPIC APPROACH: ICD-10-PCS | Performed by: UROLOGY

## 2021-04-07 PROCEDURE — 250N000013 HC RX MED GY IP 250 OP 250 PS 637: Performed by: STUDENT IN AN ORGANIZED HEALTH CARE EDUCATION/TRAINING PROGRAM

## 2021-04-07 PROCEDURE — 258N000003 HC RX IP 258 OP 636: Performed by: STUDENT IN AN ORGANIZED HEALTH CARE EDUCATION/TRAINING PROGRAM

## 2021-04-07 PROCEDURE — 0VBQ4ZZ EXCISION OF BILATERAL VAS DEFERENS, PERCUTANEOUS ENDOSCOPIC APPROACH: ICD-10-PCS | Performed by: UROLOGY

## 2021-04-07 PROCEDURE — 120N000002 HC R&B MED SURG/OB UMMC

## 2021-04-07 PROCEDURE — 88307 TISSUE EXAM BY PATHOLOGIST: CPT | Mod: TC | Performed by: UROLOGY

## 2021-04-07 PROCEDURE — 999N000141 HC STATISTIC PRE-PROCEDURE NURSING ASSESSMENT: Performed by: UROLOGY

## 2021-04-07 PROCEDURE — 250N000009 HC RX 250

## 2021-04-07 PROCEDURE — 250N000009 HC RX 250: Performed by: STUDENT IN AN ORGANIZED HEALTH CARE EDUCATION/TRAINING PROGRAM

## 2021-04-07 PROCEDURE — 07BC4ZZ EXCISION OF PELVIS LYMPHATIC, PERCUTANEOUS ENDOSCOPIC APPROACH: ICD-10-PCS | Performed by: UROLOGY

## 2021-04-07 PROCEDURE — 0VT04ZZ RESECTION OF PROSTATE, PERCUTANEOUS ENDOSCOPIC APPROACH: ICD-10-PCS | Performed by: UROLOGY

## 2021-04-07 PROCEDURE — 272N000001 HC OR GENERAL SUPPLY STERILE: Performed by: UROLOGY

## 2021-04-07 PROCEDURE — 250N000011 HC RX IP 250 OP 636: Performed by: NURSE ANESTHETIST, CERTIFIED REGISTERED

## 2021-04-07 PROCEDURE — 360N000080 HC SURGERY LEVEL 7, PER MIN: Performed by: UROLOGY

## 2021-04-07 PROCEDURE — 999N001017 HC STATISTIC GLUCOSE BY METER IP

## 2021-04-07 PROCEDURE — 250N000011 HC RX IP 250 OP 636

## 2021-04-07 PROCEDURE — 250N000025 HC SEVOFLURANE, PER MIN: Performed by: UROLOGY

## 2021-04-07 PROCEDURE — 370N000017 HC ANESTHESIA TECHNICAL FEE, PER MIN: Performed by: UROLOGY

## 2021-04-07 PROCEDURE — 250N000009 HC RX 250: Performed by: NURSE ANESTHETIST, CERTIFIED REGISTERED

## 2021-04-07 PROCEDURE — 250N000013 HC RX MED GY IP 250 OP 250 PS 637

## 2021-04-07 RX ORDER — ONDANSETRON 4 MG/1
4 TABLET, ORALLY DISINTEGRATING ORAL EVERY 30 MIN PRN
Status: DISCONTINUED | OUTPATIENT
Start: 2021-04-07 | End: 2021-04-07 | Stop reason: HOSPADM

## 2021-04-07 RX ORDER — LIDOCAINE HYDROCHLORIDE 20 MG/ML
INJECTION, SOLUTION INFILTRATION; PERINEURAL PRN
Status: DISCONTINUED | OUTPATIENT
Start: 2021-04-07 | End: 2021-04-07

## 2021-04-07 RX ORDER — LIDOCAINE 40 MG/G
CREAM TOPICAL
Status: DISCONTINUED | OUTPATIENT
Start: 2021-04-07 | End: 2021-04-08 | Stop reason: HOSPADM

## 2021-04-07 RX ORDER — SODIUM CHLORIDE, SODIUM LACTATE, POTASSIUM CHLORIDE, CALCIUM CHLORIDE 600; 310; 30; 20 MG/100ML; MG/100ML; MG/100ML; MG/100ML
INJECTION, SOLUTION INTRAVENOUS CONTINUOUS
Status: DISCONTINUED | OUTPATIENT
Start: 2021-04-07 | End: 2021-04-07 | Stop reason: HOSPADM

## 2021-04-07 RX ORDER — NALOXONE HYDROCHLORIDE 0.4 MG/ML
0.2 INJECTION, SOLUTION INTRAMUSCULAR; INTRAVENOUS; SUBCUTANEOUS
Status: DISCONTINUED | OUTPATIENT
Start: 2021-04-07 | End: 2021-04-07 | Stop reason: HOSPADM

## 2021-04-07 RX ORDER — DILTIAZEM HYDROCHLORIDE 120 MG/1
240 CAPSULE, COATED, EXTENDED RELEASE ORAL EVERY MORNING
Status: DISCONTINUED | OUTPATIENT
Start: 2021-04-08 | End: 2021-04-08 | Stop reason: HOSPADM

## 2021-04-07 RX ORDER — SODIUM CHLORIDE, SODIUM LACTATE, POTASSIUM CHLORIDE, CALCIUM CHLORIDE 600; 310; 30; 20 MG/100ML; MG/100ML; MG/100ML; MG/100ML
INJECTION, SOLUTION INTRAVENOUS CONTINUOUS PRN
Status: DISCONTINUED | OUTPATIENT
Start: 2021-04-07 | End: 2021-04-07

## 2021-04-07 RX ORDER — NALOXONE HYDROCHLORIDE 0.4 MG/ML
0.4 INJECTION, SOLUTION INTRAMUSCULAR; INTRAVENOUS; SUBCUTANEOUS
Status: DISCONTINUED | OUTPATIENT
Start: 2021-04-07 | End: 2021-04-08 | Stop reason: HOSPADM

## 2021-04-07 RX ORDER — PROPOFOL 10 MG/ML
INJECTION, EMULSION INTRAVENOUS PRN
Status: DISCONTINUED | OUTPATIENT
Start: 2021-04-07 | End: 2021-04-07

## 2021-04-07 RX ORDER — ONDANSETRON 2 MG/ML
INJECTION INTRAMUSCULAR; INTRAVENOUS PRN
Status: DISCONTINUED | OUTPATIENT
Start: 2021-04-07 | End: 2021-04-07

## 2021-04-07 RX ORDER — NALOXONE HYDROCHLORIDE 0.4 MG/ML
0.4 INJECTION, SOLUTION INTRAMUSCULAR; INTRAVENOUS; SUBCUTANEOUS
Status: DISCONTINUED | OUTPATIENT
Start: 2021-04-07 | End: 2021-04-07 | Stop reason: HOSPADM

## 2021-04-07 RX ORDER — CYCLOBENZAPRINE HCL 5 MG
10 TABLET ORAL 3 TIMES DAILY PRN
Status: DISCONTINUED | OUTPATIENT
Start: 2021-04-07 | End: 2021-04-08 | Stop reason: HOSPADM

## 2021-04-07 RX ORDER — HYDROMORPHONE HYDROCHLORIDE 1 MG/ML
.3-.5 INJECTION, SOLUTION INTRAMUSCULAR; INTRAVENOUS; SUBCUTANEOUS EVERY 10 MIN PRN
Status: DISCONTINUED | OUTPATIENT
Start: 2021-04-07 | End: 2021-04-07

## 2021-04-07 RX ORDER — OXYCODONE HYDROCHLORIDE 5 MG/1
5 TABLET ORAL EVERY 4 HOURS PRN
Status: DISCONTINUED | OUTPATIENT
Start: 2021-04-07 | End: 2021-04-07

## 2021-04-07 RX ORDER — ALBUTEROL SULFATE 90 UG/1
AEROSOL, METERED RESPIRATORY (INHALATION) PRN
Status: DISCONTINUED | OUTPATIENT
Start: 2021-04-07 | End: 2021-04-07

## 2021-04-07 RX ORDER — LIDOCAINE 40 MG/G
CREAM TOPICAL
Status: DISCONTINUED | OUTPATIENT
Start: 2021-04-07 | End: 2021-04-07 | Stop reason: HOSPADM

## 2021-04-07 RX ORDER — MEPERIDINE HYDROCHLORIDE 25 MG/ML
12.5 INJECTION INTRAMUSCULAR; INTRAVENOUS; SUBCUTANEOUS
Status: DISCONTINUED | OUTPATIENT
Start: 2021-04-07 | End: 2021-04-07 | Stop reason: HOSPADM

## 2021-04-07 RX ORDER — BISACODYL 10 MG
10 SUPPOSITORY, RECTAL RECTAL DAILY PRN
Status: DISCONTINUED | OUTPATIENT
Start: 2021-04-08 | End: 2021-04-08 | Stop reason: HOSPADM

## 2021-04-07 RX ORDER — DEXAMETHASONE SODIUM PHOSPHATE 4 MG/ML
INJECTION, SOLUTION INTRA-ARTICULAR; INTRALESIONAL; INTRAMUSCULAR; INTRAVENOUS; SOFT TISSUE PRN
Status: DISCONTINUED | OUTPATIENT
Start: 2021-04-07 | End: 2021-04-07

## 2021-04-07 RX ORDER — HYDROMORPHONE HCL IN WATER/PF 6 MG/30 ML
0.2 PATIENT CONTROLLED ANALGESIA SYRINGE INTRAVENOUS
Status: DISCONTINUED | OUTPATIENT
Start: 2021-04-07 | End: 2021-04-08 | Stop reason: HOSPADM

## 2021-04-07 RX ORDER — ONDANSETRON 4 MG/1
4-8 TABLET, ORALLY DISINTEGRATING ORAL EVERY 6 HOURS PRN
Status: DISCONTINUED | OUTPATIENT
Start: 2021-04-07 | End: 2021-04-08 | Stop reason: HOSPADM

## 2021-04-07 RX ORDER — ACETAMINOPHEN 325 MG/1
650 TABLET ORAL EVERY 4 HOURS
Status: DISCONTINUED | OUTPATIENT
Start: 2021-04-07 | End: 2021-04-08 | Stop reason: HOSPADM

## 2021-04-07 RX ORDER — BUPIVACAINE HYDROCHLORIDE 2.5 MG/ML
INJECTION, SOLUTION INFILTRATION; PERINEURAL PRN
Status: DISCONTINUED | OUTPATIENT
Start: 2021-04-07 | End: 2021-04-07 | Stop reason: HOSPADM

## 2021-04-07 RX ORDER — EPHEDRINE SULFATE 50 MG/ML
INJECTION, SOLUTION INTRAMUSCULAR; INTRAVENOUS; SUBCUTANEOUS PRN
Status: DISCONTINUED | OUTPATIENT
Start: 2021-04-07 | End: 2021-04-07

## 2021-04-07 RX ORDER — ATROPA BELLADONNA AND OPIUM 16.2; 3 MG/1; MG/1
30 SUPPOSITORY RECTAL ONCE
Status: COMPLETED | OUTPATIENT
Start: 2021-04-07 | End: 2021-04-07

## 2021-04-07 RX ORDER — ALBUTEROL SULFATE 90 UG/1
2 AEROSOL, METERED RESPIRATORY (INHALATION) EVERY 4 HOURS PRN
Status: DISCONTINUED | OUTPATIENT
Start: 2021-04-07 | End: 2021-04-08 | Stop reason: HOSPADM

## 2021-04-07 RX ORDER — ALBUTEROL SULFATE 0.83 MG/ML
2.5 SOLUTION RESPIRATORY (INHALATION) EVERY 4 HOURS PRN
Status: DISCONTINUED | OUTPATIENT
Start: 2021-04-07 | End: 2021-04-07 | Stop reason: HOSPADM

## 2021-04-07 RX ORDER — NALOXONE HYDROCHLORIDE 0.4 MG/ML
0.2 INJECTION, SOLUTION INTRAMUSCULAR; INTRAVENOUS; SUBCUTANEOUS
Status: DISCONTINUED | OUTPATIENT
Start: 2021-04-07 | End: 2021-04-08 | Stop reason: HOSPADM

## 2021-04-07 RX ORDER — FENTANYL CITRATE 50 UG/ML
25-50 INJECTION, SOLUTION INTRAMUSCULAR; INTRAVENOUS
Status: DISCONTINUED | OUTPATIENT
Start: 2021-04-07 | End: 2021-04-07

## 2021-04-07 RX ORDER — ACETAMINOPHEN 325 MG/1
975 TABLET ORAL ONCE
Status: DISCONTINUED | OUTPATIENT
Start: 2021-04-07 | End: 2021-04-07

## 2021-04-07 RX ORDER — FENTANYL CITRATE 50 UG/ML
25-50 INJECTION, SOLUTION INTRAMUSCULAR; INTRAVENOUS EVERY 5 MIN PRN
Status: DISCONTINUED | OUTPATIENT
Start: 2021-04-07 | End: 2021-04-07

## 2021-04-07 RX ORDER — CEFAZOLIN SODIUM 2 G/100ML
2 INJECTION, SOLUTION INTRAVENOUS
Status: COMPLETED | OUTPATIENT
Start: 2021-04-07 | End: 2021-04-07

## 2021-04-07 RX ORDER — MAGNESIUM CARB/ALUMINUM HYDROX 105-160MG
30 TABLET,CHEWABLE ORAL DAILY PRN
Status: DISCONTINUED | OUTPATIENT
Start: 2021-04-08 | End: 2021-04-08 | Stop reason: HOSPADM

## 2021-04-07 RX ORDER — CEFAZOLIN SODIUM 1 G/3ML
1 INJECTION, POWDER, FOR SOLUTION INTRAMUSCULAR; INTRAVENOUS SEE ADMIN INSTRUCTIONS
Status: DISCONTINUED | OUTPATIENT
Start: 2021-04-07 | End: 2021-04-07 | Stop reason: HOSPADM

## 2021-04-07 RX ORDER — GABAPENTIN 300 MG/1
600 CAPSULE ORAL AT BEDTIME
Status: DISCONTINUED | OUTPATIENT
Start: 2021-04-07 | End: 2021-04-08 | Stop reason: HOSPADM

## 2021-04-07 RX ORDER — FENTANYL CITRATE 50 UG/ML
INJECTION, SOLUTION INTRAMUSCULAR; INTRAVENOUS PRN
Status: DISCONTINUED | OUTPATIENT
Start: 2021-04-07 | End: 2021-04-07

## 2021-04-07 RX ORDER — FEXOFENADINE HCL 180 MG/1
180 TABLET ORAL EVERY MORNING
Status: DISCONTINUED | OUTPATIENT
Start: 2021-04-08 | End: 2021-04-08 | Stop reason: HOSPADM

## 2021-04-07 RX ORDER — SODIUM CHLORIDE 9 MG/ML
INJECTION, SOLUTION INTRAVENOUS CONTINUOUS
Status: DISCONTINUED | OUTPATIENT
Start: 2021-04-07 | End: 2021-04-08 | Stop reason: HOSPADM

## 2021-04-07 RX ORDER — HYDROCHLOROTHIAZIDE 12.5 MG/1
25 TABLET ORAL EVERY MORNING
Status: DISCONTINUED | OUTPATIENT
Start: 2021-04-08 | End: 2021-04-08 | Stop reason: HOSPADM

## 2021-04-07 RX ORDER — OXYCODONE HYDROCHLORIDE 5 MG/1
5 TABLET ORAL EVERY 4 HOURS PRN
Status: DISCONTINUED | OUTPATIENT
Start: 2021-04-07 | End: 2021-04-08 | Stop reason: HOSPADM

## 2021-04-07 RX ORDER — ONDANSETRON 2 MG/ML
4 INJECTION INTRAMUSCULAR; INTRAVENOUS EVERY 30 MIN PRN
Status: DISCONTINUED | OUTPATIENT
Start: 2021-04-07 | End: 2021-04-07 | Stop reason: HOSPADM

## 2021-04-07 RX ADMIN — ACETAMINOPHEN 650 MG: 325 TABLET, FILM COATED ORAL at 19:52

## 2021-04-07 RX ADMIN — PHENYLEPHRINE HYDROCHLORIDE 100 MCG: 10 INJECTION INTRAVENOUS at 08:27

## 2021-04-07 RX ADMIN — SODIUM CHLORIDE: 9 INJECTION, SOLUTION INTRAVENOUS at 17:24

## 2021-04-07 RX ADMIN — ATROPA BELLADONNA AND OPIUM 1 SUPPOSITORY: 16.2; 3 SUPPOSITORY RECTAL at 15:54

## 2021-04-07 RX ADMIN — Medication 1 G: at 10:07

## 2021-04-07 RX ADMIN — PHENYLEPHRINE HYDROCHLORIDE 100 MCG: 10 INJECTION INTRAVENOUS at 08:07

## 2021-04-07 RX ADMIN — HYDROMORPHONE HYDROCHLORIDE 0.5 MG: 1 INJECTION, SOLUTION INTRAMUSCULAR; INTRAVENOUS; SUBCUTANEOUS at 14:06

## 2021-04-07 RX ADMIN — Medication 5 MG: at 08:27

## 2021-04-07 RX ADMIN — SUGAMMADEX 180 MG: 100 INJECTION, SOLUTION INTRAVENOUS at 14:33

## 2021-04-07 RX ADMIN — ACETAMINOPHEN 650 MG: 325 TABLET, FILM COATED ORAL at 15:26

## 2021-04-07 RX ADMIN — DEXAMETHASONE SODIUM PHOSPHATE 4 MG: 4 INJECTION, SOLUTION INTRAMUSCULAR; INTRAVENOUS at 08:23

## 2021-04-07 RX ADMIN — Medication 2 G: at 08:07

## 2021-04-07 RX ADMIN — ONDANSETRON 4 MG: 2 INJECTION INTRAMUSCULAR; INTRAVENOUS at 14:04

## 2021-04-07 RX ADMIN — ROCURONIUM BROMIDE 20 MG: 10 INJECTION INTRAVENOUS at 10:31

## 2021-04-07 RX ADMIN — SODIUM CHLORIDE, POTASSIUM CHLORIDE, SODIUM LACTATE AND CALCIUM CHLORIDE: 600; 310; 30; 20 INJECTION, SOLUTION INTRAVENOUS at 14:17

## 2021-04-07 RX ADMIN — FENTANYL CITRATE 50 MCG: 50 INJECTION, SOLUTION INTRAMUSCULAR; INTRAVENOUS at 08:49

## 2021-04-07 RX ADMIN — ROCURONIUM BROMIDE 60 MG: 10 INJECTION INTRAVENOUS at 07:57

## 2021-04-07 RX ADMIN — Medication 1 G: at 12:19

## 2021-04-07 RX ADMIN — ROCURONIUM BROMIDE 30 MG: 10 INJECTION INTRAVENOUS at 09:30

## 2021-04-07 RX ADMIN — PHENYLEPHRINE HYDROCHLORIDE 0.3 MCG/KG/MIN: 10 INJECTION INTRAVENOUS at 10:31

## 2021-04-07 RX ADMIN — FENTANYL CITRATE 150 MCG: 50 INJECTION, SOLUTION INTRAMUSCULAR; INTRAVENOUS at 07:56

## 2021-04-07 RX ADMIN — Medication 1 G: at 14:18

## 2021-04-07 RX ADMIN — PROPOFOL 200 MG: 10 INJECTION, EMULSION INTRAVENOUS at 07:56

## 2021-04-07 RX ADMIN — SODIUM CHLORIDE, POTASSIUM CHLORIDE, SODIUM LACTATE AND CALCIUM CHLORIDE: 600; 310; 30; 20 INJECTION, SOLUTION INTRAVENOUS at 07:48

## 2021-04-07 RX ADMIN — ALBUTEROL SULFATE 6 PUFF: 108 INHALANT RESPIRATORY (INHALATION) at 11:28

## 2021-04-07 RX ADMIN — SODIUM CHLORIDE, POTASSIUM CHLORIDE, SODIUM LACTATE AND CALCIUM CHLORIDE: 600; 310; 30; 20 INJECTION, SOLUTION INTRAVENOUS at 10:38

## 2021-04-07 RX ADMIN — ROCURONIUM BROMIDE 30 MG: 10 INJECTION INTRAVENOUS at 11:38

## 2021-04-07 RX ADMIN — ROCURONIUM BROMIDE 20 MG: 10 INJECTION INTRAVENOUS at 12:39

## 2021-04-07 RX ADMIN — ROCURONIUM BROMIDE 20 MG: 10 INJECTION INTRAVENOUS at 13:48

## 2021-04-07 RX ADMIN — HYDROMORPHONE HYDROCHLORIDE 0.5 MG: 1 INJECTION, SOLUTION INTRAMUSCULAR; INTRAVENOUS; SUBCUTANEOUS at 12:16

## 2021-04-07 RX ADMIN — LIDOCAINE HYDROCHLORIDE 100 MG: 20 INJECTION, SOLUTION INFILTRATION; PERINEURAL at 07:56

## 2021-04-07 RX ADMIN — MIDAZOLAM 2 MG: 1 INJECTION INTRAMUSCULAR; INTRAVENOUS at 07:47

## 2021-04-07 RX ADMIN — PHENYLEPHRINE HYDROCHLORIDE 0.5 MCG/KG/MIN: 10 INJECTION INTRAVENOUS at 08:26

## 2021-04-07 RX ADMIN — PHENYLEPHRINE HYDROCHLORIDE 100 MCG: 10 INJECTION INTRAVENOUS at 08:23

## 2021-04-07 RX ADMIN — ROCURONIUM BROMIDE 10 MG: 10 INJECTION INTRAVENOUS at 09:05

## 2021-04-07 RX ADMIN — GABAPENTIN 600 MG: 300 CAPSULE ORAL at 22:11

## 2021-04-07 RX ADMIN — OXYCODONE HYDROCHLORIDE 5 MG: 5 TABLET ORAL at 18:27

## 2021-04-07 RX ADMIN — Medication 5 MG: at 08:23

## 2021-04-07 ASSESSMENT — MIFFLIN-ST. JEOR: SCORE: 1707.38

## 2021-04-07 NOTE — ANESTHESIA PREPROCEDURE EVALUATION
Anesthesia Pre-Procedure Evaluation    Patient: Miles Valencia   MRN: [id number] : 1965        Preoperative Diagnosis: Prostate cancer (H) [C61]   Procedure : Procedure(s):  Robot-assisted laparoscopic radical prostatectomy, possible pelvic lymphadenectomy,  possible open procedure     Past Medical History:   Diagnosis Date     Achilles bursitis or tendinitis 2014     Allergic rhinitis      Asthma      Benign positional vertigo      Congestion      Hypertension      Low back pain      Migraine      Multinodular goiter      Osteoarthritis      Pain in joint, shoulder region 2014     Prostate cancer (H)      RLS (restless legs syndrome)      Sleep problems       Past Surgical History:   Procedure Laterality Date     ARTHROPLASTY SHOULDER Left 2017    Procedure: ARTHROPLASTY SHOULDER;  Left Total Shoulder Arthroplasty  ;  Surgeon: Jossy Swanson MD;  [facility]     BIOPSY      Prostate     COLONOSCOPY N/A 3/21/2016    Procedure: COLONOSCOPY;  Surgeon: Toy Lima MD;  [facility]     THYROIDECTOMY Left 2020    Procedure: Left Lobe THYROIDECTOMY;  Surgeon: Evelin Cutler MD;  [facility]      Allergies   Allergen Reactions     Amoxicillin Diarrhea      Social History     Tobacco Use     Smoking status: Never Smoker     Smokeless tobacco: Never Used   Substance Use Topics     Alcohol use: Yes     Alcohol/week: 0.0 standard drinks     Comment: 1-2 drinks a week      Wt Readings from Last 1 Encounters:   21 88.2 kg (194 lb 7.1 oz)        Anesthesia Evaluation            ROS/MED HX  ENT/Pulmonary:     (+) asthma     Neurologic:       Cardiovascular:     (+) hypertension----- (-) murmur and wheezes   METS/Exercise Tolerance:     Hematologic:       Musculoskeletal:       GI/Hepatic:       Renal/Genitourinary:       Endo:       Psychiatric/Substance Use:       Infectious Disease:       Malignancy:       Other:      (+) , H/O Chronic Pain,        Physical  Exam    Airway        Mallampati: II   TM distance: > 3 FB   Neck ROM: full   Mouth opening: > 3 cm    Respiratory Devices and Support         Dental  no notable dental history         Cardiovascular          Rhythm and rate: regular and normal (-) no systolic click and no murmur    Pulmonary   pulmonary exam normal        breath sounds clear to auscultation   (-) no wheezes        OUTSIDE LABS:  CBC:   Lab Results   Component Value Date    WBC 6.2 03/19/2021    WBC 6.6 05/31/2017    HGB 15.1 03/19/2021    HGB 15.1 05/27/2020    HCT 45.1 03/19/2021    HCT 45.6 05/31/2017     03/19/2021     05/31/2017     BMP:   Lab Results   Component Value Date     03/19/2021     05/31/2017    POTASSIUM 3.6 03/19/2021    POTASSIUM 3.4 05/31/2017    CHLORIDE 106 03/19/2021    CHLORIDE 104 05/31/2017    CO2 29 03/19/2021    CO2 26 05/31/2017    BUN 14 03/19/2021    BUN 15 05/31/2017    CR 0.95 03/19/2021    CR 0.84 05/27/2020     (H) 03/19/2021     (H) 05/31/2017     COAGS: No results found for: PTT, INR, FIBR  POC:   Lab Results   Component Value Date     (H) 04/07/2021     HEPATIC:   Lab Results   Component Value Date    ALBUMIN 4.0 12/14/2018     OTHER:   Lab Results   Component Value Date    OTILIO 9.3 03/19/2021    PHOS 3.6 04/28/2010    MAG 2.1 04/28/2010    TSH 0.96 07/13/2020    T4 1.09 12/05/2019    T3 123 12/05/2019       Anesthesia Plan    ASA Status:  2   NPO Status:  NPO Appropriate    Anesthesia Type: General.     - Airway: ETT   Induction: Intravenous.   Maintenance: Inhalation.        Consents    Anesthesia Plan(s) and associated risks, benefits, and realistic alternatives discussed. Questions answered and patient/representative(s) expressed understanding.     - Discussed with:  Patient      - Extended Intubation/Ventilatory Support Discussed: Yes.      - Patient is DNR/DNI Status: No    Use of blood products discussed: Yes.     - Discussed with: Patient.     Postoperative  Care    Pain management: IV analgesics.   PONV prophylaxis: Ondansetron (or other 5HT-3), Dexamethasone or Solumedrol     Comments:                Christopher J. Behrens, MD

## 2021-04-07 NOTE — ANESTHESIA POSTPROCEDURE EVALUATION
Patient: Miles Valencia    Procedure(s):  Robot-assisted laparoscopic radical prostatectomy, pelvic lymphadenectomy,    Diagnosis:Prostate cancer (H) [C61]  Diagnosis Additional Information: No value filed.    Anesthesia Type:  General    Note:  Disposition: Admission   Postop Pain Control: Uneventful            Sign Out: Well controlled pain   PONV: No   Neuro/Psych: Uneventful            Sign Out: Acceptable/Baseline neuro status   Airway/Respiratory: Uneventful            Sign Out: Acceptable/Baseline resp. status   CV/Hemodynamics: Uneventful            Sign Out: Acceptable CV status   Other NRE: NONE   DID A NON-ROUTINE EVENT OCCUR? No         Last vitals:  Vitals:    04/07/21 1515 04/07/21 1530 04/07/21 1545   BP: 135/81 (!) 146/85 (!) 137/91   Pulse: 98 96 94   Resp: 12 10 10   Temp: 36.7  C (98  F)     SpO2: 94% 92% 90%       Last vitals prior to Anesthesia Care Transfer:  CRNA VITALS  4/7/2021 1420 - 4/7/2021 1520      4/7/2021             NIBP:  143/71    Pulse:  101    Temp:  36.8  C (98.2  F)    SpO2:  98 %    Resp Rate (observed):  14          Electronically Signed By: Liz Rushing MD  April 7, 2021  4:00 PM

## 2021-04-07 NOTE — ANESTHESIA CARE TRANSFER NOTE
Patient: Miles Valencia    Procedure(s):  Robot-assisted laparoscopic radical prostatectomy, pelvic lymphadenectomy,    Diagnosis: Prostate cancer (H) [C61]  Diagnosis Additional Information: No value filed.    Anesthesia Type:   General     Note:    Oropharynx: oropharynx clear of all foreign objects and spontaneously breathing  Level of Consciousness: awake  Oxygen Supplementation: face mask  Level of Supplemental Oxygen (L/min / FiO2): 6  Independent Airway: airway patency satisfactory and stable  Dentition: dentition unchanged  Vital Signs Stable: post-procedure vital signs reviewed and stable  Report to RN Given: handoff report given  Patient transferred to: PACU    Handoff Report: Identifed the Patient, Identified the Reponsible Provider, Reviewed the pertinent medical history, Discussed the surgical course, Reviewed Intra-OP anesthesia mangement and issues during anesthesia, Set expectations for post-procedure period and Allowed opportunity for questions and acknowledgement of understanding      Vitals: (Last set prior to Anesthesia Care Transfer)  CRNA VITALS  4/7/2021 1420 - 4/7/2021 1459      4/7/2021             NIBP:  143/71    Pulse:  101    Temp:  36.8  C (98.2  F)    SpO2:  98 %    Resp Rate (observed):  14        Electronically Signed By: Penny Higginbotham  April 7, 2021  2:59 PM

## 2021-04-07 NOTE — PROGRESS NOTES
Pt arrived to unit from PACU at 1630 via stretcher. Alert & oriented, able to make needs known. Right and left arm PIV. Choi catheter in place, draining watermelon color urine. Denies pain or discomfort. General orientation given. Pt resting comfortable at present time.

## 2021-04-07 NOTE — BRIEF OP NOTE
[facility]    Brief Operative Note    Pre-operative diagnosis: Prostate cancer (H) [C61]  Post-operative diagnosis Same as pre-operative diagnosis    Procedure: Procedure(s):  Robot-assisted laparoscopic radical prostatectomy, pelvic lymphadenectomy,  Surgeon: Surgeon(s) and Role:     * Eber Pereyra MD - Primary     * Aylin Katz MD - Resident - Assisting     * Eber Rushing MD - Resident - Assisting  Anesthesia: General   Estimated blood loss: 150 mL  Drains: RLQ KRISTAN drain, 20 Fr landry catheter with 13 mL in the balloon  Specimens:   ID Type Source Tests Collected by Time Destination   A : LEFT PELVIC LYMPH NODE Tissue Lymph Node, Left Pelvic SURGICAL PATHOLOGY EXAM Eber Pereyra MD 4/7/2021  2:28 PM    B : prostate Organ Prostate SURGICAL PATHOLOGY EXAM Eber Pereyra MD 4/7/2021  2:28 PM    C : RIGHT PELVIC LYMPH NODE Tissue Lymph Node, Right Pelvic SURGICAL PATHOLOGY EXAM Eber Pereyra MD 4/7/2021  2:28 PM      Findings:  ~40g prostate and seminal vesicles removed en block. Unremarkable prostatectomy and bilateral pelvic lymph node dissection.  Complications: None.  Implants: * No implants in log *  Dispo: PACU then floor. KRISTAN Cr in the AM. Landry to remain for 10-14 days.     Aylin Katz MD  PGY-3 Urology  Pager [phone]

## 2021-04-07 NOTE — PROGRESS NOTES
PACU to Inpatient Nursing Handoff    Patient Miles Valencia is a 55 year old male who speaks English.   Procedure Procedure(s):  Robot-assisted laparoscopic radical prostatectomy, pelvic lymphadenectomy,   Surgeon(s) Primary: Eber Pereyra MD  Resident - Assisting: bEer Rushing MD; Aylin Katz MD     Allergies   Allergen Reactions     Amoxicillin Diarrhea       Isolation  No active isolations     Past Medical History   has a past medical history of Achilles bursitis or tendinitis (5/4/2014), Allergic rhinitis, Asthma, Benign positional vertigo, Congestion, Hypertension, Low back pain, Migraine, Multinodular goiter, Osteoarthritis, Pain in joint, shoulder region (4/18/2014), Prostate cancer (H), RLS (restless legs syndrome), and Sleep problems.    Anesthesia General   Dermatome Level     Preop Meds Not applicable   Nerve block Not applicable   Intraop Meds albuterol (Proventil, Ventolin)  dexamethasone (Decadron)  fentanyl (Sublimaze): 200 mcg total  hydromorphone (Dilaudid): 1 mg total  ondansetron (Zofran): last given at 1404  versed   Local Meds Yes   Antibiotics cefazolin (Ancef) - last given at 1418     Pain Patient Currently in Pain: denies   PACU meds  declines for now, will verbal if changes   PCA / epidural No   Capnography     Telemetry ECG Rhythm: Sinus rhythm   Inpatient Telemetry Monitor Ordered? No        Labs Glucose Lab Results   Component Value Date     03/19/2021       Hgb Lab Results   Component Value Date    HGB 15.1 03/19/2021       INR No results found for: INR   PACU Imaging Not applicable     Wound/Incision Incision/Surgical Site 06/13/17 Left Shoulder (Active)   Number of days: 1394       Incision/Surgical Site 05/27/20 Anterior Neck (Active)   Number of days: 315       Incision/Surgical Site 04/07/21 Left;Medial Abdomen (Active)   Incision Assessment WDL 04/07/21 1453   Closure Liquid bandage;Sutures 04/07/21 1453   Incision Drainage Amount None  04/07/21 1453   Number of days: 0       Incision/Surgical Site 04/07/21 Umbilicus (Active)   Incision Assessment L 04/07/21 1453   Closure Liquid bandage;Sutures 04/07/21 1453   Incision Drainage Amount None 04/07/21 1453   Number of days: 0       Incision/Surgical Site 04/07/21 Right;Medial Abdomen (Active)   Incision Assessment UTV 04/07/21 1453   Closure MARILIA 04/07/21 1453   Incision Drainage Amount Other (Comment) 04/07/21 1453   Dressing Intervention Clean, dry, intact 04/07/21 1453   Number of days: 0       Incision/Surgical Site (Active)   Incision Assessment L 04/07/21 1453   Closure Liquid bandage;Sutures 04/07/21 1453   Dressing Intervention Clean, dry, intact 04/07/21 1453   Number of days:        Incision/Surgical Site 04/07/21 Left;Lateral Abdomen (Active)   Incision Assessment [facility] 04/07/21 1453   Closure Liquid bandage;Sutures 04/07/21 1453   Incision Drainage Amount None 04/07/21 1453   Dressing Intervention Clean, dry, intact 04/07/21 1453   Number of days: 0      CMS        Equipment capnography   Other LDA       IV Access Peripheral IV 04/07/21 Left Lower forearm (Active)   Site Assessment [facility] 04/07/21 1453   Line Status Saline locked 04/07/21 1453   Phlebitis Scale 0-->no symptoms 04/07/21 1453   Number of days: 0       Peripheral IV 04/07/21 Right Hand (Active)   Site Assessment [facility] 04/07/21 1453   Line Status Infusing 04/07/21 1453   Phlebitis Scale 0-->no symptoms 04/07/21 1453   Number of days: 0       Peripheral IV 06/13/17 Right Hand (Active)   Number of days: 1394      Blood Products Not applicable EBL See OR note  mL   Intake/Output Date 04/07/21 0700 - 04/08/21 0659   Shift 0786-8805 2573-6761 9786-8158 24 Hour Total   INTAKE   I.V. 2000 2000   Shift Total(mL/kg) 2000(22.68)   2000(22.68)   OUTPUT   Urine 350   350   Shift Total(mL/kg) 350(3.97)   350(3.97)   Weight (kg) 88.2 88.2 88.2 88.2      Drains / Choi Closed/Suction Drain 1 Left Shoulder (Active)   Number of days: 1394        Closed/Suction Drain 1 Right Abdomen Bulb 19 French (Active)   Site Description [facility] 04/07/21 1453   Drainage Appearance Bloody/Bright Red 04/07/21 1453   Status To bulb suction 04/07/21 1453   Number of days: 0       Urethral Catheter Non-latex 20 fr (Active)   Tube Description [facility] 04/07/21 1453   Collection Container Standard 04/07/21 1453   Securement Method Securing device (Describe) 04/07/21 1453   Rationale for Continued Use Strict 1-2 Hour I&O;Deep Sedation/Paralysis;Wound Healing 04/07/21 1453   Number of days: 0      Time of void PreOp Void Prior to Procedure: 0645 (04/07/21 0649)    PostOp      Diapered? No   Bladder Scan     PO    ice chips and ginger ale     Vitals    B/P: 127/74  T: 98.2  F (36.8  C)    Temp src: Axillary(took plastic gown off)  P:  Pulse: 98 (04/07/21 1515)          R: 12  O2:  SpO2: 94 %    O2 Device: None (Room air) (04/07/21 1515)    Oxygen Delivery: 6 LPM (04/07/21 1453)         Family/support present Raul/ phone in chart   Patient belongings     Patient transported on cart   DC meds/scripts (obs/outpt) Not applicable   Inpatient Pain Meds Released? Yes       Special needs/considerations None   Tasks needing completion None       ELICIA AYERS RN  ASCOM [id number]

## 2021-04-08 ENCOUNTER — PATIENT OUTREACH (OUTPATIENT)
Dept: CARE COORDINATION | Facility: CLINIC | Age: 56
End: 2021-04-08

## 2021-04-08 VITALS
OXYGEN SATURATION: 94 % | TEMPERATURE: 98.5 F | HEART RATE: 91 BPM | SYSTOLIC BLOOD PRESSURE: 148 MMHG | RESPIRATION RATE: 17 BRPM | HEIGHT: 69 IN | BODY MASS INDEX: 28.8 KG/M2 | WEIGHT: 194.45 LBS | DIASTOLIC BLOOD PRESSURE: 86 MMHG

## 2021-04-08 LAB
ANION GAP SERPL CALCULATED.3IONS-SCNC: 10 MMOL/L (ref 3–14)
BUN SERPL-MCNC: 17 MG/DL (ref 7–30)
CALCIUM SERPL-MCNC: 8 MG/DL (ref 8.5–10.1)
CHLORIDE SERPL-SCNC: 109 MMOL/L (ref 94–109)
CO2 SERPL-SCNC: 23 MMOL/L (ref 20–32)
CREAT FLD-MCNC: 0.9 MG/DL
CREAT SERPL-MCNC: 0.79 MG/DL (ref 0.66–1.25)
ERYTHROCYTE [DISTWIDTH] IN BLOOD BY AUTOMATED COUNT: 12.6 % (ref 10–15)
GFR SERPL CREATININE-BSD FRML MDRD: >90 ML/MIN/{1.73_M2}
GLUCOSE BLDC GLUCOMTR-MCNC: 138 MG/DL (ref 70–99)
GLUCOSE SERPL-MCNC: 139 MG/DL (ref 70–99)
HCT VFR BLD AUTO: 33.5 % (ref 40–53)
HGB BLD-MCNC: 11.5 G/DL (ref 13.3–17.7)
MCH RBC QN AUTO: 29.6 PG (ref 26.5–33)
MCHC RBC AUTO-ENTMCNC: 34.3 G/DL (ref 31.5–36.5)
MCV RBC AUTO: 86 FL (ref 78–100)
PLATELET # BLD AUTO: 240 10E9/L (ref 150–450)
POTASSIUM SERPL-SCNC: 3.7 MMOL/L (ref 3.4–5.3)
RBC # BLD AUTO: 3.89 10E12/L (ref 4.4–5.9)
SODIUM SERPL-SCNC: 142 MMOL/L (ref 133–144)
SPECIMEN SOURCE FLD: NORMAL
WBC # BLD AUTO: 18.1 10E9/L (ref 4–11)

## 2021-04-08 PROCEDURE — 250N000013 HC RX MED GY IP 250 OP 250 PS 637: Performed by: STUDENT IN AN ORGANIZED HEALTH CARE EDUCATION/TRAINING PROGRAM

## 2021-04-08 PROCEDURE — 258N000003 HC RX IP 258 OP 636: Performed by: STUDENT IN AN ORGANIZED HEALTH CARE EDUCATION/TRAINING PROGRAM

## 2021-04-08 PROCEDURE — 999N001017 HC STATISTIC GLUCOSE BY METER IP

## 2021-04-08 PROCEDURE — 80048 BASIC METABOLIC PNL TOTAL CA: CPT | Performed by: STUDENT IN AN ORGANIZED HEALTH CARE EDUCATION/TRAINING PROGRAM

## 2021-04-08 PROCEDURE — 85027 COMPLETE CBC AUTOMATED: CPT | Performed by: STUDENT IN AN ORGANIZED HEALTH CARE EDUCATION/TRAINING PROGRAM

## 2021-04-08 PROCEDURE — 82570 ASSAY OF URINE CREATININE: CPT | Performed by: STUDENT IN AN ORGANIZED HEALTH CARE EDUCATION/TRAINING PROGRAM

## 2021-04-08 PROCEDURE — 36415 COLL VENOUS BLD VENIPUNCTURE: CPT | Performed by: STUDENT IN AN ORGANIZED HEALTH CARE EDUCATION/TRAINING PROGRAM

## 2021-04-08 RX ORDER — SENNA AND DOCUSATE SODIUM 50; 8.6 MG/1; MG/1
1 TABLET, FILM COATED ORAL 2 TIMES DAILY PRN
Qty: 30 TABLET | Refills: 0 | Status: SHIPPED | OUTPATIENT
Start: 2021-04-08 | End: 2021-05-20

## 2021-04-08 RX ORDER — OXYCODONE HYDROCHLORIDE 5 MG/1
5 TABLET ORAL EVERY 6 HOURS PRN
Qty: 12 TABLET | Refills: 0 | Status: SHIPPED | OUTPATIENT
Start: 2021-04-08 | End: 2021-05-20

## 2021-04-08 RX ADMIN — HYDROCHLOROTHIAZIDE 25 MG: 12.5 TABLET ORAL at 08:00

## 2021-04-08 RX ADMIN — ACETAMINOPHEN 650 MG: 325 TABLET, FILM COATED ORAL at 04:31

## 2021-04-08 RX ADMIN — ACETAMINOPHEN 650 MG: 325 TABLET, FILM COATED ORAL at 08:00

## 2021-04-08 RX ADMIN — DILTIAZEM HYDROCHLORIDE 240 MG: 120 CAPSULE, COATED, EXTENDED RELEASE ORAL at 08:00

## 2021-04-08 RX ADMIN — ACETAMINOPHEN 650 MG: 325 TABLET, FILM COATED ORAL at 17:05

## 2021-04-08 RX ADMIN — SODIUM CHLORIDE: 9 INJECTION, SOLUTION INTRAVENOUS at 01:49

## 2021-04-08 RX ADMIN — ACETAMINOPHEN 650 MG: 325 TABLET, FILM COATED ORAL at 11:46

## 2021-04-08 RX ADMIN — ACETAMINOPHEN 650 MG: 325 TABLET, FILM COATED ORAL at 00:20

## 2021-04-08 RX ADMIN — FEXOFENADINE HYDROCHLORIDE 180 MG: 180 TABLET, FILM COATED ORAL at 11:46

## 2021-04-08 NOTE — PLAN OF CARE
Pt up and ambulated in hallway. Gait steady. Choi patent and draining. KRISTAN patent with dried drainage. Pain well controlled with PO tylenol, no narcotics given this shift. Pt tolerating full liquid diet - reports poor appetite. Had some nausea yesterday evening but none today. KRISTAN creatinine sent to lab this morning. Plan to pull KRISTAN prior to discharge - unclear discharge time. Pt able to make needs known. Continue with POC.

## 2021-04-08 NOTE — PLAN OF CARE
Discharge : home    Discharge instruction and meds given to pt.  Pt verbalized understanding of  discharge and follow-up plan and signed on AVS.  PIV access discontinued.   Pt left unit with all belongings by w/c at 17:40  Partner will provide transportation to home.

## 2021-04-08 NOTE — PROGRESS NOTES
"  VS: BP (!) 160/94 (BP Location: Left arm)   Pulse 100   Temp 98.4  F (36.9  C) (Oral)   Resp (!) 7   Ht 1.753 m (5' 9\")   Wt 88.2 kg (194 lb 7.1 oz)   SpO2 92%   BMI 28.71 kg/m    MD updated on elevated B/Ps, will update if >180/100   O2: O2 sat 92-95% on RA, lung sounds clear, denies SOB or respiratory distress. Using IS    Output: Choi catheter patent, draining watermelon color urine    Last BM: 4/7 before surgery, bowel sounds present, passing flatus    Activity: Ambulated to the bathroom x2 with use of one staff and walker.   Up for meals? no   Skin: Abdominal incisions x4, KRISTAN insertion site, abdominal rash    Pain: Oxycodone PRN given for abdominal incisional pain with relief, receiving scheduled Tylenol    CMS: Alert & oriented, able to verbalize needs, denies numbness or tingling, reported occasional numbness to feet      Dressing: KRISTAN insertion site drsg with moderate amount of sanguineous drainage noted, drsg marked.    Diet: Clear liquids diet, Drank Apple juice, but c/o nausea, drinking sips of water, nausea got better when patient got back to bed.   LDA: Left lower FA, saline locked and right hand PIV infusing NS @ 100 ml. KRISTAN draining sanguineous drainage.    Equipment: Walker, IV pole, CAPNO and personal belongings    Plan: Will continue plan of care    Additional Info: Periorbital swelling noted.          "

## 2021-04-08 NOTE — PROGRESS NOTES
VS: Blood pressure 141/84, pulse 105, temperature 98  F (36.7  C), temperature source Oral, resp.   O2: O2 sat 93% on RA,Denies SOB or respiratory distress.    Output: Choi catheter patent, and draining cherry/pink color urine    Last BM: 4/7/21   Activity: Not OOB overnight   Up for meals? no   Skin: Abdominal incisions x4, KRISTAN insertion site   Pain: Scheduled Tylenol  given for abdominal incisional pain with relief. Declined needing PRN OXycodone    CMS: Alert & oriented X4,  Denies numbness or tingling. CMS intact   Dressing: KRISTAN insertion site dressing with moderate amount of sanguineous drainage noted, drsg marked by evening RN, no increase in drainage. Abdominal incisions with liquid bandage SATNAM CDI   Diet: Regular with thins   LDA: Left lower forearm, saline locked and right hand PIV infusing NS @ 100 ml. KRISTAN draining sanguineous drainage.    Equipment: Walker, IV pole, CAPNO and personal belongings    Plan: Will continue plan of care    Additional Info:

## 2021-04-08 NOTE — PROGRESS NOTES
"Urology  Progress Note    Mild tachycardia this morning  Intermittent nausea last night per RN notes  Tolerated sips of water before bed  Pain controlled  Ambulated to bathroom last night  No gas or BM yet    Exam  BP (!) 141/84 (BP Location: Left arm)   Pulse 105   Temp 98  F (36.7  C) (Oral)   Resp 11   Ht 1.753 m (5' 9\")   Wt 88.2 kg (194 lb 7.1 oz)   SpO2 92%   BMI 28.71 kg/m    No acute distress  Unlabored breathing  Abdomen soft, nontender, nondistended. Incisions c/d/i with dermabond  KRISTAN serosanguinous  Landry with clear yellow urine in tubing    UOP 1500/1300  KRISTAN 80/15    Labs  AM labs pending    Assessment/Plan  55 year old y/o male with PMHx asthma, HTN, migraines POD#1 s/p RALP for intermediate risk prostate cancer.     Neuro: continue scheduled tylenol, PRN oxycodone for pain control. PTA gabapentin  CV: PTA diltiazem  Pulm: incentive spirometry while awake  FEN/GI: CLD this AM, advance to FLD for lunch, MIVF @ 100/hr  Endo: no acute concerns  : Continue landry catheter 10-14 days, please provide landry teaching. JPCr pending  Heme/ID: No acute concerns  Activity: encourage ambulation  PPx: SCDs    Will discuss with Dr. Pereyra.    Flor Pappas MD, PGY-2  Urology Resident     Contacting the Urology Team     Please use the following job codes to reach the Urology Team. Note that you must use an in house phone and that job codes cannot receive text pages.     On weekdays, dial [phone] (or star-star-star [phone] on the new [employer] telephones) then [phone] to reach the Adult Urology resident or PA on call    On weekdays, dial [phone] (or star-star-star [phone] on the new [employer] telephones) then [phone] to reach the Pediatric Urology resident    On weeknights and weekends, dial [phone] (or star-star-star [phone] on the new [employer] telephones) then [phone] to reach the Urology resident on call (for both Adult and Pediatrics)              "

## 2021-04-08 NOTE — OP NOTE
Procedure Date: 04/07/2021      PREOPERATIVE DIAGNOSIS:  Prostate cancer.      POSTOPERATIVE DIAGNOSIS:  Prostate cancer.      PROCEDURE:  Robot-assisted laparoscopic radical prostatectomy and bilateral lymphadenectomy.      INDICATIONS:  Mr. Valencia is a 55-year-old gentleman followed in our clinic for a recent diagnosis of prostate cancer.  After discussion of risks, benefits and alternatives, the patient presented today for robot-assisted laparoscopic radical prostatectomy.      DESCRIPTION OF PROCEDURE:  After informed consent was obtained, the patient was brought to the operating room where he was administered general endotracheal tube anesthesia.  After suitable level of anesthesia was attained, he was placed in lithotomy position with all pressure points padded.  He was administered preoperative antibiotics.  He was prepped and draped in standard sterile fashion.  Next, a 16-Japanese Choi catheter was placed in the patient's bladder.  The balloon was inflated with 10 mL of water.  We then entered the patient's abdomen using a Veress needle through the umbilicus.  After confirmatory drop test, the patient's abdomen was insufflated to 15 cm of water pressure worth of CO2.  We then placed our first port.  We then placed our camera port in a supraumbilical location.  Subsequent inspection with the camera demonstrated no injury from placement of the Veress needle or the port.  We then placed our remaining ports in an inverted umbrella in standard fashion under direct vision.  We then placed the patient in Trendelenburg and docked the robot.  We began the operation by incising the posterior peritoneum and identified the seminal vesicles and vasa deferentia bilaterally.  These were each dissected out with clips and cold cutting.  We then clipped the vasa and transected these.  Once all 4 structures were dissected out, we opened up Denonvilliers fascia underneath the prostate and developed the space between the  prostate and the rectum down to the apex.  Two Surgicel were left in this location.  We then dropped the bladder by making parallel incisions lateral to the medial umbilical ligaments and connecting these cephalad.  We then developed the space of Retzius down to the pubic symphysis.  We defatted the prostate, exposing the endopelvic fascia.  The endopelvic fascia was then opened sharply bilaterally and the pelvic floor muscles were carefully dissected off of the prostate.  The puboprostatic ligaments were taken down sharply as well.  We then controlled the dorsal vein with a stitch of 0 Vicryl.  We then incised the anterior bladder neck with electrocautery until we entered the bladder.  Subsequent inspection demonstrated no median lobe.  We then transected the posterior bladder neck and developed the plane between the prostate and the bladder until we came back through the defect in Denonvilliers fascia and brought the seminal vesicles, vasa deferentia out through this location.  The Surgicel were then removed.  We then controlled the pedicles using clips and cold cutting.  We performed a bilateral nerve-sparing in standard fashion.  The posterior attachments were taken down sharply.  We then used electrocautery to transect the dorsal venous complex down to the level of the urethra, which was then largely cold cut.  The prostate was then placed in the left lower quadrant.  We established excellent hemostasis.  We then performed a bilateral pelvic lymph node dissection in standard fashion.  After confirming hemostasis, we performed the anastomosis using 2 stitches of 3-0 Monocryl tied together in a standard running fashion.  A 20-Salvadorean Choi catheter was then placed into the patient's bladder and the balloon inflated with 13 mL of water.  We then irrigated and there was no visible leak.  The prostate was placed in an EndoCatch bag.  The 19-Salvadorean Gulshan drain was placed in the right lateral robotic port and stitched  into place with 2-0 nylon.  The robot was then undocked and then we brought the patient out of Trendelenburg.  We enlarged the supraumbilical port site and brought the prostate out of this location.  The fascia was then closed here using interrupted stitches of 0 Vicryl in a figure-of-eight fashion.  All skin was then closed with 4-0 Monocryl in a subcuticular fashion and dressed with skin glue.  The Choi catheter was connected to gravity drainage and secured to the patient's leg with heavy cloth tape.  The drain was connected to bulb suction.  The patient was then awakened, extubated and brought to the recovery room in stable condition.      SURGEON:  Eber Pereyra MD      ASSISTANTS:  Aylin Katz MD and Ayaan Costa MS3      ESTIMATED BLOOD LOSS:  300 mL      DRAINS:  Includes 19-Mosotho Gulshan drain and 20-Mosotho Choi catheter.      SPECIMENS:  Includes prostate, seminal vesicles, vasa deferentia and bilateral pelvic lymph nodes.      COMPLICATIONS:  There were no immediate complications.         EBER PEREYRA MD             D: 2021   T: 2021   MT: TORRI      Name:     KOURTNEY PEDRAZA   MRN:      [id number]        Account:        [id number]   :      1965           Procedure Date: 2021      Document: [id number]

## 2021-04-09 NOTE — PROGRESS NOTES
[facility]: Post-Discharge Note  SITUATION                                                      Admission:    Admission Date: 04/07/21   Reason for Admission: Prostate cancer  Discharge:   Discharge Date: 04/08/21  Discharge Diagnosis: Prostate cancer    BACKGROUND                                                      Reason for admission requiring a surgical or invasive procedure:    Prostate cancer (H) [C61]   The patient underwent the following procedure(s):    See above   There were no immediate complications during this procedure.    Please refer to the full operative summary for details.      ASSESSMENT      Discharge Assessment  Patient reports symptoms are: Improved  Does the patient have all of their medications?: Yes  Does patient know what their new medications are for?: Yes  Does patient have a follow-up appointment scheduled?: Yes  Does patient have any other questions or concerns?: No    Post-op  Did the patient have surgery or a procedure: Yes  Incision: healing  Drainage: No  Bleeding: none  Fever: No  Chills: No  Redness: No  Warmth: No  Swelling: No  Incision site pain: No  Eating & Drinking: eating and drinking without complaints/concerns  PO Intake: regular diet  Bowel Function: constipation  Urinary Status: indwelling urinary catheter        PLAN                                                      Outpatient Plan:      Future Appointments   Date Time Provider Department [place]   4/19/2021  8:00 AM [id number] UCXR [facility]   4/19/2021  9:30 AM Eber Pereyra MD UROP [facility]           Reyna Collins CMA

## 2021-04-09 NOTE — DISCHARGE SUMMARY
Discharge Summary     Miles Valencia MRN# [id number]   YOB: 1965 Age: 56 year old     Date of Admission:  4/7/2021  Date of Discharge::  4/8/2021  5:47 PM  Admitting Physician:  Eber Pereyra MD  Discharge Physician:  Aylin Katz MD  Primary Care Physician:         Oneyda Jones          Admission Diagnoses:   Prostate cancer (H) [C61]          Discharge Diagnosis:   Same as above         Procedures:    Procedure(s):  Robot-assisted laparoscopic radical prostatectomy, pelvic lymphadenectomy,        Non-operative procedures:   None performed          Consultations:   None         Imaging Studies:     Results for orders placed or performed in visit on 01/28/21   XR Skull G/E 4 Views    Narrative    Exam: XR SKULL G/E 4 VW, 1/28/2021 5:54 PM    Indication: Malignant neoplasm of prostate (H)    Comparison: None    Findings:   Radiographs of the skull in the frontal, angled frontal, and lateral  positions.  No suspicious lytic or osseous lesions. Normal alignment  of the visualized portions of the upper cervical spine. Complete  aeration of the paranasal sinuses and mastoid air cells.      Impression    Impression:  No suspicious lytic or osseous lesions.    I have personally reviewed the examination and initial interpretation  and I agree with the findings.    ISH DOTY MD            Medications Prior to Admission:     No medications prior to admission.            Discharge Medications:     Discharge Medication List as of 4/8/2021  4:53 PM      START taking these medications    Details   oxyCODONE (ROXICODONE) 5 MG tablet Take 1 tablet (5 mg) by mouth every 6 hours as needed for pain, Disp-12 tablet, R-0, Local PrintAlways take with stool softener. Use for pain not controlled with tylenol/ibuprofen      SENNA-docusate sodium (SENNA S) 8.6-50 MG tablet Take 1 tablet by mouth 2 times daily as needed, Disp-30 tablet, R-0, E-Prescribe         CONTINUE  these medications which have NOT CHANGED    Details   acetaminophen (TYLENOL) 325 MG tablet Take 2 tablets (650 mg) by mouth every 4 hours as needed for other (mild pain), Disp-30 tablet,R-0, OTC      albuterol (PROAIR HFA/PROVENTIL HFA/VENTOLIN HFA) 108 (90 BASE) MCG/ACT Inhaler Inhale 2 puffs into the lungs every 4 hours as needed for shortness of breath / dyspnea or wheezing, Historical      ammonium lactate (AMLACTIN) 12 % cream Apply topically daily as needed Historical      aspirin-acetaminophen-caffeine (EXCEDRIN MIGRAINE) 250-250-65 MG per tablet Take 2 tablets by mouth every 6 hours as needed for headaches , Historical      camphor-menthol (DERMASARRA) 0.5-0.5 % LOTN Apply topically every 6 hours as needed for skin care, Historical      cholecalciferol (VITAMIN D3 MAXIMUM STRENGTH) 5000 UNITS CAPS Take 5,000 Units by mouth every morning , Historical      Cyanocobalamin (VITAMIN B-12 PO) Take 1 tablet by mouth every evening , Historical      cyclobenzaprine (FLEXERIL) 10 MG tablet Take 10 mg by mouth 3 times daily as needed for muscle spasms, Historical      diltiazem ER COATED BEADS (CARDIZEM CD/CARTIA XT) 240 MG 24 hr capsule Take 240 mg by mouth every morning , Historical      fexofenadine (ALLEGRA) 180 MG tablet Take 180 mg by mouth every morning , Historical      gabapentin (NEURONTIN) 300 MG capsule Take 1 capsule (300 mg) by mouth 3 times daily, Disp-60 capsule, R-0, E-Prescribe      HEMP OIL OR EXTRACT OR OTHER CBD CANNABINOID, NOT MEDICAL CANNABIS, Take 1 capsule by mouth every evening, Historical      hydrochlorothiazide (HYDRODIURIL) 25 MG tablet Take 25 mg by mouth every morning , Historical      hydrocortisone (CORTAID) 1 % cream Apply topically 2 times daily as neededHistorical      ibuprofen (ADVIL/MOTRIN) 200 MG tablet Take 800 mg by mouth every 8 hours as needed for mild pain , Historical      meclizine (ANTIVERT) 12.5 MG tablet Take 2 tablets (25 mg) by mouth 4 times daily as needed for  dizziness, Disp-30 tablet, R-0, Local Print      MELATONIN PO Take 2.5 mg by mouth At Bedtime , Historical      Menthol, Topical Analgesic, (BIOFREEZE EX) Externally apply topically as needed, Historical      NONFORMULARY Magnesium/MSM lotion - Brand: Aincient minerals  2 pumps every evening applied to feet/legs    20 mg elemental magnesium and 25 mg MSM Per 1 pump, Historical      omega 3 1000 MG CAPS Take by mouth At Bedtime, Historical      rizatriptan (MAXALT-MLT) 10 MG ODT Take 10 mg by mouth at onset of headache for migraine, Historical      zolpidem (AMBIEN) 5 MG tablet Take 5 mg by mouth nightly as needed for sleep, Historical                    Brief History of Illness:   Reason for admission requiring a surgical or invasive procedure:   Prostate cancer (H) [C61]   The patient underwent the following procedure(s):   See above   There were no immediate complications during this procedure.    Please refer to the full operative summary for details.           Hospital Course:   The patient's hospital course was unremarkable.  Miles Valencia recovered as anticipated and experienced no post-operative complications.     On POD#1 patient was ambulating without assitance, tolerating the discharge diet, had pain controlled with PO medications to go home with, and requiring no IV medications or fluids. Patient was discharged home with appropriate contact information, follow-up and instructions as seen below in the discharge paperwork. His KRISTAN Cr was 0.9 the AM of discharge and his drain was removed.          Final Pathology Result:   Pending at time of discharge         Discharge Instructions and Follow-Up:     Discharge Procedure Orders   Reason for your hospital stay   Order Comments: Surgery to remove your prostate     Activity   Order Comments: Your activity upon discharge: Up as tolerated. No lifting greater than 15 lbs for 1 month. No strenuous exercise until seen for follow-up     Order Specific Question  Answer Comments   Is discharge order? Yes      Wound care and dressings   Order Comments: Instructions to care for your wound at home: Your incisions are covered with skin glue. This will peel off over the next few weeks. You may shower normally. No baths, tubs, pools until seen in follow-up.     Tubes and drains   Order Comments: You are going home with the following tubes or drains: landry catheter. Empty the bag several times daily into the toilet. Ensure the tubing does not get pulled or become kinked.     Diet   Order Comments: Follow this diet upon discharge: regular     Order Specific Question Answer Comments   Is discharge order? Yes             Discharge Disposition:   Discharged to Home      Condition at discharge: Good    --    Aylin Katz MD  PGY-3 Urology  Pager [phone]      6:04 AM, 4/9/2021

## 2021-04-12 LAB — COPATH REPORT: NORMAL

## 2021-04-13 ENCOUNTER — PRE VISIT (OUTPATIENT)
Dept: UROLOGY | Facility: CLINIC | Age: 56
End: 2021-04-13

## 2021-04-13 NOTE — TELEPHONE ENCOUNTER
Visit Type : post op Robot-assisted laparoscopic radical prostatectomy    Records/Orders: imaging before appointment     Pt Contacted:no     At Rooming: PETE

## 2021-04-19 ENCOUNTER — OFFICE VISIT (OUTPATIENT)
Dept: UROLOGY | Facility: CLINIC | Age: 56
End: 2021-04-19
Payer: COMMERCIAL

## 2021-04-19 ENCOUNTER — ANCILLARY PROCEDURE (OUTPATIENT)
Dept: GENERAL RADIOLOGY | Facility: CLINIC | Age: 56
End: 2021-04-19
Attending: UROLOGY
Payer: COMMERCIAL

## 2021-04-19 VITALS
DIASTOLIC BLOOD PRESSURE: 83 MMHG | WEIGHT: 185 LBS | BODY MASS INDEX: 27.4 KG/M2 | HEIGHT: 69 IN | HEART RATE: 73 BPM | SYSTOLIC BLOOD PRESSURE: 143 MMHG

## 2021-04-19 DIAGNOSIS — C61 PROSTATE CANCER (H): ICD-10-CM

## 2021-04-19 DIAGNOSIS — N52.31 ERECTILE DYSFUNCTION AFTER RADICAL PROSTATECTOMY: Primary | ICD-10-CM

## 2021-04-19 PROCEDURE — 51600 INJECTION FOR BLADDER X-RAY: CPT | Mod: GC | Performed by: RADIOLOGY

## 2021-04-19 PROCEDURE — 74430 CONTRAST X-RAY BLADDER: CPT | Mod: GC | Performed by: RADIOLOGY

## 2021-04-19 PROCEDURE — 99024 POSTOP FOLLOW-UP VISIT: CPT | Performed by: UROLOGY

## 2021-04-19 RX ORDER — SILDENAFIL CITRATE 20 MG/1
TABLET ORAL
Qty: 90 TABLET | Refills: 11 | Status: SHIPPED | OUTPATIENT
Start: 2021-04-19 | End: 2021-04-19

## 2021-04-19 RX ORDER — SILDENAFIL CITRATE 20 MG/1
TABLET ORAL
Qty: 90 TABLET | Refills: 11 | Status: SHIPPED | OUTPATIENT
Start: 2021-04-19

## 2021-04-19 RX ORDER — CIPROFLOXACIN 500 MG/1
500 TABLET, FILM COATED ORAL ONCE
Status: COMPLETED | OUTPATIENT
Start: 2021-04-19 | End: 2021-04-19

## 2021-04-19 RX ADMIN — CIPROFLOXACIN 500 MG: 500 TABLET, FILM COATED ORAL at 10:01

## 2021-04-19 ASSESSMENT — MIFFLIN-ST. JEOR: SCORE: 1659.53

## 2021-04-19 ASSESSMENT — PAIN SCALES - GENERAL: PAINLEVEL: MILD PAIN (3)

## 2021-04-19 NOTE — NURSING NOTE
The following medication was given:     MEDICATION:  Cipro   ROUTE: PO  SITE: Medication was given orally   DOSE: 500mg  LOT #: [id number]  : [employer]   EXPIRATION DATE: 09/22  NDC#: [id number]   Was there drug waste? No      Shasta Novak CMA  April 19, 2021

## 2021-04-19 NOTE — NURSING NOTE
Patient presents to the clinic today for a trial of void, catheter removal.  Patient is Miles    Order by:     Cipro 500 mg PO administered prior to procedure.    Approx 240 mL of sterile water instilled into the bladder via catheter.  22 Upper sorbian indwelling urethral Catheter then removed with out difficulty  10mL water removed from balloon, balloon intact  Patient voided approx 240mL of clear urine.   Patient tolerated procedure well.          Shasta Novak MA

## 2021-04-19 NOTE — PATIENT INSTRUCTIONS
Please follow up with Keri in 6 weeks  Please follow up with  in 3 month with a PSA before  Please do PT    It was a pleasure meeting with you today.  Thank you for allowing me and my team the privilege of caring for you today.  YOU are the reason we are here, and I truly hope we provided you with the excellent service you deserve.  Please let us know if there is anything else we can do for you so that we can be sure you are leaving completely satisfied with your care experience.

## 2021-04-19 NOTE — NURSING NOTE
"Chief Complaint   Patient presents with     RECHECK     post op Robot-assisted laparoscopic radical prostatectomy       Blood pressure (!) 143/83, pulse 73, height 1.753 m (5' 9\"), weight 83.9 kg (185 lb). Body mass index is 27.32 kg/m .    Patient Active Problem List   Diagnosis     Pain in joint, shoulder region     Achilles bursitis or tendinitis     History of total shoulder replacement, left     Non-toxic multinodular goiter     Prostate cancer (H)       Allergies   Allergen Reactions     Amoxicillin Diarrhea       Current Outpatient Medications   Medication Sig Dispense Refill     acetaminophen (TYLENOL) 325 MG tablet Take 2 tablets (650 mg) by mouth every 4 hours as needed for other (mild pain) 30 tablet 0     albuterol (PROAIR HFA/PROVENTIL HFA/VENTOLIN HFA) 108 (90 BASE) MCG/ACT Inhaler Inhale 2 puffs into the lungs every 4 hours as needed for shortness of breath / dyspnea or wheezing       ammonium lactate (AMLACTIN) 12 % cream Apply topically daily as needed        aspirin-acetaminophen-caffeine (EXCEDRIN MIGRAINE) 250-250-65 MG per tablet Take 2 tablets by mouth every 6 hours as needed for headaches        camphor-menthol (DERMASARRA) 0.5-0.5 % LOTN Apply topically every 6 hours as needed for skin care       cholecalciferol (VITAMIN D3 MAXIMUM STRENGTH) 5000 UNITS CAPS Take 5,000 Units by mouth every morning        Cyanocobalamin (VITAMIN B-12 PO) Take 1 tablet by mouth every evening        diltiazem ER COATED BEADS (CARDIZEM CD/CARTIA XT) 240 MG 24 hr capsule Take 240 mg by mouth every morning        fexofenadine (ALLEGRA) 180 MG tablet Take 180 mg by mouth every morning        gabapentin (NEURONTIN) 300 MG capsule Take 1 capsule (300 mg) by mouth 3 times daily (Patient taking differently: Take 600 mg by mouth At Bedtime ) 60 capsule 0     HEMP OIL OR EXTRACT OR OTHER CBD CANNABINOID, NOT MEDICAL CANNABIS, Take 1 capsule by mouth every evening       hydrochlorothiazide (HYDRODIURIL) 25 MG tablet Take 25 " mg by mouth every morning        hydrocortisone (CORTAID) 1 % cream Apply topically 2 times daily as needed       ibuprofen (ADVIL/MOTRIN) 200 MG tablet Take 800 mg by mouth every 8 hours as needed for mild pain        meclizine (ANTIVERT) 12.5 MG tablet Take 2 tablets (25 mg) by mouth 4 times daily as needed for dizziness 30 tablet 0     MELATONIN PO Take 2.5 mg by mouth At Bedtime        Menthol, Topical Analgesic, (BIOFREEZE EX) Externally apply topically as needed       NONFORMULARY Magnesium/MSM lotion - Brand: Aincient minerals  2 pumps every evening applied to feet/legs    20 mg elemental magnesium and 25 mg MSM Per 1 pump       omega 3 1000 MG CAPS Take by mouth At Bedtime       rizatriptan (MAXALT-MLT) 10 MG ODT Take 10 mg by mouth at onset of headache for migraine       SENNA-docusate sodium (SENNA S) 8.6-50 MG tablet Take 1 tablet by mouth 2 times daily as needed 30 tablet 0     zolpidem (AMBIEN) 5 MG tablet Take 5 mg by mouth nightly as needed for sleep       cyclobenzaprine (FLEXERIL) 10 MG tablet Take 10 mg by mouth 3 times daily as needed for muscle spasms       oxyCODONE (ROXICODONE) 5 MG tablet Take 1 tablet (5 mg) by mouth every 6 hours as needed for pain (Patient not taking: Reported on 4/19/2021) 12 tablet 0       Social History     Tobacco Use     Smoking status: Never Smoker     Smokeless tobacco: Never Used   Substance Use Topics     Alcohol use: Yes     Alcohol/week: 0.0 standard drinks     Comment: 1-2 drinks a week     Drug use: No       Raúl Lowry  4/19/2021  9:07 AM

## 2021-04-19 NOTE — PROGRESS NOTES
55 yo male with history of prostate cancer.  PAtient underwent RALP on 4/7/21.  Did well after the procedure.    OBJECTIVE:  Pathology from 4/7/21 shows Gl 3+4=7 pT2 N0Mx neg margins.  Abd soft, wounds C/D/I    Cystogram no leak per my read.    ASSESSMENT AND PLAN:   Over half of today's 25-minute visit was spent counseling the patient regarding his prostate cancer.  Pathology looks good with no indication for additional treatment at this time.  He will be referred to physical therapy for pelvic floor strengthening.  He was given a script for sildenafil to take 20 mg daily.  He remians on a lifting restriction for 6 weeks from surgery.  He will follow up with our PA in 6 weeks from surgery and with me in 3 months with a PSA.

## 2021-04-19 NOTE — LETTER
4/19/2021       RE: Miles Valencia  [address] [place] [address]     Dear Colleague,    Thank you for referring your patient, Miles Valencia, to the [facility]. Please see a copy of my visit note below.    57 yo male with history of prostate cancer.  PAtient underwent RALP on 4/7/21.  Did well after the procedure.    OBJECTIVE:  Pathology from 4/7/21 shows Gl 3+4=7 pT2 N0Mx neg margins.  Abd soft, wounds C/D/I    Cystogram no leak per my read.    ASSESSMENT AND PLAN:   Over half of today's 25-minute visit was spent counseling the patient regarding his prostate cancer.  Pathology looks good with no indication for additional treatment at this time.  He will be referred to physical therapy for pelvic floor strengthening.  He was given a script for sildenafil to take 20 mg daily.  He remians on a lifting restriction for 6 weeks from surgery.  He will follow up with our PA in 6 weeks from surgery and with me in 3 months with a PSA.    Again, thank you for allowing me to participate in the care of your patient.      Sincerely,    Eber Pereyra MD

## 2021-04-29 ENCOUNTER — THERAPY VISIT (OUTPATIENT)
Dept: PHYSICAL THERAPY | Facility: CLINIC | Age: 56
End: 2021-04-29
Payer: COMMERCIAL

## 2021-04-29 DIAGNOSIS — M62.89 PELVIC FLOOR DYSFUNCTION: Primary | ICD-10-CM

## 2021-04-29 PROCEDURE — 97161 PT EVAL LOW COMPLEX 20 MIN: CPT | Mod: GP | Performed by: PHYSICAL THERAPIST

## 2021-04-29 PROCEDURE — 97110 THERAPEUTIC EXERCISES: CPT | Mod: GP | Performed by: PHYSICAL THERAPIST

## 2021-04-29 PROCEDURE — 97530 THERAPEUTIC ACTIVITIES: CPT | Mod: GP | Performed by: PHYSICAL THERAPIST

## 2021-04-29 NOTE — PROGRESS NOTES
Physical Therapy Initial Evaluation  Subjective:  The history is provided by the patient. No  was used.   Patient Health History  Miles Valencia being seen for urinary incontinence, prostatectomy 4/7/21.          Pain is reported as 2/10 on pain scale.  General health as reported by patient is good.  Health conditions: high blood pressure, asthma, cancer, concussions/dizziness, migraines/headaches, osteoarthritis, incontinence, thyroid problems, calf pain, severe dizziness.   Other pertinent conditions: calf pain- chronic radicular pain.  Medical allergies: amoxicillin.   Surgeries include:  Cancer surgery, other and orthopedic surgery. Other surgery history details: protatectomy, total L shoulder, left lobe thyroidectomy.    Current medications:  High blood pressure medication. Other medications details: sleep and pain OTC medication.    Current occupation is .   Primary job tasks include:  Computer work and prolonged sitting.                   Therapist Assessment:   Clinical Impression: Pt presents with primary complaint of mixed incontinence post prostatectomy performed 4/7/21.  Per clinical examination, pt with decreased coordination and strength of PFM.  Pt will benefit from skilled physical therapy for PFM strength and coordination training, education on bladder health, and instruction on urge suppression techniques.    Chief Complaint:  The pt presents today s/p POONAM performed 4/7/21. He is wearing 5 depends a day. He notes stress and urge incontinence as well as urinary leakage without awareness during the day. He denies any leaking of urine at night, but he will wake up to urinate 2-3x at night.    He also notes chronic radicular back pain that occurs intermittently on left and right side. His pain will travel all the way down to his foot.    He currently has chronic radicular low back pain that can travel down both sides.  He has more pain right before bed. Ice and massage  gun seems to help his back pain.    Patient has chronic headaches as well.     Current activity: walking 1x a day 15 minutes  He was doing orange theory 2x a week.    Goals: eliminate incontinence    Urination   Do you feel the sensation of your bladder filling? yes  Do you leak on the way to the bathroom or with a strong urge to void? yes  Do you leak with cough, sneeze, jumping, running? yes  Any other activities that cause leaking? Walking, going from sitting to standing, passing gas  Do you have triggers that make you feel you can't wait to go to the bathroom? no What are they? n/a  Type of pad and number used per day? 5 depends  When you leak what is the amount? medium  How long can you delay the need to urinate? Few minutes  How many times do you get up to urinate at night? 2-3x  How many times do you urinate during the day? Not sure, maybe every hour  Can you stop the flow of urine when on the toilet? yes  Is the volume of urine passed usually: small  Do you strain to pass urine? no  Do you have a slow or hesitant urinary stream? no  Do you have difficulty initiating the urine stream? no  How many bladder infections have you had in the last 12 months? 0  What is you fluid intake per day? Water (8oz) 3-4, was  Caffeine 1  Alcohol 0-1  Drinknig 6 12oz glasses of water and 2 pots of tea caffeine   He has been trying to limit fluid intake 2 hours before bed  Do you feel like you empty completely when voiding? yes    Bowel Habits  Frequency of bowel movements? 1-2x   Consistency of stool? [place] stool scale? 4  Do you ignore the urge to defecate? no  Do you strain to pass stool? yes  Do you feel that you empty completely? No  He is having some pain after bowel movements.  He is currently not taking any stool softeners    Pelvic Pain  When do you have pelvic pain? no  Are you currently on any medications for pain or bladder control? Yes, tylenol 1-2x a day    Objective:    POSTURE: forward head    EXTERNAL  ANORECTAL ASSESSMENT:  Skin condition:normal  Bearing down/coughing:NT  Muscle contraction/perineal mobility: elevation and anorectal descent      SEMG BIOFEEDBACK  Surface Electrode Placement:   Perianal: Levator ani     Baseline EMG PM:supine: 2.0uV, seated: 1.1uV  Pt demonstrates low release of contraction supine with 10 second PFM contraction.    Endurance PFM contraction: 10 seconds  Quick flicks: 10/10  Pt demonstrated inconsistent paradoxical contraction of PFM when simulating bowel movement seated and side lying. Improved coordination after cueing.       Lumbar ROM:   Flexion: finger tips to ankles, increase in calf pain   Extension: 50% increase in calf pain     Assessment/Plan:    Patient is a 56 year old male with pelvic complaints.    Patient has the following significant findings with corresponding treatment plan.                Diagnosis 1:  Pelvic floor dysfunction  Pain -  hot/cold therapy, US, electric stimulation, mechanical traction, manual therapy, STS, splint/taping/bracing/orthotics, self management, education, directional preference exercise and home program  Decreased ROM/flexibility - manual therapy, therapeutic exercise, therapeutic activity and home program  Decreased strength - therapeutic exercise, therapeutic activities and home program  Impaired muscle performance - biofeedback, electric stimulation, neuro re-education and home program  Impaired posture - neuro re-education, therapeutic activities and home program    Therapy Evaluation Codes:   Cumulative Therapy Evaluation is: Low complexity.    Previous and current functional limitations:  (See Goal Flow Sheet for this information)    Short term and Long term goals: (See Goal Flow Sheet for this information)     Communication ability:  Patient appears to be able to clearly communicate and understand verbal and written communication and follow directions correctly.  Treatment Explanation - The following has been discussed with the  patient:   RX ordered/plan of care  Anticipated outcomes  Possible risks and side effects  This patient would benefit from PT intervention to resume normal activities.   Rehab potential is good.    Frequency:  1 X week, once daily  Duration:  for 8 weeks  Discharge Plan:  Achieve all LTG.  Independent in home treatment program.  Reach maximal therapeutic benefit.    Please refer to the daily flowsheet for treatment today, total treatment time and time spent performing 1:1 timed codes.

## 2021-05-07 ENCOUNTER — THERAPY VISIT (OUTPATIENT)
Dept: PHYSICAL THERAPY | Facility: CLINIC | Age: 56
End: 2021-05-07
Payer: COMMERCIAL

## 2021-05-07 DIAGNOSIS — C61 PROSTATE CANCER (H): ICD-10-CM

## 2021-05-07 PROCEDURE — 97110 THERAPEUTIC EXERCISES: CPT | Mod: GP | Performed by: PHYSICAL THERAPIST

## 2021-05-07 PROCEDURE — 97530 THERAPEUTIC ACTIVITIES: CPT | Mod: GP | Performed by: PHYSICAL THERAPIST

## 2021-05-20 ENCOUNTER — TELEPHONE (OUTPATIENT)
Dept: UROLOGY | Facility: CLINIC | Age: 56
End: 2021-05-20

## 2021-05-20 ENCOUNTER — AMBULATORY - HEALTHEAST (OUTPATIENT)
Dept: OTHER | Facility: CLINIC | Age: 56
End: 2021-05-20

## 2021-05-20 ENCOUNTER — DOCUMENTATION ONLY (OUTPATIENT)
Dept: OTHER | Facility: CLINIC | Age: 56
End: 2021-05-20

## 2021-05-20 ENCOUNTER — ANCILLARY PROCEDURE (OUTPATIENT)
Dept: ULTRASOUND IMAGING | Facility: CLINIC | Age: 56
End: 2021-05-20
Attending: FAMILY MEDICINE
Payer: COMMERCIAL

## 2021-05-20 ENCOUNTER — TELEPHONE (OUTPATIENT)
Dept: NURSING | Facility: CLINIC | Age: 56
End: 2021-05-20

## 2021-05-20 ENCOUNTER — OFFICE VISIT (OUTPATIENT)
Dept: URGENT CARE | Facility: URGENT CARE | Age: 56
End: 2021-05-20
Payer: COMMERCIAL

## 2021-05-20 VITALS
BODY MASS INDEX: 27.4 KG/M2 | TEMPERATURE: 99 F | SYSTOLIC BLOOD PRESSURE: 118 MMHG | RESPIRATION RATE: 15 BRPM | DIASTOLIC BLOOD PRESSURE: 80 MMHG | WEIGHT: 185 LBS | HEART RATE: 84 BPM | HEIGHT: 69 IN

## 2021-05-20 DIAGNOSIS — N50.89 SCROTAL SWELLING: ICD-10-CM

## 2021-05-20 DIAGNOSIS — N49.2 CELLULITIS OF SCROTUM: Primary | ICD-10-CM

## 2021-05-20 LAB
BASOPHILS # BLD AUTO: 0 10E9/L (ref 0–0.2)
BASOPHILS NFR BLD AUTO: 0.5 %
DIFFERENTIAL METHOD BLD: NORMAL
EOSINOPHIL # BLD AUTO: 0.7 10E9/L (ref 0–0.7)
EOSINOPHIL NFR BLD AUTO: 7.8 %
ERYTHROCYTE [DISTWIDTH] IN BLOOD BY AUTOMATED COUNT: 12.2 % (ref 10–15)
HCT VFR BLD AUTO: 41.9 % (ref 40–53)
HGB BLD-MCNC: 14.4 G/DL (ref 13.3–17.7)
LYMPHOCYTES # BLD AUTO: 2.1 10E9/L (ref 0.8–5.3)
LYMPHOCYTES NFR BLD AUTO: 23.9 %
MCH RBC QN AUTO: 29.6 PG (ref 26.5–33)
MCHC RBC AUTO-ENTMCNC: 34.4 G/DL (ref 31.5–36.5)
MCV RBC AUTO: 86 FL (ref 78–100)
MONOCYTES # BLD AUTO: 0.5 10E9/L (ref 0–1.3)
MONOCYTES NFR BLD AUTO: 5.9 %
NEUTROPHILS # BLD AUTO: 5.5 10E9/L (ref 1.6–8.3)
NEUTROPHILS NFR BLD AUTO: 61.9 %
PLATELET # BLD AUTO: 333 10E9/L (ref 150–450)
RBC # BLD AUTO: 4.87 10E12/L (ref 4.4–5.9)
WBC # BLD AUTO: 8.9 10E9/L (ref 4–11)

## 2021-05-20 PROCEDURE — 85025 COMPLETE CBC W/AUTO DIFF WBC: CPT | Performed by: FAMILY MEDICINE

## 2021-05-20 PROCEDURE — 36415 COLL VENOUS BLD VENIPUNCTURE: CPT | Performed by: FAMILY MEDICINE

## 2021-05-20 PROCEDURE — 99205 OFFICE O/P NEW HI 60 MIN: CPT | Performed by: FAMILY MEDICINE

## 2021-05-20 PROCEDURE — 76870 US EXAM SCROTUM: CPT | Mod: GC | Performed by: RADIOLOGY

## 2021-05-20 RX ORDER — CLINDAMYCIN HCL 300 MG
300 CAPSULE ORAL 4 TIMES DAILY
Qty: 28 CAPSULE | Refills: 0 | Status: SHIPPED | OUTPATIENT
Start: 2021-05-20 | End: 2021-05-27

## 2021-05-20 ASSESSMENT — MIFFLIN-ST. JEOR: SCORE: 1659.53

## 2021-05-20 NOTE — PROGRESS NOTES
Assessment & Plan     Scrotal swelling  - CBC with platelets and differential  - US Testicular and Scrotum    Cellulitis of scrotum  - clindamycin (CLEOCIN) 300 MG capsule  Dispense: 28 capsule; Refill: 0    Discussed case with Dr. Monson at the [facility] proceeded with ordering imaging and performing blood work     Patient was scheduled thru our clinic to have ultrasound done at [facility] at 1230pm today     CBC found to be within normal limits. Imaging results as below suggest scrotal cellulitis     Further discussed planned with on-call provider for Urology. Start clindamycin at this time (PCN allergy). F/u with Urology next week in the office      See AVS for further instructions    >60 minutes spent on the date of the encounter doing chart review, history and exam, documentation and further activities (consults, coordination of care) per the note.     Review of the plan was done with patient via phone at 1412, I have updated his instructions in [employer] for his reference.     Leonel Barraza MD   [place] [facility]    Ember Aquino is a 56 year old male who presents to clinic today for the following health issues:  Chief Complaint   Patient presents with     Urgent Care     Prostate Problem     Had prostate removed 6 weeks ago, a couple days ago he started having swelling in head of penis and swollen/red scrotum.      HPI    Had total radical prostatectomy about 6 weeks ago, robot assisted. His recovery had been going well initially but with     Has not attempted intercourse since the surgery.     There is no pain with urination. Occasional urinary leakage (wearing pull-up brief about 1-2 a day -- worsening with activity) .     There is some itching/burning sensation of the scrotal area starting in the last day.     He reached out to the triage nurse who recommended urgent care    He denies any fevers.     His PCP is at [facility], his last visit with his PCP was about a year  "ago.   [phone]    SH:  at the [facility]  Patient Active Problem List    Diagnosis Date Noted     Prostate cancer (H) 02/22/2021     Priority: Medium     Added automatically from request for surgery [id number]       Non-toxic multinodular goiter 01/14/2020     Priority: Medium     Added automatically from request for surgery [id number]       History of total shoulder replacement, left 06/13/2017     Priority: Medium     Achilles bursitis or tendinitis 05/04/2014     Priority: Medium     Pain in joint, shoulder region 04/18/2014     Priority: Medium       Current Outpatient Medications   Medication     acetaminophen (TYLENOL) 325 MG tablet     cholecalciferol (VITAMIN D3 MAXIMUM STRENGTH) 5000 UNITS CAPS     clindamycin (CLEOCIN) 300 MG capsule     Cyanocobalamin (VITAMIN B-12 PO)     diltiazem ER COATED BEADS (CARDIZEM CD/CARTIA XT) 240 MG 24 hr capsule     fexofenadine (ALLEGRA) 180 MG tablet     gabapentin (NEURONTIN) 300 MG capsule     hydrochlorothiazide (HYDRODIURIL) 25 MG tablet     MELATONIN PO     sildenafil (REVATIO) 20 MG tablet     albuterol (PROAIR HFA/PROVENTIL HFA/VENTOLIN HFA) 108 (90 BASE) MCG/ACT Inhaler     ammonium lactate (AMLACTIN) 12 % cream     aspirin-acetaminophen-caffeine (EXCEDRIN MIGRAINE) 250-250-65 MG per tablet     camphor-menthol (DERMASARRA) 0.5-0.5 % LOTN     HEMP OIL OR EXTRACT OR OTHER CBD CANNABINOID, NOT MEDICAL CANNABIS,     hydrocortisone (CORTAID) 1 % cream     ibuprofen (ADVIL/MOTRIN) 200 MG tablet     meclizine (ANTIVERT) 12.5 MG tablet     Menthol, Topical Analgesic, (BIOFREEZE EX)     NONFORMULARY     omega 3 1000 MG CAPS     rizatriptan (MAXALT-MLT) 10 MG ODT     zolpidem (AMBIEN) 5 MG tablet     No current facility-administered medications for this visit.          Objective    /80   Pulse 84   Temp 99  F (37.2  C) (Oral)   Resp 15   Ht 1.753 m (5' 9\")   Wt 83.9 kg (185 lb)   BMI 27.32 kg/m    Physical Exam   GEN: no distress, pleasant, good insight, " appears age  : swelling around the glans of penis and distal 1/3 of shaft, diffuse scrotal swelling mildly tender to palpation, erythema of the affected areas    Results for orders placed or performed in visit on 05/20/21   US Testicular and Scrotum     Status: None    Narrative    EXAMINATION: US TESTICULAR AND SCROTAL, 5/20/2021 1:08 PM     COMPARISON: None.    HISTORY: 6 weeks postop radical cystectomy-scrotal swelling with  erythema    TECHNIQUE: The scrotum was scanned in standard fashion with  specialized ultrasound transducer(s) using both grey scale and limited  color Doppler techniques.    Findings:  The testes demonstrate normal and symmetric echotexture and  vascularity. No evidence of a focal lesion.  The right testicle  measures 2.9 x 2.1 x 4.5 cm and the left measures 2.3 x 2.3 x 4.6 cm.  There is no evidence of torsion.    There is no evidence of a significant hydrocele or varicocele.    The right epididymis is unremarkable. 2 small epididymal head cysts.    The visualized scrotum demonstrates skin thickening, edema, and  hyperemia on color Doppler imaging. No drainable fluid collection is  visualized. No sonographic evidence for subcutaneous gas.      Impression    Impression:   Findings concerning for scrotal cellulitis with scrotal skin  thickening and edema with associated hyperemia. No sonographic  evidence for drainable fluid collection or subcutaneous gas.     I have personally reviewed the examination and initial interpretation  and I agree with the findings.    GISELLE CUI MD   Results for orders placed or performed in visit on 05/20/21   CBC with platelets and differential     Status: None   Result Value Ref Range    WBC 8.9 4.0 - 11.0 10e9/L    RBC Count 4.87 4.4 - 5.9 10e12/L    Hemoglobin 14.4 13.3 - 17.7 g/dL    Hematocrit 41.9 40.0 - 53.0 %    MCV 86 78 - 100 fl    MCH 29.6 26.5 - 33.0 pg    MCHC 34.4 31.5 - 36.5 g/dL    RDW 12.2 10.0 - 15.0 %    Platelet Count 333 150 - 450  10e9/L    Diff Method Automated Method     % Neutrophils 61.9 %    % Lymphocytes 23.9 %    % Monocytes 5.9 %    % Eosinophils 7.8 %    % Basophils 0.5 %    Absolute Neutrophil 5.5 1.6 - 8.3 10e9/L    Absolute Lymphocytes 2.1 0.8 - 5.3 10e9/L    Absolute Monocytes 0.5 0.0 - 1.3 10e9/L    Absolute Eosinophils 0.7 0.0 - 0.7 10e9/L    Absolute Basophils 0.0 0.0 - 0.2 10e9/L           The use of Dragon/Glad [employer] Have [employer] services may have been used to construct the content in this note; any grammatical or spelling errors are non-intentional. Please contact the author of this note directly if you are in need of any clarification.

## 2021-05-20 NOTE — TELEPHONE ENCOUNTER
.  [facility]: Nurse Triage Note  SITUATION/BACKGROUND                                                      Miles Valencia is a 56 year old male s/p Robot-assisted laparoscopic radical prostatectomy, pelvic lymphadenectomy procedure on 4/7/21. Patient seen in Clinic on 4/19/21 with Dr. Pereyra and catheter removed and voiding clear urine.   Per patient, he has redness and swelling at tip of penis. Hypersensitivity at urethra since removal of catheter. No discharge reported.   Incisions are healing and are pink in color and normal in temperature. Swelling noted at scrotum x 2 days.   Denies any other symptoms of protocol. Per genital problems, male. Patient to seek medical care within 24 hrs.   This nurse contacted Urology for appointment. None available within 24 hrs.   Patient advised to medical care at . He agreed to go to  at [place] this morning for evaluation.   Routed as High Priority FYI to Urology to review and follow up PRN.

## 2021-05-20 NOTE — PATIENT INSTRUCTIONS
Start clindamycin take 4 times a day for a week      Take tylenol 325-650mg and/or ibuprofen 400-800mg every 4-6 hours as needed for pain      -- I have discussed the case with Dr. Monson with urology he would like you to set up an appointment to be seen in clinic next week call [phone] to schedule this to see Dr. Pereyra or his assistant, Keri      If you develop a fever, severe pain -- please go to the emergency room immediately

## 2021-05-20 NOTE — TELEPHONE ENCOUNTER
[facility] Call Center    Phone Message    May a detailed message be left on voicemail: yes     Reason for Call: Other: Pt has prostate cancer and just got a diagnosis of an infection. Pt was urged to see Dr. Pereyra next week. Please call to discuss and schedule at .     Action Taken: Message routed to:  [facility] Urology    Travel Screening: Not Applicable

## 2021-05-20 NOTE — TELEPHONE ENCOUNTER
[facility] Call Center    Phone Message    May a detailed message be left on voicemail: no     Reason for Call: Other: Elizabeth from [employer] and needs to discuss procedure and return to work questions     Action Taken: Message routed to:  [facility] Urology    Travel Screening: Not Applicable

## 2021-05-20 NOTE — TELEPHONE ENCOUNTER
Contacted patient to discuss. He is scheduled per Roxann.  Rimma Berger LPN  Urology Clinic Service   [facility]

## 2021-05-21 ENCOUNTER — THERAPY VISIT (OUTPATIENT)
Dept: PHYSICAL THERAPY | Facility: CLINIC | Age: 56
End: 2021-05-21
Attending: UROLOGY
Payer: COMMERCIAL

## 2021-05-21 DIAGNOSIS — C61 PROSTATE CANCER (H): ICD-10-CM

## 2021-05-21 PROCEDURE — 97535 SELF CARE MNGMENT TRAINING: CPT | Performed by: PHYSICAL THERAPIST

## 2021-05-21 PROCEDURE — 97110 THERAPEUTIC EXERCISES: CPT | Performed by: PHYSICAL THERAPIST

## 2021-05-24 ENCOUNTER — OFFICE VISIT (OUTPATIENT)
Dept: UROLOGY | Facility: CLINIC | Age: 56
End: 2021-05-24
Payer: COMMERCIAL

## 2021-05-24 ENCOUNTER — PRE VISIT (OUTPATIENT)
Dept: UROLOGY | Facility: CLINIC | Age: 56
End: 2021-05-24

## 2021-05-24 VITALS — BODY MASS INDEX: 27.4 KG/M2 | HEIGHT: 69 IN | WEIGHT: 185 LBS

## 2021-05-24 DIAGNOSIS — L03.314 CELLULITIS OF GROIN: Primary | ICD-10-CM

## 2021-05-24 PROCEDURE — 99024 POSTOP FOLLOW-UP VISIT: CPT | Performed by: UROLOGY

## 2021-05-24 ASSESSMENT — MIFFLIN-ST. JEOR: SCORE: 1659.53

## 2021-05-24 ASSESSMENT — PAIN SCALES - GENERAL: PAINLEVEL: NO PAIN (0)

## 2021-05-24 NOTE — PROGRESS NOTES
"CC: cellulitis follow up; prostate cancer follow up    Mr. Miles Valencia is a 56 year old male with a hx of prostate cancer who underwent RALP on 4/7/21. Pathology from this shows G 3+7=7 pT2 N0Mx with negative margins.     Subjective:   Incontinence has been improving over the past few weeks. Now using light pads, changing once or twice a day the past week. Feels abdominal strength has slowly improved and is feeling more comfortable with low weight exercises in the near future.   Does report redness, swelling and itching pain that started near his scrotum about 6 days ago. This worsened and spread to involve his whole perineum, scrotum and up to his suprapubic skin. Was seen on 5/20 in urgent care where physical exam and US (with results below) were concerning for scrotal cellulitis and he was started on a course of Clindamycin 300 mg. The redness continued to worsen for a day after starting the clinda, but over the past three days, it has gradually improved as well as the swelling and pain. Denies any testicular pain, dysuria, fever, chills, sweats or abdominal pain.     Objective:   Weight 83.9 kg (185 lb)   Height 1.753 m (5' 9\")   Pain Score 0 (None)   BMI (Calculated) 27.32     Physical Exam:   General: NAD, sitting comfortably in bedside chair. Appears stated age   CV: no cyanosis  Pulm: breathing comfortably on room air   Abd: Non-tender to deep palpation. No rebound guarding or rebound tenderness.   : Circ. Erythema across the scrotum, perineum and cephalad to the base of the penis across the suprapubic area, nontender. Dry. No testicular swelling or tenderness. No obvious cuts or lesions.     5/20/21 Testicular and Scrotal US   EXAMINATION: US TESTICULAR AND SCROTAL, 5/20/2021 1:08 PM      COMPARISON: None.     HISTORY: 6 weeks postop radical cystectomy-scrotal swelling with erythema    Findings:  The testes demonstrate normal and symmetric echotexture and vascularity. No evidence of a focal lesion. The " right testicle measures 2.9 x 2.1 x 4.5 cm and the left measures 2.3 x 2.3 x 4.6 cm. There is no evidence of torsion.     There is no evidence of a significant hydrocele or varicocele.     The right epididymis is unremarkable. 2 small epididymal head cysts.     The visualized scrotum demonstrates skin thickening, edema, and hyperemia on color Doppler imaging. No drainable fluid collection is visualized. No sonographic evidence for subcutaneous gas.                                                                   Impression:   Findings concerning for scrotal cellulitis with scrotal skin thickening and edema with associated hyperemia. No sonographic evidence for drainable fluid collection or subcutaneous gas.     A/P:   Mr. Miles Valencia is a 56 year old male with a hx of prostate cancer who underwent RALP on 4/7/21. Pathology from this shows G 3+7=7 pT2 N0Mx with negative margins. Patient is about 7 weeks out and will continue with the 3 month PSA follow up. Okay to return to light lifting as comfortable and progress as tolerated.   Erythema and swelling seem consistent with bacterial infection, less likely fungal infection. Will continue finish the course of clindamycin as prescribed with instructions to return for possible topical antifungal if this does not improve.     - F/u in 5 weeks for 3 month PSA recheck   - cont. Clindamycin     August Awan, MS4   [facility]     Attending addendum: I have seen and examined the patient and agree with the note above.  The medical student served as scribe for my history, physical exam and medical decision making.  In addition: Over half of today's 25-minute visit was spent reviewing the chart, results and counseling the patient regarding his prostate cancer and cellulitis.  Cellulitis resolving.  Will finish antibiotics.  If worsens, can consider addition of nystatin for any yeast component.  Follow up with me in 6 weeks with PSA.

## 2021-05-24 NOTE — NURSING NOTE
"Chief Complaint   Patient presents with     Follow Up     prostate cancer and just got a diagnosis of an infection.       Height 1.753 m (5' 9\"), weight 83.9 kg (185 lb). Body mass index is 27.32 kg/m .    Patient Active Problem List   Diagnosis     Pain in joint, shoulder region     Achilles bursitis or tendinitis     History of total shoulder replacement, left     Non-toxic multinodular goiter     Prostate cancer (H)       Allergies   Allergen Reactions     Amoxicillin Diarrhea       Current Outpatient Medications   Medication Sig Dispense Refill     acetaminophen (TYLENOL) 325 MG tablet Take 2 tablets (650 mg) by mouth every 4 hours as needed for other (mild pain) 30 tablet 0     albuterol (PROAIR HFA/PROVENTIL HFA/VENTOLIN HFA) 108 (90 BASE) MCG/ACT Inhaler Inhale 2 puffs into the lungs every 4 hours as needed for shortness of breath / dyspnea or wheezing       ammonium lactate (AMLACTIN) 12 % cream Apply topically daily as needed        aspirin-acetaminophen-caffeine (EXCEDRIN MIGRAINE) 250-250-65 MG per tablet Take 2 tablets by mouth every 6 hours as needed for headaches        camphor-menthol (DERMASARRA) 0.5-0.5 % LOTN Apply topically every 6 hours as needed for skin care       cholecalciferol (VITAMIN D3 MAXIMUM STRENGTH) 5000 UNITS CAPS Take 5,000 Units by mouth every morning        clindamycin (CLEOCIN) 300 MG capsule Take 1 capsule (300 mg) by mouth 4 times daily for 7 days 28 capsule 0     Cyanocobalamin (VITAMIN B-12 PO) Take 1 tablet by mouth every evening        diltiazem ER COATED BEADS (CARDIZEM CD/CARTIA XT) 240 MG 24 hr capsule Take 240 mg by mouth every morning        fexofenadine (ALLEGRA) 180 MG tablet Take 180 mg by mouth every morning        gabapentin (NEURONTIN) 300 MG capsule Take 1 capsule (300 mg) by mouth 3 times daily (Patient taking differently: Take 600 mg by mouth At Bedtime ) 60 capsule 0     HEMP OIL OR EXTRACT OR OTHER CBD CANNABINOID, NOT MEDICAL CANNABIS, Take 1 capsule by " mouth every evening       hydrochlorothiazide (HYDRODIURIL) 25 MG tablet Take 25 mg by mouth every morning        hydrocortisone (CORTAID) 1 % cream Apply topically 2 times daily as needed       ibuprofen (ADVIL/MOTRIN) 200 MG tablet Take 800 mg by mouth every 8 hours as needed for mild pain        meclizine (ANTIVERT) 12.5 MG tablet Take 2 tablets (25 mg) by mouth 4 times daily as needed for dizziness 30 tablet 0     MELATONIN PO Take 2.5 mg by mouth At Bedtime        Menthol, Topical Analgesic, (BIOFREEZE EX) Externally apply topically as needed       NONFORMULARY Magnesium/MSM lotion - Brand: [employer] minerals  2 pumps every evening applied to feet/legs    20 mg elemental magnesium and 25 mg MSM Per 1 pump       omega 3 1000 MG CAPS Take by mouth At Bedtime       rizatriptan (MAXALT-MLT) 10 MG ODT Take 10 mg by mouth at onset of headache for migraine       sildenafil (REVATIO) 20 MG tablet Take 1 tab daily 90 tablet 11     zolpidem (AMBIEN) 5 MG tablet Take 5 mg by mouth nightly as needed for sleep         Social History     Tobacco Use     Smoking status: Never Smoker     Smokeless tobacco: Never Used   Substance Use Topics     Alcohol use: Yes     Alcohol/week: 0.0 standard drinks     Comment: 1-2 drinks a week     Drug use: No       Rimma Berger LPN  5/24/2021  8:13 AM

## 2021-05-24 NOTE — TELEPHONE ENCOUNTER
Reason for visit: Follow up     Relevant information: Hx of Erectile dysfunction after RALP    Records/imaging/labs/orders: in EPIC    Pt called: no    At Rooming: normal

## 2021-05-24 NOTE — LETTER
"5/24/2021       RE: Miles Valencia  [address] [place] [address]     Dear Colleague,    Thank you for referring your patient, Miles Valencia, to the [facility] [place] [facility]. Please see a copy of my visit note below.    CC: cellulitis follow up; prostate cancer follow up    Mr. Miles Valencia is a 56 year old male with a hx of prostate cancer who underwent RALP on 4/7/21. Pathology from this shows G 3+7=7 pT2 N0Mx with negative margins.     Subjective:   Incontinence has been improving over the past few weeks. Now using light pads, changing once or twice a day the past week. Feels abdominal strength has slowly improved and is feeling more comfortable with low weight exercises in the near future.   Does report redness, swelling and itching pain that started near his scrotum about 6 days ago. This worsened and spread to involve his whole perineum, scrotum and up to his suprapubic skin. Was seen on 5/20 in urgent care where physical exam and US (with results below) were concerning for scrotal cellulitis and he was started on a course of Clindamycin 300 mg. The redness continued to worsen for a day after starting the clinda, but over the past three days, it has gradually improved as well as the swelling and pain. Denies any testicular pain, dysuria, fever, chills, sweats or abdominal pain.     Objective:   Weight 83.9 kg (185 lb)   Height 1.753 m (5' 9\")   Pain Score 0 (None)   BMI (Calculated) 27.32     Physical Exam:   General: NAD, sitting comfortably in bedside chair. Appears stated age   CV: no cyanosis  Pulm: breathing comfortably on room air   Abd: Non-tender to deep palpation. No rebound guarding or rebound tenderness.   : Circ. Erythema across the scrotum, perineum and cephalad to the base of the penis across the suprapubic area, nontender. Dry. No testicular swelling or tenderness. No obvious cuts or lesions. "     5/20/21 Testicular and Scrotal US   EXAMINATION: US TESTICULAR AND SCROTAL, 5/20/2021 1:08 PM      COMPARISON: None.     HISTORY: 6 weeks postop radical cystectomy-scrotal swelling with erythema    Findings:  The testes demonstrate normal and symmetric echotexture and vascularity. No evidence of a focal lesion. The right testicle measures 2.9 x 2.1 x 4.5 cm and the left measures 2.3 x 2.3 x 4.6 cm. There is no evidence of torsion.     There is no evidence of a significant hydrocele or varicocele.     The right epididymis is unremarkable. 2 small epididymal head cysts.     The visualized scrotum demonstrates skin thickening, edema, and hyperemia on color Doppler imaging. No drainable fluid collection is visualized. No sonographic evidence for subcutaneous gas.                                                                   Impression:   Findings concerning for scrotal cellulitis with scrotal skin thickening and edema with associated hyperemia. No sonographic evidence for drainable fluid collection or subcutaneous gas.     A/P:   Mr. Miles Valencia is a 56 year old male with a hx of prostate cancer who underwent RALP on 4/7/21. Pathology from this shows G 3+7=7 pT2 N0Mx with negative margins. Patient is about 7 weeks out and will continue with the 3 month PSA follow up. Okay to return to light lifting as comfortable and progress as tolerated.   Erythema and swelling seem consistent with bacterial infection, less likely fungal infection. Will continue finish the course of clindamycin as prescribed with instructions to return for possible topical antifungal if this does not improve.     - F/u in 5 weeks for 3 month PSA recheck   - cont. Clindamycin     August Awan, MS4   [facility]     Attending addendum: I have seen and examined the patient and agree with the note above.  The medical student served as scribe for my history, physical exam and medical decision making.  In addition: Over  half of today's 25-minute visit was spent reviewing the chart, results and counseling the patient regarding his prostate cancer and cellulitis.  Cellulitis resolving.  Will finish antibiotics.  If worsens, can consider addition of nystatin for any yeast component.  Follow up with me in 6 weeks with PSA.

## 2021-05-24 NOTE — LETTER
May 24, 2021    RE:  Miles Valencia                              [address] [place] [address]            To whom it may concern:    Miles Valencia is under my professional care for a medical condition. He should return to unrestricted physical activity as he is able.  It is estimated that this will occur in approximately 3-6 months.  Please contact our office if you have any further questions.    Sincerely,        Eber Pereyra MD, PhD

## 2021-05-27 DIAGNOSIS — L03.314 CELLULITIS OF GROIN: Primary | ICD-10-CM

## 2021-05-27 RX ORDER — NYSTATIN 100000 [USP'U]/G
POWDER TOPICAL 3 TIMES DAILY
Qty: 60 G | Refills: 0 | Status: SHIPPED | OUTPATIENT
Start: 2021-05-27 | End: 2021-11-01

## 2021-06-04 ENCOUNTER — MEDICAL CORRESPONDENCE (OUTPATIENT)
Dept: HEALTH INFORMATION MANAGEMENT | Facility: CLINIC | Age: 56
End: 2021-06-04

## 2021-06-04 ENCOUNTER — THERAPY VISIT (OUTPATIENT)
Dept: PHYSICAL THERAPY | Facility: CLINIC | Age: 56
End: 2021-06-04
Payer: COMMERCIAL

## 2021-06-04 DIAGNOSIS — C61 PROSTATE CANCER (H): ICD-10-CM

## 2021-06-04 PROCEDURE — 97110 THERAPEUTIC EXERCISES: CPT | Mod: GP | Performed by: PHYSICAL THERAPIST

## 2021-06-04 PROCEDURE — 97530 THERAPEUTIC ACTIVITIES: CPT | Mod: GP | Performed by: PHYSICAL THERAPIST

## 2021-06-07 ENCOUNTER — TRANSFERRED RECORDS (OUTPATIENT)
Dept: HEALTH INFORMATION MANAGEMENT | Facility: CLINIC | Age: 56
End: 2021-06-07

## 2021-07-01 ENCOUNTER — PRE VISIT (OUTPATIENT)
Dept: UROLOGY | Facility: CLINIC | Age: 56
End: 2021-07-01

## 2021-07-01 NOTE — TELEPHONE ENCOUNTER
Reason for visit: PSA check     Dx/Hx/Sx: prostate cancer    Records/imaging/labs/orders: PSA scheduled same day as appointment     At Rooming: standard

## 2021-07-16 ENCOUNTER — THERAPY VISIT (OUTPATIENT)
Dept: PHYSICAL THERAPY | Facility: CLINIC | Age: 56
End: 2021-07-16
Payer: COMMERCIAL

## 2021-07-16 DIAGNOSIS — M54.16 LUMBAR RADICULOPATHY: ICD-10-CM

## 2021-07-16 PROCEDURE — 97161 PT EVAL LOW COMPLEX 20 MIN: CPT | Mod: GP | Performed by: PHYSICAL THERAPIST

## 2021-07-16 PROCEDURE — 97110 THERAPEUTIC EXERCISES: CPT | Mod: GP | Performed by: PHYSICAL THERAPIST

## 2021-07-16 NOTE — PROGRESS NOTES
Physical Therapy Initial Evaluation  Subjective:  The history is provided by the patient. No  was used.   Patient Health History  Miles Valencia being seen for low back pain, began summer 2020.     Date of Onset: 6/7/21 date of order.   Problem occurred: running   Pain is reported as 3/10 on pain scale.  General health as reported by patient is good.  Health conditions: asthma, cancer, incontinence, migraines/headaches/osteoarthritis, hot/cold extremity, pain at night/rest, severe dizziness, severe headaches.   Red flags:  None as reported by patient.  Medical allergies: amoxicillin.    Other surgery history details: post-prostatectomy 4/21, L thyroidectomy 3/2020, TLSH 6/2017.    Current medications:  High blood pressure medication, pain medication and sleep medication.    Current occupation is .   Primary job tasks include:  Computer work and prolonged sitting.                  Therapist Generated HPI Evaluation  Problem details: The pt just joined the gym and was able to do back squats without pain. He has some soreness in his hamstrings. He had a prostate surgery performed 4/7/21 and notices some incontinence with walking greater than 4 miles.    The pt reports 1 year ago he started having consistent low back pain that would result in spasms of his low back. Now he has tingling/pain that travels down the back of his legs and will tingle into the bottom of both of his feet (always present on the left side). His pain is much worse at night. He does not sleep well at night in general but he will take ibuprofen and tylenol 4-5x a week to help him sleep.    He has tried using a standing desk and he does not seem to think that helps much.    He reports chronic thoracic and neck pain.    Goals: be able to run walk for 20-30 minutes without back pain afterwards..         Type of problem:  Lumbar (slightly more on L side).    This is a chronic condition.  Condition occurred with:  Insidious  onset.  Where condition occurred: for unknown reasons.  Patient reports pain:  Lumbar spine left and lumbar spine right (more left side).  Pain is described as aching (tingling) and is constant.  Pain radiates to:  Foot left, foot right, thigh left and thigh right. Pain is worse in the P.M. and worse during the night.  Since onset symptoms are gradually improving and unchanged.  Associated symptoms:  Tingling. Exacerbated by: potentially sitting on the sofa, driving for longer than 3 hours.  and relieved by ice and NSAID's (stretching, gabapentin).  Special tests included:  Bone scan and x-ray.    Restrictions due to condition include:  Working in normal job without restrictions.  Barriers include:  None as reported by patient.                        Objective:  System         Lumbar/SI Evaluation  ROM:    AROM Lumbar:   Flexion:        Finger tips to shins, increase in pain to balls of feet after 10 reps  Ext:                    Moderate loss of motion, increase in pain down legs after 10 reps   Side Bend:        Left:     Right:   Rotation:           Left:     Right:   Side Glide:        Left:     Right:           Lumbar Myotomes:    T12-L3 (Hip Flex):  Left: 5    Right: 5  L2-4 (Quads):  Left:  5    Right:  5  L4 (Ankle DF):  Left:  5    Right:  5  L5 (Great Toe Ext): Left: 5    Right: 5   S1 (Toe Raise):  Left: 5    Right: 5      Lumbar Dermtomes:  normal                Neural Tension/Mobility:    Left side:  Slump positive.   Right side:   Slump positive.                                                       General     ROS    Assessment/Plan:    Patient is a 56 year old male with lumbar complaints.    Patient has the following significant findings with corresponding treatment plan.                Diagnosis 1:  Lumbar radiculopathy  Pain -  hot/cold therapy, US, electric stimulation, mechanical traction, manual therapy, STS, splint/taping/bracing/orthotics, self management, education, directional preference  exercise and home program  Decreased ROM/flexibility - manual therapy, therapeutic exercise, therapeutic activity and home program    Therapy Evaluation Codes:   Cumulative Therapy Evaluation is: Low complexity.    Previous and current functional limitations:  (See Goal Flow Sheet for this information)    Short term and Long term goals: (See Goal Flow Sheet for this information)     Communication ability:  Patient appears to be able to clearly communicate and understand verbal and written communication and follow directions correctly.  Treatment Explanation - The following has been discussed with the patient:   RX ordered/plan of care  Anticipated outcomes  Possible risks and side effects  This patient would benefit from PT intervention to resume normal activities.   Rehab potential is good.    Frequency:  1 X week, once daily  Duration:  for 8 weeks  Discharge Plan:  Achieve all LTG.  Independent in home treatment program.  Reach maximal therapeutic benefit.    Please refer to the daily flowsheet for treatment today, total treatment time and time spent performing 1:1 timed codes.

## 2021-07-19 ENCOUNTER — OFFICE VISIT (OUTPATIENT)
Dept: UROLOGY | Facility: CLINIC | Age: 56
End: 2021-07-19
Payer: COMMERCIAL

## 2021-07-19 ENCOUNTER — LAB (OUTPATIENT)
Dept: LAB | Facility: CLINIC | Age: 56
End: 2021-07-19
Payer: COMMERCIAL

## 2021-07-19 VITALS
DIASTOLIC BLOOD PRESSURE: 84 MMHG | BODY MASS INDEX: 27.7 KG/M2 | HEART RATE: 51 BPM | HEIGHT: 69 IN | SYSTOLIC BLOOD PRESSURE: 147 MMHG | WEIGHT: 187 LBS

## 2021-07-19 DIAGNOSIS — C61 PROSTATE CANCER (H): Primary | ICD-10-CM

## 2021-07-19 DIAGNOSIS — C61 PROSTATE CANCER (H): ICD-10-CM

## 2021-07-19 LAB — PSA SERPL-MCNC: 0.02 UG/L (ref 0–4)

## 2021-07-19 PROCEDURE — 84153 ASSAY OF PSA TOTAL: CPT | Performed by: PATHOLOGY

## 2021-07-19 PROCEDURE — 99213 OFFICE O/P EST LOW 20 MIN: CPT | Performed by: UROLOGY

## 2021-07-19 PROCEDURE — 36415 COLL VENOUS BLD VENIPUNCTURE: CPT | Performed by: PATHOLOGY

## 2021-07-19 ASSESSMENT — PAIN SCALES - GENERAL: PAINLEVEL: NO PAIN (0)

## 2021-07-19 ASSESSMENT — MIFFLIN-ST. JEOR: SCORE: 1668.61

## 2021-07-19 NOTE — PROGRESS NOTES
Mr. Miles Valencia is a 56 year old male with a hx of prostate cancer who underwent RALP on 4/7/21. Pathology from this shows G 3+4=7 pT2 N0Mx with negative margins.  He is dry but wears one pad for safety (usually dry).  Having some partial erections.    OBJECTIVE:  PSA from 7/19/21 is 0.02 ng/ml    ASSESSMENT AND PLAN:   Over half of today's 25-minute visit was spent counseling the patient regarding his prostate cancer.  PSA is low but detectable.  Will recheck in 3 months.  Patient can stop taking daily sildenafil and use it on demand for sexual activity.

## 2021-07-19 NOTE — LETTER
7/19/2021       RE: Miles Valencia  [address] [place] [address]     Dear Colleague,    Thank you for referring your patient, Miles Valencia, to the [facility]. Please see a copy of my visit note below.    Mr. Miles Valencia is a 56 year old male with a hx of prostate cancer who underwent RALP on 4/7/21. Pathology from this shows G 3+4=7 pT2 N0Mx with negative margins.  He is dry but wears one pad for safety (usually dry).  Having some partial erections.    OBJECTIVE:  PSA from 7/19/21 is 0.02 ng/ml    ASSESSMENT AND PLAN:   Over half of today's 25-minute visit was spent counseling the patient regarding his prostate cancer.  PSA is low but detectable.  Will recheck in 3 months.  Patient can stop taking daily sildenafil and use it on demand for sexual activity.     Again, thank you for allowing me to participate in the care of your patient.      Sincerely,    Eber Pereyra MD

## 2021-07-19 NOTE — NURSING NOTE
"Chief Complaint   Patient presents with     Follow Up     PSA check        Blood pressure (!) 147/84, pulse 51, height 1.753 m (5' 9\"), weight 84.8 kg (187 lb). Body mass index is 27.62 kg/m .    Patient Active Problem List   Diagnosis     Pain in joint, shoulder region     Achilles bursitis or tendinitis     History of total shoulder replacement, left     Non-toxic multinodular goiter     Prostate cancer (H)     Lumbar radiculopathy       Allergies   Allergen Reactions     Amoxicillin Diarrhea       Current Outpatient Medications   Medication Sig Dispense Refill     acetaminophen (TYLENOL) 325 MG tablet Take 2 tablets (650 mg) by mouth every 4 hours as needed for other (mild pain) 30 tablet 0     albuterol (PROAIR HFA/PROVENTIL HFA/VENTOLIN HFA) 108 (90 BASE) MCG/ACT Inhaler Inhale 2 puffs into the lungs every 4 hours as needed for shortness of breath / dyspnea or wheezing       ammonium lactate (AMLACTIN) 12 % cream Apply topically daily as needed        aspirin-acetaminophen-caffeine (EXCEDRIN MIGRAINE) 250-250-65 MG per tablet Take 2 tablets by mouth every 6 hours as needed for headaches        camphor-menthol (DERMASARRA) 0.5-0.5 % LOTN Apply topically every 6 hours as needed for skin care       cholecalciferol (VITAMIN D3 MAXIMUM STRENGTH) 5000 UNITS CAPS Take 5,000 Units by mouth every morning        Cyanocobalamin (VITAMIN B-12 PO) Take 1 tablet by mouth every evening        diltiazem ER COATED BEADS (CARDIZEM CD/CARTIA XT) 240 MG 24 hr capsule Take 240 mg by mouth every morning        fexofenadine (ALLEGRA) 180 MG tablet Take 180 mg by mouth every morning        gabapentin (NEURONTIN) 300 MG capsule Take 1 capsule (300 mg) by mouth 3 times daily (Patient taking differently: Take 600 mg by mouth At Bedtime ) 60 capsule 0     HEMP OIL OR EXTRACT OR OTHER CBD CANNABINOID, NOT MEDICAL CANNABIS, Take 1 capsule by mouth every evening       hydrochlorothiazide (HYDRODIURIL) 25 MG tablet Take 25 mg by mouth every " morning        hydrocortisone (CORTAID) 1 % cream Apply topically 2 times daily as needed       ibuprofen (ADVIL/MOTRIN) 200 MG tablet Take 800 mg by mouth every 8 hours as needed for mild pain        meclizine (ANTIVERT) 12.5 MG tablet Take 2 tablets (25 mg) by mouth 4 times daily as needed for dizziness 30 tablet 0     MELATONIN PO Take 2.5 mg by mouth At Bedtime        Menthol, Topical Analgesic, (BIOFREEZE EX) Externally apply topically as needed       NONFORMULARY Magnesium/MSM lotion - Brand: Aincient minerals  2 pumps every evening applied to feet/legs    20 mg elemental magnesium and 25 mg MSM Per 1 pump       nystatin (MYCOSTATIN) 916913 UNIT/GM external powder Apply topically 3 times daily 60 g 0     omega 3 1000 MG CAPS Take by mouth At Bedtime       rizatriptan (MAXALT-MLT) 10 MG ODT Take 10 mg by mouth at onset of headache for migraine       sildenafil (REVATIO) 20 MG tablet Take 1 tab daily 90 tablet 11     zolpidem (AMBIEN) 5 MG tablet Take 5 mg by mouth nightly as needed for sleep         Social History     Tobacco Use     Smoking status: Never Smoker     Smokeless tobacco: Never Used   Substance Use Topics     Alcohol use: Yes     Alcohol/week: 0.0 standard drinks     Comment: 1-2 drinks a week     Drug use: No       Keyonna Pa  7/19/2021  11:34 AM

## 2021-08-24 ENCOUNTER — THERAPY VISIT (OUTPATIENT)
Dept: PHYSICAL THERAPY | Facility: CLINIC | Age: 56
End: 2021-08-24
Payer: COMMERCIAL

## 2021-08-24 DIAGNOSIS — M54.16 LUMBAR RADICULOPATHY: ICD-10-CM

## 2021-08-24 PROCEDURE — 97530 THERAPEUTIC ACTIVITIES: CPT | Mod: GP | Performed by: PHYSICAL THERAPIST

## 2021-08-24 PROCEDURE — 97110 THERAPEUTIC EXERCISES: CPT | Mod: GP | Performed by: PHYSICAL THERAPIST

## 2021-09-02 ENCOUNTER — THERAPY VISIT (OUTPATIENT)
Dept: PHYSICAL THERAPY | Facility: CLINIC | Age: 56
End: 2021-09-02
Payer: COMMERCIAL

## 2021-09-02 DIAGNOSIS — M54.16 LUMBAR RADICULOPATHY: ICD-10-CM

## 2021-09-02 PROCEDURE — 97110 THERAPEUTIC EXERCISES: CPT | Mod: GP | Performed by: PHYSICAL THERAPIST

## 2021-09-02 PROCEDURE — 97530 THERAPEUTIC ACTIVITIES: CPT | Mod: GP | Performed by: PHYSICAL THERAPIST

## 2021-09-20 ENCOUNTER — THERAPY VISIT (OUTPATIENT)
Dept: PHYSICAL THERAPY | Facility: CLINIC | Age: 56
End: 2021-09-20
Payer: COMMERCIAL

## 2021-09-20 ENCOUNTER — OFFICE VISIT (OUTPATIENT)
Dept: FAMILY MEDICINE | Facility: CLINIC | Age: 56
End: 2021-09-20
Payer: COMMERCIAL

## 2021-09-20 VITALS
HEIGHT: 70 IN | BODY MASS INDEX: 27.22 KG/M2 | HEART RATE: 71 BPM | OXYGEN SATURATION: 95 % | TEMPERATURE: 97 F | WEIGHT: 190.12 LBS | RESPIRATION RATE: 15 BRPM | SYSTOLIC BLOOD PRESSURE: 125 MMHG | DIASTOLIC BLOOD PRESSURE: 85 MMHG

## 2021-09-20 DIAGNOSIS — M54.16 LUMBAR RADICULOPATHY: ICD-10-CM

## 2021-09-20 DIAGNOSIS — M89.9 LESION OF BONE OF LUMBOSACRAL SPINE: Primary | ICD-10-CM

## 2021-09-20 DIAGNOSIS — G89.29 CHRONIC LEFT-SIDED LOW BACK PAIN WITH LEFT-SIDED SCIATICA: ICD-10-CM

## 2021-09-20 DIAGNOSIS — M54.42 CHRONIC LEFT-SIDED LOW BACK PAIN WITH LEFT-SIDED SCIATICA: ICD-10-CM

## 2021-09-20 PROCEDURE — 97140 MANUAL THERAPY 1/> REGIONS: CPT | Mod: GP | Performed by: PHYSICAL THERAPIST

## 2021-09-20 PROCEDURE — 97110 THERAPEUTIC EXERCISES: CPT | Mod: GP | Performed by: PHYSICAL THERAPIST

## 2021-09-20 ASSESSMENT — PAIN SCALES - GENERAL: PAINLEVEL: MILD PAIN (2)

## 2021-09-20 ASSESSMENT — MIFFLIN-ST. JEOR: SCORE: 1698.63

## 2021-09-20 NOTE — NURSING NOTE
"56 year old  Chief Complaint   Patient presents with     Back Pain     mainly left side back pain for the last year       Blood pressure 125/85, pulse 71, temperature 97  F (36.1  C), resp. rate 15, height 1.778 m (5' 10\"), weight 86.2 kg (190 lb 1.9 oz), SpO2 95 %. Body mass index is 27.28 kg/m .  Patient Active Problem List   Diagnosis     Pain in joint, shoulder region     Achilles bursitis or tendinitis     History of total shoulder replacement, left     Non-toxic multinodular goiter     Prostate cancer (H)     Lumbar radiculopathy       Wt Readings from Last 2 Encounters:   09/20/21 86.2 kg (190 lb 1.9 oz)   07/19/21 84.8 kg (187 lb)     BP Readings from Last 3 Encounters:   09/20/21 125/85   07/19/21 (!) 147/84   05/20/21 118/80         Current Outpatient Medications   Medication     acetaminophen (TYLENOL) 325 MG tablet     albuterol (PROAIR HFA/PROVENTIL HFA/VENTOLIN HFA) 108 (90 BASE) MCG/ACT Inhaler     ammonium lactate (AMLACTIN) 12 % cream     aspirin-acetaminophen-caffeine (EXCEDRIN MIGRAINE) 250-250-65 MG per tablet     camphor-menthol (DERMASARRA) 0.5-0.5 % LOTN     cholecalciferol (VITAMIN D3 MAXIMUM STRENGTH) 5000 UNITS CAPS     Cyanocobalamin (VITAMIN B-12 PO)     diltiazem ER COATED BEADS (CARDIZEM CD/CARTIA XT) 240 MG 24 hr capsule     fexofenadine (ALLEGRA) 180 MG tablet     gabapentin (NEURONTIN) 300 MG capsule     HEMP OIL OR EXTRACT OR OTHER CBD CANNABINOID, NOT MEDICAL CANNABIS,     hydrochlorothiazide (HYDRODIURIL) 25 MG tablet     hydrocortisone (CORTAID) 1 % cream     ibuprofen (ADVIL/MOTRIN) 200 MG tablet     meclizine (ANTIVERT) 12.5 MG tablet     MELATONIN PO     Menthol, Topical Analgesic, (BIOFREEZE EX)     NONFORMULARY     omega 3 1000 MG CAPS     rizatriptan (MAXALT-MLT) 10 MG ODT     sildenafil (REVATIO) 20 MG tablet     zolpidem (AMBIEN) 5 MG tablet     nystatin (MYCOSTATIN) 462639 UNIT/GM external powder     No current facility-administered medications for this visit. "       Social History     Tobacco Use     Smoking status: Never Smoker     Smokeless tobacco: Never Used   Substance Use Topics     Alcohol use: Yes     Alcohol/week: 0.0 standard drinks     Comment: 1-2 drinks a week     Drug use: No       Health Maintenance Due   Topic Date Due     HIV SCREENING  Never done     HEPATITIS C SCREENING  Never done     LIPID  Never done     ZOSTER IMMUNIZATION (1 of 2) Never done     PREVENTIVE CARE VISIT  11/15/2020     INFLUENZA VACCINE (1) 09/01/2021       No results found for: PAP      September 20, 2021 2:46 PM

## 2021-09-20 NOTE — PROGRESS NOTES
"OVERVIEW: Miles Valencia is a 56 year old male who presents to [facility] today for Back Pain (mainly left side back pain for the last year)        ASSESSMENT/PLAN:    1. LEFT low back pain with possible sciatica and also with lesion noted on previous bone scan   - Will obtain an X-ray and MRI of the Lumbar Spine  - Continue in P.T. with Romel Gutierrez.   - I will send message with results of imaging and need for further diagnostic or treatment options.     [phone] to schedule imaging tests at the [facility].       --Barry Wiseman MD      SUBJECTIVE:   Miles is a 57 yo who is s/p prostatectomy for cancer on 4/7/21.     During the evaluation for his prostate, he had a bone scan with the following interpretation and recommendation:   \"IMPRESSION:   1. Uptake to the lower lumbar spine, favor degenerative versus  metastatic lesion. Dedicated lumbar spine imaging can be considered.  2. Uptake to the posterior calvarium, nonspecific, may be age-related  hyperostosis versus metastasis. Dedicated skull imaging can be  considered.      [Consider Follow Up: Possible lumbar spine lesion. Possible calvarial  Lesions.]\"    He has had no further evaluation for the back except for physical therapy. The P.T. has helped somewhat with his pain, but he's left with a residual constant ache in the Lower left back and now with pain down the left leg and tingling into the toes.     No fevers, sweats or chills.   Of note, his weight was 194 lbs. He then started [employer] fitness just prior to the pandemic and lost about 20 lbs. He has since regained about 16 lbs to his current weight of 190 lbs.     Review Of Systems:  See HPI.  Otherwise in his usual state of health.  No chest pain.  No shortness of breath.    Problem list per EMR:  Patient Active Problem List   Diagnosis     Pain in joint, shoulder region     Achilles bursitis or tendinitis     History of total shoulder replacement, left     " Non-toxic multinodular goiter     Prostate cancer (H)     Lumbar radiculopathy       Current Outpatient Medications   Medication Sig Dispense Refill     acetaminophen (TYLENOL) 325 MG tablet Take 2 tablets (650 mg) by mouth every 4 hours as needed for other (mild pain) 30 tablet 0     albuterol (PROAIR HFA/PROVENTIL HFA/VENTOLIN HFA) 108 (90 BASE) MCG/ACT Inhaler Inhale 2 puffs into the lungs every 4 hours as needed for shortness of breath / dyspnea or wheezing       ammonium lactate (AMLACTIN) 12 % cream Apply topically daily as needed        aspirin-acetaminophen-caffeine (EXCEDRIN MIGRAINE) 250-250-65 MG per tablet Take 2 tablets by mouth every 6 hours as needed for headaches        camphor-menthol (DERMASARRA) 0.5-0.5 % LOTN Apply topically every 6 hours as needed for skin care       cholecalciferol (VITAMIN D3 MAXIMUM STRENGTH) 5000 UNITS CAPS Take 5,000 Units by mouth every morning        Cyanocobalamin (VITAMIN B-12 PO) Take 1 tablet by mouth every evening        diltiazem ER COATED BEADS (CARDIZEM CD/CARTIA XT) 240 MG 24 hr capsule Take 240 mg by mouth every morning        fexofenadine (ALLEGRA) 180 MG tablet Take 180 mg by mouth every morning        gabapentin (NEURONTIN) 300 MG capsule Take 1 capsule (300 mg) by mouth 3 times daily (Patient taking differently: Take 600 mg by mouth At Bedtime ) 60 capsule 0     HEMP OIL OR EXTRACT OR OTHER CBD CANNABINOID, NOT MEDICAL CANNABIS, Take 1 capsule by mouth every evening       hydrochlorothiazide (HYDRODIURIL) 25 MG tablet Take 25 mg by mouth every morning        hydrocortisone (CORTAID) 1 % cream Apply topically 2 times daily as needed       ibuprofen (ADVIL/MOTRIN) 200 MG tablet Take 800 mg by mouth every 8 hours as needed for mild pain        meclizine (ANTIVERT) 12.5 MG tablet Take 2 tablets (25 mg) by mouth 4 times daily as needed for dizziness 30 tablet 0     MELATONIN PO Take 2.5 mg by mouth At Bedtime        Menthol, Topical Analgesic, (BIOFREEZE EX)  "Externally apply topically as needed       NONFORMULARY Magnesium/MSM lotion - Brand: Aincient minerals  2 pumps every evening applied to feet/legs    20 mg elemental magnesium and 25 mg MSM Per 1 pump       omega 3 1000 MG CAPS Take by mouth At Bedtime       rizatriptan (MAXALT-MLT) 10 MG ODT Take 10 mg by mouth at onset of headache for migraine       sildenafil (REVATIO) 20 MG tablet Take 1 tab daily 90 tablet 11     zolpidem (AMBIEN) 5 MG tablet Take 5 mg by mouth nightly as needed for sleep       nystatin (MYCOSTATIN) 428641 UNIT/GM external powder Apply topically 3 times daily (Patient not taking: Reported on 9/20/2021) 60 g 0       Allergies   Allergen Reactions     Amoxicillin Diarrhea        Social:   Works as a  for the [employer].  Formally worked at the [employer].  Now works on the [employer].    OBJECTIVE    Vitals: /85   Pulse 71   Temp 97  F (36.1  C)   Resp 15   Ht 1.778 m (5' 10\")   Wt 86.2 kg (190 lb 1.9 oz)   SpO2 95%   BMI 27.28 kg/m    BMI= Body mass index is 27.28 kg/m .  Very pleasant and healthy-appearing 56-year-old in no distress.  He is able to rise from a seated position without difficulty.  Is able to do heel raises without any weakness.  Left lower back is examined and has discomfort with palpation over the iliac crest on the left side and also near the L4-L5 region on the left side.  He is able to forward bend and nearly touch his toes.  Lateral rotation is intact.  His hip has full range of motion.    Straight leg raise produces increased pain at the left lower back.  It does not send radiating pain down the leg.  He reports tingling into his lateral 4 toes but this is unchanged with a straight leg raise.  Reflexes are +1 and bilaterally symmetrical at the patella and the Achilles.  Sensation is intact distally.    SEE TOP OF NOTE FOR ASSESSMENT AND PLAN    --Barry Wiseman MD  [facility], Department of " Family Medicine and Community Health

## 2021-09-20 NOTE — PATIENT INSTRUCTIONS
ASSESSMENT/PLAN:    1. LEFT low back pain with possible sciatica and also with lesion noted on previous bone scan   - Will obtain an X-ray and MRI of the Lumbar Spine  - Continue in P.T. with Romel Gutierrez.   - I will send message with results of imaging and need for further diagnostic or treatment options.       --Barry Wiseman MD

## 2021-09-25 ENCOUNTER — HEALTH MAINTENANCE LETTER (OUTPATIENT)
Age: 56
End: 2021-09-25

## 2021-09-30 ENCOUNTER — PRE VISIT (OUTPATIENT)
Dept: UROLOGY | Facility: CLINIC | Age: 56
End: 2021-09-30

## 2021-09-30 NOTE — TELEPHONE ENCOUNTER
Reason for visit: 3 month PSA check      Dx/Hx/Sx: prostate cancer     Records/imaging/labs/orders: PSA in epic    At Rooming: standard

## 2021-10-01 ENCOUNTER — ANCILLARY PROCEDURE (OUTPATIENT)
Dept: MRI IMAGING | Facility: CLINIC | Age: 56
End: 2021-10-01
Attending: FAMILY MEDICINE
Payer: COMMERCIAL

## 2021-10-01 ENCOUNTER — ANCILLARY PROCEDURE (OUTPATIENT)
Dept: GENERAL RADIOLOGY | Facility: CLINIC | Age: 56
End: 2021-10-01
Attending: FAMILY MEDICINE
Payer: COMMERCIAL

## 2021-10-01 DIAGNOSIS — M89.9 LESION OF BONE OF LUMBOSACRAL SPINE: ICD-10-CM

## 2021-10-01 DIAGNOSIS — M54.42 CHRONIC LEFT-SIDED LOW BACK PAIN WITH LEFT-SIDED SCIATICA: ICD-10-CM

## 2021-10-01 DIAGNOSIS — G89.29 CHRONIC LEFT-SIDED LOW BACK PAIN WITH LEFT-SIDED SCIATICA: ICD-10-CM

## 2021-10-01 PROCEDURE — 72148 MRI LUMBAR SPINE W/O DYE: CPT | Performed by: RADIOLOGY

## 2021-10-01 PROCEDURE — 72100 X-RAY EXAM L-S SPINE 2/3 VWS: CPT | Performed by: RADIOLOGY

## 2021-10-04 ENCOUNTER — THERAPY VISIT (OUTPATIENT)
Dept: PHYSICAL THERAPY | Facility: CLINIC | Age: 56
End: 2021-10-04
Payer: COMMERCIAL

## 2021-10-04 DIAGNOSIS — G89.29 CHRONIC LEFT-SIDED LOW BACK PAIN WITH LEFT-SIDED SCIATICA: Primary | ICD-10-CM

## 2021-10-04 DIAGNOSIS — M54.16 LUMBAR RADICULOPATHY: ICD-10-CM

## 2021-10-04 DIAGNOSIS — M54.42 CHRONIC LEFT-SIDED LOW BACK PAIN WITH LEFT-SIDED SCIATICA: Primary | ICD-10-CM

## 2021-10-04 DIAGNOSIS — M89.9 LESION OF BONE OF LUMBOSACRAL SPINE: ICD-10-CM

## 2021-10-04 PROCEDURE — 97112 NEUROMUSCULAR REEDUCATION: CPT | Mod: GP | Performed by: PHYSICAL THERAPIST

## 2021-10-04 PROCEDURE — 97140 MANUAL THERAPY 1/> REGIONS: CPT | Mod: GP | Performed by: PHYSICAL THERAPIST

## 2021-10-04 PROCEDURE — 97110 THERAPEUTIC EXERCISES: CPT | Mod: GP | Performed by: PHYSICAL THERAPIST

## 2021-10-04 NOTE — LETTER
[facility]  [address] [place] [address]  [phone]    2021    Re: Miles Valencia   :   1965  MRN:  [id number]   REFERRING PHYSICIAN:   Katiuska [facility]ING [facility]  Date of Initial Evaluation:  21  Visits:  Rxs Used: 5  Reason for Referral:  Lumbar radiculopathy    EVALUATION SUMMARY        PROGRESS  REPORT  Progress reporting period is from 21 to 10/4/21.       SUBJECTIVE  Subjective changes noted by patient:  Low back feels better overall. Able to perform yard work over weekend without worse pain. Locates pain today over L low back. His pain is better with stretching. Has been stretching 2-3x/day. Using lumbar roll when sitting in chairs without support.       Current pain level is 1/10.     Initial Pain level: 3/10.   Changes in function:  Yes (See Goal flowsheet attached for changes in current functional level)  Adverse reaction to treatment or activity: None    OBJECTIVE  Changes noted in objective findings:  Yes, see objective findings.    Objective:  Lumbar/SI Evaluation  ROM:    AROM Lumbar:   Flexion:          WNL -/+ L low back upon returning upright  Ext:                    WNL -/+ L low back   Side Bend:        Left:     Right:   Rotation:           Left:     Right:   Side Glide:        Left:  WNL + L low back    Right:  WNL -    ASSESSMENT/PLAN  Updated problem list and treatment plan: Diagnosis 1:  Lumbar radiculopathy  Pain -  hot/cold therapy, manual therapy, self management, education, directional preference exercise and home program  Decreased ROM/flexibility - manual therapy, therapeutic exercise and home program  Decreased proprioception - neuro re-education, therapeutic activities and home program  Impaired muscle performance - neuro re-education and home program  Decreased function - therapeutic activities and home program  Re: Miles Valencia   :    1965    Impaired posture - neuro re-education and home program  STG/LTGs have been met or progress has been made towards goals:  Yes (See Goal flow sheet completed today.)  Assessment of Progress: The patient's condition is improving.  Self Management Plans:  Patient has been instructed in a home treatment program.  Patient  has been instructed in self management of symptoms.  I have re-evaluated this patient and find that the nature, scope, duration and intensity of the therapy is appropriate for the medical condition of the patient.  Milse continues to require the following intervention to meet STG and LTG's:  PT    Recommendations:  This patient would benefit from continued therapy.     Frequency:  1 X every other week, once daily  Duration:  for 6 weeks    Thank you for your referral.    INQUIRIES  Therapist: Bill Gutierrez, DPT, ATC, Cert. MDT  [facility] [address] [place] [address]  Phone: [phone]  Fax: [phone]

## 2021-10-04 NOTE — PROGRESS NOTES
Subjective:  The history is provided by the patient. No  was used.     Physical Exam                    Objective:  System         Lumbar/SI Evaluation  ROM:    AROM Lumbar:   Flexion:          WNL -/+ L low back upon returning upright  Ext:                    WNL -/+ L low back   Side Bend:        Left:     Right:   Rotation:           Left:     Right:   Side Glide:        Left:  WNL + L low back    Right:  WNL -                                                                         General     ROS    Assessment/Plan:    PROGRESS  REPORT    Progress reporting period is from 7/16/21 to 10/4/21.       SUBJECTIVE  Subjective changes noted by patient:  Low back feels better overall. Able to perform yard work over weekend without worse pain. Locates pain today over L low back. His pain is better with stretching. Has been stretching 2-3x/day. Using lumbar roll when sitting in chairs without support.       Current pain level is 1/10.     Initial Pain level: 3/10.   Changes in function:  Yes (See Goal flowsheet attached for changes in current functional level)  Adverse reaction to treatment or activity: None    OBJECTIVE  Changes noted in objective findings:  Yes, see objective findings.    ASSESSMENT/PLAN  Updated problem list and treatment plan: Diagnosis 1:  Lumbar radiculopathy  Pain -  hot/cold therapy, manual therapy, self management, education, directional preference exercise and home program  Decreased ROM/flexibility - manual therapy, therapeutic exercise and home program  Decreased proprioception - neuro re-education, therapeutic activities and home program  Impaired muscle performance - neuro re-education and home program  Decreased function - therapeutic activities and home program  Impaired posture - neuro re-education and home program  STG/LTGs have been met or progress has been made towards goals:  Yes (See Goal flow sheet completed today.)  Assessment of Progress: The patient's  condition is improving.  Self Management Plans:  Patient has been instructed in a home treatment program.  Patient  has been instructed in self management of symptoms.  I have re-evaluated this patient and find that the nature, scope, duration and intensity of the therapy is appropriate for the medical condition of the patient.  Miles continues to require the following intervention to meet STG and LTG's:  PT    Recommendations:  This patient would benefit from continued therapy.     Frequency:  1 X every other week, once daily  Duration:  for 6 weeks        Please refer to the daily flowsheet for treatment today, total treatment time and time spent performing 1:1 timed codes.

## 2021-10-05 NOTE — TELEPHONE ENCOUNTER
SPINE PATIENTS - NEW PROTOCOL PREVISIT    RECORDS RECEIVED FROM: Internal   REASON FOR VISIT: Chronic left-sided low back pain with left-sided sciatica, Lesion of bone of lumbosacral spine   Date of Appt: 10/15/21   NOTES (FOR ALL VISITS) STATUS DETAILS   OFFICE NOTE from referring provider Internal Dr Barry Wiseman @ [facility] [place]:  9/20/21   OFFICE NOTE from other specialist N/A    DISCHARGE SUMMARY from hospital N/A    DISCHARGE REPORT from ER N/A    EMG REPORT N/A    MEDICATION LIST Internal    IMAGING  (FOR ALL VISITS)     MRI (HEAD, NECK, SPINE) Internal [facility]:  MRI Lumbar Spine 10/1/21   XRAY (SPINE) *NEUROSURGERY* Internal [facility]:  XR Lumbar Spine 10/1/21   CT (HEAD, NECK, SPINE) N/A

## 2021-10-07 ENCOUNTER — LAB (OUTPATIENT)
Dept: LAB | Facility: CLINIC | Age: 56
End: 2021-10-07
Attending: UROLOGY
Payer: COMMERCIAL

## 2021-10-07 DIAGNOSIS — C61 PROSTATE CANCER (H): ICD-10-CM

## 2021-10-07 LAB — PSA SERPL-MCNC: 0.09 UG/L (ref 0–4)

## 2021-10-07 PROCEDURE — 36415 COLL VENOUS BLD VENIPUNCTURE: CPT | Performed by: PATHOLOGY

## 2021-10-07 PROCEDURE — 84153 ASSAY OF PSA TOTAL: CPT | Performed by: PATHOLOGY

## 2021-10-11 ENCOUNTER — OFFICE VISIT (OUTPATIENT)
Dept: UROLOGY | Facility: CLINIC | Age: 56
End: 2021-10-11
Payer: COMMERCIAL

## 2021-10-11 VITALS
HEIGHT: 69 IN | HEART RATE: 76 BPM | BODY MASS INDEX: 28.14 KG/M2 | WEIGHT: 190 LBS | DIASTOLIC BLOOD PRESSURE: 80 MMHG | SYSTOLIC BLOOD PRESSURE: 138 MMHG

## 2021-10-11 DIAGNOSIS — M51.369 LUMBAR ADJACENT SEGMENT DISEASE WITH SPONDYLOLISTHESIS: Primary | ICD-10-CM

## 2021-10-11 DIAGNOSIS — M43.16 LUMBAR ADJACENT SEGMENT DISEASE WITH SPONDYLOLISTHESIS: Primary | ICD-10-CM

## 2021-10-11 DIAGNOSIS — C61 PROSTATE CANCER (H): Primary | ICD-10-CM

## 2021-10-11 PROCEDURE — 99213 OFFICE O/P EST LOW 20 MIN: CPT | Performed by: UROLOGY

## 2021-10-11 RX ORDER — ATORVASTATIN CALCIUM 10 MG/1
10 TABLET, FILM COATED ORAL AT BEDTIME
COMMUNITY
Start: 2021-10-04

## 2021-10-11 ASSESSMENT — MIFFLIN-ST. JEOR: SCORE: 1682.21

## 2021-10-11 ASSESSMENT — PAIN SCALES - GENERAL: PAINLEVEL: NO PAIN (0)

## 2021-10-11 NOTE — LETTER
10/11/2021       RE: Miles Valencia  [address] [place] [address]     Dear Colleague,    Thank you for referring your patient, Miles Valencia, to the [facility] UROLOGY CLINIC [facility]. Please see a copy of my visit note below.    Mr. Miles Valencia is a 56 year old male with a hx of prostate cancer who underwent RALP on 4/7/21. Pathology from this shows G 3+4=7 pT2 N0Mx with negative margins.  He is dry but wears one pad for safety (usually dry).  Having some partial erections.     OBJECTIVE:  PSA from 10/7/21 0.09 ng/mL  PSA from 7/19/21 is 0.02 ng/ml     ASSESSMENT AND PLAN:   Over half of today's 25-minute visit was spent counseling the patient regarding his prostate cancer.  PSA is low but rising. Will recheck in 1 months.  Hopefully by then it will be easier to get a PSMA PET scan.    Again, thank you for allowing me to participate in the care of your patient.      Sincerely,    Eber Pereyra MD

## 2021-10-11 NOTE — PATIENT INSTRUCTIONS
Please get a PSA lab done in 4-6 weeks (prior to the follow up appointment).    Please schedule a virtual follow up appointment with Dr. Pereyra 11/2 at 4:00 pm     It was a pleasure meeting with you today.  Thank you for allowing me and my team the privilege of caring for you today.  YOU are the reason we are here, and I truly hope we provided you with the excellent service you deserve.  Please let us know if there is anything else we can do for you so that we can be sure you are leaving completely satisfied with your care experience.

## 2021-10-11 NOTE — PROGRESS NOTES
Mr. Miles Valencia is a 56 year old male with a hx of prostate cancer who underwent RALP on 4/7/21. Pathology from this shows G 3+4=7 pT2 N0Mx with negative margins.  He is dry but wears one pad for safety (usually dry).  Having some partial erections.     OBJECTIVE:  PSA from 10/7/21 0.09 ng/mL  PSA from 7/19/21 is 0.02 ng/ml     ASSESSMENT AND PLAN:   Over half of today's 25-minute visit was spent counseling the patient regarding his prostate cancer.  PSA is low but rising. Will recheck in 1 months.  Hopefully by then it will be easier to get a PSMA PET scan.

## 2021-10-11 NOTE — NURSING NOTE
"Chief Complaint   Patient presents with     Follow Up     3 month PSA check       Blood pressure 138/80, pulse 76, height 1.753 m (5' 9\"), weight 86.2 kg (190 lb). Body mass index is 28.06 kg/m .    Patient Active Problem List   Diagnosis     Pain in joint, shoulder region     Achilles bursitis or tendinitis     History of total shoulder replacement, left     Non-toxic multinodular goiter     Prostate cancer (H)     Lumbar radiculopathy       Allergies   Allergen Reactions     Amoxicillin Diarrhea       Current Outpatient Medications   Medication Sig Dispense Refill     acetaminophen (TYLENOL) 325 MG tablet Take 2 tablets (650 mg) by mouth every 4 hours as needed for other (mild pain) 30 tablet 0     albuterol (PROAIR HFA/PROVENTIL HFA/VENTOLIN HFA) 108 (90 BASE) MCG/ACT Inhaler Inhale 2 puffs into the lungs every 4 hours as needed for shortness of breath / dyspnea or wheezing       ammonium lactate (AMLACTIN) 12 % cream Apply topically daily as needed        aspirin-acetaminophen-caffeine (EXCEDRIN MIGRAINE) 250-250-65 MG per tablet Take 2 tablets by mouth every 6 hours as needed for headaches        atorvastatin (LIPITOR) 10 MG tablet Take 10 mg by mouth At Bedtime       camphor-menthol (DERMASARRA) 0.5-0.5 % LOTN Apply topically every 6 hours as needed for skin care       cholecalciferol (VITAMIN D3 MAXIMUM STRENGTH) 5000 UNITS CAPS Take 5,000 Units by mouth every morning        Cyanocobalamin (VITAMIN B-12 PO) Take 1 tablet by mouth every evening        diltiazem ER COATED BEADS (CARDIZEM CD/CARTIA XT) 240 MG 24 hr capsule Take 240 mg by mouth every morning        fexofenadine (ALLEGRA) 180 MG tablet Take 180 mg by mouth every morning        gabapentin (NEURONTIN) 300 MG capsule Take 1 capsule (300 mg) by mouth 3 times daily (Patient taking differently: Take 600 mg by mouth At Bedtime ) 60 capsule 0     HEMP OIL OR EXTRACT OR OTHER CBD CANNABINOID, NOT MEDICAL CANNABIS, Take 1 capsule by mouth every evening   "     hydrochlorothiazide (HYDRODIURIL) 25 MG tablet Take 25 mg by mouth every morning        hydrocortisone (CORTAID) 1 % cream Apply topically 2 times daily as needed       ibuprofen (ADVIL/MOTRIN) 200 MG tablet Take 800 mg by mouth every 8 hours as needed for mild pain        meclizine (ANTIVERT) 12.5 MG tablet Take 2 tablets (25 mg) by mouth 4 times daily as needed for dizziness 30 tablet 0     MELATONIN PO Take 2.5 mg by mouth At Bedtime        Menthol, Topical Analgesic, (BIOFREEZE EX) Externally apply topically as needed       NONFORMULARY Magnesium/MSM lotion - Brand: Aincient minerals  2 pumps every evening applied to feet/legs    20 mg elemental magnesium and 25 mg MSM Per 1 pump       omega 3 1000 MG CAPS Take by mouth At Bedtime       rizatriptan (MAXALT-MLT) 10 MG ODT Take 10 mg by mouth at onset of headache for migraine       sildenafil (REVATIO) 20 MG tablet Take 1 tab daily 90 tablet 11     zolpidem (AMBIEN) 5 MG tablet Take 5 mg by mouth nightly as needed for sleep       nystatin (MYCOSTATIN) 336182 UNIT/GM external powder Apply topically 3 times daily (Patient not taking: Reported on 9/20/2021) 60 g 0       Social History     Tobacco Use     Smoking status: Never Smoker     Smokeless tobacco: Never Used   Substance Use Topics     Alcohol use: Yes     Alcohol/week: 0.0 standard drinks     Comment: 1-2 drinks a week     Drug use: No       Raúl Lowry  10/11/2021  9:08 AM

## 2021-10-13 ENCOUNTER — TELEPHONE (OUTPATIENT)
Dept: NEUROSURGERY | Facility: CLINIC | Age: 56
End: 2021-10-13

## 2021-10-14 ENCOUNTER — TELEPHONE (OUTPATIENT)
Dept: NEUROSURGERY | Facility: CLINIC | Age: 56
End: 2021-10-14

## 2021-10-14 ASSESSMENT — ENCOUNTER SYMPTOMS
SKIN CHANGES: 0
POOR WOUND HEALING: 0
MUSCLE CRAMPS: 1
NECK PAIN: 1
STIFFNESS: 0
SEIZURES: 0
LOSS OF CONSCIOUSNESS: 0
JOINT SWELLING: 0
MYALGIAS: 1
DISTURBANCES IN COORDINATION: 0
PARALYSIS: 0
NAIL CHANGES: 0
DIZZINESS: 0
MEMORY LOSS: 0
BACK PAIN: 1
TREMORS: 0
ARTHRALGIAS: 1
WEAKNESS: 1
SPEECH CHANGE: 0
HEADACHES: 1
TINGLING: 1
MUSCLE WEAKNESS: 1
NUMBNESS: 0

## 2021-10-15 ENCOUNTER — ANCILLARY PROCEDURE (OUTPATIENT)
Dept: GENERAL RADIOLOGY | Facility: CLINIC | Age: 56
End: 2021-10-15
Attending: NEUROLOGICAL SURGERY
Payer: COMMERCIAL

## 2021-10-15 ENCOUNTER — OFFICE VISIT (OUTPATIENT)
Dept: NEUROSURGERY | Facility: CLINIC | Age: 56
End: 2021-10-15
Attending: FAMILY MEDICINE
Payer: COMMERCIAL

## 2021-10-15 ENCOUNTER — PRE VISIT (OUTPATIENT)
Dept: NEUROSURGERY | Facility: CLINIC | Age: 56
End: 2021-10-15

## 2021-10-15 VITALS
HEART RATE: 84 BPM | RESPIRATION RATE: 16 BRPM | WEIGHT: 194 LBS | OXYGEN SATURATION: 97 % | SYSTOLIC BLOOD PRESSURE: 136 MMHG | BODY MASS INDEX: 28.65 KG/M2 | DIASTOLIC BLOOD PRESSURE: 79 MMHG

## 2021-10-15 DIAGNOSIS — M43.16 LUMBAR ADJACENT SEGMENT DISEASE WITH SPONDYLOLISTHESIS: ICD-10-CM

## 2021-10-15 DIAGNOSIS — M54.42 CHRONIC LEFT-SIDED LOW BACK PAIN WITH LEFT-SIDED SCIATICA: ICD-10-CM

## 2021-10-15 DIAGNOSIS — M51.369 LUMBAR ADJACENT SEGMENT DISEASE WITH SPONDYLOLISTHESIS: ICD-10-CM

## 2021-10-15 DIAGNOSIS — G89.29 CHRONIC LEFT-SIDED LOW BACK PAIN WITH LEFT-SIDED SCIATICA: ICD-10-CM

## 2021-10-15 DIAGNOSIS — M89.9 LESION OF BONE OF LUMBOSACRAL SPINE: ICD-10-CM

## 2021-10-15 PROCEDURE — 77073 BONE LENGTH STUDIES: CPT | Performed by: STUDENT IN AN ORGANIZED HEALTH CARE EDUCATION/TRAINING PROGRAM

## 2021-10-15 PROCEDURE — 72110 X-RAY EXAM L-2 SPINE 4/>VWS: CPT | Performed by: RADIOLOGY

## 2021-10-15 PROCEDURE — 72082 X-RAY EXAM ENTIRE SPI 2/3 VW: CPT | Performed by: STUDENT IN AN ORGANIZED HEALTH CARE EDUCATION/TRAINING PROGRAM

## 2021-10-15 PROCEDURE — 99204 OFFICE O/P NEW MOD 45 MIN: CPT | Performed by: NEUROLOGICAL SURGERY

## 2021-10-15 ASSESSMENT — PAIN SCALES - GENERAL: PAINLEVEL: MILD PAIN (2)

## 2021-10-15 NOTE — LETTER
10/15/2021       RE: Miles Valencia  [address] [place] [address]     Dear Colleague,    Thank you for referring your patient, Miles Valencia, to the [facility] NEUROSURGERY CLINIC [facility]. Please see a copy of my visit note below.    Service Date: 10/15/2021    HISTORY OF PRESENT ILLNESS:  Miles Valencia is a 56-year-old male who presents to our clinic for consultation requested by Dr. Wiseman for evaluation of left-sided low back pain with left leg radiculopathy.  According to the patient, he began to have a left-sided low back pain in 03/2020.  He does not recall any precipitating event or trauma or injury.  Pain is primarily in the left side of the low back, radiates down posterior to his thigh, calf and intermittently into his left foot.  He described it as achy and throbbing, but not a sharp shooting pain.  Because of the leg pain, he had to limit his exercise and weightlifting, which he likes to do.  Pain is worse with certain activities, usually prolonged walking, bending and twisting and exercise.  He has no symptoms on the right side.    Past Medical History:   Diagnosis Date     Achilles bursitis or tendinitis 5/4/2014     Allergic rhinitis      Asthma      Benign positional vertigo      Congestion      Hypertension      Low back pain      Migraine      Multinodular goiter      Osteoarthritis      Pain in joint, shoulder region 4/18/2014     Prostate cancer (H)      RLS (restless legs syndrome)      Sleep problems      Past Surgical History:   Procedure Laterality Date     ARTHROPLASTY SHOULDER Left 6/13/2017    Procedure: ARTHROPLASTY SHOULDER;  Left Total Shoulder Arthroplasty  ;  Surgeon: Jossy Swanson MD;  [facility]     BIOPSY      Prostate     COLONOSCOPY N/A 3/21/2016    Procedure: COLONOSCOPY;  Surgeon: Toy Lima MD;  Location:  GI     DAVINCI PROSTATECTOMY, LYMPHADENECTOMY N/A 4/7/2021     Procedure: Robot-assisted laparoscopic radical prostatectomy, pelvic lymphadenectomy,;  Surgeon: Eber Pereyra MD;  [facility] OR     THYROIDECTOMY Left 5/27/2020    Procedure: Left Lobe THYROIDECTOMY;  Surgeon: Evelin Cutler MD;  [facility]: [facility] OR     Current Outpatient Medications   Medication     acetaminophen (TYLENOL) 325 MG tablet     albuterol (PROAIR HFA/PROVENTIL HFA/VENTOLIN HFA) 108 (90 BASE) MCG/ACT Inhaler     ammonium lactate (AMLACTIN) 12 % cream     aspirin-acetaminophen-caffeine (EXCEDRIN MIGRAINE) 250-250-65 MG per tablet     atorvastatin (LIPITOR) 10 MG tablet     camphor-menthol (DERMASARRA) 0.5-0.5 % LOTN     cholecalciferol (VITAMIN D3 MAXIMUM STRENGTH) 5000 UNITS CAPS     Cyanocobalamin (VITAMIN B-12 PO)     diltiazem ER COATED BEADS (CARDIZEM CD/CARTIA XT) 240 MG 24 hr capsule     fexofenadine (ALLEGRA) 180 MG tablet     gabapentin (NEURONTIN) 300 MG capsule     HEMP OIL OR EXTRACT OR OTHER CBD CANNABINOID, NOT MEDICAL CANNABIS,     hydrochlorothiazide (HYDRODIURIL) 25 MG tablet     hydrocortisone (CORTAID) 1 % cream     ibuprofen (ADVIL/MOTRIN) 200 MG tablet     meclizine (ANTIVERT) 12.5 MG tablet     MELATONIN PO     Menthol, Topical Analgesic, (BIOFREEZE EX)     NONFORMULARY     omega 3 1000 MG CAPS     rizatriptan (MAXALT-MLT) 10 MG ODT     sildenafil (REVATIO) 20 MG tablet     zolpidem (AMBIEN) 5 MG tablet     nystatin (MYCOSTATIN) 141034 UNIT/GM external powder     No current facility-administered medications for this visit.       He has no significant weakness or numbness. His gait and balance are normal.  I personally reviewed the lumbar spine MRI scan, which shows evidence of a grade 1 spondylolisthesis of L4 and L5 with moderate lateral recess stenosis, possibly impinging on the L5 nerve roots bilaterally.  There is small intradiskal tear in the posterior annulus of L5-S1, but there is no significant canal stenosis or nerve root compression.  Flexion x-rays  show spondylolisthesis, but they do not show instability.    IMPRESSION AND PLAN:  Miles Valencia is a 56-year-old male with a history of prostate cancer and restless leg syndrome, who began to have left-sided back and radiculopathy since 03/2020.  At this time, he is undergoing physical therapy with core strengthening exercises.  I did encourage him to continue to the PTt.  I will also place a referral to Dr. Kwok with PM&R for consideration of a transforaminal epidural steroid injection.  We did discuss about what to look out for in the future.  If his pain were to become more persistent and severe, we can certainly discuss a possible surgical solution, which will be L4-L5 transforaminal lumbar interbody fusion.    Larry Ragsdale MD

## 2021-10-15 NOTE — PROGRESS NOTES
Service Date: 10/15/2021    HISTORY OF PRESENT ILLNESS:  Miles Valencia is a 56-year-old male who presents to our clinic for consultation requested by Dr. Wiseman for evaluation of left-sided low back pain with left leg radiculopathy.  According to the patient, he began to have a left-sided low back pain in 03/2020.  He does not recall any precipitating event or trauma or injury.  Pain is primarily in the left side of the low back, radiates down posterior to his thigh, calf and intermittently into his left foot.  He described it as achy and throbbing, but not a sharp shooting pain.  Because of the leg pain, he had to limit his exercise and weightlifting, which he likes to do.  Pain is worse with certain activities, usually prolonged walking, bending and twisting and exercise.  He has no symptoms on the right side.    Past Medical History:   Diagnosis Date     Achilles bursitis or tendinitis 5/4/2014     Allergic rhinitis      Asthma      Benign positional vertigo      Congestion      Hypertension      Low back pain      Migraine      Multinodular goiter      Osteoarthritis      Pain in joint, shoulder region 4/18/2014     Prostate cancer (H)      RLS (restless legs syndrome)      Sleep problems      Past Surgical History:   Procedure Laterality Date     ARTHROPLASTY SHOULDER Left 6/13/2017    Procedure: ARTHROPLASTY SHOULDER;  Left Total Shoulder Arthroplasty  ;  Surgeon: Jossy Swanson MD;  [facility]     BIOPSY      Prostate     COLONOSCOPY N/A 3/21/2016    Procedure: COLONOSCOPY;  Surgeon: Toy Lima MD;  [facility]: [facility] GI     DAVINCI PROSTATECTOMY, LYMPHADENECTOMY N/A 4/7/2021    Procedure: Robot-assisted laparoscopic radical prostatectomy, pelvic lymphadenectomy,;  Surgeon: Eber Pereyra MD;  [facility]     THYROIDECTOMY Left 5/27/2020    Procedure: Left Lobe THYROIDECTOMY;  Surgeon: Evelin Cutler MD;  [facility]     Current Outpatient Medications    Medication     acetaminophen (TYLENOL) 325 MG tablet     albuterol (PROAIR HFA/PROVENTIL HFA/VENTOLIN HFA) 108 (90 BASE) MCG/ACT Inhaler     ammonium lactate (AMLACTIN) 12 % cream     aspirin-acetaminophen-caffeine (EXCEDRIN MIGRAINE) 250-250-65 MG per tablet     atorvastatin (LIPITOR) 10 MG tablet     camphor-menthol (DERMASARRA) 0.5-0.5 % LOTN     cholecalciferol (VITAMIN D3 MAXIMUM STRENGTH) 5000 UNITS CAPS     Cyanocobalamin (VITAMIN B-12 PO)     diltiazem ER COATED BEADS (CARDIZEM CD/CARTIA XT) 240 MG 24 hr capsule     fexofenadine (ALLEGRA) 180 MG tablet     gabapentin (NEURONTIN) 300 MG capsule     HEMP OIL OR EXTRACT OR OTHER CBD CANNABINOID, NOT MEDICAL CANNABIS,     hydrochlorothiazide (HYDRODIURIL) 25 MG tablet     hydrocortisone (CORTAID) 1 % cream     ibuprofen (ADVIL/MOTRIN) 200 MG tablet     meclizine (ANTIVERT) 12.5 MG tablet     MELATONIN PO     Menthol, Topical Analgesic, (BIOFREEZE EX)     NONFORMULARY     omega 3 1000 MG CAPS     rizatriptan (MAXALT-MLT) 10 MG ODT     sildenafil (REVATIO) 20 MG tablet     zolpidem (AMBIEN) 5 MG tablet     nystatin (MYCOSTATIN) 857447 UNIT/GM external powder     No current facility-administered medications for this visit.       He has no significant weakness or numbness. His gait and balance are normal.  I personally reviewed the lumbar spine MRI scan, which shows evidence of a grade 1 spondylolisthesis of L4 and L5 with moderate lateral recess stenosis, possibly impinging on the L5 nerve roots bilaterally.  There is small intradiskal tear in the posterior annulus of L5-S1, but there is no significant canal stenosis or nerve root compression.  Flexion x-rays show spondylolisthesis, but they do not show instability.    IMPRESSION AND PLAN:  Miles Valencia is a 56-year-old male with a history of prostate cancer and restless leg syndrome, who began to have left-sided back and radiculopathy since 03/2020.  At this time, he is undergoing physical therapy with core  strengthening exercises.  I did encourage him to continue to the PTt.  I will also place a referral to Dr. Kwok with PM&R for consideration of a transforaminal epidural steroid injection.  We did discuss about what to look out for in the future.  If his pain were to become more persistent and severe, we can certainly discuss a possible surgical solution, which will be L4-L5 transforaminal lumbar interbody fusion.    Larry Ragsdale MD        D: 10/15/2021   T: 10/15/2021   MT: BLAZE    Name:     KOURTNEY PEDRAZAAdriana  MRN:      [id number]        Account:      [id number]   :      1965           Service Date: 10/15/2021       Document: [id number]

## 2021-10-19 ENCOUNTER — TELEPHONE (OUTPATIENT)
Dept: NEUROLOGY | Facility: CLINIC | Age: 56
End: 2021-10-19

## 2021-10-19 NOTE — TELEPHONE ENCOUNTER
Pt contacted and reported to writer than another PM&R nurse has already reached out to him about seeing Dr. Kwok tomorrow. Pt will not be available so PM&R nurse at [facility] is working on having pt seen in early November if Ok'd by Dr. Kwok.       Esther Kwok MD  [facility]; Kimberly Cid, RN  Could we schedule this patient 10/20 either 10 or 11a? Dr. Ragsdale would like me to see him ASAP as he may need to have an injection and follow-up surgery sooner than later. If patient can't make it, please let me know.       Kimberly Cid, RNCC  Neurology

## 2021-10-22 ENCOUNTER — TELEPHONE (OUTPATIENT)
Dept: UROLOGY | Facility: CLINIC | Age: 56
End: 2021-10-22

## 2021-10-22 NOTE — TELEPHONE ENCOUNTER
----- Message from Keyonna Pa sent at 10/22/2021  2:03 PM CDT -----  Regarding: Please reschedule  This patient needs to be rescheduled from 11/2 to 11/1. Looks like the check out report had the wrong date. We can add him on for 5:00 pm for a video visit     Thanks,    Keyonna

## 2021-10-23 ENCOUNTER — TELEPHONE (OUTPATIENT)
Dept: UROLOGY | Facility: CLINIC | Age: 56
End: 2021-10-23

## 2021-10-26 ENCOUNTER — PRE VISIT (OUTPATIENT)
Dept: UROLOGY | Facility: CLINIC | Age: 56
End: 2021-10-26

## 2021-10-26 NOTE — TELEPHONE ENCOUNTER
Reason for visit: follow up      Dx/Hx/Sx: prostate cancer     Records/imaging/labs/orders: PSA in epic    At Rooming: video visit

## 2021-10-28 ENCOUNTER — LAB (OUTPATIENT)
Dept: LAB | Facility: CLINIC | Age: 56
End: 2021-10-28
Attending: UROLOGY
Payer: COMMERCIAL

## 2021-10-28 DIAGNOSIS — C61 PROSTATE CANCER (H): ICD-10-CM

## 2021-10-28 LAB
ALBUMIN UR-MCNC: NEGATIVE MG/DL
APPEARANCE UR: CLEAR
BILIRUB UR QL STRIP: NEGATIVE
COLOR UR AUTO: YELLOW
GLUCOSE UR STRIP-MCNC: NEGATIVE MG/DL
HGB UR QL STRIP: NEGATIVE
KETONES UR STRIP-MCNC: NEGATIVE MG/DL
LEUKOCYTE ESTERASE UR QL STRIP: NEGATIVE
NITRATE UR QL: NEGATIVE
PH UR STRIP: 7 [PH] (ref 5–7)
PSA SERPL-MCNC: 0.04 UG/L (ref 0–4)
RBC URINE: <1 /HPF
SP GR UR STRIP: 1.01 (ref 1–1.03)
UROBILINOGEN UR STRIP-MCNC: NORMAL MG/DL
WBC URINE: <1 /HPF

## 2021-10-28 PROCEDURE — 86850 RBC ANTIBODY SCREEN: CPT | Mod: 90 | Performed by: PATHOLOGY

## 2021-10-28 PROCEDURE — 36415 COLL VENOUS BLD VENIPUNCTURE: CPT | Performed by: PATHOLOGY

## 2021-10-28 PROCEDURE — 81001 URINALYSIS AUTO W/SCOPE: CPT | Performed by: PATHOLOGY

## 2021-10-28 PROCEDURE — 84153 ASSAY OF PSA TOTAL: CPT | Performed by: PATHOLOGY

## 2021-10-28 PROCEDURE — 86901 BLOOD TYPING SEROLOGIC RH(D): CPT | Mod: 90 | Performed by: PATHOLOGY

## 2021-10-28 PROCEDURE — 99000 SPECIMEN HANDLING OFFICE-LAB: CPT | Performed by: PATHOLOGY

## 2021-10-28 PROCEDURE — 86900 BLOOD TYPING SEROLOGIC ABO: CPT | Mod: 90 | Performed by: PATHOLOGY

## 2021-10-29 LAB
ABO/RH(D): NORMAL
ANTIBODY SCREEN: NEGATIVE

## 2021-11-01 ENCOUNTER — THERAPY VISIT (OUTPATIENT)
Dept: PHYSICAL THERAPY | Facility: CLINIC | Age: 56
End: 2021-11-01
Payer: COMMERCIAL

## 2021-11-01 ENCOUNTER — VIRTUAL VISIT (OUTPATIENT)
Dept: UROLOGY | Facility: CLINIC | Age: 56
End: 2021-11-01
Payer: COMMERCIAL

## 2021-11-01 DIAGNOSIS — C61 PROSTATE CANCER (H): Primary | ICD-10-CM

## 2021-11-01 DIAGNOSIS — M54.16 LUMBAR RADICULOPATHY: ICD-10-CM

## 2021-11-01 PROCEDURE — 97530 THERAPEUTIC ACTIVITIES: CPT | Mod: GP | Performed by: PHYSICAL THERAPIST

## 2021-11-01 PROCEDURE — 99213 OFFICE O/P EST LOW 20 MIN: CPT | Mod: 95 | Performed by: UROLOGY

## 2021-11-01 PROCEDURE — 97110 THERAPEUTIC EXERCISES: CPT | Mod: GP | Performed by: PHYSICAL THERAPIST

## 2021-11-01 PROCEDURE — 97112 NEUROMUSCULAR REEDUCATION: CPT | Mod: GP | Performed by: PHYSICAL THERAPIST

## 2021-11-01 NOTE — LETTER
11/1/2021       RE: Miles Valencia  [address] [place] [address]     Dear Colleague,    Thank you for referring your patient, Miles Valencia, to the [facility] UROLOGY CLINIC [place] [facility]. Please see a copy of my visit note below.    Miles is a 56 year old who is being evaluated via a billable video visit.      How would you like to obtain your AVS? MyChart  If the video visit is dropped, the invitation should be resent by: Send to e-mail at: [email or web address]  Will anyone else be joining your video visit? No      Video Start Time:2:10pm  Video-Visit Details  Mr. Miles Valencia is a 56 year old male with a hx of prostate cancer who underwent RALP on 4/7/21. Pathology from this shows G 3+4=7 pT2 N0Mx with negative margins.  PSA  from 7/19/21 is 0.02 ng/ml, and 0.09 ng/mL from 10/7/21.   He is dry but wears one pad for safety (usually dry).  Having some partial erections.     OBJECTIVE:  PSA from 10/28/21 is 0.04 ng/mL      ASSESSMENT AND PLAN:   Over half of today's 15-minute visit was spent counseling the patient regarding his prostate cancer.  PSA is back down some.  Will recheck in 3 months.      Type of service:  Video Visit    Video End Time:2:19pm    Originating Location (pt. Location): Home    Distant Location (provider location):  [facility] UROLOGY [facility]     Platform used for Video Visit: Wilfredo    Again, thank you for allowing me to participate in the care of your patient.      Sincerely,    Eber Pereyra MD

## 2021-11-01 NOTE — PROGRESS NOTES
Miles is a 56 year old who is being evaluated via a billable video visit.      How would you like to obtain your AVS? MyChart  If the video visit is dropped, the invitation should be resent by: Send to e-mail at: [email or web address]  Will anyone else be joining your video visit? No      Video Start Time:2:10pm  Video-Visit Details  Mr. Miles Valencia is a 56 year old male with a hx of prostate cancer who underwent RALP on 4/7/21. Pathology from this shows G 3+4=7 pT2 N0Mx with negative margins.  PSA  from 7/19/21 is 0.02 ng/ml, and 0.09 ng/mL from 10/7/21.   He is dry but wears one pad for safety (usually dry).  Having some partial erections.     OBJECTIVE:  PSA from 10/28/21 is 0.04 ng/mL      ASSESSMENT AND PLAN:   Over half of today's 15-minute visit was spent counseling the patient regarding his prostate cancer.  PSA is back down some.  Will recheck in 3 months.      Type of service:  Video Visit    Video End Time:2:19pm    Originating Location (pt. Location): Home    Distant Location (provider location):  [facility] UROLOGY CLINIC [place]     Platform used for Video Visit: GrouPA[employer]

## 2021-11-02 NOTE — PATIENT INSTRUCTIONS
Please follow up in three months with a PSA beforehand.    It was a pleasure meeting with you today.  Thank you for allowing me and my team the privilege of caring for you today.  YOU are the reason we are here, and I truly hope we provided you with the excellent service you deserve.  Please let us know if there is anything else we can do for you so that we can be sure you are leaving completely satisfied with your care experience.

## 2021-11-03 ENCOUNTER — TELEPHONE (OUTPATIENT)
Dept: UROLOGY | Facility: CLINIC | Age: 56
End: 2021-11-03

## 2021-11-12 ENCOUNTER — THERAPY VISIT (OUTPATIENT)
Dept: PHYSICAL THERAPY | Facility: CLINIC | Age: 56
End: 2021-11-12
Payer: COMMERCIAL

## 2021-11-12 DIAGNOSIS — M54.16 LUMBAR RADICULOPATHY: ICD-10-CM

## 2021-11-12 PROCEDURE — 97112 NEUROMUSCULAR REEDUCATION: CPT | Mod: GP | Performed by: PHYSICAL THERAPIST

## 2021-11-12 PROCEDURE — 97110 THERAPEUTIC EXERCISES: CPT | Mod: GP | Performed by: PHYSICAL THERAPIST

## 2021-12-06 ENCOUNTER — OFFICE VISIT (OUTPATIENT)
Dept: PHYSICAL MEDICINE AND REHAB | Facility: CLINIC | Age: 56
End: 2021-12-06
Payer: COMMERCIAL

## 2021-12-06 VITALS
HEART RATE: 76 BPM | RESPIRATION RATE: 16 BRPM | OXYGEN SATURATION: 98 % | DIASTOLIC BLOOD PRESSURE: 101 MMHG | SYSTOLIC BLOOD PRESSURE: 155 MMHG

## 2021-12-06 DIAGNOSIS — M54.42 CHRONIC LEFT-SIDED LOW BACK PAIN WITH LEFT-SIDED SCIATICA: Primary | ICD-10-CM

## 2021-12-06 DIAGNOSIS — G89.29 CHRONIC LEFT-SIDED LOW BACK PAIN WITH LEFT-SIDED SCIATICA: Primary | ICD-10-CM

## 2021-12-06 DIAGNOSIS — M54.16 LUMBAR RADICULITIS: ICD-10-CM

## 2021-12-06 DIAGNOSIS — M43.17 ANTEROLISTHESIS OF LUMBOSACRAL SPINE: ICD-10-CM

## 2021-12-06 PROCEDURE — 99203 OFFICE O/P NEW LOW 30 MIN: CPT | Performed by: PHYSICAL MEDICINE & REHABILITATION

## 2021-12-06 RX ORDER — CYCLOBENZAPRINE HCL 5 MG
5-10 TABLET ORAL AT BEDTIME
Qty: 60 TABLET | Refills: 1 | Status: SHIPPED | OUTPATIENT
Start: 2021-12-06 | End: 2022-05-12

## 2021-12-06 ASSESSMENT — PAIN SCALES - GENERAL: PAINLEVEL: MILD PAIN (2)

## 2021-12-06 NOTE — LETTER
12/6/2021       RE: Miles Valencia  [address] [place] [address]     Dear Colleague,    Thank you for referring your patient, Miles Valencia, to the [facility] [place] [facility]. Please see a copy of my visit note below.    Patient seen at the request of Dr. Larry Ragsdale for an opinion and evaluation of lumbar radicular pain and consideration of lumbar epidural steroid injection.      HISTORY OF PRESENT ILLNESS:  Miles Valencia is a 56 year old male who presents with a chief complaint of left sided low back and lower extremity pain.     Pain began around Winter/Spring 2020 around the beginning of the pandemic which resolved around that summer. Over the last year, he has had continued pain that has been more persistent. He denies accident, injury or trauma but had been more active with exercising (eg [employer]); he had started exercising at home during the COVID pandemic and running around his neighborhood, for example.     He has more chronic and constant symptoms affecting his low back on the left, but when he does experience pain affecting the LLE it is more severe. Radicular symptoms travel to the posterior thigh, lateral leg and dorsum and plantar surface of the foot. He does numbness/tingling affecting his foot; described as constant and intermittently sharp in nature. Pain is reported as 3/10 at rest today and up to 5/10 at worst in the last week. Symptoms are worse with running, lying down eduardo at night ; and improved with medication, stretch/HEP. He does report significant pain-related sleep disturbance. He does have a history of restless leg syndrome and unsure if this is contributing.       PRIOR INJURIES/TREATMENT:   Ice/Heat: +ice  Physical Therapy:   He has been going to PT following prostate cancer diagnosis and prostatectomy in April and pelvic floor therapy which  transitioned to current Spine PT -- overall having improvement.       - Current Pain Medications -   NSAIDs - Naproxen prn   Tylenol   Gabapentin 600mg at bedtime     - Prior/Trialed Pain Medications -     Prior Procedures:  Date      Procedure     Improvement (%)  None         Prior Related Surgery: none   Other (acupuncture, OMT, CMM, TENS, DME, etc.):   Massage gun    Specialists Seen - (with most recent, available notes and clinic visits reviewed)   1. Neurosurgery - Dr. Larry Ragsdale      IMAGING - reviewed   10/01/2021 MR L spine   Findings:   There are 5 lumbar-type vertebrae assumed for the purposes of this  dictation.  The tip of the conus medullaris is at L1.       6 mm anterolisthesis at L4-5.  There is mild disc height narrowing and  disc desiccation at L4-5 and L5-S1.  Normal marrow signal.     On a level by level basis:  T12-L1: No spinal canal or neuroforaminal stenosis.   L1-2: No spinal canal or neuroforaminal stenosis.   L2-3: No spinal canal or neuroforaminal stenosis.   L3-4: Bilateral facet hypertrophy. No spinal canal or neural foraminal  stenosis.   L4-5: Disc bulge and bilateral facet hypertrophy. Anterolisthesis  contributes to the mild to moderate bilateral neural foraminal  stenosis and abutment of the exiting nerve root on the right. Mild  spinal canal stenosis. Dorsal synovial cysts at that level.  L5-S1: Disc bulge and central tiny protrusion. Bilateral facet  hypertrophy. Mild left neural foraminal stenosis. No spinal canal or  right neuroforaminal stenosis.     Paraspinous tissues are within normal limits.                                                                   Impression:   1. 6 mm anterolisthesis at L4-5.  2. Multilevel lumbar spondylosis, most pronounced at L4-5 with mild to  moderate bilateral neural foraminal and mild spinal canal stenosis.     10/15/21 XR L spine  IMPRESSION: Grade 1 spondylolisthesis of L4 on L5 with borderline  motion between flexion and  extension.    Review Of Systems:  I am responding to those symptoms which are directly relevant to the specific indication for my consultation. I recommend that the patient follow up with their primary or referring provider to pursue any other symptoms which may be of concern.       Medical History:  He  has a past medical history of Achilles bursitis or tendinitis (5/4/2014), Allergic rhinitis, Asthma, Benign positional vertigo, Congestion, Hypertension, Low back pain, Migraine, Multinodular goiter, Osteoarthritis, Pain in joint, shoulder region (4/18/2014), Prostate cancer (H), RLS (restless legs syndrome), and Sleep problems.     He  has a past surgical history that includes Colonoscopy (N/A, 3/21/2016); biopsy; Arthroplasty shoulder (Left, 6/13/2017); Thyroidectomy (Left, 5/27/2020); and Davinci Prostatectomy, Lymphadenectomy (N/A, 4/7/2021).    Family History  His family history includes Alcoholism in his father, maternal grandfather, and paternal grandfather; Asthma in his niece; Cancer in his cousin and maternal grandmother; Cerebrovascular Disease in his paternal grandmother; Deep Vein Thrombosis in his brother; Factor V Leiden deficiency in his brother; Hypertension in his brother, father, and mother; Migraines in his brother; Other - See Comments in his nephew and niece; Skin Cancer in his mother; Testicular cancer in his brother; Thyroid Disease in an other family member; Unknown/Adopted in his paternal half-sister.     Social History:  Work:  [employer]   He  reports that he has never smoked. He has never used smokeless tobacco. He reports current alcohol use. He reports that he does not use drugs.      Current Medications:   He has a current medication list which includes the following prescription(s): acetaminophen, albuterol, ammonium lactate, aspirin-acetaminophen-caffeine, atorvastatin, camphor-menthol, cholecalciferol, cyanocobalamin, diltiazem er coated beads, fexofenadine,  gabapentin, HEMP OIL OR EXTRACT OR OTHER CBD CANNABINOID, NOT MEDICAL CANNABIS,, hydrochlorothiazide, hydrocortisone, ibuprofen, meclizine, melatonin, NONFORMULARY, omega 3, rizatriptan, sildenafil, zolpidem, and menthol (topical analgesic).     Allergies:    -- Amoxicillin -- Diarrhea    PHYSICAL EXAMINATION:  BP (!) 155/101   Pulse 76   Resp 16   SpO2 98%    General: Pleasant, straightforward, WDWN individual.  Mental Status: Pleasant, direct, appropriate mood and affect  Resp: breathing is unlabored without audible wheeze  Vascular: Palpable pedal pulses, no cyanosis, no venous stasis changes  Heme: no visible ecchymosis or erythema on extremities  Skin: No notable rash    Neurologic:  Strength: All major muscle groups of the bilateral lower extremities have normal and symmetric muscle strength EXCEPT hip abd 4+/5    Sensation: SILT in lower extremities bilaterally L3-S1     DTRs: bilateral lower extremity stretch reflexes are equal and symmetric   Clonus: none    Gait: reveals normal sagar and stride     Musculoskeletal:  Lumbar Spine: Mildly reduced  ROM and minimal pain with extension otherwise full lumbar ROM,  Left GMax/piriformis tender to palpation, very sl post pelvic tilt, facet loading neg, Str Leg Raise negative, hip provocative maneuvers negative except with tight hip flexors.   SI joint maneuvers negative - however, he does have slight anterior innominate rotation on the left          ASSESSMENT:  Miles Valencia is a pleasant 56 year old male who presents with:    #. Lumbar spondylosis with left lumbar radicular pain (L5 through history) in the setting of L4-5 NFS with underlying Gr1 anterolisthesis that has borderline motion with flexion/extension.   #. Consider gluteal myofascial pain v piriformis muscle pain/syndrome , particularly for localized pain v radiculitis and muscle membrane irritability     PLAN:  - Reviewed HEP. Stressed the importance of home exercise program when it comes to  achieving meaningful, long-lasting pain relief    - Discussed Left L5-S1 TFESI for modulation of pain. Patient states symptoms are relatively stable and manageable at the moment.   - Can trial flexeril 5-15mg at bedtime, scheduled and as discussed.  - RTC x3mos.     Ready to learn, no apparent learning barriers.  Education provided on treatment plan according to patient's preferred learning style.  Patient verbalizes understanding.   __________________________________  Esther Kwok MD  Physical Medicine & Rehabilitation        40 minutes spent on the date of the encounter doing chart review, review of test results, patient visit and documentation           Again, thank you for allowing me to participate in the care of your patient.      Sincerely,    Esther Kwok MD

## 2021-12-06 NOTE — PROGRESS NOTES
Patient seen at the request of Dr. Larry Ragsdale for an opinion and evaluation of lumbar radicular pain and consideration of lumbar epidural steroid injection.      HISTORY OF PRESENT ILLNESS:  Miles Valencia is a 56 year old male who presents with a chief complaint of left sided low back and lower extremity pain.     Pain began around Winter/Spring 2020 around the beginning of the pandemic which resolved around that summer. Over the last year, he has had continued pain that has been more persistent. He denies accident, injury or trauma but had been more active with exercising (eg [employer]); he had started exercising at home during the COVID pandemic and running around his neighborhood, for example.     He has more chronic and constant symptoms affecting his low back on the left, but when he does experience pain affecting the LLE it is more severe. Radicular symptoms travel to the posterior thigh, lateral leg and dorsum and plantar surface of the foot. He does numbness/tingling affecting his foot; described as constant and intermittently sharp in nature. Pain is reported as 3/10 at rest today and up to 5/10 at worst in the last week. Symptoms are worse with running, lying down eduardo at night ; and improved with medication, stretch/HEP. He does report significant pain-related sleep disturbance. He does have a history of restless leg syndrome and unsure if this is contributing.       PRIOR INJURIES/TREATMENT:   Ice/Heat: +ice  Physical Therapy:   He has been going to PT following prostate cancer diagnosis and prostatectomy in April and pelvic floor therapy which transitioned to current Spine PT -- overall having improvement.       - Current Pain Medications -   NSAIDs - Naproxen prn   Tylenol   Gabapentin 600mg at bedtime     - Prior/Trialed Pain Medications -     Prior Procedures:  Date      Procedure     Improvement (%)  None         Prior Related Surgery: none   Other (acupuncture, OMT, CMM, TENS, DME,  etc.):   Massage gun    Specialists Seen - (with most recent, available notes and clinic visits reviewed)   1. Neurosurgery - Dr. Larry Ragsdale      IMAGING - reviewed   10/01/2021 MR L spine   Findings:   There are 5 lumbar-type vertebrae assumed for the purposes of this  dictation.  The tip of the conus medullaris is at L1.       6 mm anterolisthesis at L4-5.  There is mild disc height narrowing and  disc desiccation at L4-5 and L5-S1.  Normal marrow signal.     On a level by level basis:  T12-L1: No spinal canal or neuroforaminal stenosis.   L1-2: No spinal canal or neuroforaminal stenosis.   L2-3: No spinal canal or neuroforaminal stenosis.   L3-4: Bilateral facet hypertrophy. No spinal canal or neural foraminal  stenosis.   L4-5: Disc bulge and bilateral facet hypertrophy. Anterolisthesis  contributes to the mild to moderate bilateral neural foraminal  stenosis and abutment of the exiting nerve root on the right. Mild  spinal canal stenosis. Dorsal synovial cysts at that level.  L5-S1: Disc bulge and central tiny protrusion. Bilateral facet  hypertrophy. Mild left neural foraminal stenosis. No spinal canal or  right neuroforaminal stenosis.     Paraspinous tissues are within normal limits.                                                                   Impression:   1. 6 mm anterolisthesis at L4-5.  2. Multilevel lumbar spondylosis, most pronounced at L4-5 with mild to  moderate bilateral neural foraminal and mild spinal canal stenosis.     10/15/21 XR L spine  IMPRESSION: Grade 1 spondylolisthesis of L4 on L5 with borderline  motion between flexion and extension.    Review Of Systems:  I am responding to those symptoms which are directly relevant to the specific indication for my consultation. I recommend that the patient follow up with their primary or referring provider to pursue any other symptoms which may be of concern.       Medical History:  He  has a past medical history of Achilles bursitis or  tendinitis (5/4/2014), Allergic rhinitis, Asthma, Benign positional vertigo, Congestion, Hypertension, Low back pain, Migraine, Multinodular goiter, Osteoarthritis, Pain in joint, shoulder region (4/18/2014), Prostate cancer (H), RLS (restless legs syndrome), and Sleep problems.     He  has a past surgical history that includes Colonoscopy (N/A, 3/21/2016); biopsy; Arthroplasty shoulder (Left, 6/13/2017); Thyroidectomy (Left, 5/27/2020); and Davinci Prostatectomy, Lymphadenectomy (N/A, 4/7/2021).    Family History  His family history includes Alcoholism in his father, maternal grandfather, and paternal grandfather; Asthma in his niece; Cancer in his cousin and maternal grandmother; Cerebrovascular Disease in his paternal grandmother; Deep Vein Thrombosis in his brother; Factor V Leiden deficiency in his brother; Hypertension in his brother, father, and mother; Migraines in his brother; Other - See Comments in his nephew and niece; Skin Cancer in his mother; Testicular cancer in his brother; Thyroid Disease in an other family member; Unknown/Adopted in his paternal half-sister.     Social History:  Work:  [employer]   He  reports that he has never smoked. He has never used smokeless tobacco. He reports current alcohol use. He reports that he does not use drugs.      Current Medications:   He has a current medication list which includes the following prescription(s): acetaminophen, albuterol, ammonium lactate, aspirin-acetaminophen-caffeine, atorvastatin, camphor-menthol, cholecalciferol, cyanocobalamin, diltiazem er coated beads, fexofenadine, gabapentin, HEMP OIL OR EXTRACT OR OTHER CBD CANNABINOID, NOT MEDICAL CANNABIS,, hydrochlorothiazide, hydrocortisone, ibuprofen, meclizine, melatonin, NONFORMULARY, omega 3, rizatriptan, sildenafil, zolpidem, and menthol (topical analgesic).     Allergies:    -- Amoxicillin -- Diarrhea    PHYSICAL EXAMINATION:  BP (!) 155/101   Pulse 76   Resp 16   SpO2 98%     General: Pleasant, straightforward, WDWN individual.  Mental Status: Pleasant, direct, appropriate mood and affect  Resp: breathing is unlabored without audible wheeze  Vascular: Palpable pedal pulses, no cyanosis, no venous stasis changes  Heme: no visible ecchymosis or erythema on extremities  Skin: No notable rash    Neurologic:  Strength: All major muscle groups of the bilateral lower extremities have normal and symmetric muscle strength EXCEPT hip abd 4+/5    Sensation: SILT in lower extremities bilaterally L3-S1     DTRs: bilateral lower extremity stretch reflexes are equal and symmetric   Clonus: none    Gait: reveals normal sagar and stride     Musculoskeletal:  Lumbar Spine: Mildly reduced  ROM and minimal pain with extension otherwise full lumbar ROM,  Left GMax/piriformis tender to palpation, very sl post pelvic tilt, facet loading neg, Str Leg Raise negative, hip provocative maneuvers negative except with tight hip flexors.   SI joint maneuvers negative - however, he does have slight anterior innominate rotation on the left          ASSESSMENT:  Miles Valencia is a pleasant 56 year old male who presents with:    #. Lumbar spondylosis with left lumbar radicular pain (L5 through history) in the setting of L4-5 NFS with underlying Gr1 anterolisthesis that has borderline motion with flexion/extension.   #. Consider gluteal myofascial pain v piriformis muscle pain/syndrome , particularly for localized pain v radiculitis and muscle membrane irritability     PLAN:  - Reviewed HEP. Stressed the importance of home exercise program when it comes to achieving meaningful, long-lasting pain relief    - Discussed Left L5-S1 TFESI for modulation of pain. Patient states symptoms are relatively stable and manageable at the moment.   - Can trial flexeril 5-15mg at bedtime, scheduled and as discussed.  - RTC x3mos.     Ready to learn, no apparent learning barriers.  Education provided on treatment plan according to  patient's preferred learning style.  Patient verbalizes understanding.   __________________________________  Esther Kwok MD  Physical Medicine & Rehabilitation        40 minutes spent on the date of the encounter doing chart review, review of test results, patient visit and documentation

## 2021-12-06 NOTE — LETTER
12/6/2021      RE: Miles Valencia  [address] [place] [address]       Patient seen at the request of Dr. Larry Ragsdale for an opinion and evaluation of lumbar radicular pain and consideration of lumbar epidural steroid injection.      HISTORY OF PRESENT ILLNESS:  Miles Valencia is a 56 year old male who presents with a chief complaint of left sided low back and lower extremity pain.     Pain began around Winter/Spring 2020 around the beginning of the pandemic which resolved around that summer. Over the last year, he has had continued pain that has been more persistent. He denies accident, injury or trauma but had been more active with exercising (eg [employer]); he had started exercising at home during the COVID pandemic and running around his neighborhood, for example.     He has more chronic and constant symptoms affecting his low back on the left, but when he does experience pain affecting the LLE it is more severe. Radicular symptoms travel to the posterior thigh, lateral leg and dorsum and plantar surface of the foot. He does numbness/tingling affecting his foot; described as constant and intermittently sharp in nature. Pain is reported as 3/10 at rest today and up to 5/10 at worst in the last week. Symptoms are worse with running, lying down eduardo at night ; and improved with medication, stretch/HEP. He does report significant pain-related sleep disturbance. He does have a history of restless leg syndrome and unsure if this is contributing.       PRIOR INJURIES/TREATMENT:   Ice/Heat: +ice  Physical Therapy:   He has been going to PT following prostate cancer diagnosis and prostatectomy in April and pelvic floor therapy which transitioned to current Spine PT -- overall having improvement.       - Current Pain Medications -   NSAIDs - Naproxen prn   Tylenol   Gabapentin 600mg at bedtime     - Prior/Trialed Pain Medications -     Prior Procedures:  Date      Procedure     Improvement  (%)  None         Prior Related Surgery: none   Other (acupuncture, OMT, CMM, TENS, DME, etc.):   Massage gun    Specialists Seen - (with most recent, available notes and clinic visits reviewed)   1. Neurosurgery - Dr. Larry Ragsdale      IMAGING - reviewed   10/01/2021 MR L spine   Findings:   There are 5 lumbar-type vertebrae assumed for the purposes of this  dictation.  The tip of the conus medullaris is at L1.       6 mm anterolisthesis at L4-5.  There is mild disc height narrowing and  disc desiccation at L4-5 and L5-S1.  Normal marrow signal.     On a level by level basis:  T12-L1: No spinal canal or neuroforaminal stenosis.   L1-2: No spinal canal or neuroforaminal stenosis.   L2-3: No spinal canal or neuroforaminal stenosis.   L3-4: Bilateral facet hypertrophy. No spinal canal or neural foraminal  stenosis.   L4-5: Disc bulge and bilateral facet hypertrophy. Anterolisthesis  contributes to the mild to moderate bilateral neural foraminal  stenosis and abutment of the exiting nerve root on the right. Mild  spinal canal stenosis. Dorsal synovial cysts at that level.  L5-S1: Disc bulge and central tiny protrusion. Bilateral facet  hypertrophy. Mild left neural foraminal stenosis. No spinal canal or  right neuroforaminal stenosis.     Paraspinous tissues are within normal limits.                                                                   Impression:   1. 6 mm anterolisthesis at L4-5.  2. Multilevel lumbar spondylosis, most pronounced at L4-5 with mild to  moderate bilateral neural foraminal and mild spinal canal stenosis.     10/15/21 XR L spine  IMPRESSION: Grade 1 spondylolisthesis of L4 on L5 with borderline  motion between flexion and extension.    Review Of Systems:  I am responding to those symptoms which are directly relevant to the specific indication for my consultation. I recommend that the patient follow up with their primary or referring provider to pursue any other symptoms which may be of  concern.       Medical History:  He  has a past medical history of Achilles bursitis or tendinitis (5/4/2014), Allergic rhinitis, Asthma, Benign positional vertigo, Congestion, Hypertension, Low back pain, Migraine, Multinodular goiter, Osteoarthritis, Pain in joint, shoulder region (4/18/2014), Prostate cancer (H), RLS (restless legs syndrome), and Sleep problems.     He  has a past surgical history that includes Colonoscopy (N/A, 3/21/2016); biopsy; Arthroplasty shoulder (Left, 6/13/2017); Thyroidectomy (Left, 5/27/2020); and Davinci Prostatectomy, Lymphadenectomy (N/A, 4/7/2021).    Family History  His family history includes Alcoholism in his father, maternal grandfather, and paternal grandfather; Asthma in his niece; Cancer in his cousin and maternal grandmother; Cerebrovascular Disease in his paternal grandmother; Deep Vein Thrombosis in his brother; Factor V Leiden deficiency in his brother; Hypertension in his brother, father, and mother; Migraines in his brother; Other - See Comments in his nephew and niece; Skin Cancer in his mother; Testicular cancer in his brother; Thyroid Disease in an other family member; Unknown/Adopted in his paternal half-sister.     Social History:  Work:  - [employer]   He  reports that he has never smoked. He has never used smokeless tobacco. He reports current alcohol use. He reports that he does not use drugs.      Current Medications:   He has a current medication list which includes the following prescription(s): acetaminophen, albuterol, ammonium lactate, aspirin-acetaminophen-caffeine, atorvastatin, camphor-menthol, cholecalciferol, cyanocobalamin, diltiazem er coated beads, fexofenadine, gabapentin, HEMP OIL OR EXTRACT OR OTHER CBD CANNABINOID, NOT MEDICAL CANNABIS,, hydrochlorothiazide, hydrocortisone, ibuprofen, meclizine, melatonin, NONFORMULARY, omega 3, rizatriptan, sildenafil, zolpidem, and menthol (topical analgesic).     Allergies:    -- Amoxicillin  -- Diarrhea    PHYSICAL EXAMINATION:  BP (!) 155/101   Pulse 76   Resp 16   SpO2 98%    General: Pleasant, straightforward, WDWN individual.  Mental Status: Pleasant, direct, appropriate mood and affect  Resp: breathing is unlabored without audible wheeze  Vascular: Palpable pedal pulses, no cyanosis, no venous stasis changes  Heme: no visible ecchymosis or erythema on extremities  Skin: No notable rash    Neurologic:  Strength: All major muscle groups of the bilateral lower extremities have normal and symmetric muscle strength EXCEPT hip abd 4+/5    Sensation: SILT in lower extremities bilaterally L3-S1     DTRs: bilateral lower extremity stretch reflexes are equal and symmetric   Clonus: none    Gait: reveals normal sagar and stride     Musculoskeletal:  Lumbar Spine: Mildly reduced  ROM and minimal pain with extension otherwise full lumbar ROM,  Left GMax/piriformis tender to palpation, very sl post pelvic tilt, facet loading neg, Str Leg Raise negative, hip provocative maneuvers negative except with tight hip flexors.   SI joint maneuvers negative - however, he does have slight anterior innominate rotation on the left          ASSESSMENT:  Miles Valencia is a pleasant 56 year old male who presents with:    #. Lumbar spondylosis with left lumbar radicular pain (L5 through history) in the setting of L4-5 NFS with underlying Gr1 anterolisthesis that has borderline motion with flexion/extension.   #. Consider gluteal myofascial pain v piriformis muscle pain/syndrome , particularly for localized pain v radiculitis and muscle membrane irritability     PLAN:  - Reviewed HEP. Stressed the importance of home exercise program when it comes to achieving meaningful, long-lasting pain relief    - Discussed Left L5-S1 TFESI for modulation of pain. Patient states symptoms are relatively stable and manageable at the moment.   - Can trial flexeril 5-15mg at bedtime, scheduled and as discussed.  - RTC x3mos.     Ready to  learn, no apparent learning barriers.  Education provided on treatment plan according to patient's preferred learning style.  Patient verbalizes understanding.   __________________________________  Esther Kwok MD  Physical Medicine & Rehabilitation        40 minutes spent on the date of the encounter doing chart review, review of test results, patient visit and documentation           Esther Kwok MD

## 2021-12-20 ENCOUNTER — IMMUNIZATION (OUTPATIENT)
Dept: NURSING | Facility: CLINIC | Age: 56
End: 2021-12-20
Payer: COMMERCIAL

## 2021-12-20 PROCEDURE — 91300 PR COVID VAC PFIZER DIL RECON 30 MCG/0.3 ML IM: CPT

## 2021-12-20 PROCEDURE — 0004A PR COVID VAC PFIZER DIL RECON 30 MCG/0.3 ML IM: CPT

## 2021-12-23 ENCOUNTER — THERAPY VISIT (OUTPATIENT)
Dept: PHYSICAL THERAPY | Facility: CLINIC | Age: 56
End: 2021-12-23
Payer: COMMERCIAL

## 2021-12-23 DIAGNOSIS — M54.16 LUMBAR RADICULOPATHY: ICD-10-CM

## 2021-12-23 PROCEDURE — 97112 NEUROMUSCULAR REEDUCATION: CPT | Mod: GP | Performed by: PHYSICAL THERAPIST

## 2021-12-23 PROCEDURE — 97110 THERAPEUTIC EXERCISES: CPT | Mod: GP | Performed by: PHYSICAL THERAPIST

## 2021-12-23 NOTE — LETTER
[facility]  [address] [place] [address]  [phone]    2021    Re: Miles CHICHO Erik   :   1965  MRN:  [id number]   REFERRING PHYSICIAN:   Katiuska [facility]  Date of Initial Evaluation:  21  Visits:  Rxs Used: 8  Reason for Referral:  Lumbar radiculopathy    EVALUATION SUMMARY     PROGRESS  REPORT  Progress reporting period is from 10/4/21 to 21.       SUBJECTIVE  Subjective changes noted by patient:  Pt is feeling better. He has started taking prescribed muscle relaxer meds which has helps. Locates pain over L low back and buttock. His pain is worse in morning. His pain increases with sitting on sofa, prolonged sitting and jogging. His pain is better with moving and stretching. Pt has been inconsistent with HEP over past month.       Current Pain level: 1-2/10.     Initial Pain level: 3/10.   Changes in function:  Yes (See Goal flowsheet attached for changes in current functional level)  Adverse reaction to treatment or activity: None    OBJECTIVE  Changes noted in objective findings:  Yes, see objective findings.    Objective:  Lumbar/SI Evaluation  ROM:    AROM Lumbar:   Flexion:          WNL -  Ext:                    WNL -   Side Bend:        Left:     Right:   Rotation:           Left:     Right:   Side Glide:        Left:  WNL + L low back    Right:  WNL -        Glory Lumbar Evaluation  Static Tests:  Lying Prone in Extension: prone and STEPHANIE: produce central and B low back, no worse, ROM improved  Principle of Treatment:  Posture Correction: posture correction  Extension: prone and STEPHANIE  Re: Miles Valencia   :   1965    ASSESSMENT/PLAN  Updated problem list and treatment plan: Diagnosis 1:  Lumbar radiculopathy  Pain -  hot/cold therapy, manual therapy, self management, education, directional preference exercise and home program  Decreased  ROM/flexibility - manual therapy, therapeutic exercise and home program  Decreased proprioception - neuro re-education, therapeutic activities and home program  Impaired muscle performance - neuro re-education and home program  Decreased function - therapeutic activities and home program  Impaired posture - neuro re-education and home program  STG/LTGs have been met or progress has been made towards goals:  Yes (See Goal flow sheet completed today.)  Assessment of Progress: The patient's condition is improving.  Self Management Plans:  Patient has been instructed in a home treatment program.  Patient  has been instructed in self management of symptoms.  I have re-evaluated this patient and find that the nature, scope, duration and intensity of the therapy is appropriate for the medical condition of the patient.  Miles continues to require the following intervention to meet STG and LTG's:  PT    Recommendations:  This patient would benefit from continued therapy.     Frequency:  1 X every 4 weeks, once daily  Duration:  for 12 weeks    Thank you for your referral.    INQUIRIES  Therapist: Bill Gutierrez, DPELSI, ATC, Cert. [place]  [facility] [address] [place] [address]  Phone: [phone]  Fax: [phone]

## 2021-12-23 NOTE — PROGRESS NOTES
Subjective:  The history is provided by the patient. No  was used.     Physical Exam                    Objective:  System         Lumbar/SI Evaluation  ROM:    AROM Lumbar:   Flexion:          WNL -  Ext:                    WNL -   Side Bend:        Left:     Right:   Rotation:           Left:     Right:   Side Glide:        Left:  WNL + L low back    Right:  WNL -                                                                           Glory Lumbar Evaluation          Static Tests:          Lying Prone in Extension: prone and STEPHANIE: produce central and B low back, no worse, ROM improved      Principle of Treatment:  Posture Correction: posture correction    Extension: prone and STEPHANIE                                           ROS    Assessment/Plan:    PROGRESS  REPORT    Progress reporting period is from 10/4/21 to 12/23/21.       SUBJECTIVE  Subjective changes noted by patient:  Pt is feeling better. He has started taking prescribed muscle relaxer meds which has helps. Locates pain over L low back and buttock. His pain is worse in morning. His pain increases with sitting on sofa, prolonged sitting and jogging. His pain is better with moving and stretching. Pt has been inconsistent with HEP over past month.       Current Pain level: 1-2/10.     Initial Pain level: 3/10.   Changes in function:  Yes (See Goal flowsheet attached for changes in current functional level)  Adverse reaction to treatment or activity: None    OBJECTIVE  Changes noted in objective findings:  Yes, see objective findings.    ASSESSMENT/PLAN  Updated problem list and treatment plan: Diagnosis 1:  Lumbar radiculopathy  Pain -  hot/cold therapy, manual therapy, self management, education, directional preference exercise and home program  Decreased ROM/flexibility - manual therapy, therapeutic exercise and home program  Decreased proprioception - neuro re-education, therapeutic activities and home program  Impaired muscle  performance - neuro re-education and home program  Decreased function - therapeutic activities and home program  Impaired posture - neuro re-education and home program  STG/LTGs have been met or progress has been made towards goals:  Yes (See Goal flow sheet completed today.)  Assessment of Progress: The patient's condition is improving.  Self Management Plans:  Patient has been instructed in a home treatment program.  Patient  has been instructed in self management of symptoms.  I have re-evaluated this patient and find that the nature, scope, duration and intensity of the therapy is appropriate for the medical condition of the patient.  Miles continues to require the following intervention to meet STG and LTG's:  PT    Recommendations:  This patient would benefit from continued therapy.     Frequency:  1 X every 4 weeks, once daily  Duration:  for 12 weeks        Please refer to the daily flowsheet for treatment today, total treatment time and time spent performing 1:1 timed codes.

## 2021-12-29 ENCOUNTER — TELEPHONE (OUTPATIENT)
Dept: INTERNAL MEDICINE | Facility: CLINIC | Age: 56
End: 2021-12-29
Payer: COMMERCIAL

## 2021-12-29 NOTE — TELEPHONE ENCOUNTER
Pt informed that we do not have an order for shingrix and I dont see a primary provider in the [facility] network. Pt informed to go through a pharmacy. Pt will try to get an order placed through a family provider he saw through [facility].

## 2022-01-21 ENCOUNTER — THERAPY VISIT (OUTPATIENT)
Dept: PHYSICAL THERAPY | Facility: CLINIC | Age: 57
End: 2022-01-21
Payer: COMMERCIAL

## 2022-01-21 DIAGNOSIS — M54.16 LUMBAR RADICULOPATHY: ICD-10-CM

## 2022-01-21 PROCEDURE — 97112 NEUROMUSCULAR REEDUCATION: CPT | Mod: GP | Performed by: PHYSICAL THERAPIST

## 2022-01-21 PROCEDURE — 97110 THERAPEUTIC EXERCISES: CPT | Mod: GP | Performed by: PHYSICAL THERAPIST

## 2022-02-11 ENCOUNTER — PRE VISIT (OUTPATIENT)
Dept: UROLOGY | Facility: CLINIC | Age: 57
End: 2022-02-11
Payer: COMMERCIAL

## 2022-02-11 NOTE — TELEPHONE ENCOUNTER
Reason for visit: 3 month PSA check      Dx/Hx/Sx: prostate cancer s/p RALP    Records/imaging/labs/orders: PSA in epic    At Rooming: video visit

## 2022-02-16 ENCOUNTER — LAB (OUTPATIENT)
Dept: LAB | Facility: CLINIC | Age: 57
End: 2022-02-16
Attending: UROLOGY
Payer: COMMERCIAL

## 2022-02-16 DIAGNOSIS — C61 PROSTATE CANCER (H): ICD-10-CM

## 2022-02-16 LAB — PSA SERPL-MCNC: 0.04 UG/L (ref 0–4)

## 2022-02-16 PROCEDURE — 84153 ASSAY OF PSA TOTAL: CPT | Performed by: PATHOLOGY

## 2022-02-16 PROCEDURE — 36415 COLL VENOUS BLD VENIPUNCTURE: CPT | Performed by: PATHOLOGY

## 2022-02-17 ENCOUNTER — THERAPY VISIT (OUTPATIENT)
Dept: PHYSICAL THERAPY | Facility: CLINIC | Age: 57
End: 2022-02-17
Payer: COMMERCIAL

## 2022-02-17 DIAGNOSIS — M54.16 LUMBAR RADICULOPATHY: ICD-10-CM

## 2022-02-17 PROCEDURE — 97112 NEUROMUSCULAR REEDUCATION: CPT | Mod: GP | Performed by: PHYSICAL THERAPIST

## 2022-02-17 PROCEDURE — 97140 MANUAL THERAPY 1/> REGIONS: CPT | Mod: GP | Performed by: PHYSICAL THERAPIST

## 2022-02-17 PROCEDURE — 97110 THERAPEUTIC EXERCISES: CPT | Mod: GP | Performed by: PHYSICAL THERAPIST

## 2022-02-21 ENCOUNTER — VIRTUAL VISIT (OUTPATIENT)
Dept: UROLOGY | Facility: CLINIC | Age: 57
End: 2022-02-21
Payer: COMMERCIAL

## 2022-02-21 DIAGNOSIS — C61 PROSTATE CANCER (H): Primary | ICD-10-CM

## 2022-02-21 PROCEDURE — 99213 OFFICE O/P EST LOW 20 MIN: CPT | Mod: 95 | Performed by: UROLOGY

## 2022-02-21 RX ORDER — LISINOPRIL 10 MG/1
10 TABLET ORAL DAILY
COMMUNITY
Start: 2022-02-10 | End: 2023-01-09

## 2022-02-21 NOTE — PROGRESS NOTES
Miles is a 56 year old who is being evaluated via a billable video visit.      How would you like to obtain your AVS? [employer]  If the video visit is dropped, the invitation should be resent by: Text to cell phone: [phone]  Will anyone else be joining your video visit? No      Video Start Time: 1:03pm  Video-Visit Details  Mr. Miles Valencia is a 56 year old male with a hx of prostate cancer who underwent RALP on 4/7/21. Pathology from this shows G 3+4=7 pT2 N0Mx with negative margins.  PSA  from 7/19/21 is 0.02 ng/ml, and 0.09 ng/mL from 10/7/21, and 0.04 ng/mL from 10/28/21.  Patient notes no incontinence but does have some climacturia.  Erections are not as strong as they used to be, but currently this is not problematic.        OBJECTIVE:  PSA from 2/16/22 is 0.04 ng/mL      ASSESSMENT AND PLAN:   Over half of today's 15-minute visit was spent reviewing the chart, resiults and counseling the patient regarding his prostate cancer.  PSA is stable.  No need for intervention at this point.  Will recheck PSA in 3 months.        Type of service:  Video Visit    Video End Time:1:11pm    Originating Location (pt. Location): Home    Distant Location (provider location):  [facility] [place]     Platform used for Video Visit: [employer]

## 2022-02-21 NOTE — LETTER
2/21/2022       RE: Miles Valencia  [address] [place] [address]     Dear Colleague,    Thank you for referring your patient, Miles Valencia, to the [facility] [place] at [facility]. Please see a copy of my visit note below.    Miles is a 56 year old who is being evaluated via a billable video visit.      How would you like to obtain your AVS? MyChart  If the video visit is dropped, the invitation should be resent by: Text to cell phone: [phone]  Will anyone else be joining your video visit? No      Video Start Time: 1:03pm  Video-Visit Details  Mr. Miles Valencia is a 56 year old male with a hx of prostate cancer who underwent RALP on 4/7/21. Pathology from this shows G 3+4=7 pT2 N0Mx with negative margins.  PSA  from 7/19/21 is 0.02 ng/ml, and 0.09 ng/mL from 10/7/21, and 0.04 ng/mL from 10/28/21.  Patient notes no incontinence but does have some climacturia.  Erections are not as strong as they used to be, but currently this is not problematic.        OBJECTIVE:  PSA from 2/16/22 is 0.04 ng/mL      ASSESSMENT AND PLAN:   Over half of today's 15-minute visit was spent reviewing the chart, resiults and counseling the patient regarding his prostate cancer.  PSA is stable.  No need for intervention at this point.  Will recheck PSA in 3 months.        Type of service:  Video Visit    Video End Time:1:11pm    Originating Location (pt. Location): Home    Distant Location (provider location):  [facility] [place]     Platform used for Video Visit: [employer]    Sincerely,    Eber Pereyra MD

## 2022-02-22 NOTE — PATIENT INSTRUCTIONS
Please follow up in 3 months with a PSA beforehand.     It was a pleasure meeting with you today.  Thank you for allowing me and my team the privilege of caring for you today.  YOU are the reason we are here, and I truly hope we provided you with the excellent service you deserve.  Please let us know if there is anything else we can do for you so that we can be sure you are leaving completely satisfied with your care experience.

## 2022-03-07 ENCOUNTER — OFFICE VISIT (OUTPATIENT)
Dept: PHYSICAL MEDICINE AND REHAB | Facility: CLINIC | Age: 57
End: 2022-03-07
Payer: COMMERCIAL

## 2022-03-07 VITALS
SYSTOLIC BLOOD PRESSURE: 128 MMHG | RESPIRATION RATE: 16 BRPM | HEART RATE: 74 BPM | DIASTOLIC BLOOD PRESSURE: 75 MMHG | OXYGEN SATURATION: 97 %

## 2022-03-07 DIAGNOSIS — G89.29 CHRONIC LEFT-SIDED LOW BACK PAIN WITH LEFT-SIDED SCIATICA: Primary | ICD-10-CM

## 2022-03-07 DIAGNOSIS — M54.16 LUMBAR RADICULITIS: ICD-10-CM

## 2022-03-07 DIAGNOSIS — G57.02 PIRIFORMIS SYNDROME OF LEFT SIDE: ICD-10-CM

## 2022-03-07 DIAGNOSIS — M54.42 CHRONIC LEFT-SIDED LOW BACK PAIN WITH LEFT-SIDED SCIATICA: Primary | ICD-10-CM

## 2022-03-07 PROCEDURE — 99213 OFFICE O/P EST LOW 20 MIN: CPT | Performed by: PHYSICAL MEDICINE & REHABILITATION

## 2022-03-07 ASSESSMENT — PAIN SCALES - GENERAL: PAINLEVEL: MILD PAIN (2)

## 2022-03-07 NOTE — LETTER
3/7/2022       RE: Miles Valencia  [address] [place] [address]     Dear Colleague,    Thank you for referring your patient, Miles Valencia, to the [facility] PHYSICAL MEDICINE AND REHABILITATION CLINIC [place] at [facility]. Please see a copy of my visit note below.    PM&R Follow-Up Visit -     Date of Initial Visit: 12/6/2021  LOV: 12/6/2021  TD: 3/7/2022    Recall: Miles Valencia is a 56 year old male who follows up for lumbosacral radiculopathy    INTERVAL HISTORY:  Patient was seen for his initial evaluation December 6, 2021.  He was referred by my colleague in neurosurgery, Dr. Ragsdale for evaluation and consideration of interventional procedures for modulation of pain.  At that visit, he was doing well with physical therapy and deferred any additional injection therapies.  He was asked to continue physical therapy with transition to home exercise program.  We offered him a trial of Flexeril 5 to 15 mg scheduled at bedtime.  He returns today for 3-month follow-up visit.    Since last time, he reports doing better overall.  Pain and symptoms have reduced.  He has continued physical therapy and continues his home exercise program routinely.  He plans for additional PT approximately once per month for maintenance and check-in.  He continues to report some left-sided paresthesias extending down to the level of the foot/ankle when he lays on his left side.  This does tend to resolve but can last up to 1 day.  He denies any additional worsening of his paresthesias and also denies weakness affecting his left lower extremity.    He did take Flexeril routinely for several weeks and has continued taking Flexeril on an as needed basis which has been helpful.    RECALL HISTORY OF PRESENT ILLNESS:  Miles Valencia is a 56 year old male who presents with a chief complaint of left sided low back and lower extremity pain.     Pain began around Winter/Spring  2020 around the beginning of the pandemic which resolved around that summer. Over the last year, he has had continued pain that has been more persistent. He denies accident, injury or trauma but had been more active with exercising (eg [employer]); he had started exercising at home during the COVID pandemic and running around his neighborhood, for example.     He has more chronic and constant symptoms affecting his low back on the left, but when he does experience pain affecting the LLE it is more severe. Radicular symptoms travel to the posterior thigh, lateral leg and dorsum and plantar surface of the foot. He does numbness/tingling affecting his foot; described as constant and intermittently sharp in nature. Pain is reported as 3/10 at rest today and up to 5/10 at worst in the last week. Symptoms are worse with running, lying down eduardo at night ; and improved with medication, stretch/HEP. He does report significant pain-related sleep disturbance. He does have a history of restless leg syndrome and unsure if this is contributing.     PRIOR INJURIES/TREATMENT:   Ice/Heat: +ice  Physical Therapy:   He has been going to PT following prostate cancer diagnosis and prostatectomy in April and pelvic floor therapy which transitioned to current Spine PT -- overall having improvement.       - Current Pain Medications -   NSAIDs - Naproxen prn   Tylenol   Gabapentin 600mg at bedtime   Flexeril prn     Prior Procedures:  Date      Procedure     Improvement (%)  None         Prior Related Surgery: none   Other (acupuncture, OMT, CMM, TENS, DME, etc.):   Massage gun    Specialists Seen - (with most recent, available notes and clinic visits reviewed)   1. Neurosurgery - Dr. Larry Ragsdale      IMAGING - reviewed   10/01/2021 MR L spine   Findings:   There are 5 lumbar-type vertebrae assumed for the purposes of this  dictation.  The tip of the conus medullaris is at L1.       6 mm anterolisthesis at L4-5.  There is mild  disc height narrowing and  disc desiccation at L4-5 and L5-S1.  Normal marrow signal.     On a level by level basis:  T12-L1: No spinal canal or neuroforaminal stenosis.   L1-2: No spinal canal or neuroforaminal stenosis.   L2-3: No spinal canal or neuroforaminal stenosis.   L3-4: Bilateral facet hypertrophy. No spinal canal or neural foraminal  stenosis.   L4-5: Disc bulge and bilateral facet hypertrophy. Anterolisthesis  contributes to the mild to moderate bilateral neural foraminal  stenosis and abutment of the exiting nerve root on the right. Mild  spinal canal stenosis. Dorsal synovial cysts at that level.  L5-S1: Disc bulge and central tiny protrusion. Bilateral facet  hypertrophy. Mild left neural foraminal stenosis. No spinal canal or  right neuroforaminal stenosis.     Paraspinous tissues are within normal limits.                                                                   Impression:   1. 6 mm anterolisthesis at L4-5.  2. Multilevel lumbar spondylosis, most pronounced at L4-5 with mild to  moderate bilateral neural foraminal and mild spinal canal stenosis.     10/15/21 XR L spine  IMPRESSION: Grade 1 spondylolisthesis of L4 on L5 with borderline  motion between flexion and extension.    Review Of Systems:  I am responding to those symptoms which are directly relevant to the specific indication for my consultation. I recommend that the patient follow up with their primary or referring provider to pursue any other symptoms which may be of concern.       Medical History:  He  has a past medical history of Achilles bursitis or tendinitis (5/4/2014), Allergic rhinitis, Asthma, Benign positional vertigo, Congestion, Hypertension, Low back pain, Migraine, Multinodular goiter, Osteoarthritis, Pain in joint, shoulder region (4/18/2014), Prostate cancer (H), RLS (restless legs syndrome), and Sleep problems.     He  has a past surgical history that includes Colonoscopy (N/A, 3/21/2016); biopsy; Arthroplasty  shoulder (Left, 6/13/2017); Thyroidectomy (Left, 5/27/2020); and Davinci Prostatectomy, Lymphadenectomy (N/A, 4/7/2021).    Family History  His family history includes Alcoholism in his father, maternal grandfather, and paternal grandfather; Asthma in his niece; Cancer in his cousin and maternal grandmother; Cerebrovascular Disease in his paternal grandmother; Deep Vein Thrombosis in his brother; Factor V Leiden deficiency in his brother; Hypertension in his brother, father, and mother; Migraines in his brother; Other - See Comments in his nephew and niece; Skin Cancer in his mother; Testicular cancer in his brother; Thyroid Disease in an other family member; Unknown/Adopted in his paternal half-sister.     Social History:  Work:  [employer]   He  reports that he has never smoked. He has never used smokeless tobacco. He reports current alcohol use. He reports that he does not use drugs.      Current Medications:   He has a current medication list which includes the following prescription(s): acetaminophen, albuterol, ammonium lactate, aspirin-acetaminophen-caffeine, atorvastatin, camphor-menthol, cholecalciferol, cyanocobalamin, cyclobenzaprine, diazepam, diltiazem er coated beads, fexofenadine, gabapentin, HEMP OIL OR EXTRACT OR OTHER CBD CANNABINOID, NOT MEDICAL CANNABIS,, hydrochlorothiazide, hydrocortisone, ibuprofen, lisinopril, meclizine, melatonin, menthol (topical analgesic), NONFORMULARY, omega 3, rizatriptan, sildenafil, and zolpidem.       Allergies:    -- Amoxicillin -- Diarrhea    PHYSICAL EXAMINATION:  /75   Pulse 74   Resp 16   SpO2 97%    General: Pleasant, straightforward, WDWN individual.  Mental Status: Pleasant, direct, appropriate mood and affect  Resp: breathing is unlabored without audible wheeze  Vascular: Palpable pedal pulses, no cyanosis, no venous stasis changes  Heme: no visible ecchymosis or erythema on extremities  Skin: No notable  rash    Neurologic:  Strength: All major muscle groups of the bilateral lower extremities have normal and symmetric muscle strength EXCEPT hip abd 4+/5     Gait: reveals normal sagar and stride     Musculoskeletal:  Lumbar Spine: Mildly reduced  ROM and minimal pain with extension otherwise full lumbar ROM,  Left GMax/piriformis tender to palpation     ASSESSMENT:  Miles Valencia is a pleasant 56 year old male who presents with:    #. Lumbar spondylosis with left lumbar radicular pain (L5 through history) in the setting of L4-5 NFS with underlying Gr1 anterolisthesis that has borderline motion with flexion/extension.   #. Consider gluteal myofascial pain v piriformis muscle pain/syndrome , particularly for localized pain v radiculitis and muscle membrane irritability     Overall, symptoms are improved with PT and HEP.     PLAN:  - Continue scheduled PT and HEP.   - Re-iterated physical symptoms that should prompt ED visit: changes in bowel/bladder, loss strength, changes in sensation (ie new or worsening numbness/tingling), sudden increase in pain.   - Can refill flexeril as needed   - RTC prn     Ready to learn, no apparent learning barriers.  Education provided on treatment plan according to patient's preferred learning style.  Patient verbalizes understanding.   __________________________________  Esther Kwok MD  Physical Medicine & Rehabilitation      20 minutes spent on the date of the encounter doing chart review, patient visit and documentation

## 2022-03-07 NOTE — PROGRESS NOTES
PM&R Follow-Up Visit -     Date of Initial Visit: 12/6/2021  LOV: 12/6/2021  TD: 3/7/2022    Recall: Miles Valencia is a 56 year old male who follows up for lumbosacral radiculopathy    INTERVAL HISTORY:  Patient was seen for his initial evaluation December 6, 2021.  He was referred by my colleague in neurosurgery, Dr. Ragsdale for evaluation and consideration of interventional procedures for modulation of pain.  At that visit, he was doing well with physical therapy and deferred any additional injection therapies.  He was asked to continue physical therapy with transition to home exercise program.  We offered him a trial of Flexeril 5 to 15 mg scheduled at bedtime.  He returns today for 3-month follow-up visit.    Since last time, he reports doing better overall.  Pain and symptoms have reduced.  He has continued physical therapy and continues his home exercise program routinely.  He plans for additional PT approximately once per month for maintenance and check-in.  He continues to report some left-sided paresthesias extending down to the level of the foot/ankle when he lays on his left side.  This does tend to resolve but can last up to 1 day.  He denies any additional worsening of his paresthesias and also denies weakness affecting his left lower extremity.    He did take Flexeril routinely for several weeks and has continued taking Flexeril on an as needed basis which has been helpful.    RECALL HISTORY OF PRESENT ILLNESS:  Miles Valencia is a 56 year old male who presents with a chief complaint of left sided low back and lower extremity pain.     Pain began around Winter/Spring 2020 around the beginning of the pandemic which resolved around that summer. Over the last year, he has had continued pain that has been more persistent. He denies accident, injury or trauma but had been more active with exercising (eg [employer]); he had started exercising at home during the COVID pandemic and running around  his neighborhood, for example.     He has more chronic and constant symptoms affecting his low back on the left, but when he does experience pain affecting the LLE it is more severe. Radicular symptoms travel to the posterior thigh, lateral leg and dorsum and plantar surface of the foot. He does numbness/tingling affecting his foot; described as constant and intermittently sharp in nature. Pain is reported as 3/10 at rest today and up to 5/10 at worst in the last week. Symptoms are worse with running, lying down eduardo at night ; and improved with medication, stretch/HEP. He does report significant pain-related sleep disturbance. He does have a history of restless leg syndrome and unsure if this is contributing.       PRIOR INJURIES/TREATMENT:   Ice/Heat: +ice  Physical Therapy:   He has been going to PT following prostate cancer diagnosis and prostatectomy in April and pelvic floor therapy which transitioned to current Spine PT -- overall having improvement.       - Current Pain Medications -   NSAIDs - Naproxen prn   Tylenol   Gabapentin 600mg at bedtime   Flexeril prn     Prior Procedures:  Date      Procedure     Improvement (%)  None         Prior Related Surgery: none   Other (acupuncture, OMT, CMM, TENS, DME, etc.):   Massage gun    Specialists Seen - (with most recent, available notes and clinic visits reviewed)   1. Neurosurgery - Dr. Larry Ragsdale      IMAGING - reviewed   10/01/2021 MR L spine   Findings:   There are 5 lumbar-type vertebrae assumed for the purposes of this  dictation.  The tip of the conus medullaris is at L1.       6 mm anterolisthesis at L4-5.  There is mild disc height narrowing and  disc desiccation at L4-5 and L5-S1.  Normal marrow signal.     On a level by level basis:  T12-L1: No spinal canal or neuroforaminal stenosis.   L1-2: No spinal canal or neuroforaminal stenosis.   L2-3: No spinal canal or neuroforaminal stenosis.   L3-4: Bilateral facet hypertrophy. No spinal canal or neural  foraminal  stenosis.   L4-5: Disc bulge and bilateral facet hypertrophy. Anterolisthesis  contributes to the mild to moderate bilateral neural foraminal  stenosis and abutment of the exiting nerve root on the right. Mild  spinal canal stenosis. Dorsal synovial cysts at that level.  L5-S1: Disc bulge and central tiny protrusion. Bilateral facet  hypertrophy. Mild left neural foraminal stenosis. No spinal canal or  right neuroforaminal stenosis.     Paraspinous tissues are within normal limits.                                                                   Impression:   1. 6 mm anterolisthesis at L4-5.  2. Multilevel lumbar spondylosis, most pronounced at L4-5 with mild to  moderate bilateral neural foraminal and mild spinal canal stenosis.     10/15/21 XR L spine  IMPRESSION: Grade 1 spondylolisthesis of L4 on L5 with borderline  motion between flexion and extension.    Review Of Systems:  I am responding to those symptoms which are directly relevant to the specific indication for my consultation. I recommend that the patient follow up with their primary or referring provider to pursue any other symptoms which may be of concern.       Medical History:  He  has a past medical history of Achilles bursitis or tendinitis (5/4/2014), Allergic rhinitis, Asthma, Benign positional vertigo, Congestion, Hypertension, Low back pain, Migraine, Multinodular goiter, Osteoarthritis, Pain in joint, shoulder region (4/18/2014), Prostate cancer (H), RLS (restless legs syndrome), and Sleep problems.     He  has a past surgical history that includes Colonoscopy (N/A, 3/21/2016); biopsy; Arthroplasty shoulder (Left, 6/13/2017); Thyroidectomy (Left, 5/27/2020); and Davinci Prostatectomy, Lymphadenectomy (N/A, 4/7/2021).    Family History  His family history includes Alcoholism in his father, maternal grandfather, and paternal grandfather; Asthma in his niece; Cancer in his cousin and maternal grandmother; Cerebrovascular Disease in his  paternal grandmother; Deep Vein Thrombosis in his brother; Factor V Leiden deficiency in his brother; Hypertension in his brother, father, and mother; Migraines in his brother; Other - See Comments in his nephew and niece; Skin Cancer in his mother; Testicular cancer in his brother; Thyroid Disease in an other family member; Unknown/Adopted in his paternal half-sister.     Social History:  Work:  [employer]   He  reports that he has never smoked. He has never used smokeless tobacco. He reports current alcohol use. He reports that he does not use drugs.      Current Medications:   He has a current medication list which includes the following prescription(s): acetaminophen, albuterol, ammonium lactate, aspirin-acetaminophen-caffeine, atorvastatin, camphor-menthol, cholecalciferol, cyanocobalamin, cyclobenzaprine, diazepam, diltiazem er coated beads, fexofenadine, gabapentin, HEMP OIL OR EXTRACT OR OTHER CBD CANNABINOID, NOT MEDICAL CANNABIS,, hydrochlorothiazide, hydrocortisone, ibuprofen, lisinopril, meclizine, melatonin, menthol (topical analgesic), NONFORMULARY, omega 3, rizatriptan, sildenafil, and zolpidem.       Allergies:    -- Amoxicillin -- Diarrhea    PHYSICAL EXAMINATION:  /75   Pulse 74   Resp 16   SpO2 97%    General: Pleasant, straightforward, WDWN individual.  Mental Status: Pleasant, direct, appropriate mood and affect  Resp: breathing is unlabored without audible wheeze  Vascular: Palpable pedal pulses, no cyanosis, no venous stasis changes  Heme: no visible ecchymosis or erythema on extremities  Skin: No notable rash    Neurologic:  Strength: All major muscle groups of the bilateral lower extremities have normal and symmetric muscle strength EXCEPT hip abd 4+/5     Gait: reveals normal sagar and stride     Musculoskeletal:  Lumbar Spine: Mildly reduced  ROM and minimal pain with extension otherwise full lumbar ROM,  Left GMax/piriformis tender to palpation      ASSESSMENT:  Miles Valencia is a pleasant 56 year old male who presents with:    #. Lumbar spondylosis with left lumbar radicular pain (L5 through history) in the setting of L4-5 NFS with underlying Gr1 anterolisthesis that has borderline motion with flexion/extension.   #. Consider gluteal myofascial pain v piriformis muscle pain/syndrome , particularly for localized pain v radiculitis and muscle membrane irritability     Overall, symptoms are improved with PT and HEP.     PLAN:  - Continue scheduled PT and HEP.   - Re-iterated physical symptoms that should prompt ED visit: changes in bowel/bladder, loss strength, changes in sensation (ie new or worsening numbness/tingling), sudden increase in pain.   - Can refill flexeril as needed   - RTC prn     Ready to learn, no apparent learning barriers.  Education provided on treatment plan according to patient's preferred learning style.  Patient verbalizes understanding.   __________________________________  Esther Kwok MD  Physical Medicine & Rehabilitation      20 minutes spent on the date of the encounter doing chart review, patient visit and documentation

## 2022-03-30 ENCOUNTER — PRE VISIT (OUTPATIENT)
Dept: UROLOGY | Facility: CLINIC | Age: 57
End: 2022-03-30
Payer: COMMERCIAL

## 2022-03-30 NOTE — TELEPHONE ENCOUNTER
Reason for visit: 3 month PSA check      Dx/Hx/Sx: prostate cancer     Records/imaging/labs/orders: PSA in epic    At Rooming: video visit

## 2022-05-12 DIAGNOSIS — Z96.612 STATUS POST TOTAL SHOULDER ARTHROPLASTY, LEFT: ICD-10-CM

## 2022-05-12 DIAGNOSIS — M54.42 CHRONIC LEFT-SIDED LOW BACK PAIN WITH LEFT-SIDED SCIATICA: ICD-10-CM

## 2022-05-12 DIAGNOSIS — G89.29 CHRONIC LEFT-SIDED LOW BACK PAIN WITH LEFT-SIDED SCIATICA: ICD-10-CM

## 2022-05-12 NOTE — TELEPHONE ENCOUNTER
Patient requesting ref[facility] be [facility].  We have not received request from the pharmacy.    Medication: cyclobenzaprine (Flexeril) 5 mg  Last refilled: 1/25/22, #60   Last OV: 3/7/22  Next OV: None (per OV notes from 3/7/22, RTC prn)    Sveta Keenan RN on 5/12/2022 at 1:11 PM

## 2022-05-16 RX ORDER — CYCLOBENZAPRINE HCL 5 MG
5-10 TABLET ORAL
Qty: 60 TABLET | Refills: 1 | Status: SHIPPED | OUTPATIENT
Start: 2022-05-16 | End: 2023-03-03

## 2022-06-02 ENCOUNTER — LAB (OUTPATIENT)
Dept: LAB | Facility: CLINIC | Age: 57
End: 2022-06-02
Payer: COMMERCIAL

## 2022-06-02 DIAGNOSIS — C61 PROSTATE CANCER (H): ICD-10-CM

## 2022-06-02 LAB — PSA SERPL-MCNC: 0.04 UG/L (ref 0–4)

## 2022-06-02 PROCEDURE — 36415 COLL VENOUS BLD VENIPUNCTURE: CPT | Performed by: PATHOLOGY

## 2022-06-02 PROCEDURE — 84153 ASSAY OF PSA TOTAL: CPT | Performed by: PATHOLOGY

## 2022-07-11 ENCOUNTER — VIRTUAL VISIT (OUTPATIENT)
Dept: UROLOGY | Facility: CLINIC | Age: 57
End: 2022-07-11
Payer: COMMERCIAL

## 2022-07-11 DIAGNOSIS — C61 PROSTATE CANCER (H): Primary | ICD-10-CM

## 2022-07-11 PROCEDURE — 99213 OFFICE O/P EST LOW 20 MIN: CPT | Mod: 95 | Performed by: UROLOGY

## 2022-07-11 NOTE — PROGRESS NOTES
Miles is a 57 year old who is being evaluated via a billable video visit.      How would you like to obtain your AVS? MyChart  If the video visit is dropped, the invitation should be resent by: Send to e-mail at: [email or web address]  Will anyone else be joining your video visit? No        Video-Visit Details    Video Start Time:5:28pm    Type of service:  Video Visit    Mr. Miles Valencia is a 56 year old male with a hx of prostate cancer who underwent RALP on 4/7/21. Pathology from this shows G 3+4=7 pT2 N0Mx with negative margins.  PSA  from 7/19/21 is 0.02 ng/ml, and 0.09 ng/mL from 10/7/21, and 0.04 ng/mL from 10/28/21.  Patient notes no incontinence but does have some climacturia.  Erections are not as strong as they used to be, but currently this is not problematic.        OBJECTIVE:  PSA from 6/2/22 is 0.04 ng/mL      ASSESSMENT AND PLAN:   Over half of today's 15-minute visit was spent reviewing the chart, resiults and counseling the patient regarding his prostate cancer.  PSA is stable.  No need for intervention at this point.  Will recheck PSA in 6 months.       Video End Time:5:32pm    Originating Location (pt. Location): Home    Distant Location (provider location):  [facility] [place]     Platform used for Video Visit: Face to Face Live

## 2022-07-11 NOTE — LETTER
7/11/2022       RE: Miles Valencia  [address] [place] [address]     Dear Colleague,    Thank you for referring your patient, Miles Valencia, to the [facility] UROLOGY CLINIC [place] at [facility]. Please see a copy of my visit note below.    Miles is a 57 year old who is being evaluated via a billable video visit.      How would you like to obtain your AVS? MyChart  If the video visit is dropped, the invitation should be resent by: Send to e-mail at: [email or web address]  Will anyone else be joining your video visit? No        Video-Visit Details    Video Start Time:5:28pm    Type of service:  Video Visit    Mr. Miles Valencia is a 56 year old male with a hx of prostate cancer who underwent RALP on 4/7/21. Pathology from this shows G 3+4=7 pT2 N0Mx with negative margins.  PSA  from 7/19/21 is 0.02 ng/ml, and 0.09 ng/mL from 10/7/21, and 0.04 ng/mL from 10/28/21.  Patient notes no incontinence but does have some climacturia.  Erections are not as strong as they used to be, but currently this is not problematic.        OBJECTIVE:  PSA from 6/2/22 is 0.04 ng/mL      ASSESSMENT AND PLAN:   Over half of today's 15-minute visit was spent reviewing the chart, resiults and counseling the patient regarding his prostate cancer.  PSA is stable.  No need for intervention at this point.  Will recheck PSA in 6 months.       Video End Time:5:32pm    Originating Location (pt. Location): Home    Distant Location (provider location):  [facility] [place]     Platform used for Video Visit: Wilfredo    Sincerely,    Eber Pereyra MD

## 2022-09-09 ENCOUNTER — MEDICAL CORRESPONDENCE (OUTPATIENT)
Dept: HEALTH INFORMATION MANAGEMENT | Facility: CLINIC | Age: 57
End: 2022-09-09

## 2022-09-09 ENCOUNTER — TRANSFERRED RECORDS (OUTPATIENT)
Dept: HEALTH INFORMATION MANAGEMENT | Facility: CLINIC | Age: 57
End: 2022-09-09

## 2022-09-09 LAB
ALT SERPL-CCNC: 44 U/L (ref 16–63)
AST SERPL-CCNC: 32 U/L (ref 0–55)
CHOLESTEROL (EXTERNAL): 121 MG/DL
CREATININE (EXTERNAL): 1.11 MG/DL (ref 0.7–1.3)
GFR ESTIMATED (EXTERNAL): >60 ML/MIN/1.73M2
GLUCOSE (EXTERNAL): 106 MG/DL (ref 70–124)
HBA1C MFR BLD: 5.7 % (ref 4.3–5.6)
HDLC SERPL-MCNC: 45 MG/DL (ref 40–999)
LDL CHOLESTEROL CALCULATED (EXTERNAL): 48 MG/DL (ref 0–130)
POTASSIUM (EXTERNAL): 4.1 MMOL/L (ref 3.4–5.3)
TRIGLYCERIDES (EXTERNAL): 142 MG/DL (ref 20–150)
TSH SERPL-ACNC: 0.9 MIU/L (ref 0.4–4.5)

## 2022-09-12 ENCOUNTER — TRANSFERRED RECORDS (OUTPATIENT)
Dept: HEALTH INFORMATION MANAGEMENT | Facility: CLINIC | Age: 57
End: 2022-09-12

## 2022-09-14 ENCOUNTER — TRANSCRIBE ORDERS (OUTPATIENT)
Dept: OTHER | Age: 57
End: 2022-09-14

## 2022-09-14 DIAGNOSIS — T78.40XA ALLERGY: Primary | ICD-10-CM

## 2022-09-23 ENCOUNTER — ANCILLARY PROCEDURE (OUTPATIENT)
Dept: ULTRASOUND IMAGING | Facility: CLINIC | Age: 57
End: 2022-09-23
Attending: NURSE PRACTITIONER
Payer: COMMERCIAL

## 2022-09-23 DIAGNOSIS — E04.1 RIGHT THYROID NODULE: ICD-10-CM

## 2022-09-23 PROCEDURE — 76536 US EXAM OF HEAD AND NECK: CPT | Mod: GC | Performed by: RADIOLOGY

## 2022-12-13 ENCOUNTER — OFFICE VISIT (OUTPATIENT)
Dept: PULMONOLOGY | Facility: CLINIC | Age: 57
End: 2022-12-13
Payer: COMMERCIAL

## 2022-12-13 ENCOUNTER — OFFICE VISIT (OUTPATIENT)
Dept: ALLERGY | Facility: CLINIC | Age: 57
End: 2022-12-13
Payer: COMMERCIAL

## 2022-12-13 VITALS
HEART RATE: 78 BPM | WEIGHT: 197.7 LBS | DIASTOLIC BLOOD PRESSURE: 87 MMHG | OXYGEN SATURATION: 98 % | SYSTOLIC BLOOD PRESSURE: 134 MMHG | BODY MASS INDEX: 29.2 KG/M2

## 2022-12-13 DIAGNOSIS — R21 RASH: ICD-10-CM

## 2022-12-13 DIAGNOSIS — J30.89 OTHER ALLERGIC RHINITIS: ICD-10-CM

## 2022-12-13 DIAGNOSIS — R05.3 CHRONIC COUGH: Primary | ICD-10-CM

## 2022-12-13 DIAGNOSIS — R05.3 CHRONIC COUGH: ICD-10-CM

## 2022-12-13 PROCEDURE — 95004 PERQ TESTS W/ALRGNC XTRCS: CPT | Performed by: INTERNAL MEDICINE

## 2022-12-13 PROCEDURE — 94375 RESPIRATORY FLOW VOLUME LOOP: CPT | Performed by: INTERNAL MEDICINE

## 2022-12-13 PROCEDURE — 99204 OFFICE O/P NEW MOD 45 MIN: CPT | Mod: 25 | Performed by: INTERNAL MEDICINE

## 2022-12-13 RX ORDER — PREDNISONE 20 MG/1
TABLET ORAL
Qty: 10 TABLET | Refills: 1 | Status: SHIPPED | OUTPATIENT
Start: 2022-12-13 | End: 2023-05-26

## 2022-12-13 ASSESSMENT — ENCOUNTER SYMPTOMS
ADENOPATHY: 0
FACIAL SWELLING: 0
MYALGIAS: 0
WHEEZING: 0
COUGH: 1
ACTIVITY CHANGE: 0
HEADACHES: 0
FEVER: 0
CHEST TIGHTNESS: 0
VOMITING: 0
NAUSEA: 0
SINUS PRESSURE: 1
EYE ITCHING: 1
ARTHRALGIAS: 0
FATIGUE: 0
DIARRHEA: 0
SHORTNESS OF BREATH: 1
RHINORRHEA: 1
EYE DISCHARGE: 1
EYE REDNESS: 0
JOINT SWELLING: 0

## 2022-12-13 NOTE — PROGRESS NOTES
Miles Valencia comes into clinic today at the request of Dr. Givens, Ordering Provider for spirometry       This service provided today was under the supervising provider of the day Dr. Givens, who was available if needed.    Gerry Corey, RT

## 2022-12-13 NOTE — PROGRESS NOTES
Miles Valencia was seen in the Allergy Clinic at [facility].    Miles Valencia is a 57 year old male being seen today at the request of Oneyda Jones [facility] in consultation for seasonal allergies.    He is is having symptoms of postnasal drainage and cough and watery eyes.  He has tried antihistamines and nasal steroids without much benefit.    He was also prescribed albuterol for what was considered exercise-induced bronchoconstriction.  He was having cough with exercise.  However he had to have his thyroid removed couple years ago and the exercise related symptoms improved after this procedure.    He does have skin itching in the winter.  He has a rash on his chest that he would like to have evaluated.  This started about 2 weeks ago.  Using chlorhexidine with some improvement.    In June while on a walking tour in [place] he developed a significant cough.  He had started lisinopril in February.  He coughed from approximately June until Thanksgiving and it was to the point of near vomiting.  He stopped lisinopril around Thanksgiving and the cough did decrease but does persist.  The cough continues to wake him up a couple times a week.  The cough is most days.  He is not using his albuterol for this cough.      Past Medical History:   Diagnosis Date     Achilles bursitis or tendinitis 5/4/2014     Allergic rhinitis      Asthma      Benign positional vertigo      Congestion      Hypertension      Low back pain      Migraine      Multinodular goiter      Osteoarthritis      Pain in joint, shoulder region 4/18/2014     Prostate cancer (H)      RLS (restless legs syndrome)      Sleep problems      Family History   Problem Relation Age of Onset     Eczema Mother      Skin Cancer Mother      Hypertension Mother      Hypertension Father      Alcoholism Father      Eczema Brother      Hypertension Brother      Migraines Brother      Testicular cancer Brother      Factor V Leiden  deficiency Brother      Deep Vein Thrombosis Brother      Cancer Maternal Grandmother         Breast     Alcoholism Maternal Grandfather      Cerebrovascular Disease Paternal Grandmother      Alcoholism Paternal Grandfather      Unknown/Adopted Paternal Half-Sister      Other - See Comments Niece         V[place]brands     Asthma Niece      Other - See Comments Nephew         Von [place]brands, Factor V Leiden     Cancer Cousin      Thyroid Disease Other      Anesthesia Reaction No family hx of      Past Surgical History:   Procedure Laterality Date     ARTHROPLASTY SHOULDER Left 6/13/2017    Procedure: ARTHROPLASTY SHOULDER;  Left Total Shoulder Arthroplasty  ;  Surgeon: Jossy Swanson MD;  Location: [facility] OR     BIOPSY      Prostate     COLONOSCOPY N/A 3/21/2016    Procedure: COLONOSCOPY;  Surgeon: Toy Lima MD;  Location:  GI     DAVINCI PROSTATECTOMY, LYMPHADENECTOMY N/A 4/7/2021    Procedure: Robot-assisted laparoscopic radical prostatectomy, pelvic lymphadenectomy,;  Surgeon: Eber Pereyra MD;  Location: [facility] OR     THYROIDECTOMY Left 5/27/2020    Procedure: Left Lobe THYROIDECTOMY;  Surgeon: Evelin Cutler MD;  Location: [facility] OR       ENVIRONMENTAL HISTORY:   Pets inside the house include 1 dog(s) and 1 cat\.  Do you smoke cigarettes or other recreational drugs? No There is/are 0 smokers living in the house. The house does have a damp basement.     SOCIAL HISTORY:   Miles is employed as  at [employer]. He lives with his spouse.      Review of Systems   Constitutional: Negative for activity change, fatigue and fever.   HENT: Positive for postnasal drip, rhinorrhea, sinus pressure and sneezing. Negative for congestion, dental problem, ear pain, facial swelling and nosebleeds.    Eyes: Positive for discharge and itching. Negative for redness.   Respiratory: Positive for cough and shortness of breath. Negative for chest tightness and wheezing.    Cardiovascular: Negative  for chest pain.   Gastrointestinal: Negative for diarrhea, nausea and vomiting.   Musculoskeletal: Negative for arthralgias, joint swelling and myalgias.   Skin: Negative for rash.   Neurological: Negative for headaches.   Hematological: Negative for adenopathy.   Psychiatric/Behavioral: Negative for behavioral problems and self-injury.         Current Outpatient Medications:      acetaminophen (TYLENOL) 325 MG tablet, Take 2 tablets (650 mg) by mouth every 4 hours as needed for other (mild pain), Disp: 30 tablet, Rfl: 0     albuterol (PROAIR HFA/PROVENTIL HFA/VENTOLIN HFA) 108 (90 BASE) MCG/ACT Inhaler, Inhale 2 puffs into the lungs every 4 hours as needed for shortness of breath / dyspnea or wheezing, Disp: , Rfl:      ammonium lactate (AMLACTIN) 12 % cream, Apply topically daily as needed , Disp: , Rfl:      aspirin-acetaminophen-caffeine (EXCEDRIN MIGRAINE) 250-250-65 MG per tablet, Take 2 tablets by mouth every 6 hours as needed for headaches , Disp: , Rfl:      atorvastatin (LIPITOR) 10 MG tablet, Take 10 mg by mouth At Bedtime, Disp: , Rfl:      camphor-menthol (DERMASARRA) 0.5-0.5 % LOTN, Apply topically every 6 hours as needed for skin care, Disp: , Rfl:      cholecalciferol 125 MCG (5000 UT) CAPS, Take 5,000 Units by mouth every morning , Disp: , Rfl:      Cyanocobalamin (VITAMIN B-12 PO), Take 1 tablet by mouth every evening , Disp: , Rfl:      cyclobenzaprine (FLEXERIL) 5 MG tablet, Take 1-2 tablets (5-10 mg) by mouth nightly as needed for muscle spasms, Disp: 60 tablet, Rfl: 1     DIAZEPAM PO, , Disp: , Rfl:      diltiazem ER COATED BEADS (CARDIZEM CD/CARTIA XT) 240 MG 24 hr capsule, Take 240 mg by mouth every morning , Disp: , Rfl:      fexofenadine (ALLEGRA) 180 MG tablet, Take 180 mg by mouth every morning , Disp: , Rfl:      gabapentin (NEURONTIN) 300 MG capsule, Take 1 capsule (300 mg) by mouth 3 times daily (Patient taking differently: Take 600 mg by mouth At Bedtime), Disp: 60 capsule, Rfl: 0      HEMP OIL OR EXTRACT OR OTHER CBD CANNABINOID, NOT MEDICAL CANNABIS,, Take 1 capsule by mouth every evening, Disp: , Rfl:      hydrochlorothiazide (HYDRODIURIL) 25 MG tablet, Take 25 mg by mouth every morning , Disp: , Rfl:      hydrocortisone (CORTAID) 1 % cream, Apply topically 2 times daily as needed, Disp: , Rfl:      ibuprofen (ADVIL/MOTRIN) 200 MG tablet, Take 800 mg by mouth every 8 hours as needed for mild pain , Disp: , Rfl:      meclizine (ANTIVERT) 12.5 MG tablet, Take 2 tablets (25 mg) by mouth 4 times daily as needed for dizziness, Disp: 30 tablet, Rfl: 0     MELATONIN PO, Take 2.5 mg by mouth At Bedtime , Disp: , Rfl:      NONFORMULARY, Magnesium/MSM lotion - Brand: Aincient minerals 2 pumps every evening applied to feet/legs  20 mg elemental magnesium and 25 mg MSM Per 1 pump, Disp: , Rfl:      omega 3 1000 MG CAPS, Take by mouth At Bedtime, Disp: , Rfl:      predniSONE (DELTASONE) 20 MG tablet, Take 2 tabs daily x 5 days, Disp: 10 tablet, Rfl: 1     rizatriptan (MAXALT-MLT) 10 MG ODT, Take 10 mg by mouth at onset of headache for migraine, Disp: , Rfl:      sildenafil (REVATIO) 20 MG tablet, Take 1 tab daily (Patient taking differently: Take 1 tab daily as needed), Disp: 90 tablet, Rfl: 11     zolpidem (AMBIEN) 5 MG tablet, Take 5 mg by mouth nightly as needed for sleep, Disp: , Rfl:      lisinopril (ZESTRIL) 10 MG tablet, Take 10 mg by mouth daily (Patient not taking: Reported on 12/13/2022), Disp: , Rfl:   Allergies   Allergen Reactions     Amoxicillin Diarrhea         EXAM:   /87   Pulse 78   Wt 89.7 kg (197 lb 11.2 oz)   SpO2 98%   BMI 29.20 kg/m      Physical Exam    Constitutional:       General: He is not in acute distress.     Appearance: Normal appearance. He is not ill-appearing.   HENT:      Head: Normocephalic and atraumatic.      Nose: Nose normal. No congestion or rhinorrhea.      Mouth/Throat:      Mouth: Mucous membranes are moist.      Pharynx: Oropharynx is clear. No  posterior oropharyngeal erythema.   Eyes:      General:         Right eye: No discharge.         Left eye: No discharge.   Cardiovascular:      Rate and Rhythm: Normal rate and regular rhythm.      Heart sounds: Normal heart sounds.   Pulmonary:      Effort: Pulmonary effort is normal.      Breath sounds: Normal breath sounds. No wheezing or rhonchi.   Skin:     General: Skin is warm.      Findings: No erythema or rash.   Neurological:      General: No focal deficit present.      Mental Status: He is alert. Mental status is at baseline.   Psychiatric:         Mood and Affect: Mood normal.         Behavior: Behavior normal.     WORKUP: Skin testing was positive to mold and dust mites.  Spirometry was also performed and was within normal limits.  Flow volume loops are also normal.  FEV1 is 101% predicted FVC 89%    ENVIRONMENTAL PERCUTANEOUS SKIN TESTING: ADULT  [facility] 12/13/2022   Consent Y   Ordering Physician Chon   Interpreting Physician Chon   Testing Technician MICHAEL, RN   Location Back   Time start:  1:33 PM   Time End:  1:48 PM   Positive Control: Histatrol*ALK 1 mg/ml 5/6   Negative Control: 50% Glycerin 0   Cat Hair*ALK (10,000 BAU/ml) 0   AP Dog Hair/Dander (1:100 w/v) 0   Dust Mite p. 30,000 AU/ml 28/40   Dust Mite f. (30,000 AU/ml) 23/28   Alex (W/F in millimeters) 0   Poncho Grass (100,000 BAU/mL) 0   Red Cedar (W/F in millimeters) 0   Maple/Darlington (W/F in millimeters) 0   Hackberry (W/F in millimeters) 0   [place] (W/F in millimeters) 0   [place] *ALK (W/F in millimeters) 0   American Elm (W/F in millimeters) 0   Porter (W/F in millimeters) 0   Black [place] (W/F in millimeters) 0   Birch Mix (W/F in millimeters) 0   Hanna (W/F in millimeters) 0   Oak (W/F in millimeters) 0   Cocklebur (W/F in millimeters) 0   Magnolia (W/F in millimeters) 0   White Porfirio (W/F in millimeters) 0   Careless (W/F in millimeters) 0   Nettle (W/F in millimeters) 0   English Plantain (W/F in  millimeters) 0   Kochia (W/F in millimeters) 0   Lamb's Quarter (W/F in millimeters) 0   Marshelder (W/F in millimeters) 0   Ragweed Mix* ALK (W/F in millimeters) 0   Russian Thistle (W/F in millimeters) 0   Sagebrush/Mugwort (W/F in millimeters) 0   Sheep Sorrel (W/F in millimeters) 0   Feather Mix* ALK (W/F in millimeters) 0   Penicillium Mix (1:10 w/v) 4/5   Curvularia spicifera (1:10 w/v) 0   Epicoccum (1:10 w/v) 0   Aspergillus fumigatus (1:10 w/v): 4/5   Alternaria tenius (1:10 w/v) 0   H. Cladosporium (1:10 w/v) 0   Phoma herbarum (1:10 w/v) 0      Verbal consent was obtained for skin testing    ASSESSMENT/PLAN:  Miles Valencia is a 57 year old male seen today for allergy evaluation with postnasal drainage and cough being the primary complaint.  The cough has been significant since June with some improvement after stopping lisinopril.  It is waking him up at night.  He has been taking antihistamines for the postnasal drainage with some improvement.  Skin testing was positive to dust mites and mold today.  Cough variant asthma and allergic rhinitis are likely the cause of his symptoms.    Since his symptoms have been significant for numerous months we will prescribe prednisone.  This also may help the rash on his chest.  He will retry the chlorhexidine for his chest rash.  This does not look like folliculitis at this time but more dermatitis.  Plan to follow-up in 4 weeks.  He will let us know if symptoms improve or worsen with the prednisone.  Continue with Allegra and albuterol as needed.    1. Prednisone as prescribed in AM with food  2. Allegra 180 mg daily, up to twice daily  3. Albuterol 2 puffs every 4 hours as needed    Follow-up in  4 weeks      Thank you for allowing me to participate in the care of Miles Valencia.      I spent 35 minutes on the date of the encounter doing chart review, history and exam, documentation and further coordination as noted above exclusive of separately reported  interpretations    Jeremy Givens MD  Allergy/Immunology  [facility]

## 2022-12-13 NOTE — LETTER
12/13/2022         RE: Miles Valencia  [address] [place] [address]        Dear Colleague,    Thank you for referring your patient, Miles Valencia, to the [facility] SPECIALTY [facility]. Please see a copy of my visit note below.    Miles Valencia was seen in the Allergy Clinic at [facility].    Miles Valencia is a 57 year old male being seen today at the request of Oneyda Jones [facility] in consultation for seasonal allergies.    He is is having symptoms of postnasal drainage and cough and watery eyes.  He has tried antihistamines and nasal steroids without much benefit.    He was also prescribed albuterol for what was considered exercise-induced bronchoconstriction.  He was having cough with exercise.  However he had to have his thyroid removed couple years ago and the exercise related symptoms improved after this procedure.    He does have skin itching in the winter.  He has a rash on his chest that he would like to have evaluated.  This started about 2 weeks ago.  Using chlorhexidine with some improvement.    In June while on a walking tour in [place] he developed a significant cough.  He had started lisinopril in February.  He coughed from approximately June until Thanksgiving and it was to the point of near vomiting.  He stopped lisinopril around Thanksgiving and the cough did decrease but does persist.  The cough continues to wake him up a couple times a week.  The cough is most days.  He is not using his albuterol for this cough.      Past Medical History:   Diagnosis Date     Achilles bursitis or tendinitis 5/4/2014     Allergic rhinitis      Asthma      Benign positional vertigo      Congestion      Hypertension      Low back pain      Migraine      Multinodular goiter      Osteoarthritis      Pain in joint, shoulder region 4/18/2014     Prostate cancer (H)      RLS (restless legs syndrome)      Sleep problems      Family History   Problem  Relation Age of Onset     Eczema Mother      Skin Cancer Mother      Hypertension Mother      Hypertension Father      Alcoholism Father      Eczema Brother      Hypertension Brother      Migraines Brother      Testicular cancer Brother      Factor V Leiden deficiency Brother      Deep Vein Thrombosis Brother      Cancer Maternal Grandmother         Breast     Alcoholism Maternal Grandfather      Cerebrovascular Disease Paternal Grandmother      Alcoholism Paternal Grandfather      Unknown/Adopted Paternal Half-Sister      Other - See Comments Niece         V[place]brands     Asthma Niece      Other - See Comments Nephew         Von [place]brands, Factor V Leiden     Cancer Cousin      Thyroid Disease Other      Anesthesia Reaction No family hx of      Past Surgical History:   Procedure Laterality Date     ARTHROPLASTY SHOULDER Left 6/13/2017    Procedure: ARTHROPLASTY SHOULDER;  Left Total Shoulder Arthroplasty  ;  Surgeon: Jossy Swanson MD;  [facility]: [facility] OR     BIOPSY      Prostate     COLONOSCOPY N/A 3/21/2016    Procedure: COLONOSCOPY;  Surgeon: Toy Lima MD;  Location: [facility] GI     DAVINCI PROSTATECTOMY, LYMPHADENECTOMY N/A 4/7/2021    Procedure: Robot-assisted laparoscopic radical prostatectomy, pelvic lymphadenectomy,;  Surgeon: bEer Pereyra MD;  Location: [facility] OR     THYROIDECTOMY Left 5/27/2020    Procedure: Left Lobe THYROIDECTOMY;  Surgeon: Evelin Cutler MD;  Location: [facility] OR       ENVIRONMENTAL HISTORY:   Pets inside the house include 1 dog(s) and 1 cat\.  Do you smoke cigarettes or other recreational drugs? No There is/are 0 smokers living in the house. The house does have a damp basement.     SOCIAL HISTORY:   Miles is employed as  at [employer]. He lives with his spouse.      Review of Systems   Constitutional: Negative for activity change, fatigue and fever.   HENT: Positive for postnasal drip, rhinorrhea, sinus pressure and sneezing. Negative for  congestion, dental problem, ear pain, facial swelling and nosebleeds.    Eyes: Positive for discharge and itching. Negative for redness.   Respiratory: Positive for cough and shortness of breath. Negative for chest tightness and wheezing.    Cardiovascular: Negative for chest pain.   Gastrointestinal: Negative for diarrhea, nausea and vomiting.   Musculoskeletal: Negative for arthralgias, joint swelling and myalgias.   Skin: Negative for rash.   Neurological: Negative for headaches.   Hematological: Negative for adenopathy.   Psychiatric/Behavioral: Negative for behavioral problems and self-injury.         Current Outpatient Medications:      acetaminophen (TYLENOL) 325 MG tablet, Take 2 tablets (650 mg) by mouth every 4 hours as needed for other (mild pain), Disp: 30 tablet, Rfl: 0     albuterol (PROAIR HFA/PROVENTIL HFA/VENTOLIN HFA) 108 (90 BASE) MCG/ACT Inhaler, Inhale 2 puffs into the lungs every 4 hours as needed for shortness of breath / dyspnea or wheezing, Disp: , Rfl:      ammonium lactate (AMLACTIN) 12 % cream, Apply topically daily as needed , Disp: , Rfl:      aspirin-acetaminophen-caffeine (EXCEDRIN MIGRAINE) 250-250-65 MG per tablet, Take 2 tablets by mouth every 6 hours as needed for headaches , Disp: , Rfl:      atorvastatin (LIPITOR) 10 MG tablet, Take 10 mg by mouth At Bedtime, Disp: , Rfl:      camphor-menthol (DERMASARRA) 0.5-0.5 % LOTN, Apply topically every 6 hours as needed for skin care, Disp: , Rfl:      cholecalciferol 125 MCG (5000 UT) CAPS, Take 5,000 Units by mouth every morning , Disp: , Rfl:      Cyanocobalamin (VITAMIN B-12 PO), Take 1 tablet by mouth every evening , Disp: , Rfl:      cyclobenzaprine (FLEXERIL) 5 MG tablet, Take 1-2 tablets (5-10 mg) by mouth nightly as needed for muscle spasms, Disp: 60 tablet, Rfl: 1     DIAZEPAM PO, , Disp: , Rfl:      diltiazem ER COATED BEADS (CARDIZEM CD/CARTIA XT) 240 MG 24 hr capsule, Take 240 mg by mouth every morning , Disp: , Rfl:       fexofenadine (ALLEGRA) 180 MG tablet, Take 180 mg by mouth every morning , Disp: , Rfl:      gabapentin (NEURONTIN) 300 MG capsule, Take 1 capsule (300 mg) by mouth 3 times daily (Patient taking differently: Take 600 mg by mouth At Bedtime), Disp: 60 capsule, Rfl: 0     HEMP OIL OR EXTRACT OR OTHER CBD CANNABINOID, NOT MEDICAL CANNABIS,, Take 1 capsule by mouth every evening, Disp: , Rfl:      hydrochlorothiazide (HYDRODIURIL) 25 MG tablet, Take 25 mg by mouth every morning , Disp: , Rfl:      hydrocortisone (CORTAID) 1 % cream, Apply topically 2 times daily as needed, Disp: , Rfl:      ibuprofen (ADVIL/MOTRIN) 200 MG tablet, Take 800 mg by mouth every 8 hours as needed for mild pain , Disp: , Rfl:      meclizine (ANTIVERT) 12.5 MG tablet, Take 2 tablets (25 mg) by mouth 4 times daily as needed for dizziness, Disp: 30 tablet, Rfl: 0     MELATONIN PO, Take 2.5 mg by mouth At Bedtime , Disp: , Rfl:      NONFORMULARY, Magnesium/MSM lotion - Brand: Aincient minerals 2 pumps every evening applied to feet/legs  20 mg elemental magnesium and 25 mg MSM Per 1 pump, Disp: , Rfl:      omega 3 1000 MG CAPS, Take by mouth At Bedtime, Disp: , Rfl:      predniSONE (DELTASONE) 20 MG tablet, Take 2 tabs daily x 5 days, Disp: 10 tablet, Rfl: 1     rizatriptan (MAXALT-MLT) 10 MG ODT, Take 10 mg by mouth at onset of headache for migraine, Disp: , Rfl:      sildenafil (REVATIO) 20 MG tablet, Take 1 tab daily (Patient taking differently: Take 1 tab daily as needed), Disp: 90 tablet, Rfl: 11     zolpidem (AMBIEN) 5 MG tablet, Take 5 mg by mouth nightly as needed for sleep, Disp: , Rfl:      lisinopril (ZESTRIL) 10 MG tablet, Take 10 mg by mouth daily (Patient not taking: Reported on 12/13/2022), Disp: , Rfl:   Allergies   Allergen Reactions     Amoxicillin Diarrhea         EXAM:   /87   Pulse 78   Wt 89.7 kg (197 lb 11.2 oz)   SpO2 98%   BMI 29.20 kg/m      Physical Exam    Constitutional:       General: He is not in acute  distress.     Appearance: Normal appearance. He is not ill-appearing.   HENT:      Head: Normocephalic and atraumatic.      Nose: Nose normal. No congestion or rhinorrhea.      Mouth/Throat:      Mouth: Mucous membranes are moist.      Pharynx: Oropharynx is clear. No posterior oropharyngeal erythema.   Eyes:      General:         Right eye: No discharge.         Left eye: No discharge.   Cardiovascular:      Rate and Rhythm: Normal rate and regular rhythm.      Heart sounds: Normal heart sounds.   Pulmonary:      Effort: Pulmonary effort is normal.      Breath sounds: Normal breath sounds. No wheezing or rhonchi.   Skin:     General: Skin is warm.      Findings: No erythema or rash.   Neurological:      General: No focal deficit present.      Mental Status: He is alert. Mental status is at baseline.   Psychiatric:         Mood and Affect: Mood normal.         Behavior: Behavior normal.     WORKUP: Skin testing was positive to mold and dust mites.  Spirometry was also performed and was within normal limits.  Flow volume loops are also normal.  FEV1 is 101% predicted FVC 89%    ENVIRONMENTAL PERCUTANEOUS SKIN TESTING: ADULT  [facility] 12/13/2022   Consent Y   Ordering Physician Chon   Interpreting Physician Chon   Testing Technician CV, RN   Location Back   Time start:  1:33 PM   Time End:  1:48 PM   Positive Control: Histatrol*ALK 1 mg/ml 5/6   Negative Control: 50% Glycerin 0   Cat Hair*ALK (10,000 BAU/ml) 0   AP Dog Hair/Dander (1:100 w/v) 0   Dust Mite p. 30,000 AU/ml 28/40   Dust Mite f. (30,000 AU/ml) 23/28   Alex (W/F in millimeters) 0   Poncho Grass (100,000 BAU/mL) 0   Red Cedar (W/F in millimeters) 0   Maple/Stanton (W/F in millimeters) 0   Hackberry (W/F in millimeters) 0   H[place] River (W/F in millimeters) 0   [place] *ALK (W/F in millimeters) 0   American Elm (W/F in millimeters) 0   [place]se[place] (W/F in millimeters) 0   Black [place] (W/F in millimeters) 0   Birch Mix (W/F in millimeters)  0   Delta (W/F in millimeters) 0   Oak (W/F in millimeters) 0   Cocklebur (W/F in millimeters) 0   Tumacacori (W/F in millimeters) 0   White Porfirio (W/F in millimeters) 0   Careless (W/F in millimeters) 0   Nettle (W/F in millimeters) 0   English Plantain (W/F in millimeters) 0   Kochia (W/F in millimeters) 0   Lamb's Quarter (W/F in millimeters) 0   Marshelder (W/F in millimeters) 0   Ragweed Mix* ALK (W/F in millimeters) 0   Russian Thistle (W/F in millimeters) 0   Sagebrush/Mugwort (W/F in millimeters) 0   Sheep Sorrel (W/F in millimeters) 0   Feather Mix* ALK (W/F in millimeters) 0   Penicillium Mix (1:10 w/v) 4/5   Curvularia spicifera (1:10 w/v) 0   Epicoccum (1:10 w/v) 0   Aspergillus fumigatus (1:10 w/v): 4/5   Alternaria tenius (1:10 w/v) 0   H. Cladosporium (1:10 w/v) 0   Phoma herbarum (1:10 w/v) 0      Verbal consent was obtained for skin testing    ASSESSMENT/PLAN:  Miles Valencia is a 57 year old male seen today for allergy evaluation with postnasal drainage and cough being the primary complaint.  The cough has been significant since June with some improvement after stopping lisinopril.  It is waking him up at night.  He has been taking antihistamines for the postnasal drainage with some improvement.  Skin testing was positive to dust mites and mold today.  Cough variant asthma and allergic rhinitis are likely the cause of his symptoms.    Since his symptoms have been significant for numerous months we will prescribe prednisone.  This also may help the rash on his chest.  He will retry the chlorhexidine for his chest rash.  This does not look like folliculitis at this time but more dermatitis.  Plan to follow-up in 4 weeks.  He will let us know if symptoms improve or worsen with the prednisone.  Continue with Allegra and albuterol as needed.    1. Prednisone as prescribed in AM with food  2. Allegra 180 mg daily, up to twice daily  3. Albuterol 2 puffs every 4 hours as needed    Follow-up in  4  weeks      Thank you for allowing me to participate in the care of Miles Valencia.      I spent 35 minutes on the date of the encounter doing chart review, history and exam, documentation and further coordination as noted above exclusive of separately reported interpretations    Jeremy Givens MD  Allergy/Immunology  [facility]      Verbal consent was obtained prior to procedure by the provider. Per provider verbal order, placed Adult Environmental Panel scratch test. Once antigens were placed, patient was monitored for 15 minutes in clinic. Provider read test after 15 minutes.. Patient tolerated procedure well. All questions and concerns were addressed at office visit by the provider.     Solitario WADSWORTH RN, BSN              Again, thank you for allowing me to participate in the care of your patient.        Sincerely,        Jeremy Givens MD

## 2022-12-13 NOTE — PROGRESS NOTES
Verbal consent was obtained prior to procedure by the provider. Per provider verbal order, placed Adult Environmental Panel scratch test. Once antigens were placed, patient was monitored for 15 minutes in clinic. Provider read test after 15 minutes.. Patient tolerated procedure well. All questions and concerns were addressed at office visit by the provider.     Solitario WADSWORTH RN, BSN

## 2022-12-13 NOTE — PATIENT INSTRUCTIONS
Prednisone as prescribed in AM with food  Allegra 180 mg daily, up to twice daily  Albuterol 2 puffs every 4 hours as needed            Allergy Staff Appt Hours Shot Hours Location         Physician   Jeremy Givens MD      Support Staff   Giovanna SHEIKH, CHARISMA WADSWORTH, RN   Alex RUSS, FLORENTINO TROY, JUANA          Mondays  Not available until January/2023 Wednesdays  Close    Tuesdays Thursdays and Fridays:  [place] 7-5                    [place]     Tuesdays: 7:40-3:20      Fridays: 7:40-4:20                [facility]  [address] [address]  [place] [address]  Appt Line: [phone]    Pulmonary Function Scheduling:  [place]: [phone]         Questions about cost of your care?  For questions about your cost of your visit, procedure, lab or imaging contact: [employer] Price Line [phone] or visit: [email or web address]    Important Scheduling Information  Appointments for skin testing: Appointment will last approximately 45 minutes.  Please call the appointment line for your clinic to schedule.  Discontinue oral antihistamines 7 days prior to the appointment.  Discontinue nasal and ocular antihistamines 4 days prior to appointment.    Appointments for challenges (oral food challenge, penicillin testing, aspirin desensitization) If your provider instructs you to that this additional testing is indicated, it will need to be scheduled directly through the allergy department.  Please contact them via [employer] or by calling the clinic and asking to speak with the allergy team.  They will provide additional information and instructions for the appointment.  Discontinue oral antihistamines 7 days prior to the appointment.  Discontinue nasal and ocular antihistamines 4 days prior to the appointment.    Thank you for trusting us with your care. Please feel free to contact us with any questions or concerns you may have.

## 2022-12-14 LAB
EXPTIME-PRE: 5.55 SEC
FEF2575-%PRED-PRE: 181 %
FEF2575-PRE: 5.6 L/SEC
FEF2575-PRED: 3.09 L/SEC
FEFMAX-%PRED-PRE: 100 %
FEFMAX-PRE: 9.27 L/SEC
FEFMAX-PRED: 9.21 L/SEC
FEV1-%PRED-PRE: 101 %
FEV1-PRE: 3.65 L
FEV1FEV6-PRE: 89 %
FEV1FEV6-PRED: 79 %
FEV1FVC-PRE: 89 %
FEV1FVC-PRED: 78 %
FIFMAX-PRE: 8.63 L/SEC
FVC-%PRED-PRE: 89 %
FVC-PRE: 4.1 L
FVC-PRED: 4.59 L

## 2022-12-23 ENCOUNTER — PRE VISIT (OUTPATIENT)
Dept: UROLOGY | Facility: CLINIC | Age: 57
End: 2022-12-23

## 2022-12-23 NOTE — TELEPHONE ENCOUNTER
Reason for visit: 6 month follow up      Dx/Hx/Sx: prostate cancer     Records/imaging/labs/orders: PSA scheduled 1/4    At Rooming: video visit

## 2022-12-26 ENCOUNTER — HEALTH MAINTENANCE LETTER (OUTPATIENT)
Age: 57
End: 2022-12-26

## 2023-01-04 ENCOUNTER — OFFICE VISIT (OUTPATIENT)
Dept: FAMILY MEDICINE | Facility: CLINIC | Age: 58
End: 2023-01-04
Payer: COMMERCIAL

## 2023-01-04 VITALS
OXYGEN SATURATION: 96 % | HEART RATE: 87 BPM | SYSTOLIC BLOOD PRESSURE: 155 MMHG | WEIGHT: 200 LBS | BODY MASS INDEX: 29.53 KG/M2 | DIASTOLIC BLOOD PRESSURE: 90 MMHG | TEMPERATURE: 97.2 F | RESPIRATION RATE: 16 BRPM

## 2023-01-04 DIAGNOSIS — L30.9 ECZEMA, UNSPECIFIED TYPE: Primary | ICD-10-CM

## 2023-01-04 DIAGNOSIS — M79.644 THUMB PAIN, RIGHT: ICD-10-CM

## 2023-01-04 NOTE — PROGRESS NOTES
"Medical assistant intake:  Miles Valencia is a 57 year old male who presents to [facility] today for Thumb Discomfort (History of right thumb discomfort x8 months. Pain is around a 5. ) and Rash (Patient is concerns rash located on his upper chest and right ankle.)        ASSESSMENT/PLAN:    1. RIGHT thumb pain in a 56 yo with history of DeQuervain's tenosynovitis but today's exam with much more pain over the CMC joint of the thumb.    - Discussed but deferred injection today due to lack of likelihood of DeQuervain's.   - Sent for X-rays  Asked for \"AP of wrist with clenched fist  Ulnar deviation of wrist  Lateral view    Be sure to include the metacarpal phalangeal joint of the thumb on the X-rays as patient with proximal thumb pain\"  -Also, referred to Ortho Hand therapy      2. Rash on Chest and eczema on legs. Being treated by his allergist.   -Will refer to Dermatology. Miles wants to check with his  on the preferred clinic. He will send us a message when he knows and we can place referral then.     3. Discussed blood pressure.   - He's checking at home and levels have been normal. He has a PCP if needed      --Barry Wiseman MD      SUBJECTIVE:   Miles is a 57-year-old who presents today with a primary issue of right thumb pain.  He believes that this is a recurrence of DeQuervain's tenosynovitis that he has had in the past in both thumbs.  He has had injections in both tendon sheaths with the last injection being about 5 years ago for the left thumb.  His current symptoms have been present for about 8 months.    Also, he is interested in a referral for a dermatologist as he has a recurrent rash on his chest and also some eczema near his right ankle.    His PCP is Oneyda Jones.     Review Of Systems:    Has otherwise been in usual state of health, e.g.   Cardiovascular: negative  Respiratory: No shortness of breath, dyspnea on exertion, cough, or hemoptysis  Gastrointestinal: " negative  Genitourinary: negative    Problem list per EMR:  Patient Active Problem List   Diagnosis     Pain in joint, shoulder region     Achilles bursitis or tendinitis     History of total shoulder replacement, left     Non-toxic multinodular goiter     Prostate cancer (H)     Lumbar radiculopathy       Current Outpatient Medications   Medication Sig Dispense Refill     acetaminophen (TYLENOL) 325 MG tablet Take 2 tablets (650 mg) by mouth every 4 hours as needed for other (mild pain) 30 tablet 0     albuterol (PROAIR HFA/PROVENTIL HFA/VENTOLIN HFA) 108 (90 BASE) MCG/ACT Inhaler Inhale 2 puffs into the lungs every 4 hours as needed for shortness of breath / dyspnea or wheezing       ammonium lactate (AMLACTIN) 12 % cream Apply topically daily as needed        aspirin-acetaminophen-caffeine (EXCEDRIN MIGRAINE) 250-250-65 MG per tablet Take 2 tablets by mouth every 6 hours as needed for headaches        atorvastatin (LIPITOR) 10 MG tablet Take 10 mg by mouth At Bedtime       camphor-menthol (DERMASARRA) 0.5-0.5 % LOTN Apply topically every 6 hours as needed for skin care       cholecalciferol 125 MCG (5000 UT) CAPS Take 5,000 Units by mouth every morning        Cyanocobalamin (VITAMIN B-12 PO) Take 1 tablet by mouth every evening        cyclobenzaprine (FLEXERIL) 5 MG tablet Take 1-2 tablets (5-10 mg) by mouth nightly as needed for muscle spasms 60 tablet 1     DIAZEPAM PO        diltiazem ER COATED BEADS (CARDIZEM CD/CARTIA XT) 240 MG 24 hr capsule Take 240 mg by mouth every morning        fexofenadine (ALLEGRA) 180 MG tablet Take 180 mg by mouth every morning        gabapentin (NEURONTIN) 300 MG capsule Take 1 capsule (300 mg) by mouth 3 times daily (Patient taking differently: Take 600 mg by mouth At Bedtime) 60 capsule 0     HEMP OIL OR EXTRACT OR OTHER CBD CANNABINOID, NOT MEDICAL CANNABIS, Take 1 capsule by mouth every evening       hydrochlorothiazide (HYDRODIURIL) 25 MG tablet Take 25 mg by mouth every  morning        hydrocortisone (CORTAID) 1 % cream Apply topically 2 times daily as needed       ibuprofen (ADVIL/MOTRIN) 200 MG tablet Take 800 mg by mouth every 8 hours as needed for mild pain        meclizine (ANTIVERT) 12.5 MG tablet Take 2 tablets (25 mg) by mouth 4 times daily as needed for dizziness 30 tablet 0     MELATONIN PO Take 2.5 mg by mouth At Bedtime        NONFORMULARY Magnesium/MSM lotion - Brand: Aincient minerals  2 pumps every evening applied to feet/legs    20 mg elemental magnesium and 25 mg MSM Per 1 pump       omega 3 1000 MG CAPS Take by mouth At Bedtime       predniSONE (DELTASONE) 20 MG tablet Take 2 tabs daily x 5 days 10 tablet 1     rizatriptan (MAXALT-MLT) 10 MG ODT Take 10 mg by mouth at onset of headache for migraine       sildenafil (REVATIO) 20 MG tablet Take 1 tab daily (Patient taking differently: Take 1 tab daily as needed) 90 tablet 11     zolpidem (AMBIEN) 5 MG tablet Take 5 mg by mouth nightly as needed for sleep       lisinopril (ZESTRIL) 10 MG tablet Take 10 mg by mouth daily (Patient not taking: Reported on 12/13/2022)         Allergies   Allergen Reactions     Amoxicillin Diarrhea        Social:   Unchanged.  He continues to work at the [employer].      OBJECTIVE    Vitals: BP (!) 155/90 (BP Location: Left arm, Patient Position: Chair, Cuff Size: Adult Large)   Pulse 87   Temp 97.2  F (36.2  C)   Resp 16   Wt 90.7 kg (200 lb)   SpO2 96%   BMI 29.53 kg/m    BMI= Body mass index is 29.53 kg/m .  He appears well and in no distress.  His elevated blood pressure was discussed with him and he states that it is always elevated when he presents to the clinics but that this returns to normal and he checks his pressure at home and it tends to be in the normal range.    Right hand is with pain over the CMC joint of the right thumb.  He has a negative Finkelstein's test.  There is no pain radiating proximal to the wrist crease.  He has no distinct pain  over the scaphoid tubercle or the scapholunate junction.  He has full sensation and range of motion of the thumb.    His skin on the chest reveals a diffuse macular punctate type rash with approximately 20 different areas of involvement with each being about 2-4 mm in diameter.    Also, has an eczematous scaly patch on his right lower leg.  This measures approximately 2 x 1 cm.    SEE TOP OF NOTE FOR ASSESSMENT AND PLAN    --Barry Wiseman MD  [facility], Department of Family Medicine and Community Health

## 2023-01-04 NOTE — NURSING NOTE
57 year old  Chief Complaint   Patient presents with     Thumb Discomfort     History of right thumb discomfort x8 months. Pain is around a 5.      Rash     Patient is concerns rash located on his upper chest and right ankle.       Blood pressure (!) 155/90, pulse 87, temperature 97.2  F (36.2  C), resp. rate 16, weight 90.7 kg (200 lb), SpO2 96 %. Body mass index is 29.53 kg/m .  Patient Active Problem List   Diagnosis     Pain in joint, shoulder region     Achilles bursitis or tendinitis     History of total shoulder replacement, left     Non-toxic multinodular goiter     Prostate cancer (H)     Lumbar radiculopathy       Wt Readings from Last 2 Encounters:   01/04/23 90.7 kg (200 lb)   12/13/22 89.7 kg (197 lb 11.2 oz)     BP Readings from Last 3 Encounters:   01/04/23 (!) 155/90   12/13/22 134/87   03/07/22 128/75         Current Outpatient Medications   Medication     acetaminophen (TYLENOL) 325 MG tablet     albuterol (PROAIR HFA/PROVENTIL HFA/VENTOLIN HFA) 108 (90 BASE) MCG/ACT Inhaler     ammonium lactate (AMLACTIN) 12 % cream     aspirin-acetaminophen-caffeine (EXCEDRIN MIGRAINE) 250-250-65 MG per tablet     atorvastatin (LIPITOR) 10 MG tablet     camphor-menthol (DERMASARRA) 0.5-0.5 % LOTN     cholecalciferol 125 MCG (5000 UT) CAPS     Cyanocobalamin (VITAMIN B-12 PO)     cyclobenzaprine (FLEXERIL) 5 MG tablet     DIAZEPAM PO     diltiazem ER COATED BEADS (CARDIZEM CD/CARTIA XT) 240 MG 24 hr capsule     fexofenadine (ALLEGRA) 180 MG tablet     gabapentin (NEURONTIN) 300 MG capsule     HEMP OIL OR EXTRACT OR OTHER CBD CANNABINOID, NOT MEDICAL CANNABIS,     hydrochlorothiazide (HYDRODIURIL) 25 MG tablet     hydrocortisone (CORTAID) 1 % cream     ibuprofen (ADVIL/MOTRIN) 200 MG tablet     meclizine (ANTIVERT) 12.5 MG tablet     MELATONIN PO     NONFORMULARY     omega 3 1000 MG CAPS     predniSONE (DELTASONE) 20 MG tablet     rizatriptan (MAXALT-MLT) 10 MG ODT     sildenafil (REVATIO) 20 MG tablet     zolpidem  (AMBIEN) 5 MG tablet     lisinopril (ZESTRIL) 10 MG tablet     No current facility-administered medications for this visit.       Social History     Tobacco Use     Smoking status: Never     Smokeless tobacco: Never   Substance Use Topics     Alcohol use: Yes     Alcohol/week: 0.0 standard drinks     Comment: 1-2 drinks a week     Drug use: No       Health Maintenance Due   Topic Date Due     HIV SCREENING  Never done     HEPATITIS C SCREENING  Never done       No results found for: VEE Delacruz CMA, [facility]  January 4, 2023 11:31 AM

## 2023-01-04 NOTE — PATIENT INSTRUCTIONS
"  ASSESSMENT/PLAN:    RIGHT thumb pain in a 58 yo with history of DeQuervain's tenosynovitis but today's exam with much more pain over the CMC joint of the thumb.    - Discussed but deferred injection today due to lack of likelihood of DeQuervain's.   - Sent for X-rays  Asked for \"AP of wrist with clenched fist  Ulnar deviation of wrist  Lateral view    Be sure to include the metacarpal phalangeal joint of the thumb on the X-rays as patient with proximal thumb pain\"  -Also, referred to Ortho Hand therapy      2. Rash on Chest and eczema on legs. Being treated by his allergist.   -Will refer to Dermatology. Miles wants to check with his  on the preferred clinic. He will send us a message when he knows and we can place referral then.     3. Discussed blood pressure.   - He's checking at home and levels have been normal. He has a PCP if needed      --Barry Wiseman MD  "

## 2023-01-05 ENCOUNTER — MYC MEDICAL ADVICE (OUTPATIENT)
Dept: FAMILY MEDICINE | Facility: CLINIC | Age: 58
End: 2023-01-05

## 2023-01-05 DIAGNOSIS — L30.9 ECZEMA, UNSPECIFIED TYPE: Primary | ICD-10-CM

## 2023-01-05 NOTE — TELEPHONE ENCOUNTER
Referral ordered to [facility] to work with Dr. Brian Fowler as requested by pt. Faxed to [phone]. Pt notified.    Darrell MUNGUIA, RN  01/05/23 10:43 AM

## 2023-01-06 ENCOUNTER — LAB (OUTPATIENT)
Dept: LAB | Facility: CLINIC | Age: 58
End: 2023-01-06
Payer: COMMERCIAL

## 2023-01-06 ENCOUNTER — ANCILLARY PROCEDURE (OUTPATIENT)
Dept: GENERAL RADIOLOGY | Facility: CLINIC | Age: 58
End: 2023-01-06
Attending: FAMILY MEDICINE
Payer: COMMERCIAL

## 2023-01-06 DIAGNOSIS — C61 PROSTATE CANCER (H): ICD-10-CM

## 2023-01-06 DIAGNOSIS — M79.644 THUMB PAIN, RIGHT: ICD-10-CM

## 2023-01-06 LAB — PSA SERPL-MCNC: 0.05 NG/ML (ref 0–3.5)

## 2023-01-06 PROCEDURE — 36415 COLL VENOUS BLD VENIPUNCTURE: CPT | Performed by: PATHOLOGY

## 2023-01-06 PROCEDURE — 73110 X-RAY EXAM OF WRIST: CPT | Mod: RT | Performed by: RADIOLOGY

## 2023-01-06 PROCEDURE — 84153 ASSAY OF PSA TOTAL: CPT | Performed by: PATHOLOGY

## 2023-01-09 ENCOUNTER — TELEPHONE (OUTPATIENT)
Dept: ALLERGY | Facility: CLINIC | Age: 58
End: 2023-01-09

## 2023-01-09 ENCOUNTER — OFFICE VISIT (OUTPATIENT)
Dept: ALLERGY | Facility: CLINIC | Age: 58
End: 2023-01-09
Payer: COMMERCIAL

## 2023-01-09 VITALS
WEIGHT: 199.96 LBS | OXYGEN SATURATION: 96 % | SYSTOLIC BLOOD PRESSURE: 135 MMHG | DIASTOLIC BLOOD PRESSURE: 77 MMHG | HEART RATE: 85 BPM | BODY MASS INDEX: 29.53 KG/M2

## 2023-01-09 DIAGNOSIS — J30.89 OTHER ALLERGIC RHINITIS: ICD-10-CM

## 2023-01-09 DIAGNOSIS — J45.991 COUGH VARIANT ASTHMA: ICD-10-CM

## 2023-01-09 DIAGNOSIS — R05.3 CHRONIC COUGH: Primary | ICD-10-CM

## 2023-01-09 PROCEDURE — 99214 OFFICE O/P EST MOD 30 MIN: CPT | Performed by: INTERNAL MEDICINE

## 2023-01-09 RX ORDER — BUDESONIDE AND FORMOTEROL FUMARATE DIHYDRATE 160; 4.5 UG/1; UG/1
2 AEROSOL RESPIRATORY (INHALATION) 2 TIMES DAILY
Qty: 10.2 G | Refills: 1 | Status: SHIPPED | OUTPATIENT
Start: 2023-01-09 | End: 2023-05-26

## 2023-01-09 NOTE — PATIENT INSTRUCTIONS
Start Symbicort 2 puffs twice daily x 2 weeks (rinse mouth afterwards).  May use 2 puffs as needed after the 2 weeks.  Call if not resolved in 2 weeks

## 2023-01-09 NOTE — PROGRESS NOTES
Miles Valencia was seen in the Allergy Clinic at [facility].    Miles Valencia is a 57 year old male being seen today for ongoing evaluation of Chronic cough.  Likely has cough variant asthma and dust mite allergy and mold allergy.    Since the last visit the patient has been feeling better.    He has tried antihistamines and nasal steroids.  At the last appointment skin tests were positive to dust mites and molds.  We did give a course of prednisone which worked great but he was unable to sleep at night without a sleeping pill.    Spirometry was performed which was within normal limits.    He has only tried albuterol once, which did not provide much benefit for the cough.  He estimates he is 80% improved after prednisone.  He does not want to repeat prednisone    Past Medical History:   Diagnosis Date     Achilles bursitis or tendinitis 5/4/2014     Allergic rhinitis      Asthma      Benign positional vertigo      Congestion      Hypertension      Low back pain      Migraine      Multinodular goiter      Osteoarthritis      Pain in joint, shoulder region 4/18/2014     Prostate cancer (H)      RLS (restless legs syndrome)      Sleep problems      Family History   Problem Relation Age of Onset     Eczema Mother      Skin Cancer Mother      Hypertension Mother      Hypertension Father      Alcoholism Father      Eczema Brother      Hypertension Brother      Migraines Brother      Testicular cancer Brother      Factor V Leiden deficiency Brother      Deep Vein Thrombosis Brother      Cancer Maternal Grandmother         Breast     Alcoholism Maternal Grandfather      Cerebrovascular Disease Paternal Grandmother      Alcoholism Paternal Grandfather      Unknown/Adopted Paternal Half-Sister      Other - See Comments Niece         Von Willebrands     Asthma Niece      Other - See Comments Nephew         Von Willebrands, Factor V Leiden     Cancer Cousin      Thyroid Disease Other      Anesthesia  Reaction No family hx of      Past Surgical History:   Procedure Laterality Date     ARTHROPLASTY SHOULDER Left 6/13/2017    Procedure: ARTHROPLASTY SHOULDER;  Left Total Shoulder Arthroplasty  ;  Surgeon: Jossy Swanson MD;  [facility]     BIOPSY      Prostate     COLONOSCOPY N/A 3/21/2016    Procedure: COLONOSCOPY;  Surgeon: Toy Lima MD;  Location: [facility]     DAVINCI PROSTATECTOMY, LYMPHADENECTOMY N/A 4/7/2021    Procedure: Robot-assisted laparoscopic radical prostatectomy, pelvic lymphadenectomy,;  Surgeon: Eber Pereyra MD;  [facility]     THYROIDECTOMY Left 5/27/2020    Procedure: Left Lobe THYROIDECTOMY;  Surgeon: Evelin Cutler MD;  [facility]         Current Outpatient Medications:      albuterol (PROAIR HFA/PROVENTIL HFA/VENTOLIN HFA) 108 (90 BASE) MCG/ACT Inhaler, Inhale 2 puffs into the lungs every 4 hours as needed for shortness of breath / dyspnea or wheezing, Disp: , Rfl:      ammonium lactate (AMLACTIN) 12 % cream, Apply topically daily as needed , Disp: , Rfl:      aspirin-acetaminophen-caffeine (EXCEDRIN MIGRAINE) 250-250-65 MG per tablet, Take 2 tablets by mouth every 6 hours as needed for headaches , Disp: , Rfl:      atorvastatin (LIPITOR) 10 MG tablet, Take 10 mg by mouth At Bedtime, Disp: , Rfl:      budesonide-formoterol (SYMBICORT) 160-4.5 MCG/ACT Inhaler, Inhale 2 puffs into the lungs 2 times daily, Disp: 10.2 g, Rfl: 1     camphor-menthol (DERMASARRA) 0.5-0.5 % LOTN, Apply topically every 6 hours as needed for skin care, Disp: , Rfl:      cholecalciferol 125 MCG (5000 UT) CAPS, Take 5,000 Units by mouth every morning , Disp: , Rfl:      Cyanocobalamin (VITAMIN B-12 PO), Take 1 tablet by mouth every evening , Disp: , Rfl:      cyclobenzaprine (FLEXERIL) 5 MG tablet, Take 1-2 tablets (5-10 mg) by mouth nightly as needed for muscle spasms, Disp: 60 tablet, Rfl: 1     DIAZEPAM PO, , Disp: , Rfl:      diltiazem ER COATED BEADS (CARDIZEM  CD/CARTIA XT) 240 MG 24 hr capsule, Take 240 mg by mouth every morning , Disp: , Rfl:      fexofenadine (ALLEGRA) 180 MG tablet, Take 180 mg by mouth every morning , Disp: , Rfl:      gabapentin (NEURONTIN) 300 MG capsule, Take 1 capsule (300 mg) by mouth 3 times daily (Patient taking differently: Take 600 mg by mouth At Bedtime), Disp: 60 capsule, Rfl: 0     HEMP OIL OR EXTRACT OR OTHER CBD CANNABINOID, NOT MEDICAL CANNABIS,, Take 1 capsule by mouth every evening, Disp: , Rfl:      hydrochlorothiazide (HYDRODIURIL) 25 MG tablet, Take 25 mg by mouth every morning , Disp: , Rfl:      hydrocortisone (CORTAID) 1 % cream, Apply topically 2 times daily as needed, Disp: , Rfl:      ibuprofen (ADVIL/MOTRIN) 200 MG tablet, Take 800 mg by mouth every 8 hours as needed for mild pain , Disp: , Rfl:      meclizine (ANTIVERT) 12.5 MG tablet, Take 2 tablets (25 mg) by mouth 4 times daily as needed for dizziness, Disp: 30 tablet, Rfl: 0     MELATONIN PO, Take 2.5 mg by mouth At Bedtime , Disp: , Rfl:      NONFORMULARY, Magnesium/MSM lotion - Brand: Aincient minerals 2 pumps every evening applied to feet/legs  20 mg elemental magnesium and 25 mg MSM Per 1 pump, Disp: , Rfl:      omega 3 1000 MG CAPS, Take by mouth At Bedtime, Disp: , Rfl:      predniSONE (DELTASONE) 20 MG tablet, Take 2 tabs daily x 5 days, Disp: 10 tablet, Rfl: 1     rizatriptan (MAXALT-MLT) 10 MG ODT, Take 10 mg by mouth at onset of headache for migraine, Disp: , Rfl:      sildenafil (REVATIO) 20 MG tablet, Take 1 tab daily (Patient taking differently: Take 1 tab daily as needed), Disp: 90 tablet, Rfl: 11     zolpidem (AMBIEN) 5 MG tablet, Take 5 mg by mouth nightly as needed for sleep, Disp: , Rfl:      acetaminophen (TYLENOL) 325 MG tablet, Take 2 tablets (650 mg) by mouth every 4 hours as needed for other (mild pain), Disp: 30 tablet, Rfl: 0  Allergies   Allergen Reactions     Amoxicillin Diarrhea         EXAM:   /77   Pulse 85   Wt 90.7 kg (199 lb  15.3 oz)   SpO2 96%   BMI 29.53 kg/m      Constitutional:       General: He is not in acute distress.     Appearance: Normal appearance. He is not ill-appearing.   HENT:      Head: Normocephalic and atraumatic.      Mouth/Throat:      Mouth: Mucous membranes are moist.      Pharynx: Oropharynx is clear. No posterior oropharyngeal erythema.   Eyes:      General:         Right eye: No discharge.         Left eye: No discharge.   Cardiovascular:      Rate and Rhythm: Normal rate and regular rhythm.      Heart sounds: Normal heart sounds.   Pulmonary:      Effort: Pulmonary effort is normal.      Breath sounds: Normal breath sounds. No wheezing or rhonchi.   Skin:     General: Skin is warm.      Findings: No erythema or rash.   Neurological:      General: No focal deficit present.      Mental Status: He is alert. Mental status is at baseline.   Psychiatric:         Mood and Affect: Mood normal.         Behavior: Behavior normal.     WORKUP:    Latest Reference Range & Units 12/13/22 13:45   FVC-Pred L 4.59   FVC-Pre L 4.10   FVC-%Pred-Pre % 89   FEV1-Pre L 3.65   FEV1-%Pred-Pre % 101   FEV1FVC-Pred % 78   FEV1FVC-Pre % 89   FEV1FEV6-Pred % 79   FEV1FEV6-Pre % 89   FEFMax-Pred L/sec 9.21   FEFMax-Pre L/sec 9.27   FEFMax-%Pred-Pre % 100   FEF2575-Pred L/sec 3.09   FEF2575-Pre L/sec 5.60   BVT7481-%Pred-Pre % 181   FIFMax-Pre L/sec 8.63   ExpTime-Pre sec 5.55       ASSESSMENT/PLAN:  Miles Valencia is a 57 year old male seen today for ongoing evaluation of cough which is considered cough variant asthma as a likely diagnosis.  Also has allergic rhinitis to dust mite and molds.  Spirometry was performed on December 13 and within normal limits.    We will not restart prednisone.  We will have him start Symbicort 2 puffs twice daily for 2 weeks and then as needed.  He can contact the clinic if Symbicort is not covered well by insurance and will change the prescription.  Follow-up will be as needed or if he is not  improving.    1. Start Symbicort 2 puffs twice daily x 2 weeks (rinse mouth afterwards).  2. May use 2 puffs as needed after the 2 weeks.  3. Call if not resolved in 2 weeks  4. May continue to use Allegra as needed for allergic rhinitis.    Follow-up if he is not improving.      Thank you for allowing me to participate in the care of Miles Valencia.      I spent 20 minutes on the date of the encounter doing chart review, history and exam, documentation and further coordination as noted above exclusive of separately reported interpretations    Jeremy Givens MD  Allergy/Immunology  [facility]

## 2023-01-09 NOTE — LETTER
1/9/2023         RE: Miles Valencia  [address] [place] [address]        Dear Colleague,    Thank you for referring your patient, Miles Valencia, to the [facility]. Please see a copy of my visit note below.    Miles Valencia was seen in the Allergy Clinic at [facility].    Miles Valencia is a 57 year old male being seen today for ongoing evaluation of Chronic cough.  Likely has cough variant asthma and dust mite allergy and mold allergy.    Since the last visit the patient has been feeling better.    He has tried antihistamines and nasal steroids.  At the last appointment skin tests were positive to dust mites and molds.  We did give a course of prednisone which worked great but he was unable to sleep at night without a sleeping pill.    Spirometry was performed which was within normal limits.    He has only tried albuterol once, which did not provide much benefit for the cough.  He estimates he is 80% improved after prednisone.  He does not want to repeat prednisone    Past Medical History:   Diagnosis Date     Achilles bursitis or tendinitis 5/4/2014     Allergic rhinitis      Asthma      Benign positional vertigo      Congestion      Hypertension      Low back pain      Migraine      Multinodular goiter      Osteoarthritis      Pain in joint, shoulder region 4/18/2014     Prostate cancer (H)      RLS (restless legs syndrome)      Sleep problems      Family History   Problem Relation Age of Onset     Eczema Mother      Skin Cancer Mother      Hypertension Mother      Hypertension Father      Alcoholism Father      Eczema Brother      Hypertension Brother      Migraines Brother      Testicular cancer Brother      Factor V Leiden deficiency Brother      Deep Vein Thrombosis Brother      Cancer Maternal Grandmother         Breast     Alcoholism Maternal Grandfather      Cerebrovascular Disease Paternal Grandmother      Alcoholism Paternal  Grandfather      Unknown/Adopted Paternal Half-Sister      Other - See Comments Niece         Von Willebrands     Asthma Niece      Other - See Comments Nephew         Von Willebrands, Factor V Leiden     Cancer Cousin      Thyroid Disease Other      Anesthesia Reaction No family hx of      Past Surgical History:   Procedure Laterality Date     ARTHROPLASTY SHOULDER Left 6/13/2017    Procedure: ARTHROPLASTY SHOULDER;  Left Total Shoulder Arthroplasty  ;  Surgeon: Jossy Swanson MD;  [facility]     BIOPSY      Prostate     COLONOSCOPY N/A 3/21/2016    Procedure: COLONOSCOPY;  Surgeon: Toy Lima MD;  [facility]: [facility]     DAVINCI PROSTATECTOMY, LYMPHADENECTOMY N/A 4/7/2021    Procedure: Robot-assisted laparoscopic radical prostatectomy, pelvic lymphadenectomy,;  Surgeon: Eber Pereyra MD;  [facility]     THYROIDECTOMY Left 5/27/2020    Procedure: Left Lobe THYROIDECTOMY;  Surgeon: Evelin Cutler MD;  [facility]         Current Outpatient Medications:      albuterol (PROAIR HFA/PROVENTIL HFA/VENTOLIN HFA) 108 (90 BASE) MCG/ACT Inhaler, Inhale 2 puffs into the lungs every 4 hours as needed for shortness of breath / dyspnea or wheezing, Disp: , Rfl:      ammonium lactate (AMLACTIN) 12 % cream, Apply topically daily as needed , Disp: , Rfl:      aspirin-acetaminophen-caffeine (EXCEDRIN MIGRAINE) 250-250-65 MG per tablet, Take 2 tablets by mouth every 6 hours as needed for headaches , Disp: , Rfl:      atorvastatin (LIPITOR) 10 MG tablet, Take 10 mg by mouth At Bedtime, Disp: , Rfl:      budesonide-formoterol (SYMBICORT) 160-4.5 MCG/ACT Inhaler, Inhale 2 puffs into the lungs 2 times daily, Disp: 10.2 g, Rfl: 1     camphor-menthol (DERMASARRA) 0.5-0.5 % LOTN, Apply topically every 6 hours as needed for skin care, Disp: , Rfl:      cholecalciferol 125 MCG (5000 UT) CAPS, Take 5,000 Units by mouth every morning , Disp: , Rfl:      Cyanocobalamin (VITAMIN B-12 PO), Take 1  tablet by mouth every evening , Disp: , Rfl:      cyclobenzaprine (FLEXERIL) 5 MG tablet, Take 1-2 tablets (5-10 mg) by mouth nightly as needed for muscle spasms, Disp: 60 tablet, Rfl: 1     DIAZEPAM PO, , Disp: , Rfl:      diltiazem ER COATED BEADS (CARDIZEM CD/CARTIA XT) 240 MG 24 hr capsule, Take 240 mg by mouth every morning , Disp: , Rfl:      fexofenadine (ALLEGRA) 180 MG tablet, Take 180 mg by mouth every morning , Disp: , Rfl:      gabapentin (NEURONTIN) 300 MG capsule, Take 1 capsule (300 mg) by mouth 3 times daily (Patient taking differently: Take 600 mg by mouth At Bedtime), Disp: 60 capsule, Rfl: 0     HEMP OIL OR EXTRACT OR OTHER CBD CANNABINOID, NOT MEDICAL CANNABIS,, Take 1 capsule by mouth every evening, Disp: , Rfl:      hydrochlorothiazide (HYDRODIURIL) 25 MG tablet, Take 25 mg by mouth every morning , Disp: , Rfl:      hydrocortisone (CORTAID) 1 % cream, Apply topically 2 times daily as needed, Disp: , Rfl:      ibuprofen (ADVIL/MOTRIN) 200 MG tablet, Take 800 mg by mouth every 8 hours as needed for mild pain , Disp: , Rfl:      meclizine (ANTIVERT) 12.5 MG tablet, Take 2 tablets (25 mg) by mouth 4 times daily as needed for dizziness, Disp: 30 tablet, Rfl: 0     MELATONIN PO, Take 2.5 mg by mouth At Bedtime , Disp: , Rfl:      NONFORMULARY, Magnesium/MSM lotion - Brand: Aincient minerals 2 pumps every evening applied to feet/legs  20 mg elemental magnesium and 25 mg MSM Per 1 pump, Disp: , Rfl:      omega 3 1000 MG CAPS, Take by mouth At Bedtime, Disp: , Rfl:      predniSONE (DELTASONE) 20 MG tablet, Take 2 tabs daily x 5 days, Disp: 10 tablet, Rfl: 1     rizatriptan (MAXALT-MLT) 10 MG ODT, Take 10 mg by mouth at onset of headache for migraine, Disp: , Rfl:      sildenafil (REVATIO) 20 MG tablet, Take 1 tab daily (Patient taking differently: Take 1 tab daily as needed), Disp: 90 tablet, Rfl: 11     zolpidem (AMBIEN) 5 MG tablet, Take 5 mg by mouth nightly as needed for sleep, Disp: , Rfl:       acetaminophen (TYLENOL) 325 MG tablet, Take 2 tablets (650 mg) by mouth every 4 hours as needed for other (mild pain), Disp: 30 tablet, Rfl: 0  Allergies   Allergen Reactions     Amoxicillin Diarrhea         EXAM:   /77   Pulse 85   Wt 90.7 kg (199 lb 15.3 oz)   SpO2 96%   BMI 29.53 kg/m      Constitutional:       General: He is not in acute distress.     Appearance: Normal appearance. He is not ill-appearing.   HENT:      Head: Normocephalic and atraumatic.      Mouth/Throat:      Mouth: Mucous membranes are moist.      Pharynx: Oropharynx is clear. No posterior oropharyngeal erythema.   Eyes:      General:         Right eye: No discharge.         Left eye: No discharge.   Cardiovascular:      Rate and Rhythm: Normal rate and regular rhythm.      Heart sounds: Normal heart sounds.   Pulmonary:      Effort: Pulmonary effort is normal.      Breath sounds: Normal breath sounds. No wheezing or rhonchi.   Skin:     General: Skin is warm.      Findings: No erythema or rash.   Neurological:      General: No focal deficit present.      Mental Status: He is alert. Mental status is at baseline.   Psychiatric:         Mood and Affect: Mood normal.         Behavior: Behavior normal.     WORKUP:    Latest Reference Range & Units 12/13/22 13:45   FVC-Pred L 4.59   FVC-Pre L 4.10   FVC-%Pred-Pre % 89   FEV1-Pre L 3.65   FEV1-%Pred-Pre % 101   FEV1FVC-Pred % 78   FEV1FVC-Pre % 89   FEV1FEV6-Pred % 79   FEV1FEV6-Pre % 89   FEFMax-Pred L/sec 9.21   FEFMax-Pre L/sec 9.27   FEFMax-%Pred-Pre % 100   FEF2575-Pred L/sec 3.09   FEF2575-Pre L/sec 5.60   HFJ3882-%Pred-Pre % 181   FIFMax-Pre L/sec 8.63   ExpTime-Pre sec 5.55       ASSESSMENT/PLAN:  Miles Valencia is a 57 year old male seen today for ongoing evaluation of cough which is considered cough variant asthma as a likely diagnosis.  Also has allergic rhinitis to dust mite and molds.  Spirometry was performed on December 13 and within normal limits.    We will not restart  prednisone.  We will have him start Symbicort 2 puffs twice daily for 2 weeks and then as needed.  He can contact the clinic if Symbicort is not covered well by insurance and will all sure the prescription.  Follow-up will be as needed or if he is not improving.    1. Start Symbicort 2 puffs twice daily x 2 weeks (rinse mouth afterwards).  2. May use 2 puffs as needed after the 2 weeks.  3. Call if not resolved in 2 weeks    Follow-up if he is not improving.      Thank you for allowing me to participate in the care of Miles Valencia.      I spent 20 minutes on the date of the encounter doing chart review, history and exam, documentation and further coordination as noted above exclusive of separately reported interpretations    Jeremy Givens MD  Allergy/Immunology  [facility]      Again, thank you for allowing me to participate in the care of your patient.        Sincerely,        Jeremy Givens MD

## 2023-01-10 ENCOUNTER — VIRTUAL VISIT (OUTPATIENT)
Dept: UROLOGY | Facility: CLINIC | Age: 58
End: 2023-01-10
Payer: COMMERCIAL

## 2023-01-10 DIAGNOSIS — C61 PROSTATE CANCER (H): Primary | ICD-10-CM

## 2023-01-10 PROCEDURE — 99213 OFFICE O/P EST LOW 20 MIN: CPT | Mod: 95 | Performed by: NURSE PRACTITIONER

## 2023-01-10 NOTE — PROGRESS NOTES
Miles is a 57 year old who is being evaluated via a billable video visit.      How would you like to obtain your AVS? MyChart  If the video visit is dropped, the invitation should be resent by: Send to e-mail at: [email or web address]  Will anyone else be joining your video visit? No    Video-Visit Details    Type of service:  Video Visit   Video start time: 9:29 AM  Video end time: 9:35 AM    Originating Location (pt. Location): Home  Distant Location (provider location):  Off-site  Platform used for Video Visit: Wilfredo Valencia complains of   Chief Complaint   Patient presents with     Video Visit     6 month follow up with PSA       Assessment/Plan:  57 year old male with a history of prostate cancer s/p RALP on 4/7/21, with path showing [place] 3+4=7 pT2 N0Mx with negative margins. PSAs have remained stable. No issues or bothersome symptoms to report today.   -Will continue following PSAs, with recheck in 6 months. He will follow up with me at that time to review.     Kimberly Nino, CNP  Department of Urology      Subjective:   57 year old male with a history of prostate cancer who underwent RALP on 4/7/21, with path showing [place] 3+4=7 pT2 N0Mx with negative margins.  Post-op PSAs have been 0.02, 0.09, 0.04 and 0.04 in 06/2022. Has previously followed with Dr. Pereyra, last seen in July. At that time, reported climacturia and weaker erections that were not problematic.     PSA was updated a few days ago and was 0.05. He states that everything has been stable in terms of symptoms. Sexual function remains weaker than prior to surgery, but he remains unbothered by this.       Objective:     Exam:   Patient is a 57 year old male   No vital signs obtained due to virtual visit.  General Appearance: Well groomed, hygenic  Respiratory: No cough, no respiratory distress or labored breathing  Musculoskeletal: Grossly normal with no gross deficits  Skin: No discoloration or apparent rashes  Neurologic: No  tremors  Psychiatric: Alert and oriented  Further examination is deferred due to the nature of our visit.

## 2023-01-10 NOTE — LETTER
1/10/2023       RE: Miles Valencia  [address] [place] [address]     Dear Colleague,    Thank you for referring your patient, Miles Valencia, to the [facility] [place] at [facility]. Please see a copy of my visit note below.    Miles is a 57 year old who is being evaluated via a billable video visit.      How would you like to obtain your AVS? MyChart  If the video visit is dropped, the invitation should be resent by: Send to e-mail at: [email or web address]  Will anyone else be joining your video visit? No    Video-Visit Details    Type of service:  Video Visit   Video start time: 9:29 AM  Video end time: 9:35 AM    Originating Location (pt. Location): Home  Distant Location (provider location):  Off-site  Platform used for Video Visit: [employer]     Miles Valencia complains of   Chief Complaint   Patient presents with     Video Visit     6 month follow up with PSA       Assessment/Plan:  57 year old male with a history of prostate cancer s/p RALP on 4/7/21, with path showing [place] 3+4=7 pT2 N0Mx with negative margins. PSAs have remained stable. No issues or bothersome symptoms to report today.   -Will continue following PSAs, with recheck in 6 months. He will follow up with me at that time to review.     Kimberly Nino, CNP  Department of Urology      Subjective:   57 year old male with a history of prostate cancer who underwent RALP on 4/7/21, with path showing [place] 3+4=7 pT2 N0Mx with negative margins.  Post-op PSAs have been 0.02, 0.09, 0.04 and 0.04 in 06/2022. Has previously followed with Dr. Pereyra, last seen in July. At that time, reported climacturia and weaker erections that were not problematic.     PSA was updated a few days ago and was 0.05. He states that everything has been stable in terms of symptoms. Sexual function remains weaker than prior to surgery, but he remains unbothered by this.       Objective:      Exam:   Patient is a 57 year old male   No vital signs obtained due to virtual visit.  General Appearance: Well groomed, hygenic  Respiratory: No cough, no respiratory distress or labored breathing  Musculoskeletal: Grossly normal with no gross deficits  Skin: No discoloration or apparent rashes  Neurologic: No tremors  Psychiatric: Alert and oriented  Further examination is deferred due to the nature of our visit.

## 2023-01-10 NOTE — PATIENT INSTRUCTIONS
UROLOGY CLINIC VISIT PATIENT INSTRUCTIONS    Follow up with me in 6 months with another PSA beforehand.     If you have any issues, questions or concerns in the meantime, do not hesitate to contact us at [phone] or via [employer].     Kimberly Nino, CNP  Department of Urology

## 2023-01-20 ENCOUNTER — THERAPY VISIT (OUTPATIENT)
Dept: OCCUPATIONAL THERAPY | Facility: CLINIC | Age: 58
End: 2023-01-20
Attending: FAMILY MEDICINE
Payer: COMMERCIAL

## 2023-01-20 DIAGNOSIS — M79.644 PAIN OF RIGHT THUMB: ICD-10-CM

## 2023-01-20 DIAGNOSIS — M79.644 THUMB PAIN, RIGHT: ICD-10-CM

## 2023-01-20 DIAGNOSIS — M25.531 RIGHT WRIST PAIN: ICD-10-CM

## 2023-01-20 PROCEDURE — 97110 THERAPEUTIC EXERCISES: CPT | Mod: GO | Performed by: OCCUPATIONAL THERAPIST

## 2023-01-20 PROCEDURE — 97760 ORTHOTIC MGMT&TRAING 1ST ENC: CPT | Mod: GO | Performed by: OCCUPATIONAL THERAPIST

## 2023-01-20 PROCEDURE — 97165 OT EVAL LOW COMPLEX 30 MIN: CPT | Mod: GO | Performed by: OCCUPATIONAL THERAPIST

## 2023-01-20 NOTE — PROGRESS NOTES
"Hand Therapy Initial Evaluation    Current Date:  1/20/2023    Diagnosis: Right thumb pain  DOI: 8 months (Order date:   Referring physician: Barry Wiseman MD    Precautions: None    Subjective:  Miles Valencia is a 57 year old male.    Patient reports symptoms of the right thumb which occurred due to unknown. Of note, patient has a history of bilateral De Quervain's tenosynovitis which was managed with tendon sheath injections. with Since onset symptoms are gradually getting better.  General health as reported by patient is fair.  Pertinent medical history includes: asthma, cancer, concussions/dizziness, implanted device, migraines/headaches, osteoarthritis, pain at rest/night, sleep disorder/apnea, thyroid problems.  Medical allergies:latex, medication allergies.  Surgical history: cancer and orthopedic.  Medication history: Anti-inflammatory, High Blood Pressure, Muscle Relaxants, Pain, Steroids.    Occupational Profile Information:  Right hand dominant  Current occupation is a   Job Tasks: Computer Work, Prolonged Sitting  Currently working in normal job without restrictions  Prior functional level:  no limitations  Mobility: No difficulty  Transportation: drives  Barriers include:none  Leisure activities/hobbies: Cross country skiing    Patient reports symptoms of pain  Patient reported occupational performance deficits: Household chores, work tasks  Special tests:  x-ray. Per report, \"1. No acute osseous abnormality. 2. Mild first carpometacarpal degenerative change.\"    Functional Outcome Measure:   Upper Extremity Functional Index Score:  SCORE: 70/80   (A lower score indicates greater disability.)          Objective:  Pain Level (Scale 0-10)   1/20/2023   At Rest 0-1/10   With Use 2-5/10     Pain Description  Date 1/20/2023   Location 1st dorsal compartment, CMC joint   Pain Quality Shooting, stabbing, aching   Frequency intermittent     Pain is worst  daytime   Exacerbated by Turning door " knob, putting hand in glove, carrying with thumb abducted, opening a jar   Relieved by Icing, avoiding painful activities   Progression Slight improvement     Wrist 1/20/2023   AROM (PROM) Right   Extension 69   Flexion 68   RD 15   UD 35+, 1st DC   UD with Th Flex 20+, 1st DC     ROM  Thumb 1/20/2023   AROM  (PROM) Right   MP 0/40   IP 0/65   RABD 61   PABD 45       Thumb Observation/Appearance   - none  + mild    ++ moderate    +++ severe     1/20/2023   Shoulder deformity present over CMC R: -  L: -   Edema over the CMC joint R: -  L: -   Noted collapse of MP into hyperextension during pinch R: -  L: -      Provocative Tests  Pain Report:  - none    + mild    ++ moderate    +++ severe     1/20/2023   CMC Adduction Stress Test R: -   CMC Extension Stress Test R: -   Finkelstein's R: +   WHAT Test R: -   Resisted APL and EPB R: -       Strength   (Measured in pounds)  Pain Report: - none  + mild    ++ moderate    +++ severe    1/20/2023 1/20/2023   Trials Left Right   1 45 50     Lat Pinch 1/20/2023 1/20/2023   Trials Left Right   1 13 17     3 Pt Pinch 1/20/2023 1/20/2023   Trials Left Right   1   12 10     Palpation   Pain Report:  - none    + mild    ++ moderate    +++ severe    1/20/2023   CMC Joint Line R: -   Thenar Ronco R: -   1st DC R: +   Radial Styloid R: +     Assessment:  Patient presents with symptoms consistent with diagnosis of the above condition, with conservative intervention. Symptoms and provocative testing are consistent with De Quervain's tenosynovitis.    Patient's limitations or Problem List includes:  Pain, Decreased ROM/motion and Increased edema of the right wrist and thumb which interferes with the patient's ability to perform Work Tasks, Recreational Activities and Household Chores as compared to previous level of function.    Rehab Potential:  Excellent - Return to full activity, no limitations    Patient will benefit from skilled Occupational Therapy to increase ROM and  decrease pain and edema to return to previous activity level and resume normal daily tasks and to reach their rehab potential.    Barriers to Learning:  No barrier    Communication Issues:  Patient appears to be able to clearly communicate and understand verbal and written communication and follow directions correctly.    Chart Review: Chart Review    Identified Performance Deficits: home establishment and management, work and leisure activities    Assessment of Occupational Performance:  1-3 Performance Deficits    Clinical Decision Making (Complexity): Low complexity    Treatment Explanation:  The following has been discussed with the patient:    RX ordered/plan of care  Anticipated outcomes  Possible risks and side effects    Plan:  Frequency:  1 X week, once daily  Duration:  for 6 weeks    Treatment Plan:    Modalities:    US   Therapeutic Exercise:    AROM, AAROM, PROM, Tendon Gliding, Isotonics and Isometrics  Therapeutic Activities:   Functional activities   Neuromuscular re-ed:   Nerve Gliding and Kinesiotaping  Manual Techniques:   Friction massage and Myofascial release  Orthotic Fabrication:    Forearm-based thumb spica orthosis  Self Care:    Ergonomic Considerations    Discharge Plan:  Achieve all LTG  [place] in home treatment program.  Reach maximal therapeutic benefit.    Home Program:  Wrist AROM  Thumb AROM  De Quervain's stretch  Friction massage  Ottobock orthosis, wear at night and as-needed for painful activities    Next Visit:  US and MFR  Check orthosis

## 2023-01-27 ENCOUNTER — THERAPY VISIT (OUTPATIENT)
Dept: OCCUPATIONAL THERAPY | Facility: CLINIC | Age: 58
End: 2023-01-27
Payer: COMMERCIAL

## 2023-01-27 DIAGNOSIS — M25.531 RIGHT WRIST PAIN: ICD-10-CM

## 2023-01-27 DIAGNOSIS — M79.644 PAIN OF RIGHT THUMB: Primary | ICD-10-CM

## 2023-01-27 PROCEDURE — 97140 MANUAL THERAPY 1/> REGIONS: CPT | Mod: GO | Performed by: OCCUPATIONAL THERAPIST

## 2023-01-27 PROCEDURE — 97035 APP MDLTY 1+ULTRASOUND EA 15: CPT | Mod: GO | Performed by: OCCUPATIONAL THERAPIST

## 2023-02-10 ENCOUNTER — THERAPY VISIT (OUTPATIENT)
Dept: OCCUPATIONAL THERAPY | Facility: CLINIC | Age: 58
End: 2023-02-10
Payer: COMMERCIAL

## 2023-02-10 DIAGNOSIS — M79.644 PAIN OF RIGHT THUMB: Primary | ICD-10-CM

## 2023-02-10 DIAGNOSIS — M25.531 RIGHT WRIST PAIN: ICD-10-CM

## 2023-02-10 PROCEDURE — 97035 APP MDLTY 1+ULTRASOUND EA 15: CPT | Mod: GO | Performed by: OCCUPATIONAL THERAPIST

## 2023-02-10 PROCEDURE — 97140 MANUAL THERAPY 1/> REGIONS: CPT | Mod: GO | Performed by: OCCUPATIONAL THERAPIST

## 2023-03-03 ENCOUNTER — THERAPY VISIT (OUTPATIENT)
Dept: OCCUPATIONAL THERAPY | Facility: CLINIC | Age: 58
End: 2023-03-03
Payer: COMMERCIAL

## 2023-03-03 DIAGNOSIS — M54.42 CHRONIC LEFT-SIDED LOW BACK PAIN WITH LEFT-SIDED SCIATICA: ICD-10-CM

## 2023-03-03 DIAGNOSIS — G89.29 CHRONIC LEFT-SIDED LOW BACK PAIN WITH LEFT-SIDED SCIATICA: ICD-10-CM

## 2023-03-03 DIAGNOSIS — M25.531 RIGHT WRIST PAIN: ICD-10-CM

## 2023-03-03 DIAGNOSIS — M79.644 PAIN OF RIGHT THUMB: Primary | ICD-10-CM

## 2023-03-03 PROCEDURE — 97140 MANUAL THERAPY 1/> REGIONS: CPT | Mod: GO | Performed by: OCCUPATIONAL THERAPIST

## 2023-03-03 PROCEDURE — 97035 APP MDLTY 1+ULTRASOUND EA 15: CPT | Mod: GO | Performed by: OCCUPATIONAL THERAPIST

## 2023-03-03 RX ORDER — CYCLOBENZAPRINE HCL 5 MG
5-10 TABLET ORAL
Qty: 60 TABLET | Refills: 1 | Status: SHIPPED | OUTPATIENT
Start: 2023-03-03 | End: 2023-09-05

## 2023-03-03 NOTE — PROGRESS NOTES
"SOAP note objective information for 3/3/2023.  Please refer to the daily flowsheet for treatment today, total treatment time and time spent performing 1:1 timed codes.       Diagnosis: Right thumb pain  DOI: 8 months  Referring physician: Barry Wiseman MD    Precautions: None      Objective:  Pain Level (Scale 0-10)   1/20/2023 3/3/2023   At Rest 0-1/10 0-1/10   With Use 2-5/10 1-2/10     Pain Description  Date 1/20/2023   Location 1st dorsal compartment, CMC joint   Pain Quality Shooting, stabbing, aching   Frequency intermittent     Pain is worst  daytime   Exacerbated by Turning door knob, putting hand in glove, carrying with thumb abducted, opening a jar   Relieved by Icing, avoiding painful activities   Progression Slight improvement     Wrist 1/20/2023 3/3/2023   AROM (PROM) Right Right   Extension 69    Flexion 68    RD 15    UD 35+, 1st DC 40   UD with Th Flex 20+, 1st DC 25, \"between stretch and pain\"     ROM  Thumb 1/20/2023   AROM  (PROM) Right   MP 0/40   IP 0/65   RABD 61   PABD 45       Thumb Observation/Appearance   - none  + mild    ++ moderate    +++ severe     1/20/2023   Shoulder deformity present over CMC R: -  L: -   Edema over the CMC joint R: -  L: -   Noted collapse of MP into hyperextension during pinch R: -  L: -      Provocative Tests  Pain Report:  - none    + mild    ++ moderate    +++ severe     1/20/2023 3/3/2023   CMC Adduction Stress Test R: -    CMC Extension Stress Test R: -    Finkelstein's R: + R: \"in between stretch and pain\"   WHAT Test R: -    Resisted APL and EPB R: -        Strength   (Measured in pounds)  Pain Report: - none  + mild    ++ moderate    +++ severe    1/20/2023 1/20/2023   Trials Left Right   1 45 50     Lat Pinch 1/20/2023 1/20/2023   Trials Left Right   1 13 17     3 Pt Pinch 1/20/2023 1/20/2023   Trials Left Right   1   12 10     Palpation   Pain Report:  - none    + mild    ++ moderate    +++ severe    1/20/2023   CMC Joint Line R: -   Thenar Muir " R: -   1st DC R: +   Radial Styloid R: +         Home Program:  Wrist AROM  Thumb AROM  De Quervain's stretch  Friction massage  Ottobock orthosis, wear at night and as-needed for painful activities  3/3/2023  Added gentle self radial column mobilization    Next Visit:  US and MICHAELR

## 2023-03-03 NOTE — TELEPHONE ENCOUNTER
Pt approach me in pod 3H and requested a refill of cyclobenzaprine sent to [facility] on [address] [facility] in [place].  Best number to contact pt is [phone].    Anabell Thompson on 3/3/2023 at 10:36 AM

## 2023-03-03 NOTE — TELEPHONE ENCOUNTER
Refill request received from patient.     Called and verified the pharmacy he would like his medication sent to, as there were 3 on file. Patient requested to update to just the [facility] on [address] and have the medication sent there. Updated pharmacy and advised patient this will be sent to Dr. Kwok to review.     Medication: cyclobenzaprine (FLEXERIL) 5 MG tablet  Directions: Take 1-2 tablets (5-10 mg) by mouth nightly as needed for muscle spasms      Last ordered: 5/16/2022   Qty: 60  RF: 1  Last OV: 3/7/22  Next OV: None    Routing to Dr. Kwok to review and sign if appropriate.    EZRA Erickson RN Care Coordinator  [facility]   [facility] main phone # [phone] fax # [phone]

## 2023-04-14 ENCOUNTER — MEDICAL CORRESPONDENCE (OUTPATIENT)
Dept: HEALTH INFORMATION MANAGEMENT | Facility: CLINIC | Age: 58
End: 2023-04-14
Payer: COMMERCIAL

## 2023-04-20 ENCOUNTER — TRANSCRIBE ORDERS (OUTPATIENT)
Dept: OTHER | Age: 58
End: 2023-04-20

## 2023-04-20 DIAGNOSIS — M54.2 NECK PAIN: Primary | ICD-10-CM

## 2023-04-20 DIAGNOSIS — R20.2 PARESTHESIA OF FINGER: ICD-10-CM

## 2023-05-23 ENCOUNTER — OFFICE VISIT (OUTPATIENT)
Dept: PHYSICAL MEDICINE AND REHAB | Facility: CLINIC | Age: 58
End: 2023-05-23
Payer: COMMERCIAL

## 2023-05-23 VITALS — DIASTOLIC BLOOD PRESSURE: 77 MMHG | HEART RATE: 85 BPM | SYSTOLIC BLOOD PRESSURE: 135 MMHG | OXYGEN SATURATION: 96 %

## 2023-05-23 DIAGNOSIS — M54.12 CERVICAL RADICULAR PAIN: Primary | ICD-10-CM

## 2023-05-23 DIAGNOSIS — M54.2 NECK PAIN: ICD-10-CM

## 2023-05-23 DIAGNOSIS — M54.12 CERVICAL RADICULITIS: ICD-10-CM

## 2023-05-23 DIAGNOSIS — R20.2 PARESTHESIA OF FINGER: ICD-10-CM

## 2023-05-23 DIAGNOSIS — M47.812 CERVICAL SPONDYLOSIS: ICD-10-CM

## 2023-05-23 PROCEDURE — 99215 OFFICE O/P EST HI 40 MIN: CPT | Mod: GC | Performed by: PHYSICAL MEDICINE & REHABILITATION

## 2023-05-23 ASSESSMENT — PAIN SCALES - GENERAL: PAINLEVEL: SEVERE PAIN (6)

## 2023-05-23 NOTE — NURSING NOTE
"Miles Valencia is a 58 year old male who presents for:  Chief Complaint   Patient presents with     RECHECK     Neck & back pain        Initial Vitals:  /77   Pulse 85   SpO2 96%  Estimated body mass index is 29.53 kg/m  as calculated from the following:    Height as of 10/11/21: 5' 9\" (1.753 m).    Weight as of 1/9/23: 199 lb 15.3 oz (90.7 kg).. There is no height or weight on file to calculate BSA. BP completed using cuff size: regular  Severe Pain (6)    Nursing Comments:     Dayron Joel MA   "

## 2023-05-23 NOTE — LETTER
"5/23/2023       RE: Miles Valencia  [address] [place] [address]     Dear Colleague,    Thank you for referring your patient, Miles Valencia, to the [facility] [place] at [facility]. Please see a copy of my visit note below.    Patient seen at the request of Oneyda Jones for an opinion and evaluation of bilateral forearm/hand numbness.      Date of Initial Visit: 12/6/2021  LOV: 3/7/2022  TD: 5/23/2023    HISTORY OF PRESENT ILLNESS:  Miles Valencia is a 58 year old male who was previously evaluated for lumbosacral radiculopathy now presents with a chief complaint of numbness/tingling in bilateral forearms and 4th/5th digits.     Symptoms began ~1 year ago and not associated with trauma. They have been getting progressively worse over that time. He has longstanding chronic neck pain as well that has been getting worse over the same time.     The hand/arm symptoms are localized to the bilateral ulnar forearms into the 4th and 5th digits (R 55%, L 45%); describes his symptoms as numbness/tingling. Has noticed decreased hand strength over the last ~3 years which he feels is associated with arthritis and tendinopathy making it difficult to open jars. No issues with fine motor control (buttons, zippers, typing, etc). Symptoms are worse when lying flat, hands rested on his stomach with elbows at ~90 degrees. Difficult falling asleep at night, but symptoms do not wake him up.     Reports longstanding history of neck pain for many years. Neck pain is primarily right sided with radiation into upper trapezius and periscapular region. Arm symptoms do not seem to be correlated with flares of his neck. Associated with some intermittent positional vertigo, described as poor balance/feeling like he \"had one too many\".       PRIOR INJURIES/TREATMENT:   Ice/Heat: +ice  Physical Therapy:   He has been going to PT following prostate cancer diagnosis and " prostatectomy in April and pelvic floor therapy which transitioned to current Spine PT -- overall having improvement.        - Current Pain Medications -   NSAIDs - Naproxen prn   Tylenol   Gabapentin 600mg at bedtime   Flexeril prn      Prior Procedures:  Date                                         Procedure                                           Improvement (%)  None                                                         Prior Related Surgery: none   Other (acupuncture, OMT, CMM, TENS, DME, etc.):   Massage gun     Specialists Seen - (with most recent, available notes and clinic visits reviewed)   1. Neurosurgery - Dr. Larry Ragsdale       IMAGING - reviewed   10/01/2021 MR L spine   Findings:   There are 5 lumbar-type vertebrae assumed for the purposes of this  dictation.  The tip of the conus medullaris is at L1.       6 mm anterolisthesis at L4-5.  There is mild disc height narrowing and  disc desiccation at L4-5 and L5-S1.  Normal marrow signal.     On a level by level basis:  T12-L1: No spinal canal or neuroforaminal stenosis.   L1-2: No spinal canal or neuroforaminal stenosis.   L2-3: No spinal canal or neuroforaminal stenosis.   L3-4: Bilateral facet hypertrophy. No spinal canal or neural foraminal  stenosis.   L4-5: Disc bulge and bilateral facet hypertrophy. Anterolisthesis  contributes to the mild to moderate bilateral neural foraminal  stenosis and abutment of the exiting nerve root on the right. Mild  spinal canal stenosis. Dorsal synovial cysts at that level.  L5-S1: Disc bulge and central tiny protrusion. Bilateral facet  hypertrophy. Mild left neural foraminal stenosis. No spinal canal or  right neuroforaminal stenosis.     Paraspinous tissues are within normal limits.                                                                   Impression:   1. 6 mm anterolisthesis at L4-5.  2. Multilevel lumbar spondylosis, most pronounced at L4-5 with mild to  moderate bilateral neural foraminal and mild  spinal canal stenosis.     10/15/21 XR L spine  IMPRESSION: Grade 1 spondylolisthesis of L4 on L5 with borderline  motion between flexion and extension.     Review Of Systems:  I am responding to those symptoms which are directly relevant to the specific indication for my consultation. I recommend that the patient follow up with their primary or referring provider to pursue any other symptoms which may be of concern.        Medical History:  He  has a past medical history of Achilles bursitis or tendinitis (5/4/2014), Allergic rhinitis, Asthma, Benign positional vertigo, Congestion, Hypertension, Low back pain, Migraine, Multinodular goiter, Osteoarthritis, Pain in joint, shoulder region (4/18/2014), Prostate cancer (H), RLS (restless legs syndrome), and Sleep problems.      He  has a past surgical history that includes Colonoscopy (N/A, 3/21/2016); biopsy; Arthroplasty shoulder (Left, 6/13/2017); Thyroidectomy (Left, 5/27/2020); and Davinci Prostatectomy, Lymphadenectomy (N/A, 4/7/2021).     Family History  His family history includes Alcoholism in his father, maternal grandfather, and paternal grandfather; Asthma in his niece; Cancer in his cousin and maternal grandmother; Cerebrovascular Disease in his paternal grandmother; Deep Vein Thrombosis in his brother; Factor V Leiden deficiency in his brother; Hypertension in his brother, father, and mother; Migraines in his brother; Other - See Comments in his nephew and niece; Skin Cancer in his mother; Testicular cancer in his brother; Thyroid Disease in an other family member; Unknown/Adopted in his paternal half-sister.      Social History:  Work:  "[employer]   He  reports that he has never smoked. He has never used smokeless tobacco. He reports current alcohol use. He reports that he does not use drugs.      Current Medications:   He has a current medication list which includes the following prescription(s): acetaminophen, albuterol, ammonium  lactate, aspirin-acetaminophen-caffeine, atorvastatin, camphor-menthol, cholecalciferol, cyanocobalamin, cyclobenzaprine, diazepam, diltiazem er coated beads, fexofenadine, gabapentin, HEMP OIL OR EXTRACT OR OTHER CBD CANNABINOID, NOT MEDICAL CANNABIS,, hydrochlorothiazide, hydrocortisone, ibuprofen, meclizine, melatonin, NONFORMULARY, omega 3, rizatriptan, sildenafil, and zolpidem.        Allergies:    -- Amoxicillin -- Diarrhea      VITALS  /77   Pulse 85   SpO2 96%     PHYSICAL EXAMINATION:  General: Pleasant, straightforward, WDWN individual.  Mental Status: Pleasant, direct, appropriate mood and affect  Resp: breathing is unlabored without audible wheeze  Vascular: No cyanosis, no venous stasis changes  Heme: No visible ecchymosis or erythema on extremities  Skin: No notable rash    NEURO  Strength   Right  Left  Shoulder abduction  5/5  5/5  Elbow flexion   5/5  5/5  Elbow Extension  5/5  5/5  Wrist Extension  5/5  5/5  Finger Flexion   5/5  5/5  Finger Abduction  5/5  5/5    Sensation  Sensation intact to light touch bilateral. No difference in pin-prick sensation.    MSK  Inspection  No obvious deformities. No significant pelvic tilt. Normal lordosis of lumbar spine.    Palpation  Tenderness and fullness to bilateral cervical paraspinal muscles, bilateral levator scapulae, right upper trapezius, right pariscapular region. Palpation over bilateral scalene muscles reproduced symptoms in hands.     Range of motion  Neck: Appropriate age match forward flexion, extension, right rotation. Mildly limited with left rotation.     Provocation   Right  Left  Radiculopathy Provocation   Spurling's   NEG*  NEG  * brought on vertigo, no radicular symptoms    Cubital Compression  NEG  NEG  Phalen's Test   NEG  NEG       ASSESSMENT:  Miles Valencia is a pleasant 58 year old male previously evaluated for lumbosacral radiculopathy who presents with bilateral numbness/tingling in an ulnar distribution in the setting  of chronic right sided neck pain. Differential includes bilateral C8 radiculopathy, ulnar neuropathy, and possibly TOS given provocation of symptoms with palpation over scalenes and coexisting positional vertigo. At this time, recommended further workup with a cervical MRI. If this is unrevealing for etiology, would consider an EMG/NCS to evaluated radiculopathy vs ulnar neuropathy or a referral to Dr. Aguilar for TOS/vestibular workup.     We also briefly discussed a flare of his left lumbar radicular pain (L5 through prior history). Recommended increasing gabapentin and/or taking flexeril more consistently. Patient would like to trial a higher dose of gabapentin. He will discuss this with his PCP, though we would be happy to prescribe if needed.       PLAN:  Diagnostic Work Up:   MRI cervical spine  Therapies / Home Exercise Program:  Prior PT, continue HEP  Medications:  Increase gabapentin for LE symptoms  Interventions:  None  Referrals:  Consider referral for EMG/NCS or Dr. Aguilar if MRI unrevealing  Follow Up:  After MRI to discuss results/next steps    Ready to learn, no apparent learning barriers.  Education provided on treatment plan according to patient's preferred learning style.  Patient verbalizes understanding.   __________________________________  Geo Hawley MD  Physical Medicine and Rehabilitation      40 minutes spent by me on the date of the encounter doing chart review, history and exam, documentation and further activities per the note    I was physically present for the E/M service provided. I agree with the House Officer note and plan, which I have reviewed and edited where appropriate.  ________________________________         Again, thank you for allowing me to participate in the care of your patient.      Sincerely,    Esther Kwok MD

## 2023-05-23 NOTE — PROGRESS NOTES
"Patient seen at the request of Oneyda Jones for an opinion and evaluation of bilateral forearm/hand numbness.      Date of Initial Visit: 12/6/2021  LOV: 3/7/2022  TD: 5/23/2023    HISTORY OF PRESENT ILLNESS:  Miles Valencia is a 58 year old male who was previously evaluated for lumbosacral radiculopathy now presents with a chief complaint of numbness/tingling in bilateral forearms and 4th/5th digits.     Symptoms began ~1 year ago and not associated with trauma. They have been getting progressively worse over that time. He has longstanding chronic neck pain as well that has been getting worse over the same time.     The hand/arm symptoms are localized to the bilateral ulnar forearms into the 4th and 5th digits (R 55%, L 45%); describes his symptoms as numbness/tingling. Has noticed decreased hand strength over the last ~3 years which he feels is associated with arthritis and tendinopathy making it difficult to open jars. No issues with fine motor control (buttons, zippers, typing, etc). Symptoms are worse when lying flat, hands rested on his stomach with elbows at ~90 degrees. Difficult falling asleep at night, but symptoms do not wake him up.     Reports longstanding history of neck pain for many years. Neck pain is primarily right sided with radiation into upper trapezius and periscapular region. Arm symptoms do not seem to be correlated with flares of his neck. Associated with some intermittent positional vertigo, described as poor balance/feeling like he \"had one too many\".       PRIOR INJURIES/TREATMENT:   Ice/Heat: +ice  Physical Therapy:   He has been going to PT following prostate cancer diagnosis and prostatectomy in April and pelvic floor therapy which transitioned to current Spine PT -- overall having improvement.        - Current Pain Medications -   NSAIDs - Naproxen prn   Tylenol   Gabapentin 600mg at bedtime   Flexeril prn      Prior Procedures:  Date                                         " Procedure                                           Improvement (%)  None                                                         Prior Related Surgery: none   Other (acupuncture, OMT, CMM, TENS, DME, etc.):   Massage gun     Specialists Seen - (with most recent, available notes and clinic visits reviewed)   1. Neurosurgery - Dr. Larry Ragsdale       IMAGING - reviewed   10/01/2021 MR L spine   Findings:   There are 5 lumbar-type vertebrae assumed for the purposes of this  dictation.  The tip of the conus medullaris is at L1.       6 mm anterolisthesis at L4-5.  There is mild disc height narrowing and  disc desiccation at L4-5 and L5-S1.  Normal marrow signal.     On a level by level basis:  T12-L1: No spinal canal or neuroforaminal stenosis.   L1-2: No spinal canal or neuroforaminal stenosis.   L2-3: No spinal canal or neuroforaminal stenosis.   L3-4: Bilateral facet hypertrophy. No spinal canal or neural foraminal  stenosis.   L4-5: Disc bulge and bilateral facet hypertrophy. Anterolisthesis  contributes to the mild to moderate bilateral neural foraminal  stenosis and abutment of the exiting nerve root on the right. Mild  spinal canal stenosis. Dorsal synovial cysts at that level.  L5-S1: Disc bulge and central tiny protrusion. Bilateral facet  hypertrophy. Mild left neural foraminal stenosis. No spinal canal or  right neuroforaminal stenosis.     Paraspinous tissues are within normal limits.                                                                   Impression:   1. 6 mm anterolisthesis at L4-5.  2. Multilevel lumbar spondylosis, most pronounced at L4-5 with mild to  moderate bilateral neural foraminal and mild spinal canal stenosis.     10/15/21 XR L spine  IMPRESSION: Grade 1 spondylolisthesis of L4 on L5 with borderline  motion between flexion and extension.     Review Of Systems:  I am responding to those symptoms which are directly relevant to the specific indication for my consultation. I recommend that  the patient follow up with their primary or referring provider to pursue any other symptoms which may be of concern.        Medical History:  He  has a past medical history of Achilles bursitis or tendinitis (5/4/2014), Allergic rhinitis, Asthma, Benign positional vertigo, Congestion, Hypertension, Low back pain, Migraine, Multinodular goiter, Osteoarthritis, Pain in joint, shoulder region (4/18/2014), Prostate cancer (H), RLS (restless legs syndrome), and Sleep problems.      He  has a past surgical history that includes Colonoscopy (N/A, 3/21/2016); biopsy; Arthroplasty shoulder (Left, 6/13/2017); Thyroidectomy (Left, 5/27/2020); and Davinci Prostatectomy, Lymphadenectomy (N/A, 4/7/2021).     Family History  His family history includes Alcoholism in his father, maternal grandfather, and paternal grandfather; Asthma in his niece; Cancer in his cousin and maternal grandmother; Cerebrovascular Disease in his paternal grandmother; Deep Vein Thrombosis in his brother; Factor V Leiden deficiency in his brother; Hypertension in his brother, father, and mother; Migraines in his brother; Other - See Comments in his nephew and niece; Skin Cancer in his mother; Testicular cancer in his brother; Thyroid Disease in an other family member; Unknown/Adopted in his paternal half-sister.      Social History:  Work: [employer]   He  reports that he has never smoked. He has never used smokeless tobacco. He reports current alcohol use. He reports that he does not use drugs.      Current Medications:   He has a current medication list which includes the following prescription(s): acetaminophen, albuterol, ammonium lactate, aspirin-acetaminophen-caffeine, atorvastatin, camphor-menthol, cholecalciferol, cyanocobalamin, cyclobenzaprine, diazepam, diltiazem er coated beads, fexofenadine, gabapentin, HEMP OIL OR EXTRACT OR OTHER CBD CANNABINOID, NOT MEDICAL CANNABIS,, hydrochlorothiazide, hydrocortisone, ibuprofen,  meclizine, melatonin, NONFORMULARY, omega 3, rizatriptan, sildenafil, and zolpidem.        Allergies:    -- Amoxicillin -- Diarrhea      VITALS  /77   Pulse 85   SpO2 96%     PHYSICAL EXAMINATION:  General: Pleasant, straightforward, WDWN individual.  Mental Status: Pleasant, direct, appropriate mood and affect  Resp: breathing is unlabored without audible wheeze  Vascular: No cyanosis, no venous stasis changes  Heme: No visible ecchymosis or erythema on extremities  Skin: No notable rash    NEURO  Strength   Right  Left  Shoulder abduction  5/5  5/5  Elbow flexion   5/5  5/5  Elbow Extension  5/5  5/5  Wrist Extension  5/5  5/5  Finger Flexion   5/5  5/5  Finger Abduction  5/5  5/5    Sensation  Sensation intact to light touch bilateral. No difference in pin-prick sensation.    MSK  Inspection  No obvious deformities. No significant pelvic tilt. Normal lordosis of lumbar spine.    Palpation  Tenderness and fullness to bilateral cervical paraspinal muscles, bilateral levator scapulae, right upper trapezius, right pariscapular region. Palpation over bilateral scalene muscles reproduced symptoms in hands.     Range of motion  Neck: Appropriate age match forward flexion, extension, right rotation. Mildly limited with left rotation.     Provocation   Right  Left  Radiculopathy Provocation   Spurling's   NEG*  NEG  * brought on vertigo, no radicular symptoms    Cubital Compression  NEG  NEG  Phalen's Test   NEG  NEG       ASSESSMENT:  Miles Valencia is a pleasant 58 year old male previously evaluated for lumbosacral radiculopathy who presents with bilateral numbness/tingling in an ulnar distribution in the setting of chronic right sided neck pain. Differential includes bilateral C8 radiculopathy, ulnar neuropathy, and possibly TOS given provocation of symptoms with palpation over scalenes and coexisting positional vertigo. At this time, recommended further workup with a cervical MRI. If this is unrevealing for  etiology, would consider an EMG/NCS to evaluated radiculopathy vs ulnar neuropathy or a referral to Dr. Aguilar for TOS/vestibular workup.     We also briefly discussed a flare of his left lumbar radicular pain (L5 through prior history). Recommended increasing gabapentin and/or taking flexeril more consistently. Patient would like to trial a higher dose of gabapentin. He will discuss this with his PCP, though we would be happy to prescribe if needed.       PLAN:  1. Diagnostic Work Up:   a. MRI cervical spine  2. Therapies / Home Exercise Program:  a. Prior PT, continue HEP  3. Medications:  a. Increase gabapentin for LE symptoms  4. Interventions:  a. None  5. Referrals:  a. Consider referral for EMG/NCS or Dr. Aguilar if MRI unrevealing  6. Follow Up:  a. After MRI to discuss results/next steps    Ready to learn, no apparent learning barriers.  Education provided on treatment plan according to patient's preferred learning style.  Patient verbalizes understanding.   __________________________________  Geo Hawley MD  Physical Medicine and Rehabilitation      40 minutes spent by me on the date of the encounter doing chart review, history and exam, documentation and further activities per the note    I was physically present for the E/M service provided. I agree with the House Officer note and plan, which I have reviewed and edited where appropriate.  ________________________________   Esther Kwok MD  Department of Physical Medicine & Rehabilitation

## 2023-06-19 ENCOUNTER — ANCILLARY PROCEDURE (OUTPATIENT)
Dept: MRI IMAGING | Facility: CLINIC | Age: 58
End: 2023-06-19
Attending: PHYSICAL MEDICINE & REHABILITATION
Payer: COMMERCIAL

## 2023-06-19 DIAGNOSIS — M54.12 CERVICAL RADICULITIS: ICD-10-CM

## 2023-06-19 DIAGNOSIS — M47.812 CERVICAL SPONDYLOSIS: ICD-10-CM

## 2023-06-19 DIAGNOSIS — M54.2 NECK PAIN: ICD-10-CM

## 2023-06-19 DIAGNOSIS — R20.2 PARESTHESIA OF FINGER: ICD-10-CM

## 2023-06-19 DIAGNOSIS — M54.12 CERVICAL RADICULAR PAIN: ICD-10-CM

## 2023-06-19 PROCEDURE — 72141 MRI NECK SPINE W/O DYE: CPT

## 2023-06-20 ENCOUNTER — MYC MEDICAL ADVICE (OUTPATIENT)
Dept: PHYSICAL MEDICINE AND REHAB | Facility: CLINIC | Age: 58
End: 2023-06-20
Payer: COMMERCIAL

## 2023-06-20 DIAGNOSIS — R20.0 NUMBNESS AND TINGLING OF BOTH UPPER EXTREMITIES: ICD-10-CM

## 2023-06-20 DIAGNOSIS — R20.2 NUMBNESS AND TINGLING OF BOTH UPPER EXTREMITIES: ICD-10-CM

## 2023-06-20 DIAGNOSIS — R42 DIZZINESS: Primary | ICD-10-CM

## 2023-06-23 NOTE — TELEPHONE ENCOUNTER
RECORDS RECEIVED FROM: Internal    REASON FOR VISIT: Dizziness [R42]  Numbness and tingling of both upper extremities [R20.0, R20.2   Date of Appt: 8/2/23 1:00pm    NOTES (FOR ALL VISITS) STATUS DETAILS   OFFICE NOTE from referring provider Internal 5/23/23, 3/7/22 Esther Kwok MD @[facility]     MEDICATION LIST Internal    IMAGING  (FOR ALL VISITS)     MRI (HEAD, NECK, SPINE) Internal [facility]  6/19/23 MR Cervical Spine

## 2023-08-01 ENCOUNTER — PRE VISIT (OUTPATIENT)
Dept: UROLOGY | Facility: CLINIC | Age: 58
End: 2023-08-01
Payer: COMMERCIAL

## 2023-08-01 NOTE — TELEPHONE ENCOUNTER
Reason for visit: 6 month follow up      Dx/Hx/Sx: prostate cancer     Records/imaging/labs/orders: PSA scheduled 8/2    At Rooming: video visit

## 2023-08-02 ENCOUNTER — PRE VISIT (OUTPATIENT)
Dept: NEUROLOGY | Facility: CLINIC | Age: 58
End: 2023-08-02

## 2023-08-02 ENCOUNTER — OFFICE VISIT (OUTPATIENT)
Dept: NEUROLOGY | Facility: CLINIC | Age: 58
End: 2023-08-02
Attending: PHYSICAL MEDICINE & REHABILITATION
Payer: COMMERCIAL

## 2023-08-02 ENCOUNTER — LAB (OUTPATIENT)
Dept: LAB | Facility: CLINIC | Age: 58
End: 2023-08-02
Payer: COMMERCIAL

## 2023-08-02 VITALS
OXYGEN SATURATION: 96 % | RESPIRATION RATE: 16 BRPM | SYSTOLIC BLOOD PRESSURE: 123 MMHG | HEART RATE: 92 BPM | DIASTOLIC BLOOD PRESSURE: 82 MMHG

## 2023-08-02 DIAGNOSIS — R20.0 NUMBNESS AND TINGLING OF BOTH UPPER EXTREMITIES: ICD-10-CM

## 2023-08-02 DIAGNOSIS — R20.2 NUMBNESS AND TINGLING OF BOTH UPPER EXTREMITIES: ICD-10-CM

## 2023-08-02 DIAGNOSIS — G54.0 TOS (THORACIC OUTLET SYNDROME): ICD-10-CM

## 2023-08-02 DIAGNOSIS — G43.009 MIGRAINE WITHOUT AURA AND WITHOUT STATUS MIGRAINOSUS, NOT INTRACTABLE: Primary | ICD-10-CM

## 2023-08-02 DIAGNOSIS — I87.1 COMPRESSION OF VEIN: ICD-10-CM

## 2023-08-02 DIAGNOSIS — R42 DIZZINESS: ICD-10-CM

## 2023-08-02 DIAGNOSIS — C61 PROSTATE CANCER (H): ICD-10-CM

## 2023-08-02 LAB — PSA SERPL DL<=0.01 NG/ML-MCNC: 0.07 NG/ML (ref 0–3.5)

## 2023-08-02 PROCEDURE — 99205 OFFICE O/P NEW HI 60 MIN: CPT | Performed by: PSYCHIATRY & NEUROLOGY

## 2023-08-02 PROCEDURE — 36415 COLL VENOUS BLD VENIPUNCTURE: CPT | Performed by: PATHOLOGY

## 2023-08-02 PROCEDURE — 84153 ASSAY OF PSA TOTAL: CPT | Performed by: PATHOLOGY

## 2023-08-02 ASSESSMENT — PAIN SCALES - GENERAL: PAINLEVEL: NO PAIN (0)

## 2023-08-02 NOTE — LETTER
8/2/2023       RE: Miles Valencia  [address] [place] [address]       Dear Colleague,    Thank you for referring your patient, Miles Valencia, to the [facility] [place] at [facility]. Please see a copy of my visit note below.    [facility]    Neurology Consult    8/2/2023      Miles Valencia MRN# [id number]   YOB: 1965 Age: 58 year old      Primary care provider:   Oneyda Jones    Requesting provider:   Esther Kwok    Reason for Consult:  TOS, dizziness, migraine    IMPRESSIONS:  Miles Valencia is a 58 year old male with a past medical history of side locked right sided headaches that starts in the right shoulder and radiates to the right eye now now with bilateral right>left symptoms of neurogenic and possibly venous TOS given both paresthesias and swelling. Headaches have worsened in the last couple of years and there is a prior history of vertigo which has intermittently presents very mildly. There are no major aural symptoms. There is a history of neck surgery for a multinodular goiter that had been giving him some compressive symptoms. There is multilevel cervical disc disease and foraminal narrowing, worse on the left side. To determine whether cervical radiculopathy may be complicating the picture of TOS, we'll obtain and EMG/NCS as well as a quantitative venous ultrasound. Depending on the ultrasound results, we may obtain a CT venogram of the head and neck given the prominent lateralized headache and eye pain. We would want to rule out internal jugular vein entrapment causing the lateralized head symptoms.     Recommendations:  1. EMG  2. US TOS  3. Follow-up CTVenogram after ultrasound  4. Follow-up after imaging    HISTORY OF PRESENT ILLNESS:  Miles Valencia is a 58 year old male with a past medical history of multinodular goiter with compressive symptoms  s/p left lobe resection (5-27-20), prostate cancer s/p prostatectomy (4-7-21), history of migraine and tension headches and neck pain, who presents with bilateral numbness and tingling in the hands and fingers for the last year.     NECK/UPPER EXT SYMPTOMS:  Current symptoms are mostly numbness and tingling in the right 4th and 5th digits. This had started in the right hand and then evolved to the left side about a year ago but symptoms are mostly in the right hand. He has not had an EMG/NCS, yet. There is some weakness, but he also had arthritis and tendinitis that complicate this picture. Thre has been swelling in the right hand for at least 2 years. There is right hand weakness. He is right handed. The hands can swell in the morning. He doesn't wear a wedding ring because of the swelling. Swelling is bilateral but worse on the right hand. There have been no color changes but has been told that he has Raynaud's syndrome.     There is no history of heavy lifting. He used to weight lifting until 8 years ago. He stopped bicycling. No racket sports, swimming, martial arts. He used to have low back pain, better now.      HEADACHE  Headache Type #1  He has had a headache history since his teen years. In his 20-30's they became more severe and he was treated with triptans. He can manage the headaches now with Excedrin but he takes this every day. The pain starts on the right trapezius and scapular area, migrating to the right side of the scalene, right neck and jaw and to the right eye. He can abort this migration with Excedrin. This can escalate to involving the eye at least once a week. In his 20-30's it was twice a week. At its most severe, he would have to stop what he was doing, turn off the light, and lie down. He gets light and sound sensitive, nausea, rare vomiting. There is no visual aura or loss of vision. There is no diplopia. This is a 100% right sided headache since he was 12-13 years old. He had been  "treated with retainer for TMD and some physical therapy. He had used amitriptyline for sleep regulation and headache, tried for a few years. He uses gabapentin for sleep, also. He has never tried Botox. No prior accidents or broken bones before the headaches started.       Headache Type #2  There is a second headache type, bilateral, that starts in the upper shoulder up the neck and into both eyes. That is the predominant headache now. Now daily for the last 2 weeks. In the last 6 months there have been at most 20% headache free days. This headache responds to Excedrin.  This headache started in the last couple years.     There has been a lot of eye strain. He is a  at the [employer].  He is on the computer a lot.    Both his mother and his one brother has have had bothersome headaches. Mom has vertigo.     DIZZINESS:  Spinning vertigo: Several years ago, best estimate is ~ 2017, he had had episodes of vertigo. He had been doing neck exercises on the floor when the first one hit. When he tried to get up he had the spinning. The initial one lasted at least several hours. He had a hard time walking. He had been treated with meclizine. He got some relief with the Epley maneuver but for 6 months, whenever he would lie down, he felt like his sense of horizontal was off. Occasionally, when he rolls over or sits up now he can get a sense of vertigo. This can be associated with neck tightness.     Lately, he has had very mild seconds of vertigo associated with neck tightness. It can feel like it's about to come on but does not progress to a full sense of rotation. He can have some imbalance in the middle of the night.     Rocking vertigo: None  Presyncope: None    AURAL:  Tinnitus: He has occasional tinnitus, not too bad. He has occasional, \"exploding head,\" loud sound in the head.  Ear pressure: There is no pain or pressure in the ears.  Hearing loss: There is no hearing loss.     BULBAR SYMPTOMS:  Dysphagia: No  Throat " pressure: No  Chronic cough: No    CARDIAC:  Chest pain: No  Shortness of breath: Occasionally, seasonal allergies  Palpitation: No  Syncope: No    OTHER:  Pelvic pressure: No  Rectal pressure: No  Hematuria: No  Swelling in feet: No    PAST MEDICAL HISTORY:  Past Medical History:   Diagnosis Date    Achilles bursitis or tendinitis 5/4/2014    Allergic rhinitis     Asthma     Benign positional vertigo     Congestion     Hypertension     Low back pain     Migraine     Multinodular goiter     Osteoarthritis     Pain in joint, shoulder region 4/18/2014    Prostate cancer ([facility])     RLS (restless legs syndrome)     Sleep problems       PAST SURGICAL HISTORY:  Past Surgical History:   Procedure Laterality Date    ARTHROPLASTY SHOULDER Left 6/13/2017    Procedure: ARTHROPLASTY SHOULDER;  Left Total Shoulder Arthroplasty  ;  Surgeon: Jossy Swanson MD;  Location: [facility] OR    BIOPSY      Prostate    COLONOSCOPY N/A 3/21/2016    Procedure: COLONOSCOPY;  Surgeon: Toy Lima MD;  Location: [facility] GI    DAVINCI PROSTATECTOMY, LYMPHADENECTOMY N/A 4/7/2021    Procedure: Robot-assisted laparoscopic radical prostatectomy, pelvic lymphadenectomy,;  Surgeon: Eber Pereyra MD;  Location: [facility] OR    THYROIDECTOMY Left 5/27/2020    Procedure: Left Lobe THYROIDECTOMY;  Surgeon: Evelin Cutler MD;  Location: [facility] OR     SOCIAL HISTORY: , , never smoker    ALLERGIES:  Allergies   Allergen Reactions    Amoxicillin Diarrhea     MEDICATIONS:    Current Outpatient Medications:     acetaminophen (TYLENOL) 325 MG tablet, Take 2 tablets (650 mg) by mouth every 4 hours as needed for other (mild pain), Disp: 30 tablet, Rfl: 0    albuterol (PROAIR HFA/PROVENTIL HFA/VENTOLIN HFA) 108 (90 BASE) MCG/ACT Inhaler, Inhale 2 puffs into the lungs every 4 hours as needed for shortness of breath / dyspnea or wheezing, Disp: , Rfl:     ammonium lactate (AMLACTIN) 12 % cream, Apply topically daily as needed , Disp: ,  Rfl:     aspirin-acetaminophen-caffeine (EXCEDRIN MIGRAINE) 250-250-65 MG per tablet, Take 2 tablets by mouth every 6 hours as needed for headaches , Disp: , Rfl:     atorvastatin (LIPITOR) 10 MG tablet, Take 10 mg by mouth At Bedtime, Disp: , Rfl:     camphor-menthol (DERMASARRA) 0.5-0.5 % LOTN, Apply topically every 6 hours as needed for skin care, Disp: , Rfl:     cholecalciferol 125 MCG (5000 UT) CAPS, Take 5,000 Units by mouth every morning , Disp: , Rfl:     Cyanocobalamin (VITAMIN B-12 PO), Take 1 tablet by mouth every evening , Disp: , Rfl:     cyclobenzaprine (FLEXERIL) 5 MG tablet, Take 1-2 tablets (5-10 mg) by mouth nightly as needed for muscle spasms, Disp: 60 tablet, Rfl: 1    DIAZEPAM PO, , Disp: , Rfl:     diltiazem ER COATED BEADS (CARDIZEM CD/CARTIA XT) 240 MG 24 hr capsule, Take 240 mg by mouth every morning , Disp: , Rfl:     fexofenadine (ALLEGRA) 180 MG tablet, Take 180 mg by mouth every morning , Disp: , Rfl:     gabapentin (NEURONTIN) 300 MG capsule, Take 1 capsule (300 mg) by mouth 3 times daily (Patient taking differently: Take 600 mg by mouth At Bedtime), Disp: 60 capsule, Rfl: 0    HEMP OIL OR EXTRACT OR OTHER CBD CANNABINOID, NOT MEDICAL CANNABIS,, Take 1 capsule by mouth every evening, Disp: , Rfl:     hydrochlorothiazide (HYDRODIURIL) 25 MG tablet, Take 25 mg by mouth every morning , Disp: , Rfl:     hydrocortisone (CORTAID) 1 % cream, Apply topically 2 times daily as needed, Disp: , Rfl:     ibuprofen (ADVIL/MOTRIN) 200 MG tablet, Take 800 mg by mouth every 8 hours as needed for mild pain , Disp: , Rfl:     meclizine (ANTIVERT) 12.5 MG tablet, Take 2 tablets (25 mg) by mouth 4 times daily as needed for dizziness, Disp: 30 tablet, Rfl: 0    MELATONIN PO, Take 2.5 mg by mouth At Bedtime , Disp: , Rfl:     NONFORMULARY, Magnesium/MSM lotion - Brand: Aincient minerals 2 pumps every evening applied to feet/legs  20 mg elemental magnesium and 25 mg MSM Per 1 pump, Disp: , Rfl:     omega 3  1000 MG CAPS, Take by mouth At Bedtime, Disp: , Rfl:     rizatriptan (MAXALT-MLT) 10 MG ODT, Take 10 mg by mouth at onset of headache for migraine, Disp: , Rfl:     sildenafil (REVATIO) 20 MG tablet, Take 1 tab daily (Patient taking differently: Take 1 tab daily as needed), Disp: 90 tablet, Rfl: 11    zolpidem (AMBIEN) 5 MG tablet, Take 5 mg by mouth nightly as needed for sleep, Disp: , Rfl:      PHYSICAL EXAM:  Vitals:  /82   Pulse 92   Resp 16   SpO2 96%     General: Patient is well-nourished, well-groomed, in no apparent distress    HEENT: Head is atraumatic, eyes look normal exteriorly, throat clear, neck supple.  Ext: Warm, well-perfused. Mild edema in the hands and ankles. Non-pitting. Good pulses.     Neurologic:  Mental Status: Alert and oriented to person, place, time, and situation.  Able to provide an excellent history.     Cranial Nerves: Visual fields full to confrontation. Extraocular movements full.  Face sensation normal.  Normal head impulse testing.  Normal hearing to finger rub. Face symmetric with normal movements. Tongue and palate midline.  Normal shoulder elevation.      Motor: Normal bulk and tone.  No pronator drift.  Normal foot taps.  Full strength to confrontational testing.    Sensory: Normal light touch, temperature, and vibration.    Reflexes: Biceps, Brachioradialis, Triceps, Knees, Ankles 2/4.     Coordination: Normal finger to nose, rapid alternating movements    Gait: Normal stance width.  Negative Romberg.  Good gait initiation.  Good stride length.  Good arm swing.  Normal turn. Able to walk 5 steps in tandem.      Upper Limb Tension Test for Thoracic Outlet Syndrome:  Arms abducted: Numbness and tingling develop in RIGHT>LEFT  Arms abducted head turned to the RIGHT:   Right hand gets better   Left hand gets worse  Arms abducted head turned to the LEFT:   Left hand gets worse   Right hand gets same     Arms abducted head tilt to the RIGHT:   Right hand gets worse   Left  hand gets much worse  Arms abducted head tilt to the LEFT:   Left hand gets better   Right hand gets worse    Pain Right scalene: Yes, no radiation  Pain Left scalene: Yes, no radiation  Pain Right sternocleidomastoid: No  Pain Left sternocleidomastoid: No    Pain under the RIGHT pectoralis minor tendon: No  Pain at the RIGHT 1st rib-sternum insertion site: No  Pain under the LEFT pectoralis minor tendon: No  Pain at the LEFT 1st rib-sternum insertion site: No     DATA:  All available and relevant labs, imaging, and other procedures were reviewed personally.   Last brain imaging:  MRI Cervical spine w/o contrast  Narrative: PHYSICIAN ALERT    EXAM: MR CERVICAL SPINE W/O CONTRAST  LOCATION: [facility]  DATE: 6/19/2023    INDICATION: BUE pain and paraesthesias primarily C8-T1 distribution. Evaluate radic CCS.  COMPARISON: None.  TECHNIQUE: MRI Cervical Spine without IV contrast.    FINDINGS:   No abnormal signal within the cervical cord. The epidural space is clear. The pontomedullary junctions clear. No prevertebral soft tissue swelling. The marrow signal throughout the cervical vertebrae and visualized portion of the upper thoracic vertebral   bodies is clear. Limited views of the soft tissues of the neck and mucosal spaces of the neck demonstrate partially imaged right thyroid gland mass which displaces the trachea to the left of midline. Right-sided thyroid mass measures approximately 3.9   cm in anterior to posterior dimension by 5 cm in width.    C2-C3: Normal disc height. No herniation. Normal facets. No spinal canal or neural foraminal stenosis.     C3-C4: Bilateral uncovertebral joint hypertrophic change. Left-sided facet degeneration. Mild central ventral osteophyte/disc complex which attenuates the anterior subarachnoid space. Left-sided foraminal stenosis.     C4-C5: Minimal central/right paracentral ventral osteophyte/disc complex which causes subtle attenuation of the  anterior/right subarachnoid space. Right foramen is patent. Left foramen is patent. Left-sided facet degeneration.     C5-C6: DDD change at C5-C6 with bilateral uncovertebral joint hypertrophic change. Mild bilateral facet degeneration. Mild foraminal narrowing bilaterally. Small right-sided foraminal disc protrusion contributes to narrowing of the right foramen.    C6-C7: DDD change at C6-C7 with bilateral uncovertebral joint hypertrophic change. Left-sided facet degeneration. Narrowing of the left foramen.     C7-T1: Minimal right paracentral ventral osteophyte/disc complex which does not appear to cause neural compromise. Spinal canal and foramen are patent.  Impression: IMPRESSION:  1.  Degenerative change throughout the cervical spine with variable degrees of uncovertebral joint and facet hypertrophic change which results in left-sided foraminal narrowing at C3-C4, bilateral foraminal narrowing at C5-C6, left-sided foraminal   narrowing at C6-C7. Small right-sided foraminal disc protrusion at C5-C6 contributes to narrowing of the right foramen.    2.  Minimal degrees of central ventral osteophyte/disc complex at the levels indicated above. Please see body of the report for details as above. No evidence of critical spinal canal stenosis. No evidence of acute disc herniation.    3.  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 Would recommend thyroid ultrasound and potential FNA of this thyroid mass to exclude   underlying malignancy.    60-minutes were spent in evaluation, examination, and documentation.          Again, thank you for allowing me to participate in the care of your patient.      Sincerely,    Jerald ZARATE Cha, MD

## 2023-08-02 NOTE — PROGRESS NOTES
[facility]    Neurology Consult    8/2/2023      Miles Valencia MRN# [id number]   YOB: 1965 Age: 58 year old      Primary care provider:   Oneyda Jones    Requesting provider:   Esther Kwok    Reason for Consult:  TOS, dizziness, migraine    IMPRESSIONS:  Miles Valencia is a 58 year old male with a past medical history of side locked right sided headaches that starts in the right shoulder and radiates to the right eye now with bilateral right>left symptoms of neurogenic and possibly venous TOS given both paresthesias and swelling. Headaches have worsened in the last couple of years and there is a prior history of vertigo which has intermittently presents very mildly. There are no major aural symptoms. There is a history of neck surgery for a multinodular goiter that had been giving him some compressive symptoms. There is multilevel cervical disc disease and foraminal narrowing, worse on the left side. To determine whether cervical radiculopathy may be complicating the picture of TOS, we'll obtain and EMG/NCS as well as a quantitative venous ultrasound. Depending on the ultrasound results, we may obtain a CT venogram of the head and neck given the prominent lateralized headache and eye pain. We would want to rule out internal jugular vein entrapment causing the lateralized head symptoms.     Recommendations:  1. EMG  2. US TOS  3. Follow-up CTVenogram after ultrasound  4. Follow-up after imaging    HISTORY OF PRESENT ILLNESS:  Miles Valencia is a 58 year old male with a past medical history of multinodular goiter with compressive symptoms s/p left lobe resection (5-27-20), prostate cancer s/p prostatectomy (4-7-21), history of migraine and tension headches and neck pain, who presents with bilateral numbness and tingling in the hands and fingers for the last year.     NECK/UPPER EXT SYMPTOMS:  Current symptoms are mostly numbness and tingling in the  right 4th and 5th digits. This had started in the right hand and then evolved to the left side about a year ago but symptoms are mostly in the right hand. He has not had an EMG/NCS, yet. There is some weakness, but he also had arthritis and tendinitis that complicate this picture. Thre has been swelling in the right hand for at least 2 years. There is right hand weakness. He is right handed. The hands can swell in the morning. He doesn't wear a wedding ring because of the swelling. Swelling is bilateral but worse on the right hand. There have been no color changes but has been told that he has Raynaud's syndrome.     There is no history of heavy lifting. He used to weight lifting until 8 years ago. He stopped bicycling. No racket sports, swimming, martial arts. He used to have low back pain, better now.      HEADACHE  Headache Type #1  He has had a headache history since his teen years. In his 20-30's they became more severe and he was treated with triptans. He can manage the headaches now with Excedrin but he takes this every day. The pain starts on the right trapezius and scapular area, migrating to the right side of the scalene, right neck and jaw and to the right eye. He can abort this migration with Excedrin. This can escalate to involving the eye at least once a week. In his 20-30's it was twice a week. At its most severe, he would have to stop what he was doing, turn off the light, and lie down. He gets light and sound sensitive, nausea, rare vomiting. There is no visual aura or loss of vision. There is no diplopia. This is a 100% right sided headache since he was 12-13 years old. He had been treated with retainer for TMD and some physical therapy. He had used amitriptyline for sleep regulation and headache, tried for a few years. He uses gabapentin for sleep, also. He has never tried Botox. No prior accidents or broken bones before the headaches started.       Headache Type #2  There is a second headache  "type, bilateral, that starts in the upper shoulder up the neck and into both eyes. That is the predominant headache now. Now daily for the last 2 weeks. In the last 6 months there have been at most 20% headache free days. This headache responds to Excedrin.  This headache started in the last couple years.     There has been a lot of eye strain. He is a  at the [employer].  He is on the computer a lot.    Both his mother and his one brother has have had bothersome headaches. Mom has vertigo.     DIZZINESS:  Spinning vertigo: Several years ago, best estimate is ~ 2017, he had had episodes of vertigo. He had been doing neck exercises on the floor when the first one hit. When he tried to get up he had the spinning. The initial one lasted at least several hours. He had a hard time walking. He had been treated with meclizine. He got some relief with the Epley maneuver but for 6 months, whenever he would lie down, he felt like his sense of horizontal was off. Occasionally, when he rolls over or sits up now he can get a sense of vertigo. This can be associated with neck tightness.     Lately, he has had very mild seconds of vertigo associated with neck tightness. It can feel like it's about to come on but does not progress to a full sense of rotation. He can have some imbalance in the middle of the night.     Rocking vertigo: None  Presyncope: None    AURAL:  Tinnitus: He has occasional tinnitus, not too bad. He has occasional, \"exploding head,\" loud sound in the head.  Ear pressure: There is no pain or pressure in the ears.  Hearing loss: There is no hearing loss.     BULBAR SYMPTOMS:  Dysphagia: No  Throat pressure: No  Chronic cough: No    CARDIAC:  Chest pain: No  Shortness of breath: Occasionally, seasonal allergies  Palpitation: No  Syncope: No    OTHER:  Pelvic pressure: No  Rectal pressure: No  Hematuria: No  Swelling in feet: No    PAST MEDICAL HISTORY:  Past Medical History:   Diagnosis Date    Achilles bursitis " or tendinitis 5/4/2014    Allergic rhinitis     Asthma     Benign positional vertigo     Congestion     Hypertension     Low back pain     Migraine     Multinodular goiter     Osteoarthritis     Pain in joint, shoulder region 4/18/2014    Prostate cancer ([facility])     RLS (restless legs syndrome)     Sleep problems       PAST SURGICAL HISTORY:  Past Surgical History:   Procedure Laterality Date    ARTHROPLASTY SHOULDER Left 6/13/2017    Procedure: ARTHROPLASTY SHOULDER;  Left Total Shoulder Arthroplasty  ;  Surgeon: Jossy Swanson MD;  Location: [facility] OR    BIOPSY      Prostate    COLONOSCOPY N/A 3/21/2016    Procedure: COLONOSCOPY;  Surgeon: Toy Lima MD;  Location: [facility]    DAVINCI PROSTATECTOMY, LYMPHADENECTOMY N/A 4/7/2021    Procedure: Robot-assisted laparoscopic radical prostatectomy, pelvic lymphadenectomy,;  Surgeon: Eber Pereyra MD;  Location: [facility] OR    THYROIDECTOMY Left 5/27/2020    Procedure: Left Lobe THYROIDECTOMY;  Surgeon: Evelin Cutler MD;  Location: [facility] OR     SOCIAL HISTORY: , , never smoker    ALLERGIES:  Allergies   Allergen Reactions    Amoxicillin Diarrhea     MEDICATIONS:    Current Outpatient Medications:     acetaminophen (TYLENOL) 325 MG tablet, Take 2 tablets (650 mg) by mouth every 4 hours as needed for other (mild pain), Disp: 30 tablet, Rfl: 0    albuterol (PROAIR HFA/PROVENTIL HFA/VENTOLIN HFA) 108 (90 BASE) MCG/ACT Inhaler, Inhale 2 puffs into the lungs every 4 hours as needed for shortness of breath / dyspnea or wheezing, Disp: , Rfl:     ammonium lactate (AMLACTIN) 12 % cream, Apply topically daily as needed , Disp: , Rfl:     aspirin-acetaminophen-caffeine (EXCEDRIN MIGRAINE) 250-250-65 MG per tablet, Take 2 tablets by mouth every 6 hours as needed for headaches , Disp: , Rfl:     atorvastatin (LIPITOR) 10 MG tablet, Take 10 mg by mouth At Bedtime, Disp: , Rfl:     camphor-menthol (DERMASARRA) 0.5-0.5 % LOTN, Apply topically every 6  hours as needed for skin care, Disp: , Rfl:     cholecalciferol 125 MCG (5000 UT) CAPS, Take 5,000 Units by mouth every morning , Disp: , Rfl:     Cyanocobalamin (VITAMIN B-12 PO), Take 1 tablet by mouth every evening , Disp: , Rfl:     cyclobenzaprine (FLEXERIL) 5 MG tablet, Take 1-2 tablets (5-10 mg) by mouth nightly as needed for muscle spasms, Disp: 60 tablet, Rfl: 1    DIAZEPAM PO, , Disp: , Rfl:     diltiazem ER COATED BEADS (CARDIZEM CD/CARTIA XT) 240 MG 24 hr capsule, Take 240 mg by mouth every morning , Disp: , Rfl:     fexofenadine (ALLEGRA) 180 MG tablet, Take 180 mg by mouth every morning , Disp: , Rfl:     gabapentin (NEURONTIN) 300 MG capsule, Take 1 capsule (300 mg) by mouth 3 times daily (Patient taking differently: Take 600 mg by mouth At Bedtime), Disp: 60 capsule, Rfl: 0    HEMP OIL OR EXTRACT OR OTHER CBD CANNABINOID, NOT MEDICAL CANNABIS,, Take 1 capsule by mouth every evening, Disp: , Rfl:     hydrochlorothiazide (HYDRODIURIL) 25 MG tablet, Take 25 mg by mouth every morning , Disp: , Rfl:     hydrocortisone (CORTAID) 1 % cream, Apply topically 2 times daily as needed, Disp: , Rfl:     ibuprofen (ADVIL/MOTRIN) 200 MG tablet, Take 800 mg by mouth every 8 hours as needed for mild pain , Disp: , Rfl:     meclizine (ANTIVERT) 12.5 MG tablet, Take 2 tablets (25 mg) by mouth 4 times daily as needed for dizziness, Disp: 30 tablet, Rfl: 0    MELATONIN PO, Take 2.5 mg by mouth At Bedtime , Disp: , Rfl:     NONFORMULARY, Magnesium/MSM lotion - Brand: [employer] minerals 2 pumps every evening applied to feet/legs  20 mg elemental magnesium and 25 mg MSM Per 1 pump, Disp: , Rfl:     omega 3 1000 MG CAPS, Take by mouth At Bedtime, Disp: , Rfl:     rizatriptan (MAXALT-MLT) 10 MG ODT, Take 10 mg by mouth at onset of headache for migraine, Disp: , Rfl:     sildenafil (REVATIO) 20 MG tablet, Take 1 tab daily (Patient taking differently: Take 1 tab daily as needed), Disp: 90 tablet, Rfl: 11    zolpidem (AMBIEN) 5  MG tablet, Take 5 mg by mouth nightly as needed for sleep, Disp: , Rfl:      PHYSICAL EXAM:  Vitals:  /82   Pulse 92   Resp 16   SpO2 96%     General: Patient is well-nourished, well-groomed, in no apparent distress    HEENT: Head is atraumatic, eyes look normal exteriorly, throat clear, neck supple.  Ext: Warm, well-perfused. Mild edema in the hands and ankles. Non-pitting. Good pulses.     Neurologic:  Mental Status: Alert and oriented to person, place, time, and situation.  Able to provide an excellent history.     Cranial Nerves: Visual fields full to confrontation. Extraocular movements full.  Face sensation normal.  Normal head impulse testing.  Normal hearing to finger rub. Face symmetric with normal movements. Tongue and palate midline.  Normal shoulder elevation.      Motor: Normal bulk and tone.  No pronator drift.  Normal foot taps.  Full strength to confrontational testing.    Sensory: Normal light touch, temperature, and vibration.    Reflexes: Biceps, Brachioradialis, Triceps, Knees, Ankles 2/4.     Coordination: Normal finger to nose, rapid alternating movements    Gait: Normal stance width.  Negative Romberg.  Good gait initiation.  Good stride length.  Good arm swing.  Normal turn. Able to walk 5 steps in tandem.      Upper Limb Tension Test for Thoracic Outlet Syndrome:  Arms abducted: Numbness and tingling develop in RIGHT>LEFT  Arms abducted head turned to the RIGHT:   Right hand gets better   Left hand gets worse  Arms abducted head turned to the LEFT:   Left hand gets worse   Right hand gets same     Arms abducted head tilt to the RIGHT:   Right hand gets worse   Left hand gets much worse  Arms abducted head tilt to the LEFT:   Left hand gets better   Right hand gets worse    Pain Right scalene: Yes, no radiation  Pain Left scalene: Yes, no radiation  Pain Right sternocleidomastoid: No  Pain Left sternocleidomastoid: No    Pain under the RIGHT pectoralis minor tendon: No  Pain at the  RIGHT 1st rib-sternum insertion site: No  Pain under the LEFT pectoralis minor tendon: No  Pain at the LEFT 1st rib-sternum insertion site: No     DATA:  All available and relevant labs, imaging, and other procedures were reviewed personally.   Last brain imaging:  MRI Cervical spine w/o contrast  Narrative: PHYSICIAN ALERT    EXAM: MR CERVICAL SPINE W/O CONTRAST  LOCATION: [facility]  DATE: 6/19/2023    INDICATION: BUE pain and paraesthesias primarily C8-T1 distribution. Evaluate radic CCS.  COMPARISON: None.  TECHNIQUE: MRI Cervical Spine without IV contrast.    FINDINGS:   No abnormal signal within the cervical cord. The epidural space is clear. The pontomedullary junctions clear. No prevertebral soft tissue swelling. The marrow signal throughout the cervical vertebrae and visualized portion of the upper thoracic vertebral   bodies is clear. Limited views of the soft tissues of the neck and mucosal spaces of the neck demonstrate partially imaged right thyroid gland mass which displaces the trachea to the left of midline. Right-sided thyroid mass measures approximately 3.9   cm in anterior to posterior dimension by 5 cm in width.    C2-C3: Normal disc height. No herniation. Normal facets. No spinal canal or neural foraminal stenosis.     C3-C4: Bilateral uncovertebral joint hypertrophic change. Left-sided facet degeneration. Mild central ventral osteophyte/disc complex which attenuates the anterior subarachnoid space. Left-sided foraminal stenosis.     C4-C5: Minimal central/right paracentral ventral osteophyte/disc complex which causes subtle attenuation of the anterior/right subarachnoid space. Right foramen is patent. Left foramen is patent. Left-sided facet degeneration.     C5-C6: DDD change at C5-C6 with bilateral uncovertebral joint hypertrophic change. Mild bilateral facet degeneration. Mild foraminal narrowing bilaterally. Small right-sided foraminal disc protrusion contributes  to narrowing of the right foramen.    C6-C7: DDD change at C6-C7 with bilateral uncovertebral joint hypertrophic change. Left-sided facet degeneration. Narrowing of the left foramen.     C7-T1: Minimal right paracentral ventral osteophyte/disc complex which does not appear to cause neural compromise. Spinal canal and foramen are patent.  Impression: IMPRESSION:  1.  Degenerative change throughout the cervical spine with variable degrees of uncovertebral joint and facet hypertrophic change which results in left-sided foraminal narrowing at C3-C4, bilateral foraminal narrowing at C5-C6, left-sided foraminal   narrowing at C6-C7. Small right-sided foraminal disc protrusion at C5-C6 contributes to narrowing of the right foramen.    2.  Minimal degrees of central ventral osteophyte/disc complex at the levels indicated above. Please see body of the report for details as above. No evidence of critical spinal canal stenosis. No evidence of acute disc herniation.    3.  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 Would recommend thyroid ultrasound and potential FNA of this thyroid mass to exclude   underlying malignancy.    60-minutes were spent in evaluation, examination, and documentation.

## 2023-08-04 ENCOUNTER — VIRTUAL VISIT (OUTPATIENT)
Dept: UROLOGY | Facility: CLINIC | Age: 58
End: 2023-08-04
Payer: COMMERCIAL

## 2023-08-04 DIAGNOSIS — Z85.46 PERSONAL HISTORY OF MALIGNANT NEOPLASM OF PROSTATE: Primary | ICD-10-CM

## 2023-08-04 PROCEDURE — 99213 OFFICE O/P EST LOW 20 MIN: CPT | Mod: VID | Performed by: NURSE PRACTITIONER

## 2023-08-04 NOTE — PATIENT INSTRUCTIONS
UROLOGY CLINIC VISIT PATIENT INSTRUCTIONS    -Follow up with me in 6 months with another PSA checked beforehand.    If you have any issues, questions or concerns in the meantime, do not hesitate to contact us at [phone] or via [employer].     Kimberly Nino, CNP  Department of Urology

## 2023-08-04 NOTE — PROGRESS NOTES
Virtual Visit Details    Type of service:  Video Visit   Originating Location (pt. Location): Home  Distant Location (provider location):  Off-site  Platform used for Video Visit: [employer]     Video start time: 10:59 AM  Video end time: 11:05 AM    CC: Prostate cancer follow up    Assessment/Plan:  58 year old male with a history of prostate cancer s/p RALP on 4/7/21, with path showing [place] 3+4=7 pT2 N0Mx with negative margins. PSAs have been detectable, though have remained stable, last at 0.07. Will plan to repeat one again in 6 months to compare.    Kimberly Nino, CNP  Department of Urology      Subjective:   58 year old male with a history of prostate cancer s/p RALP on 4/7/21, with path showing [place] 3+4=7 pT2 N0Mx with negative margins. PSAs have been detectable, though have remained stable. Post-op PSAs have been 0.02, 0.09, 0.04, 0.04, 0.05, and 0.07 as of two days ago.     Today, he states that he has been doing well since our last visit. He does have some chronic low back pain that started a few years ago. This has not worsened or changed. No other symptoms or issues to discuss today.     Objective:     Exam:   Patient is a 58 year old male   No vital signs obtained due to virtual visit.  General Appearance: Well groomed, hygenic  Respiratory: No cough, no respiratory distress or labored breathing  Musculoskeletal: Grossly normal with no gross deficits  Skin: No discoloration or apparent rashes  Neurologic: No tremors  Psychiatric: Alert and oriented  Further examination is deferred due to the nature of our visit.

## 2023-08-04 NOTE — NURSING NOTE
Is the patient currently in the state of [place]? YES    Visit mode:VIDEO    If the visit is dropped, the patient can be reconnected by: VIDEO VISIT: Send to e-mail at: [email or web address]    Will anyone else be joining the visit? NO      How would you like to obtain your AVS? MyChart    Are changes needed to the allergy or medication list? NO    Reason for visit: 6 mo follow up    Helen Garrison on 8/4/2023 at 10:47 AM

## 2023-08-04 NOTE — LETTER
8/4/2023       RE: Miles Valencia  [address] [place] [address]     Dear Colleague,    Thank you for referring your patient, Miles Valencia, to the [facility] [place] [facility]. Please see a copy of my visit note below.    Virtual Visit Details    Type of service:  Video Visit   Originating Location (pt. Location): Home  Distant Location (provider location):  Off-site  Platform used for Video Visit: [employer]     Video start time: 10:59 AM  Video end time: 11:05 AM    CC: Prostate cancer follow up    Assessment/Plan:  58 year old male with a history of prostate cancer s/p RALP on 4/7/21, with path showing [place] 3+4=7 pT2 N0Mx with negative margins. PSAs have been detectable, though have remained stable, last at 0.07. Will plan to repeat one again in 6 months to compare.    Kimberly Nino, CNP  Department of Urology      Subjective:   58 year old male with a history of prostate cancer s/p RALP on 4/7/21, with path showing [place] 3+4=7 pT2 N0Mx with negative margins. PSAs have been detectable, though have remained stable. Post-op PSAs have been 0.02, 0.09, 0.04, 0.04, 0.05, and 0.07 as of two days ago.     Today, he states that he has been doing well since our last visit. He does have some chronic low back pain that started a few years ago. This has not worsened or changed. No other symptoms or issues to discuss today.     Objective:     Exam:   Patient is a 58 year old male   No vital signs obtained due to virtual visit.  General Appearance: Well groomed, hygenic  Respiratory: No cough, no respiratory distress or labored breathing  Musculoskeletal: Grossly normal with no gross deficits  Skin: No discoloration or apparent rashes  Neurologic: No tremors  Psychiatric: Alert and oriented  Further examination is deferred due to the nature of our visit.

## 2023-08-22 ENCOUNTER — THERAPY VISIT (OUTPATIENT)
Dept: OCCUPATIONAL THERAPY | Facility: CLINIC | Age: 58
End: 2023-08-22
Payer: COMMERCIAL

## 2023-08-22 DIAGNOSIS — M25.531 RIGHT WRIST PAIN: ICD-10-CM

## 2023-08-22 DIAGNOSIS — M79.644 PAIN OF RIGHT THUMB: Primary | ICD-10-CM

## 2023-08-22 PROCEDURE — 97535 SELF CARE MNGMENT TRAINING: CPT | Mod: GO | Performed by: OCCUPATIONAL THERAPIST

## 2023-08-22 PROCEDURE — 97165 OT EVAL LOW COMPLEX 30 MIN: CPT | Mod: GO | Performed by: OCCUPATIONAL THERAPIST

## 2023-08-22 PROCEDURE — 97760 ORTHOTIC MGMT&TRAING 1ST ENC: CPT | Mod: GO | Performed by: OCCUPATIONAL THERAPIST

## 2023-08-22 NOTE — PROGRESS NOTES
"OCCUPATIONAL THERAPY EVALUATION  Type of Visit: Evaluation    See electronic medical record for Abuse and Falls Screening details.    Subjective   Presenting condition or subjective complaint:  Right thumb pain  Date of onset: About a year ago (Order date: 01/20/23)    Relevant medical history:  Past Medical History:   Diagnosis Date    Achilles bursitis or tendinitis 5/4/2014    Allergic rhinitis     Asthma     Benign positional vertigo     Congestion     Hypertension     Low back pain     Migraine     Multinodular goiter     Osteoarthritis     Pain in joint, shoulder region 4/18/2014    Prostate cancer (H)     RLS (restless legs syndrome)     Sleep problems      Dates & types of surgery:  Past Surgical History:   Procedure Laterality Date    ARTHROPLASTY SHOULDER Left 6/13/2017    Procedure: ARTHROPLASTY SHOULDER;  Left Total Shoulder Arthroplasty  ;  Surgeon: Jossy Swanson MD;  [facility]: [facility]    BIOPSY      Prostate    COLONOSCOPY N/A 3/21/2016    Procedure: COLONOSCOPY;  Surgeon: Toy Lima MD;  Location: [facility]    DAVINCI PROSTATECTOMY, LYMPHADENECTOMY N/A 4/7/2021    Procedure: Robot-assisted laparoscopic radical prostatectomy, pelvic lymphadenectomy,;  Surgeon: Eber Pereyra MD;  Location: [facility]    THYROIDECTOMY Left 5/27/2020    Procedure: Left Lobe THYROIDECTOMY;  Surgeon: Evelin Cutler MD;  Location: [facility]      Prior diagnostic imaging/testing results:  X-ray. Per report, \"1. No acute osseous abnormality. 2. Mild first carpometacarpal degenerative change.\"     Prior therapy history for the same diagnosis, illness or injury: Hand therapy    Prior level of function:  Transfers: Independent  Ambulation: Independent  ADL: Independent  IADL: Independent    Living environment: Patient is independent at home and there are no concerns about accessibility and mobility in the home.    Employment:    Hobbies/Interests: Cross country skiing    Patient goals for " therapy: To have less thumb pain with activity.    Objective   Right hand dominant  Patient reports symptoms of pain, weakness    Pain Level (Scale 0-10)   8/22/2023   At Rest 2-5/10   With Use 2-5/10     Pain Description  Date 8/22/2023   Location Thumb CMC joint, occasionally 1st dorsal compartment   Pain Quality Burning and Dull   Frequency intermittent     Pain is worst  daytime   Exacerbated by  Gripping, pinching, opening a jar, carrying     ROM  Thumb 8/22/2023 8/22/2023   AROM  (PROM) Left Right   MP 0/45 0/50   IP 0/65 0/65   RABD 68 65   PABD 65 50+   Kapand Opposition Scale (0-10/10) Small finger MCP joint Small finger MCP joint     Thumb Observation/Appearance   - none  + mild    ++ moderate    +++ severe     8/22/2023   Shoulder deformity present over CMC R: -  L: -   Edema over the CMC joint R: -  L: -   Noted collapse of MP into hyperextension during pinch R: -  L: -      Provocative Tests  Pain Report:  - none    + mild    ++ moderate    +++ severe     8/22/2023   Crepitus present R: -  L: -   CMC Adduction Stress Test R: -  L: -   CMC Extension Stress Test R: +  L: -   Finkelstein's R: -  L: -   WHAT Test R: -  L: -       Strength   (Measured in pounds)  Pain Report: - none  + mild    ++ moderate    +++ severe    8/22/2023 8/22/2023   Trials Left Right   1 54 45     Lat Pinch 8/22/2023 8/22/2023   Trials Left Right   1 17 14     3 Pt Pinch 8/22/2023 8/22/2023   Trials Left Right   1 14 13+     Palpation   Pain Report:  - none    + mild    ++ moderate    +++ severe    8/22/2023   CMC Joint Line R: Slight  L: -   Thenar Eminence R: -  L: -   Web Space R: -  L: -   1st DC R: -  L: -         Assessment & Plan   CLINICAL IMPRESSIONS  Medical Diagnosis: Right thumb pain    Treatment Diagnosis: Right thumb pain    Impression/Assessment: Pt is a 58 year old male presenting to Occupational Therapy due to the above condition. Symptoms and provocative testing are consistent with mild thumb CMC  osteoarthritis.  The following significant findings have been identified: Impaired strength and Pain.  These identified deficits interfere with their ability to perform work tasks, recreational activities, household chores, and meal planning and preparation as compared to previous level of function.   Patient's limitations or Problem List includes: Pain, Decreased ROM/motion, Decreased stability, Decreased , and Decreased pinch of the right thumb which interferes with the patient's ability to perform Work Tasks, Recreational Activities, and Household Chores as compared to previous level of function.    Clinical Decision Making (Complexity):  Assessment of Occupational Performance: 3-5 Performance Deficits  Occupational Performance Limitations: driving and community mobility, home establishment and management, meal preparation and cleanup, shopping, work, and leisure activities  Clinical Decision Making (Complexity): Low complexity    PLAN OF CARE  Treatment Interventions:  Modalities:  Paraffin  Therapeutic Exercise:  AROM, AAROM, PROM, Isotonics, Isometrics, and Stabilization  Neuromuscular re-education:  Kinesiotaping  Manual Techniques:  Joint mobilization and Myofascial release  Orthotic Fabrication:  Hand-based thumb spica orthosis, thumb-based CMC support orthosis    Long Term Goals   OT Goal 1  Goal Identifier: IADL  Goal Description: Able to open a tight or new jar with 2/10 pain or less with or without the use of adaptive equipment.  Rationale: In order to maximize safety and independence with performance of self-care activities  Target Date: 10/22/23      Frequency of Treatment: 2x/month  Duration of Treatment: 2 months     Recommended Referrals to Other Professionals:  N/A  Education Assessment: Learner/Method: Patient     Risks and benefits of evaluation/treatment have been explained.   Patient/Family/caregiver agrees with Plan of Care.     Evaluation Time:    OT Eval, Low Complexity Minutes (46982):  30    Signing Clinician: Rohini Gaines, OT

## 2023-09-01 ENCOUNTER — THERAPY VISIT (OUTPATIENT)
Dept: OCCUPATIONAL THERAPY | Facility: CLINIC | Age: 58
End: 2023-09-01
Payer: COMMERCIAL

## 2023-09-01 DIAGNOSIS — M79.644 PAIN OF RIGHT THUMB: Primary | ICD-10-CM

## 2023-09-01 DIAGNOSIS — M25.531 RIGHT WRIST PAIN: ICD-10-CM

## 2023-09-01 PROCEDURE — 97110 THERAPEUTIC EXERCISES: CPT | Mod: GO | Performed by: OCCUPATIONAL THERAPIST

## 2023-09-05 DIAGNOSIS — M54.42 CHRONIC LEFT-SIDED LOW BACK PAIN WITH LEFT-SIDED SCIATICA: ICD-10-CM

## 2023-09-05 DIAGNOSIS — G89.29 CHRONIC LEFT-SIDED LOW BACK PAIN WITH LEFT-SIDED SCIATICA: ICD-10-CM

## 2023-09-05 RX ORDER — CYCLOBENZAPRINE HCL 5 MG
5-10 TABLET ORAL
Qty: 60 TABLET | Refills: 1 | Status: SHIPPED | OUTPATIENT
Start: 2023-09-05 | End: 2024-02-06

## 2023-09-05 NOTE — TELEPHONE ENCOUNTER
Refill request received from [facility] via fax    Medication: cyclobenzaprine (FLEXERIL) 5 MG tablet   Directions: Take 1-2 tablets (5-10 mg) by mouth nightly as needed for muscle spasms      Last ordered: 3/3/23   Qty: 60  RF: 1  Last OV: 5/23/23  Next OV: None scheduled    Routing to Dr. Kwok to review and sign if appropriate.    JEY EricksonN RN Care Coordinator  [facility]   [facility] main phone # [phone] fax # [phone]

## 2023-09-15 ENCOUNTER — THERAPY VISIT (OUTPATIENT)
Dept: OCCUPATIONAL THERAPY | Facility: CLINIC | Age: 58
End: 2023-09-15
Payer: COMMERCIAL

## 2023-09-15 DIAGNOSIS — M25.531 RIGHT WRIST PAIN: ICD-10-CM

## 2023-09-15 DIAGNOSIS — M79.644 PAIN OF RIGHT THUMB: Primary | ICD-10-CM

## 2023-09-15 PROCEDURE — 97535 SELF CARE MNGMENT TRAINING: CPT | Mod: GO | Performed by: OCCUPATIONAL THERAPIST

## 2023-09-15 PROCEDURE — 97110 THERAPEUTIC EXERCISES: CPT | Mod: GO | Performed by: OCCUPATIONAL THERAPIST

## 2023-09-26 ENCOUNTER — OFFICE VISIT (OUTPATIENT)
Dept: NEUROLOGY | Facility: CLINIC | Age: 58
End: 2023-09-26
Attending: PSYCHIATRY & NEUROLOGY
Payer: COMMERCIAL

## 2023-09-26 DIAGNOSIS — G54.0 TOS (THORACIC OUTLET SYNDROME): ICD-10-CM

## 2023-09-26 DIAGNOSIS — R20.2 NUMBNESS AND TINGLING OF BOTH UPPER EXTREMITIES: ICD-10-CM

## 2023-09-26 DIAGNOSIS — R20.0 NUMBNESS AND TINGLING OF BOTH UPPER EXTREMITIES: ICD-10-CM

## 2023-09-26 PROCEDURE — 95885 MUSC TST DONE W/NERV TST LIM: CPT | Mod: 59 | Performed by: STUDENT IN AN ORGANIZED HEALTH CARE EDUCATION/TRAINING PROGRAM

## 2023-09-26 PROCEDURE — 95886 MUSC TEST DONE W/N TEST COMP: CPT | Mod: RT | Performed by: STUDENT IN AN ORGANIZED HEALTH CARE EDUCATION/TRAINING PROGRAM

## 2023-09-26 PROCEDURE — 95910 NRV CNDJ TEST 7-8 STUDIES: CPT | Performed by: STUDENT IN AN ORGANIZED HEALTH CARE EDUCATION/TRAINING PROGRAM

## 2023-09-26 NOTE — LETTER
2023       RE: Miles Valencia  [address] [place] [address]       Dear Colleague,    Thank you for referring your patient, Miles Valencia, to the [facility]IN[facility] [place] at [facility]. Please see a copy of my visit note below.                            [facility]  Electrodiagnostic Laboratory                 Department of Neurology                                                                                                         Test Date:  2023    Patient: Miles Valencia :  Physician: August Mari MD   Sex: Male AGE: [age] year Ref Phys:    ID#: [id number]   Technician: Jen Pitt     History and Examination:  58-year-old man presented with intermittent numbness and tingling sensation in bilateral medial forearms and fourth and fifth digits.  Symptoms are symmetric on both side.  He has chronic occipital headache and neck pain but denies radicular pain down either arm.  This study was performed to assess for cervical radiculopathy, brachial plexopathy, or focal neuropathy of the upper limbs.     Techniques:  Motor and sensory conduction studies were done with surface recording electrodes. EMG was done with a concentric needle electrode.     Results:  Nerve conduction studies of bilateral upper limbs were normal.  There was no conduction block or focal slowing on the ulnar motor responses.  Median-ulnar palmar interlatency difference was normal on both sides.      Needle EMG of bilateral upper limbs and right cervical paraspinal muscles were normal.    Interpretation:  This is a normal study.  There is no electrophysiologic evidence of cervical radiculopathy, brachial plexopathy, or mononeuropathy of the upper limbs in the current study.    August Mari MD  Department of Neurology        Nerve Conduction Studies  Motor Sites      Latency Amplitude Neg. Amp Diff Segment Distance Velocity Neg.  Dur Neg Area Diff Temperature Comment   Site (ms) Norm (mV) Norm %  cm m/s Norm ms %  C    Left Median (APB) Motor   Wrist 3.7  < 4.4 7.6  > 5.0  Wrist-APB 8   4.5  31.1    Elbow 8.3 - 6.8  > 5.0 -10.5 Elbow-Wrist 24 52  > 48 4.8 -13.4 31.1    Right Median (APB) Motor   Wrist 3.3  < 4.4 9.1  > 5.0  Wrist-APB 8   5.2  31.5    Elbow 8.2 - 8.7  > 5.0 -4.4 Elbow-Wrist 24 49  > 48 5.7 -3.2 31.5    Left Ulnar (ADM) Motor   Wrist 3.4  < 3.5 8.1  > 5.0  Wrist-ADM 8   4.7  31.2    Bel Elbow 6.5 - 7.6 - -6.2 Bel Elbow-Wrist 19 61  > 48 4.8 -5.2 31.2    Abv Elbow 8.6 - 7.0 - -7.9 Abv Elbow-Bel Elbow 11.5 55  > 48 4.8 -9.9 31.1    Right Ulnar (ADM) Motor   Wrist 3.3  < 3.5 9.0  > 5.0  Wrist-ADM 8   4.5  31.3    Bel Elbow 6.7 - 8.3 - -7.8 Bel Elbow-Wrist 19.5 57  > 48 4.9 -4.0 31.3    Abv Elbow 8.4 - 8.2 - -1.20 Abv Elbow-Bel Elbow 11.5 68  > 48 5.1 -0.84 31.2      Sensory Sites      Onset Lat Peak Lat Amp (O-P) Amp (P-P) Segment Distance Velocity Temperature Comment   Site ms ms  V Norm  V  cm m/s Norm  C    Left Median Sensory   Wrist-Dig II 2.8 3.5 35  > 10 47 Wrist-Dig II 14 50  > 48 31.4    Right Median Sensory   Wrist-Dig II 2.7 3.4 24  > 10 34 Wrist-Dig II 14 52  > 48 31.4    Left Median-Ulnar Palmar Sensory        Median   Palm-Wrist 1.33 1.85 89 - 105 Palm-Wrist 8 60 - 31.3         Ulnar   Palm-Wrist 1.15 1.78 39 - 55 Palm-Wrist 8 70 - 31.3    Right Median-Ulnar Palmar Sensory        Median   Palm-Wrist 1.28 1.83 48 - 64 Palm-Wrist 8 63 - 31.6         Ulnar   Palm-Wrist 1.33 1.93 21 - 37 Palm-Wrist 8 60 - 31.5    Left Ulnar Sensory   Wrist-Dig V 2.4 3.1 32  > 8 35 Wrist-Dig V 12.5 52  > 48 31.9    Right Ulnar Sensory   Wrist-Dig V 2.6 3.3 27  > 8 30 Wrist-Dig V 12.5 48  > 48 31.4      Inter-Nerve Comparisons     Nerve 1 Value 1 Nerve 2 Value 2 Parameter Result Normal   Sensory Sites   R Median Palm-Wrist 1.8 ms R Ulnar Palm-Wrist 1.9 ms Peak Lat Diff 0.10 ms <0.40   L Median Palm-Wrist 1.9 ms L Ulnar Palm-Wrist 1.8 ms  Peak Lat Diff 0.07 ms <0.40       Electromyography     Side Muscle Ins Act Fibs/PSW Fasc HF Amp Dur Poly Recrt Int Pat   Right FDI Nml None Nml 0 Nml Nml 0 Nml Nml   Right Pronator Teres Nml None Nml 0 Nml Nml 0 Nml Nml   Right EIP Nml None Nml 0 Nml Nml 0 Nml Nml   Right FCU Nml None Nml 0 Nml Nml 0 Nml Nml   Right Cervical Paraspinals Nml None Nml 0        Left FDI Nml None Nml 0 Nml Nml 0 Nml Nml   Left EIP Nml None Nml 0 Nml Nml 0 Nml Nml   Left FCU Nml None Nml 0 Nml Nml 0 Nml Nml         NCS Waveforms:    Motor                Sensory                          Again, thank you for allowing me to participate in the care of your patient.      Sincerely,    August Mari MD

## 2023-09-26 NOTE — PROGRESS NOTES
[facility]  Electrodiagnostic Laboratory                 Department of Neurology                                                                                                         Test Date:  2023    Patient: Miles Valencia :  Physician: August Mari MD   Sex: Male AGE: [age] year Ref Phys:    ID#: [id number]   Technician: Jen Pitt     History and Examination:  58-year-old man presented with intermittent numbness and tingling sensation in bilateral medial forearms and fourth and fifth digits.  Symptoms are symmetric on both side.  He has chronic occipital headache and neck pain but denies radicular pain down either arm.  This study was performed to assess for cervical radiculopathy, brachial plexopathy, or focal neuropathy of the upper limbs.     Techniques:  Motor and sensory conduction studies were done with surface recording electrodes. EMG was done with a concentric needle electrode.     Results:  Nerve conduction studies of bilateral upper limbs were normal.  There was no conduction block or focal slowing on the ulnar motor responses.  Median-ulnar palmar interlatency difference was normal on both sides.      Needle EMG of bilateral upper limbs and right cervical paraspinal muscles were normal.    Interpretation:  This is a normal study.  There is no electrophysiologic evidence of cervical radiculopathy, brachial plexopathy, or mononeuropathy of the upper limbs in the current study.    August Mari MD  Department of Neurology        Nerve Conduction Studies  Motor Sites      Latency Amplitude Neg. Amp Diff Segment Distance Velocity Neg. Dur Neg Area Diff Temperature Comment   Site (ms) Norm (mV) Norm %  cm m/s Norm ms %  C    Left Median (APB) Motor   Wrist 3.7  < 4.4 7.6  > 5.0  Wrist-APB 8   4.5  31.1    Elbow 8.3 - 6.8  > 5.0 -10.5 Elbow-Wrist 24 52  > 48 4.8 -13.4 31.1    Right Median (APB) Motor   Wrist 3.3  < 4.4 9.1  > 5.0  Wrist-APB 8    5.2  31.5    Elbow 8.2 - 8.7  > 5.0 -4.4 Elbow-Wrist 24 49  > 48 5.7 -3.2 31.5    Left Ulnar (ADM) Motor   Wrist 3.4  < 3.5 8.1  > 5.0  Wrist-ADM 8   4.7  31.2    Bel Elbow 6.5 - 7.6 - -6.2 Bel Elbow-Wrist 19 61  > 48 4.8 -5.2 31.2    Abv Elbow 8.6 - 7.0 - -7.9 Abv Elbow-Bel Elbow 11.5 55  > 48 4.8 -9.9 31.1    Right Ulnar (ADM) Motor   Wrist 3.3  < 3.5 9.0  > 5.0  Wrist-ADM 8   4.5  31.3    Bel Elbow 6.7 - 8.3 - -7.8 Bel Elbow-Wrist 19.5 57  > 48 4.9 -4.0 31.3    Abv Elbow 8.4 - 8.2 - -1.20 Abv Elbow-Bel Elbow 11.5 68  > 48 5.1 -0.84 31.2      Sensory Sites      Onset Lat Peak Lat Amp (O-P) Amp (P-P) Segment Distance Velocity Temperature Comment   Site ms ms  V Norm  V  cm m/s Norm  C    Left Median Sensory   Wrist-Dig II 2.8 3.5 35  > 10 47 Wrist-Dig II 14 50  > 48 31.4    Right Median Sensory   Wrist-Dig II 2.7 3.4 24  > 10 34 Wrist-Dig II 14 52  > 48 31.4    Left Median-Ulnar Palmar Sensory        Median   Palm-Wrist 1.33 1.85 89 - 105 Palm-Wrist 8 60 - 31.3         Ulnar   Palm-Wrist 1.15 1.78 39 - 55 Palm-Wrist 8 70 - 31.3    Right Median-Ulnar Palmar Sensory        Median   Palm-Wrist 1.28 1.83 48 - 64 Palm-Wrist 8 63 - 31.6         Ulnar   Palm-Wrist 1.33 1.93 21 - 37 Palm-Wrist 8 60 - 31.5    Left Ulnar Sensory   Wrist-Dig V 2.4 3.1 32  > 8 35 Wrist-Dig V 12.5 52  > 48 31.9    Right Ulnar Sensory   Wrist-Dig V 2.6 3.3 27  > 8 30 Wrist-Dig V 12.5 48  > 48 31.4      Inter-Nerve Comparisons     Nerve 1 Value 1 Nerve 2 Value 2 Parameter Result Normal   Sensory Sites   R Median Palm-Wrist 1.8 ms R Ulnar Palm-Wrist 1.9 ms Peak Lat Diff 0.10 ms <0.40   L Median Palm-Wrist 1.9 ms L Ulnar Palm-Wrist 1.8 ms Peak Lat Diff 0.07 ms <0.40       Electromyography     Side Muscle Ins Act Fibs/PSW Fasc HF Amp Dur Poly Recrt Int Pat   Right FDI Nml None Nml 0 Nml Nml 0 Nml Nml   Right Pronator Teres Nml None Nml 0 Nml Nml 0 Nml Nml   Right EIP Nml None Nml 0 Nml Nml 0 Nml Nml   Right FCU Nml None Nml 0 Nml Nml 0 Nml Nml    Right Cervical Paraspinals Nml None Nml 0        Left FDI Nml None Nml 0 Nml Nml 0 Nml Nml   Left EIP Nml None Nml 0 Nml Nml 0 Nml Nml   Left FCU Nml None Nml 0 Nml Nml 0 Nml Nml         NCS Waveforms:    Motor                Sensory

## 2023-11-17 ENCOUNTER — ANCILLARY PROCEDURE (OUTPATIENT)
Dept: ULTRASOUND IMAGING | Facility: CLINIC | Age: 58
End: 2023-11-17
Attending: PSYCHIATRY & NEUROLOGY
Payer: COMMERCIAL

## 2023-11-17 DIAGNOSIS — I87.1 COMPRESSION OF VEIN: ICD-10-CM

## 2023-11-17 DIAGNOSIS — G54.0 TOS (THORACIC OUTLET SYNDROME): ICD-10-CM

## 2023-11-17 DIAGNOSIS — R20.2 NUMBNESS AND TINGLING OF BOTH UPPER EXTREMITIES: ICD-10-CM

## 2023-11-17 DIAGNOSIS — R20.0 NUMBNESS AND TINGLING OF BOTH UPPER EXTREMITIES: ICD-10-CM

## 2023-11-17 PROCEDURE — 93922 UPR/L XTREMITY ART 2 LEVELS: CPT | Performed by: RADIOLOGY

## 2023-11-17 PROCEDURE — 93970 EXTREMITY STUDY: CPT | Performed by: RADIOLOGY

## 2023-11-26 ENCOUNTER — HEALTH MAINTENANCE LETTER (OUTPATIENT)
Age: 58
End: 2023-11-26

## 2023-12-01 DIAGNOSIS — I87.1 COMPRESSION OF VEIN: Primary | ICD-10-CM

## 2023-12-01 DIAGNOSIS — R42 DIZZINESS: ICD-10-CM

## 2023-12-01 DIAGNOSIS — M54.2 NECK PAIN: ICD-10-CM

## 2023-12-01 DIAGNOSIS — H53.8 BLURRING OF VISUAL IMAGE OF RIGHT EYE: ICD-10-CM

## 2023-12-12 ENCOUNTER — ANCILLARY PROCEDURE (OUTPATIENT)
Dept: CT IMAGING | Facility: CLINIC | Age: 58
End: 2023-12-12
Attending: PSYCHIATRY & NEUROLOGY
Payer: COMMERCIAL

## 2023-12-12 DIAGNOSIS — H53.8 BLURRING OF VISUAL IMAGE OF RIGHT EYE: ICD-10-CM

## 2023-12-12 DIAGNOSIS — M54.2 NECK PAIN: ICD-10-CM

## 2023-12-12 DIAGNOSIS — R42 DIZZINESS: ICD-10-CM

## 2023-12-12 DIAGNOSIS — I87.1 COMPRESSION OF VEIN: ICD-10-CM

## 2023-12-12 PROCEDURE — 70496 CT ANGIOGRAPHY HEAD: CPT | Performed by: RADIOLOGY

## 2023-12-12 PROCEDURE — 70498 CT ANGIOGRAPHY NECK: CPT | Performed by: RADIOLOGY

## 2023-12-12 RX ORDER — IOPAMIDOL 755 MG/ML
90 INJECTION, SOLUTION INTRAVASCULAR ONCE
Status: COMPLETED | OUTPATIENT
Start: 2023-12-12 | End: 2023-12-12

## 2023-12-12 RX ADMIN — IOPAMIDOL 90 ML: 755 INJECTION, SOLUTION INTRAVASCULAR at 15:26

## 2023-12-12 NOTE — DISCHARGE INSTRUCTIONS

## 2023-12-13 LAB — RADIOLOGIST FLAGS: NORMAL

## 2023-12-14 DIAGNOSIS — E04.1 THYROID NODULE: Primary | ICD-10-CM

## 2024-01-03 ENCOUNTER — TRANSCRIBE ORDERS (OUTPATIENT)
Dept: OTHER | Age: 59
End: 2024-01-03

## 2024-01-03 DIAGNOSIS — Z12.11 SCREEN FOR COLON CANCER: Primary | ICD-10-CM

## 2024-01-26 ENCOUNTER — HOSPITAL ENCOUNTER (OUTPATIENT)
Facility: AMBULATORY SURGERY CENTER | Age: 59
End: 2024-01-26
Attending: INTERNAL MEDICINE
Payer: COMMERCIAL

## 2024-01-26 ENCOUNTER — TELEPHONE (OUTPATIENT)
Dept: GASTROENTEROLOGY | Facility: CLINIC | Age: 59
End: 2024-01-26
Payer: COMMERCIAL

## 2024-01-26 NOTE — TELEPHONE ENCOUNTER
"Endoscopy Scheduling Screen    Have you had a positive Covid test in the last 14 days?  No      Are you active on MyChart?   Yes      What insurance is in the chart?  Other:  med[employer]      Ordering/Referring Provider: CANDICE SNOW   (If ordering provider performs procedure, schedule with ordering provider unless otherwise instructed. )    BMI: Estimated body mass index is 29.53 kg/m  as calculated from the following:    Height as of 10/11/21: 1.753 m (5' 9\").    Weight as of 1/9/23: 90.7 kg (199 lb 15.3 oz).     Sedation Ordered  moderate sedation.   If patient BMI > 50 do not schedule in [facility].    If patient BMI > 45 do not schedule at [facility].    Are you taking methadone or Suboxone?  No    Have you had difficulties, pain, or discomfort during past endoscopy procedures?  No      Are you taking any prescription medications for pain 3 or more times per week?   NO - No RN review required.      Do you have a history of malignant hyperthermia or adverse reaction to anesthesia?  No    (Females) Are you currently pregnant?          Have you been diagnosed or told you have pulmonary hypertension?   No      Do you have an LVAD?  No      Have you been told you have moderate to severe sleep apnea?  No      Have you been told you have COPD, asthma, or any other lung disease?  Yes     What breathing problems do you have?  Asthma     Do you use home oxygen?  No    Have your breathing problems required an ED visit or hospitalization in the last year?  No      Do you have any heart conditions?  No       Have you ever had an organ transplant?   No      Have you ever had or are you awaiting a heart or lung transplant?   No      In the last 6 months have you had a stroke or transient ischemic attack (TIA aka \"mini stroke\")?   No      Have you been diagnosed with or been told you have cirrhosis of the liver?   No      Are you currently on dialysis?   No      Do you need assistance transferring?   No    BMI: Estimated body mass index is " "29.53 kg/m  as calculated from the following:    Height as of 10/11/21: 1.753 m (5' 9\").    Weight as of 1/9/23: 90.7 kg (199 lb 15.3 oz).     Is patients BMI > 40 and scheduling location [facility]?  No      Do you take an injectable medication for weight loss or diabetes (excluding insulin)?  No      Do you take the medication Naltrexone?  No      Do you take blood thinners?  No       Prep   Are you currently on dialysis or do you have chronic kidney disease?  No      Do you have a diagnosis of diabetes?  No      Do you have a diagnosis of cystic fibrosis (CF)?  No      On a regular basis do you go 3 -5 days between bowel movements?  No      BMI > 40?  No    Preferred Pharmacy:    [id number] [place], [place] [facility] [address] [facility]  [address] [facility] [address]  Phone: [phone] Fax: [phone]      Final Scheduling Details   Colonoscopy prep sent?  Standard MiraLAX    Procedure scheduled  Colonoscopy    Surgeon:       Date of procedure:  6/26/24     Pre-OP / PAC:   No - Not required for this site.    Location  [facility] - [facility] - Per order.    Sedation   Moderate Sedation - Per order.      Patient Reminders:   You will receive a call from a Nurse to review instructions and health history.  This assessment must be completed prior to your procedure.  Failure to complete the Nurse assessment may result in the procedure being cancelled.      On the day of your procedure, please designate an adult(s) who can drive you home stay with you for the next 24 hours. The medicines used in the exam will make you sleepy. You will not be able to drive.      You cannot take public transportation, ride share services, or non-medical taxi service without a responsible caregiver.  Medical transport services are allowed with the requirement that a responsible caregiver will receive you at your destination.  We require that drivers and caregivers are confirmed prior to your procedure.    "

## 2024-02-05 ENCOUNTER — LAB (OUTPATIENT)
Dept: LAB | Facility: HOSPITAL | Age: 59
End: 2024-02-05
Payer: COMMERCIAL

## 2024-02-05 ENCOUNTER — TELEPHONE (OUTPATIENT)
Dept: PHYSICAL MEDICINE AND REHAB | Facility: CLINIC | Age: 59
End: 2024-02-05
Payer: COMMERCIAL

## 2024-02-05 DIAGNOSIS — Z85.46 PERSONAL HISTORY OF MALIGNANT NEOPLASM OF PROSTATE: ICD-10-CM

## 2024-02-05 DIAGNOSIS — M54.42 CHRONIC LEFT-SIDED LOW BACK PAIN WITH LEFT-SIDED SCIATICA: ICD-10-CM

## 2024-02-05 DIAGNOSIS — G89.29 CHRONIC LEFT-SIDED LOW BACK PAIN WITH LEFT-SIDED SCIATICA: ICD-10-CM

## 2024-02-05 LAB — PSA SERPL DL<=0.01 NG/ML-MCNC: 0.06 NG/ML (ref 0–3.5)

## 2024-02-05 PROCEDURE — 84153 ASSAY OF PSA TOTAL: CPT

## 2024-02-05 PROCEDURE — 36415 COLL VENOUS BLD VENIPUNCTURE: CPT

## 2024-02-06 RX ORDER — CYCLOBENZAPRINE HCL 5 MG
5-10 TABLET ORAL
Qty: 60 TABLET | Refills: 1 | Status: SHIPPED | OUTPATIENT
Start: 2024-02-06 | End: 2024-06-13

## 2024-02-06 NOTE — TELEPHONE ENCOUNTER
Refill request received from [facility] via fax.    Medication: cyclobenzaprine (FLEXERIL) 5 MG tablet   Directions: Take 1-2 tablets (5-10 mg) by mouth nightly as needed for muscle spasms      Last ordered: 9/5/23   Qty: 60  RF: 1  Last OV: 5/23/23  Next OV: None scheduled    Routing to Dr. Kwok to review and sign if appropriate.    JEY EricksonN RN Care Coordinator  [facility]   [facility] main phone # [phone] fax # [phone]

## 2024-02-09 ENCOUNTER — PRE VISIT (OUTPATIENT)
Dept: UROLOGY | Facility: CLINIC | Age: 59
End: 2024-02-09
Payer: COMMERCIAL

## 2024-02-13 ENCOUNTER — VIRTUAL VISIT (OUTPATIENT)
Dept: UROLOGY | Facility: CLINIC | Age: 59
End: 2024-02-13
Payer: COMMERCIAL

## 2024-02-13 DIAGNOSIS — Z85.46 PERSONAL HISTORY OF MALIGNANT NEOPLASM OF PROSTATE: Primary | ICD-10-CM

## 2024-02-13 PROCEDURE — 99213 OFFICE O/P EST LOW 20 MIN: CPT | Mod: 95 | Performed by: NURSE PRACTITIONER

## 2024-02-13 ASSESSMENT — PAIN SCALES - GENERAL: PAINLEVEL: NO PAIN (0)

## 2024-02-13 NOTE — NURSING NOTE
Is the patient currently in the state of [place]? YES    Visit mode:VIDEO    If the visit is dropped, the patient can be reconnected by: VIDEO VISIT: Send to e-mail at: [email or web address]    Will anyone else be joining the visit? NO  (If patient encounters technical issues they should call [phone]    How would you like to obtain your AVS? MyChart    Are changes needed to the allergy or medication list? No    Reason for visit: RECHECK (6 month follow-up )    Danielle BENNETT

## 2024-02-13 NOTE — LETTER
2/13/2024       RE: Miles Valencia  [address] [place] [address]     Dear Colleague,    Thank you for referring your patient, Miles Valencia, to the [facility] [place] at [facility]. Please see a copy of my visit note below.    Virtual Visit Details    Type of service:  Video Visit   Originating Location (pt. Location): Home  Distant Location (provider location):  Off-site  Platform used for Video Visit: Vitasol    Video start time: 2:50 PM  Video end time: 2:55 PM    CC: Prostate cancer follow up    Assessment/Plan:  58 year old male with a history of prostate cancer s/p RALP on 4/7/21, with path showing Rafael 3+4=7 pT2 N0Mx with negative margins. PSAs have been detectable and were gradually trending up, though most recent level stable- 0.06 from 0.07 six moths ago. We reviewed this result today. Patient doing well with no issues. With therefore continue to monitor the PSA in 6 months again, though will not plan for a visit at that time. Will simply reply to this result on Call [facility]. Will then plan to formally see him one year from now with about PSA, which will be on the same 6 month interval.     Kimberly Nino, CNP  Department of Urology      Subjective:   58 year old male with a history of prostate cancer s/p RALP on 4/7/21, with path showing Rafael 3+4=7 pT2 N0Mx with negative margins. PSAs have been detectable, though have remained stable. Past several PSA results as follows:     2/16/22: 0.04  6/2/22: 0.04  1/6/23: 0.05  8/2/23: 0.07  2/5/24: 0.06    He has continued to do well without any issues.     Objective:     Exam:   Patient is a 58 year old male   No vital signs obtained due to virtual visit.  General Appearance: Well groomed, hygenic  Respiratory: No cough, no respiratory distress or labored breathing  Musculoskeletal: Grossly normal with no gross deficits  Skin: No discoloration or apparent rashes  Neurologic: No  tremors  Psychiatric: Alert and oriented  Further examination is deferred due to the nature of our visit.

## 2024-02-13 NOTE — PROGRESS NOTES
Virtual Visit Details    Type of service:  Video Visit   Originating Location (pt. Location): Home  Distant Location (provider location):  Off-site  Platform used for Video Visit: [employer]    Video start time: 2:50 PM  Video end time: 2:55 PM    CC: Prostate cancer follow up    Assessment/Plan:  58 year old male with a history of prostate cancer s/p RALP on 4/7/21, with path showing Lamar 3+4=7 pT2 N0Mx with negative margins. PSAs have been detectable and were gradually trending up, though most recent level stable- 0.06 from 0.07 six moths ago. We reviewed this result today. Patient doing well with no issues. With therefore continue to monitor the PSA in 6 months again, though will not plan for a visit at that time. Will simply reply to this result on [employer]. Will then plan to formally see him one year from now with about PSA, which will be on the same 6 month interval.     Kimberly Nino, CNP  Department of Urology      Subjective:   58 year old male with a history of prostate cancer s/p RALP on 4/7/21, with path showing Lamar 3+4=7 pT2 N0Mx with negative margins. PSAs have been detectable, though have remained stable. Past several PSA results as follows:     2/16/22: 0.04  6/2/22: 0.04  1/6/23: 0.05  8/2/23: 0.07  2/5/24: 0.06    He has continued to do well without any issues.     Objective:     Exam:   Patient is a 58 year old male   No vital signs obtained due to virtual visit.  General Appearance: Well groomed, hygenic  Respiratory: No cough, no respiratory distress or labored breathing  Musculoskeletal: Grossly normal with no gross deficits  Skin: No discoloration or apparent rashes  Neurologic: No tremors  Psychiatric: Alert and oriented  Further examination is deferred due to the nature of our visit.

## 2024-02-13 NOTE — PATIENT INSTRUCTIONS
UROLOGY CLINIC VISIT PATIENT INSTRUCTIONS    PSA check in 6 months and I will review the result via [employer] at that time, though no need for a visit.     Will plan to see you in one year, or sooner if needed.     If you have any issues, questions or concerns in the meantime, do not hesitate to contact us at [phone] or via [employer].     Kimberly Nino, CNP  Department of Urology

## 2024-02-14 ENCOUNTER — TELEPHONE (OUTPATIENT)
Dept: UROLOGY | Facility: CLINIC | Age: 59
End: 2024-02-14
Payer: COMMERCIAL

## 2024-02-14 NOTE — TELEPHONE ENCOUNTER
Left voice message for the patient to call back and schedule the following:    Appointment type: return on year   Provider: aurelio vazquez  Return date: on pr before 2/13, sooner if needed  Specialty phone number: [phone]    Additional appointment(s) needed: 6 month PSA check, no appointment needed at that time. Mica will mychart/call with results

## 2024-03-08 NOTE — PROGRESS NOTES
PM&R Follow-Up Visit -     Date of Initial Visit: 12/6/2021  LOV: 5/23/2023  TD: 3/11/2024     RECALL: Miles Valencia is a 58 year old male with history of RLS, dizziness, left TSA who follows up for cervical and lumbar issues    INTERVAL HISTORY:  Patient was last seen in clinic May 23, 2023.  At that visit, he had additional complaints of dizziness and upper extremity paresthesias.  He was referred to neurology and saw my colleague, Dr. Aguilar, for possible TOS type syndrome.  However, this appears to be relatively unremarkable.  He has increased the gabapentin to 900 mg at bedtime and takes Flexeril as needed at bedtime.    For the most part symptoms have been relatively stable and manageable.  He has been noticing increased pain affecting the right lateral shoulder near the level of the rotator cuff insertion and deltoid.  Pain is worse with shoulder abduction and internal/external rotation.  He also has pain extending to the right lateral neck and upper shoulder region which she also feels may be due to compensation. Shoulder pain is also worse when he sleeps on his right side/shoulder.     Additionally, he reports some more diffuse proximal muscle aches and pains and cramping.  He was recently started on a statin and wonders if this could be contributing to    RECALL HISTORY OF PRESENT ILLNESS:  Miles Valencia is a male who was previously evaluated for lumbosacral radiculopathy now presents with a chief complaint of numbness/tingling in bilateral forearms and 4th/5th digits.     Symptoms began ~1 year ago and not associated with trauma. They have been getting progressively worse over that time. He has longstanding chronic neck pain as well that has been getting worse over the same time.     The hand/arm symptoms are localized to the bilateral ulnar forearms into the 4th and 5th digits (R 55%, L 45%); describes his symptoms as numbness/tingling. Has noticed decreased hand strength over the last ~3 years which  "he feels is associated with arthritis and tendinopathy making it difficult to open jars. No issues with fine motor control (buttons, zippers, typing, etc). Symptoms are worse when lying flat, hands rested on his stomach with elbows at ~90 degrees. Difficult falling asleep at night, but symptoms do not wake him up.     Reports longstanding history of neck pain for many years. Neck pain is primarily right sided with radiation into upper trapezius and periscapular region. Arm symptoms do not seem to be correlated with flares of his neck. Associated with some intermittent positional vertigo, described as poor balance/feeling like he \"had one too many\".       PRIOR INJURIES/TREATMENT:   Ice/Heat: +ice  Physical Therapy:   -Prior PT following prostate cancer diagnosis and prostatectomy in April 2023 and pelvic floor therapy which transitioned to current Spine PT -- overall improvement        - Current Pain Medications -   NSAIDs - Naproxen prn   Tylenol   Gabapentin 900mg at bedtime   Flexeril prn      Prior Procedures:  Date                                         Procedure                                           Improvement (%)  None                                                         Prior Related Surgery:   History of left TSA   Other (acupuncture, OMT, CMM, TENS, DME, etc.):   Massage gun     Specialists Seen - (with most recent, available notes and clinic visits reviewed)   1. Neurosurgery - Dr. Larry Ragsdale    2. Neurology - Dr. Aguilar     IMAGING - reviewed   10/01/2021 MR L spine   Findings:   There are 5 lumbar-type vertebrae assumed for the purposes of this  dictation.  The tip of the conus medullaris is at L1.       6 mm anterolisthesis at L4-5.  There is mild disc height narrowing and  disc desiccation at L4-5 and L5-S1.  Normal marrow signal.     On a level by level basis:  T12-L1: No spinal canal or neuroforaminal stenosis.   L1-2: No spinal canal or neuroforaminal stenosis.   L2-3: No spinal canal or " neuroforaminal stenosis.   L3-4: Bilateral facet hypertrophy. No spinal canal or neural foraminal  stenosis.   L4-5: Disc bulge and bilateral facet hypertrophy. Anterolisthesis  contributes to the mild to moderate bilateral neural foraminal  stenosis and abutment of the exiting nerve root on the right. Mild  spinal canal stenosis. Dorsal synovial cysts at that level.  L5-S1: Disc bulge and central tiny protrusion. Bilateral facet  hypertrophy. Mild left neural foraminal stenosis. No spinal canal or  right neuroforaminal stenosis.     Paraspinous tissues are within normal limits.                                                                   Impression:   1. 6 mm anterolisthesis at L4-5.  2. Multilevel lumbar spondylosis, most pronounced at L4-5 with mild to  moderate bilateral neural foraminal and mild spinal canal stenosis.     10/15/21 XR L spine  IMPRESSION: Grade 1 spondylolisthesis of L4 on L5 with borderline  motion between flexion and extension.        Medical History:  He  has a past medical history of Achilles bursitis or tendinitis (5/4/2014), Allergic rhinitis, Asthma, Benign positional vertigo, Congestion, Hypertension, Low back pain, Migraine, Multinodular goiter, Osteoarthritis, Pain in joint, shoulder region (4/18/2014), Prostate cancer (H), RLS (restless legs syndrome), and Sleep problems.     He  has a past surgical history that includes Colonoscopy (N/A, 3/21/2016); biopsy; Arthroplasty shoulder (Left, 6/13/2017); Thyroidectomy (Left, 5/27/2020); and Davinci Prostatectomy, Lymphadenectomy (N/A, 4/7/2021).      Family History  His family history includes Alcoholism in his father, maternal grandfather, and paternal grandfather; Asthma in his niece; Cancer in his cousin and maternal grandmother; Cerebrovascular Disease in his paternal grandmother; Deep Vein Thrombosis in his brother; Factor V Leiden deficiency in his brother; Hypertension in his brother, father, and mother; Migraines in his  brother; Other - See Comments in his nephew and niece; Skin Cancer in his mother; Testicular cancer in his brother; Thyroid Disease in an other family member; Unknown/Adopted in his paternal half-sister.      Social History:  Work:  [employer]   He  reports that he has never smoked. He has never used smokeless tobacco. He reports current alcohol use. He reports that he does not use drugs.      Current Medications:   He has a current medication list which includes the following prescription(s): acetaminophen, albuterol, aspirin-acetaminophen-caffeine, atorvastatin, cholecalciferol, cyanocobalamin, cyclobenzaprine, diltiazem er coated beads, fexofenadine, gabapentin, HEMP OIL OR EXTRACT OR OTHER CBD CANNABINOID, NOT MEDICAL CANNABIS,, hydrochlorothiazide, ibuprofen, melatonin, NONFORMULARY, omega 3, rizatriptan, sildenafil, zolpidem, ammonium lactate, camphor-menthol, diazepam, hydrocortisone, and meclizine.        Allergies:    -- Amoxicillin -- Diarrhea    PHYSICAL EXAMINATION:  BP (!) 134/90 (BP Location: Right arm, Patient Position: Sitting, Cuff Size: Adult Large)   Pulse 99   Resp 16   SpO2 96%    General: Pleasant, straightforward, WDWN individual.  Mental Status: Pleasant, direct, appropriate mood and affect  Resp: breathing is unlabored without audible wheeze  Vascular: No cyanosis   Heme: No visible ecchymosis or erythema on extremities  Skin: No notable rash     Musculoskeletal:  Right Shoulder Exam: ROM full but with end range pain and positive impingement.  Scapular kinetics with early substitution.  Tender on the right lateral shoulder, Pain with supraspinatus activation and IR.  Empty can, Ricardo-Hawkin tests and Neers sign pos,        ASSESSMENT:  Mr. Miles Valencia is a pleasant 58 year old male who presents with:    #. Neck pain  (primary encounter diagnosis)  #. Myalgia  #. Other spondylosis, cervical region  #. Numbness and tingling of both upper extremities  #. Tendinopathy  of right shoulder   #. Lumbar spondylosis  #. Lumbar radicular pain. (L5 through prior history).    Complicating co-morbidities include:   #. Dizziness. Follows with neurology    #. Migraine headache   #. H/o Left TSA        PLAN:  -Refer for spine PT.  Consider MedX program in the future  -Continue Flexeril as needed  -Labs: CK, ESR, CRP. Consider discussing with PCP regarding statin use and /or alternative  -Consider US guided R subacromial/subdeltoid corticosteroid injection in the future if ongoing, persistent symptoms  -Follow up x2-3 months.        Ready to learn, no apparent learning barriers.  Education provided on treatment plan according to patient's preferred learning style.  Patient verbalizes understanding.     ________________________________   Esther Kwok MD  Department of Physical Medicine & Rehabilitation

## 2024-03-11 ENCOUNTER — OFFICE VISIT (OUTPATIENT)
Dept: PHYSICAL MEDICINE AND REHAB | Facility: CLINIC | Age: 59
End: 2024-03-11
Payer: COMMERCIAL

## 2024-03-11 ENCOUNTER — LAB (OUTPATIENT)
Dept: LAB | Facility: CLINIC | Age: 59
End: 2024-03-11
Payer: COMMERCIAL

## 2024-03-11 VITALS
OXYGEN SATURATION: 96 % | SYSTOLIC BLOOD PRESSURE: 134 MMHG | DIASTOLIC BLOOD PRESSURE: 90 MMHG | HEART RATE: 99 BPM | RESPIRATION RATE: 16 BRPM

## 2024-03-11 DIAGNOSIS — R20.0 NUMBNESS AND TINGLING OF BOTH UPPER EXTREMITIES: ICD-10-CM

## 2024-03-11 DIAGNOSIS — Z96.612 STATUS POST TOTAL SHOULDER ARTHROPLASTY, LEFT: ICD-10-CM

## 2024-03-11 DIAGNOSIS — R20.2 NUMBNESS AND TINGLING OF BOTH UPPER EXTREMITIES: ICD-10-CM

## 2024-03-11 DIAGNOSIS — M47.892 OTHER SPONDYLOSIS, CERVICAL REGION: ICD-10-CM

## 2024-03-11 DIAGNOSIS — M67.911 TENDINOPATHY OF RIGHT SHOULDER: ICD-10-CM

## 2024-03-11 DIAGNOSIS — M47.816 LUMBAR SPONDYLOSIS: ICD-10-CM

## 2024-03-11 DIAGNOSIS — M54.2 NECK PAIN: ICD-10-CM

## 2024-03-11 DIAGNOSIS — M79.10 MYALGIA: ICD-10-CM

## 2024-03-11 DIAGNOSIS — M54.2 NECK PAIN: Primary | ICD-10-CM

## 2024-03-11 LAB
CK SERPL-CCNC: 150 U/L (ref 39–308)
CRP SERPL-MCNC: <3 MG/L
ERYTHROCYTE [SEDIMENTATION RATE] IN BLOOD BY WESTERGREN METHOD: 12 MM/HR (ref 0–20)

## 2024-03-11 PROCEDURE — 36415 COLL VENOUS BLD VENIPUNCTURE: CPT | Performed by: PATHOLOGY

## 2024-03-11 PROCEDURE — 82550 ASSAY OF CK (CPK): CPT | Performed by: PATHOLOGY

## 2024-03-11 PROCEDURE — 86140 C-REACTIVE PROTEIN: CPT | Performed by: PATHOLOGY

## 2024-03-11 PROCEDURE — 99214 OFFICE O/P EST MOD 30 MIN: CPT | Performed by: PHYSICAL MEDICINE & REHABILITATION

## 2024-03-11 PROCEDURE — 85652 RBC SED RATE AUTOMATED: CPT | Performed by: PATHOLOGY

## 2024-03-11 NOTE — LETTER
3/11/2024       RE: Miles Valencia  [address] [place] [address]     Dear Colleague,    Thank you for referring your patient, Miles Valencia, to the [facility] [place] at [facility]. Please see a copy of my visit note below.    PM&R Follow-Up Visit -     Date of Initial Visit: 12/6/2021  LOV: 5/23/2023  TD: 3/11/2024     RECALL: Miles Valencia is a 58 year old male with history of RLS, dizziness, left TSA who follows up for cervical and lumbar issues    INTERVAL HISTORY:  Patient was last seen in clinic May 23, 2023.  At that visit, he had additional complaints of dizziness and upper extremity paresthesias.  He was referred to neurology and saw my colleague, Dr. Aguilar, for possible TOS type syndrome.  However, this appears to be relatively unremarkable.  He has increased the gabapentin to 900 mg at bedtime and takes Flexeril as needed at bedtime.    For the most part symptoms have been relatively stable and manageable.  He has been noticing increased pain affecting the right lateral shoulder near the level of the rotator cuff insertion and deltoid.  Pain is worse with shoulder abduction and internal/external rotation.  He also has pain extending to the right lateral neck and upper shoulder region which she also feels may be due to compensation. Shoulder pain is also worse when he sleeps on his right side/shoulder.     Additionally, he reports some more diffuse proximal muscle aches and pains and cramping.  He was recently started on a statin and wonders if this could be contributing to    RECALL HISTORY OF PRESENT ILLNESS:  Miles Valencia is a male who was previously evaluated for lumbosacral radiculopathy now presents with a chief complaint of numbness/tingling in bilateral forearms and 4th/5th digits.     Symptoms began ~1 year ago and not associated with trauma. They have been getting progressively  "worse over that time. He has longstanding chronic neck pain as well that has been getting worse over the same time.     The hand/arm symptoms are localized to the bilateral ulnar forearms into the 4th and 5th digits (R 55%, L 45%); describes his symptoms as numbness/tingling. Has noticed decreased hand strength over the last ~3 years which he feels is associated with arthritis and tendinopathy making it difficult to open jars. No issues with fine motor control (buttons, zippers, typing, etc). Symptoms are worse when lying flat, hands rested on his stomach with elbows at ~90 degrees. Difficult falling asleep at night, but symptoms do not wake him up.     Reports longstanding history of neck pain for many years. Neck pain is primarily right sided with radiation into upper trapezius and periscapular region. Arm symptoms do not seem to be correlated with flares of his neck. Associated with some intermittent positional vertigo, described as poor balance/feeling like he \"had one too many\".       PRIOR INJURIES/TREATMENT:   Ice/Heat: +ice  Physical Therapy:   -Prior PT following prostate cancer diagnosis and prostatectomy in April 2023 and pelvic floor therapy which transitioned to current Spine PT -- overall improvement        - Current Pain Medications -   NSAIDs - Naproxen prn   Tylenol   Gabapentin 900mg at bedtime   Flexeril prn      Prior Procedures:  Date                                         Procedure                                           Improvement (%)  None                                                         Prior Related Surgery:   History of left TSA   Other (acupuncture, OMT, CMM, TENS, DME, etc.):   Massage gun     Specialists Seen - (with most recent, available notes and clinic visits reviewed)   1. Neurosurgery - Dr. Larry Ragsdale    2. Neurology - Dr. Aguilar     IMAGING - reviewed   10/01/2021 MR L spine   Findings:   There are 5 lumbar-type vertebrae assumed for the purposes of this  dictation.  The " tip of the conus medullaris is at L1.       6 mm anterolisthesis at L4-5.  There is mild disc height narrowing and  disc desiccation at L4-5 and L5-S1.  Normal marrow signal.     On a level by level basis:  T12-L1: No spinal canal or neuroforaminal stenosis.   L1-2: No spinal canal or neuroforaminal stenosis.   L2-3: No spinal canal or neuroforaminal stenosis.   L3-4: Bilateral facet hypertrophy. No spinal canal or neural foraminal  stenosis.   L4-5: Disc bulge and bilateral facet hypertrophy. Anterolisthesis  contributes to the mild to moderate bilateral neural foraminal  stenosis and abutment of the exiting nerve root on the right. Mild  spinal canal stenosis. Dorsal synovial cysts at that level.  L5-S1: Disc bulge and central tiny protrusion. Bilateral facet  hypertrophy. Mild left neural foraminal stenosis. No spinal canal or  right neuroforaminal stenosis.     Paraspinous tissues are within normal limits.                                                                   Impression:   1. 6 mm anterolisthesis at L4-5.  2. Multilevel lumbar spondylosis, most pronounced at L4-5 with mild to  moderate bilateral neural foraminal and mild spinal canal stenosis.     10/15/21 XR L spine  IMPRESSION: Grade 1 spondylolisthesis of L4 on L5 with borderline  motion between flexion and extension.        Medical History:  He  has a past medical history of Achilles bursitis or tendinitis (5/4/2014), Allergic rhinitis, Asthma, Benign positional vertigo, Congestion, Hypertension, Low back pain, Migraine, Multinodular goiter, Osteoarthritis, Pain in joint, shoulder region (4/18/2014), Prostate cancer (H), RLS (restless legs syndrome), and Sleep problems.     He  has a past surgical history that includes Colonoscopy (N/A, 3/21/2016); biopsy; Arthroplasty shoulder (Left, 6/13/2017); Thyroidectomy (Left, 5/27/2020); and Davinci Prostatectomy, Lymphadenectomy (N/A, 4/7/2021).      Family History  His family history includes Alcoholism  in his father, maternal grandfather, and paternal grandfather; Asthma in his niece; Cancer in his cousin and maternal grandmother; Cerebrovascular Disease in his paternal grandmother; Deep Vein Thrombosis in his brother; Factor V Leiden deficiency in his brother; Hypertension in his brother, father, and mother; Migraines in his brother; Other - See Comments in his nephew and niece; Skin Cancer in his mother; Testicular cancer in his brother; Thyroid Disease in an other family member; Unknown/Adopted in his paternal half-sister.      Social History:  Work: [employer]   He  reports that he has never smoked. He has never used smokeless tobacco. He reports current alcohol use. He reports that he does not use drugs.      Current Medications:   He has a current medication list which includes the following prescription(s): acetaminophen, albuterol, aspirin-acetaminophen-caffeine, atorvastatin, cholecalciferol, cyanocobalamin, cyclobenzaprine, diltiazem er coated beads, fexofenadine, gabapentin, HEMP OIL OR EXTRACT OR OTHER CBD CANNABINOID, NOT MEDICAL CANNABIS,, hydrochlorothiazide, ibuprofen, melatonin, NONFORMULARY, omega 3, rizatriptan, sildenafil, zolpidem, ammonium lactate, camphor-menthol, diazepam, hydrocortisone, and meclizine.        Allergies:    -- Amoxicillin -- Diarrhea    PHYSICAL EXAMINATION:  BP (!) 134/90 (BP Location: Right arm, Patient Position: Sitting, Cuff Size: Adult Large)   Pulse 99   Resp 16   SpO2 96%    General: Pleasant, straightforward, WDWN individual.  Mental Status: Pleasant, direct, appropriate mood and affect  Resp: breathing is unlabored without audible wheeze  Vascular: No cyanosis   Heme: No visible ecchymosis or erythema on extremities  Skin: No notable rash     Musculoskeletal:  Right Shoulder Exam: ROM full but with end range pain and positive impingement.  Scapular kinetics with early substitution.  Tender on the right lateral shoulder, Pain with supraspinatus  activation and IR.  Empty can, Ricardo-Hawkin tests and Neers sign pos,        ASSESSMENT:  Mr. Miles Valencia is a pleasant 58 year old male who presents with:    #. Neck pain  (primary encounter diagnosis)  #. Myalgia  #. Other spondylosis, cervical region  #. Numbness and tingling of both upper extremities  #. Tendinopathy of right shoulder   #. Lumbar spondylosis  #. Lumbar radicular pain. (L5 through prior history).    Complicating co-morbidities include:   #. Dizziness. Follows with neurology    #. Migraine headache   #. H/o Left TSA        PLAN:  -Refer for spine PT.  Consider MedX program in the future  -Continue Flexeril as needed  -Labs: CK, ESR, CRP. Consider discussing with PCP regarding statin use and /or alternative  -Consider US guided R subacromial/subdeltoid corticosteroid injection in the future if ongoing, persistent symptoms  -Follow up x2-3 months.        Ready to learn, no apparent learning barriers.  Education provided on treatment plan according to patient's preferred learning style.  Patient verbalizes understanding.         Again, thank you for allowing me to participate in the care of your patient.      Sincerely,    Esther Kwok MD

## 2024-03-11 NOTE — PATIENT INSTRUCTIONS
It was a pleasure seeing you today in our [facility]. We discussed the following:    #. Physical Therapy referral    An order for physical therapy was added today. Physical therapy schedulers should call you in the next few days to schedule an appointment. You may also call [phone] to arrange an appointment at any of the [facility] outpatient physical therapy locations.  It will be very important for you to do the physical therapy exercises on a regular basis to decrease your pain and prevent future flares of pain.     Can discuss MedX program with your therapist at [facility] in the future. I'm happy to place a new referral in the future to pursue this.     #. Labs    Labs were ordered today. Our staff can help you schedule a same-day appointment today, or you can call [phone] (Option 1) to schedule an appointment for a later time.      #. Flexeril as needed

## 2024-04-02 ENCOUNTER — THERAPY VISIT (OUTPATIENT)
Dept: PHYSICAL THERAPY | Facility: CLINIC | Age: 59
End: 2024-04-02
Attending: PHYSICAL MEDICINE & REHABILITATION
Payer: COMMERCIAL

## 2024-04-02 DIAGNOSIS — M54.2 NECK PAIN: ICD-10-CM

## 2024-04-02 DIAGNOSIS — M47.816 LUMBAR SPONDYLOSIS: ICD-10-CM

## 2024-04-02 DIAGNOSIS — M79.10 MYALGIA: ICD-10-CM

## 2024-04-02 DIAGNOSIS — M47.892 OTHER SPONDYLOSIS, CERVICAL REGION: ICD-10-CM

## 2024-04-02 DIAGNOSIS — Z96.612 STATUS POST TOTAL SHOULDER ARTHROPLASTY, LEFT: ICD-10-CM

## 2024-04-02 DIAGNOSIS — M54.2 NECK PAIN: Primary | ICD-10-CM

## 2024-04-02 PROCEDURE — 97161 PT EVAL LOW COMPLEX 20 MIN: CPT | Mod: GP | Performed by: PHYSICAL THERAPIST

## 2024-04-02 PROCEDURE — 97110 THERAPEUTIC EXERCISES: CPT | Mod: GP | Performed by: PHYSICAL THERAPIST

## 2024-04-02 NOTE — PROGRESS NOTES
PHYSICAL THERAPY EVALUATION  Type of Visit: Evaluation    See electronic medical record for Abuse and Falls Screening details.  Cervical  Subjective       Presenting condition or subjective complaint: Neck pain for most of adult life with headaches with R>L.  Any tension may trigger a migraine. The past few months if he sleeps on his back he will have a tension headache.  If he sleeps on the left he has left shoulder pain in the deltoid.  If he sleeps on the right he gets tension in his upper neck and turns to a migraine during the day/night. Uses topicals which can help. Pain is bilaterally.  The past year he notices tingling into 5th digits.  He had an EMG.  He will get more arm pain in the evening and wonders if it is related to his Statin.  It has helped as he has stopped taking it. The pain is worse in the evening, intermittent and described as a spasm and refers up and down. He will get some dizziness at times.  Low back has been more painful the past few years.  The severe pain started during Covid. The pain is more L but can be both and will radiate into the lateral thighs and into the calf.  May go to the bottom of his foot and toes. Pain is intermittent more into the evening. He stopped exercsing and a lot of the pain resolved.  Activity makes it worse.  He has tried acupuncture in the past with little success.   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    Prior diagnostic imaging/testing results: MRI; CT scan; X-ray; EMG     Prior therapy history for the same diagnosis, illness or injury: Yes still goes intermittently as no long lasting  results    Prior Level of Function  Transfers:   Ambulation:   ADL:   IADL:     Living Environment  Social support: With a significant other or spouse   Type of home: House   Stairs to enter the home: Yes 4 Is there a railing: Yes   Ramp: No   Stairs inside the home: Yes 14 Is there a railing: Yes   Help at home: None  Equipment owned:       Employment: Yes computer work  Hobbies/Interests: knitting; woodworking    Patient goals for therapy: Sleep and wake without pain; reduce the amout of NSAIDS and prescription medications I use    Pain assessment: Pain present     Objective   LUMBAR SPINE EVALUATION  PAIN: Pain Level at Rest: 0/10  Pain Level with Use: 8/10  INTEGUMENTARY (edema, incisions):   POSTURE:  decrease cervical lordosis, slight forward head, increased tissue at C6  GAIT:   Weightbearing Status:   Assistive Device(s):   Gait Deviations:   BALANCE/PROPRIOCEPTION:   WEIGHTBEARING ALIGNMENT:   NON-WEIGHTBEARING ALIGNMENT:    ROM:  lumbar: motion WNL throughout, no increase symptoms.  Easier SB ro L than R.  Shoulder: AROM WFL B today, no pain.  Cervical: AROM WNL except turning L 48 degrees, slight pull with flexion  PELVIC/SI SCREEN:   STRENGTH:  B UE strength 5- to 5/5 throughout ; B LE strength 5/5 throughout    MYOTOMES:   DTR S:   CORD SIGNS:   DERMATOMES:   NEURAL TENSION:   FLEXIBILITY:  fair at B scalenes, UT, LS, B LE hamstrings, hip flexors and piriformis  LUMBAR/HIP Special Tests:  negative cervical compression/distraction, SLR and slump tests.     PELVIS/SI SPECIAL TESTS:   FUNCTIONAL TESTS:   PALPATION:  pain/tightness: B SO region, scalene, UT, LS, rhomboids, pectoralis region,  quadratus lumborum, piriformis and glutes  SPINAL SEGMENTAL CONCLUSIONS:  intermittent tightness throughout cervical, thoracic and lumbar spine      All weight progressions in therapy sessions are dictated by the patient tolerance and therapist discretion. Testing on [facility] equipment is completed on 4-week intervals to  allow for physiological change in muscle structure and neuromuscular re-education.    Lumbar MedX Initial testing    AROM (full=  0-72  lumbar)    Max Extension Torque     Flex: ext ratio (ideal 1.4:1)      Cervical MedX Initial testing  4/2/24   AROM (full= 0-120  cervical) 18-90   Max Extension Torque  169   Flex: ext ratio (ideal 1.4:1) 1.43:1     Date 4/2/24   Lumbar Parameters    Top Dead Center (TDC)    Counterbalance (CB)    Seat Pad    Femur Restraint    Week/Visit    Enter Week/Visit #    Weight (lbs)    Reps (#)    Time    ROM (degrees)    Pain    Therapist cues    Cervical Parameters    Top Dead Center (TDC) 51   Counterbalance (CB) 0.4   Seat Height 396   Week/Visit    Enter Week/Visit # Wk 1 V 1   Weight (lbs) --   Reps (#) --   Time    ROM (degrees) 18-90   Pain    Therapist cues      Date 4/2/24   Exercise    Warm up    Rotary torso,  B 90 seconds    Chin tuck and scapular retraction Hold 10 seconds x 10 reps                                         Modifications instructed by therapist:     Assessment & Plan   CLINICAL IMPRESSIONS  Medical Diagnosis: Cervical and lumbar Pain    Treatment Diagnosis: Cervical and Lumbar pain   Impression/Assessment: Patient is a 58 year old male with chronic cervical and lumbar pain as well as HX migraine  complaints.  The following significant findings have been identified: Pain, Decreased ROM/flexibility, Decreased joint mobility, Decreased strength, Impaired muscle performance, and Decreased activity tolerance. These impairments interfere with their ability to perform self care tasks, work tasks, and recreational activities as compared to previous level of function.     Clinical Decision Making (Complexity):  Clinical Presentation: Stable/Uncomplicated  Clinical Presentation Rationale: based on medical and personal factors listed in PT evaluation  Clinical Decision Making (Complexity): Low complexity    PLAN OF CARE  Treatment Interventions:  Modalities: Mechanical  Traction  Interventions: Manual Therapy, Neuromuscular Re-education, Therapeutic Activity, Therapeutic Exercise, Self-Care/Home Management    Long Term Goals     PT Goal 1  Goal Identifier: strategies  Goal Description: Pt will learn an extensive HEP and strategies for neck and back to help with with stabilization and manage symptoms as they occur  Target Date: 24  PT Goal 2  Goal Identifier: sleep  Goal Description: Pt will be able to sleep through the night and wake with  pain of 0-1/10  Target Date: 24  PT Goal 3  Goal Identifier: medication  Goal Description: Pt will decrease amount of medication taken weekly to 3-4 days per week versus -daily  Target Date: 24      Frequency of Treatment: 2x/week decreased to 1x/week  Duration of Treatment: 16 visits, cervical and lumbar MedX program    Recommended Referrals to Other Professionals:   Education Assessment:   Learner/Method: Patient    Risks and benefits of evaluation/treatment have been explained.   Patient/Family/caregiver agrees with Plan of Care.     Evaluation Time:     PT Eval, Low Complexity Minutes (72617): 30       Signing Clinician: Анна Paige PT

## 2024-04-09 ENCOUNTER — THERAPY VISIT (OUTPATIENT)
Dept: PHYSICAL THERAPY | Facility: CLINIC | Age: 59
End: 2024-04-09
Attending: PHYSICAL MEDICINE & REHABILITATION
Payer: COMMERCIAL

## 2024-04-09 DIAGNOSIS — M47.816 LUMBAR SPONDYLOSIS: ICD-10-CM

## 2024-04-09 DIAGNOSIS — M54.2 NECK PAIN: ICD-10-CM

## 2024-04-09 PROCEDURE — 97110 THERAPEUTIC EXERCISES: CPT | Mod: GP | Performed by: PHYSICAL THERAPIST

## 2024-04-09 NOTE — PROGRESS NOTES
All weight progressions in therapy sessions are dictated by the patient tolerance and therapist discretion. Testing on MedX equipment is completed on 4-week intervals to allow for physiological change in muscle structure and neuromuscular re-education.    Lumbar MedX Initial testing   4/9/24   AROM (full=  0-72  lumbar) 0-63   Max Extension Torque  273   Flex: ext ratio (ideal 1.4:1) 2.42:1     Cervical MedX Initial testing  4/2/24   AROM (full= 0-120  cervical) 18-90   Max Extension Torque  169   Flex: ext ratio (ideal 1.4:1) 1.43:1     Date 4/9/24 4/2/24   Lumbar Parameters     Top Dead Center (TDC) 21    Counterbalance (CB) 382    Seat Pad 1    Femur Restraint 6    Week/Visit     Enter Week/Visit # Wk1 V 2    Weight (lbs) 110#    Reps (#) 21    Time 184s    ROM (degrees) 0-63    Pain fatigue    Therapist cues     Cervical Parameters     Top Dead Center (TDC) 51 51   Counterbalance (CB) 0.4 0.4   Seat Height 396 396   Week/Visit     Enter Week/Visit # Wk 1 V 2 Wk 1 V 1   Weight (lbs) 120# --   Reps (#) 30 --   Time 174    ROM (degrees) 9-90 18-90   Pain No more pain    Therapist cues       Date 4/9/24 4/2/24   Exercise     Warm up, treadmill 4 min    Rotary torso,  B 90 seconds 30# to R    Chin tuck and scapular retraction  Hold 10 seconds x 10 reps                                                  Modifications instructed by therapist:

## 2024-04-12 ENCOUNTER — THERAPY VISIT (OUTPATIENT)
Dept: PHYSICAL THERAPY | Facility: CLINIC | Age: 59
End: 2024-04-12
Payer: COMMERCIAL

## 2024-04-12 DIAGNOSIS — M47.816 LUMBAR SPONDYLOSIS: ICD-10-CM

## 2024-04-12 DIAGNOSIS — M79.10 MYALGIA: ICD-10-CM

## 2024-04-12 DIAGNOSIS — M54.2 NECK PAIN: Primary | ICD-10-CM

## 2024-04-12 PROCEDURE — 97110 THERAPEUTIC EXERCISES: CPT | Mod: GP | Performed by: PHYSICAL THERAPIST

## 2024-04-12 NOTE — PROGRESS NOTES
Neck pain for most of adult life with headaches with R>L.  Any tension may trigger a migraine. The past few months if he sleeps on his back he will have a tension headache.  If he sleeps on the left he has left shoulder pain in the deltoid.  If he sleeps on the right he gets tension in his upper neck and turns to a migraine during the day/night. Uses topicals which can help. Pain is bilaterally.  The past year he notices tingling into 5th digits.  He had an EMG.  He will get more arm pain in the evening and wonders if it is related to his Statin.  It has helped as he has stopped taking it. The pain is worse in the evening, intermittent and described as a spasm and refers up and down. He will get some dizziness at times.  Low back has been more painful the past few years.  The severe pain started during Covid. The pain is more L but can be both and will radiate into the lateral thighs and into the calf.  May go to the bottom of his foot and toes. Pain is intermittent more into the evening. He stopped exercsing and a lot of the pain resolved.  Activity makes it worse.  He has tried acupuncture in the past with little success.     All weight progressions in therapy sessions are dictated by the patient tolerance and therapist discretion. Testing on MedX equipment is completed on 4-week intervals to allow for physiological change in muscle structure and neuromuscular re-education.    Lumbar MedX Initial testing   4/9/24   AROM (full=  0-72  lumbar) 0-63   Max Extension Torque  273   Flex: ext ratio (ideal 1.4:1) 2.42:1     Cervical MedX Initial testing  4/2/24   AROM (full= 0-120  cervical) 18-90   Max Extension Torque  169   Flex: ext ratio (ideal 1.4:1) 1.43:1     Date 4/12/2024 4/9/24 4/2/24   Lumbar Parameters      Top Dead Center (TDC) 21 21    Counterbalance (CB) 382 382    Seat Pad 1 1    Femur Restraint 6 6    Week/Visit      Enter Week/Visit # Wk 2 V 1 Wk1 V 2    Weight (lbs) 114# 110#    Reps (#) 20 21    Time  173 184s    ROM (degrees) 0-63 0-63    Pain fatigue fatigue    Therapist cues      Cervical Parameters      Top Dead Center (TDC) 51 51 51   Counterbalance (CB) 0.4 0.4 0.4   Seat Height 396 396 396   Week/Visit      Enter Week/Visit # Wk 2 V 1 Wk 1 V 2 Wk 1 V 1   Weight (lbs) 132# 120# --   Reps (#) 30 30 --   Time 163 174    ROM (degrees) 9-90 9-90 18-90   Pain No pain No more pain    Therapist cues        Date 4/12/2024 4/9/24 4/2/24   Exercise      Warm up, treadmill X 4 min  4 min    Rotary torso,  B 90 seconds 36#'s to L 30# to R    Chin tuck and scapular retraction   Hold 10 seconds x 10 reps   Cervical retraction with over pressure X 10  (extensions inc UE pain)     Prone pressups Gentle motion X 10     Marcy pose X 30 seconds                                           Modifications instructed by therapist:

## 2024-04-16 ENCOUNTER — THERAPY VISIT (OUTPATIENT)
Dept: PHYSICAL THERAPY | Facility: CLINIC | Age: 59
End: 2024-04-16
Payer: COMMERCIAL

## 2024-04-16 DIAGNOSIS — M79.10 MYALGIA: ICD-10-CM

## 2024-04-16 DIAGNOSIS — M54.2 NECK PAIN: Primary | ICD-10-CM

## 2024-04-16 PROCEDURE — 97110 THERAPEUTIC EXERCISES: CPT | Mod: GP | Performed by: PHYSICAL THERAPIST

## 2024-04-23 ENCOUNTER — THERAPY VISIT (OUTPATIENT)
Dept: PHYSICAL THERAPY | Facility: CLINIC | Age: 59
End: 2024-04-23
Payer: COMMERCIAL

## 2024-04-23 DIAGNOSIS — M47.816 LUMBAR SPONDYLOSIS: ICD-10-CM

## 2024-04-23 DIAGNOSIS — M54.2 NECK PAIN: Primary | ICD-10-CM

## 2024-04-23 DIAGNOSIS — M79.10 MYALGIA: ICD-10-CM

## 2024-04-23 PROCEDURE — 97110 THERAPEUTIC EXERCISES: CPT | Mod: GP | Performed by: PHYSICAL THERAPIST

## 2024-04-23 NOTE — PROGRESS NOTES
All weight progressions in therapy sessions are dictated by the patient tolerance and therapist discretion. Testing on MedX equipment is completed on 4-week intervals to allow for physiological change in muscle structure and neuromuscular re-education.    Lumbar MedX Initial testing   4/9/24   AROM (full=  0-72  lumbar) 0-63   Max Extension Torque  273   Flex: ext ratio (ideal 1.4:1) 2.42:1     Cervical MedX Initial testing  4/2/24   AROM (full= 0-120  cervical) 18-90   Max Extension Torque  169   Flex: ext ratio (ideal 1.4:1) 1.43:1     Date 4/23/24 4/16/24 4/12/2024 4/9/24 4/2/24   Lumbar Parameters        Top Dead Center (TDC) 21 21 21 21    Counterbalance (CB) 382 382 382 382    Seat Pad 1 1 1 1    Femur Restraint 6 6 6 6    Week/Visit        Enter Week/Visit # Wk 3 V 1 Wk 2 V 2 Wk 2 V 1 Wk1 V 2    Weight (lbs) 124# 119# 114# 110#    Reps (#) 27 20 20 21    Time 152  173 184s    ROM (degrees) 0-63 0-63 0-63 0-63    Pain   fatigue fatigue    Therapist cues        Cervical Parameters        Top Dead Center (TDC) 51 51 51 51 51   Counterbalance (CB) 0.4 0.4 0.4 0.4 0.4   Seat Height 398 396 396 396 396   Week/Visit        Enter Week/Visit # Wk 3 V 1 Wk 2 V 2 Wk 2 V 1 Wk 1 V 2 Wk 1 V 1   Weight (lbs) 153# 144# 132# 120# --   Reps (#) 34 30 30 30 --   Time 165  163 174    ROM (degrees) 9-93 9-90 9-90 9-90 18-90   Pain   No pain No more pain    Therapist cues          Date 4/23/24 4/16/24 4/12/2024 4/9/24 4/2/24   Exercise        Warm up, treadmill 4 min 4 min X 4 min  4 min    Rotary torso,  B 90 seconds 44# to L 40# to R 36#'s to L 30# to R    Chin tuck and scapular retraction     Hold 10 seconds x 10 reps   Cervical retraction with over pressure   X 10  (extensions inc UE pain)     Prone pressups   Gentle motion X 10     Marcy pose   X 30 seconds     Intro occiivot  today      Stretches: scalene, UT and levator scapulae Did not feel he needed to review Hold 30 seconds x 1-3 reps      Stretches: sitting hip  external rotation, piriformis, and hamstring, supine quadratus lumborum, piriformis above 90, standing gastroc and soleus  Did not feel he needed to review Hold 30 seconds X 1-3 reps    All B      Bridges with core engagement and leg extension Hold 10 seconds X 3 each leg                         Modifications instructed by therapist:

## 2024-04-25 ENCOUNTER — THERAPY VISIT (OUTPATIENT)
Dept: PHYSICAL THERAPY | Facility: CLINIC | Age: 59
End: 2024-04-25
Payer: COMMERCIAL

## 2024-04-25 DIAGNOSIS — M54.2 NECK PAIN: Primary | ICD-10-CM

## 2024-04-25 DIAGNOSIS — M54.50 CHRONIC BILATERAL LOW BACK PAIN WITHOUT SCIATICA: ICD-10-CM

## 2024-04-25 DIAGNOSIS — G89.29 CHRONIC BILATERAL LOW BACK PAIN WITHOUT SCIATICA: ICD-10-CM

## 2024-04-25 PROCEDURE — 97110 THERAPEUTIC EXERCISES: CPT | Mod: GP | Performed by: PHYSICAL THERAPIST

## 2024-04-25 NOTE — PROGRESS NOTES
All weight progressions in therapy sessions are dictated by the patient tolerance and therapist discretion. Testing on MedX equipment is completed on 4-week intervals to allow for physiological change in muscle structure and neuromuscular re-education.    Lumbar MedX Initial testing   4/9/24   AROM (full=  0-72  lumbar) 0-63   Max Extension Torque  273   Flex: ext ratio (ideal 1.4:1) 2.42:1     Cervical MedX Initial testing  4/2/24   AROM (full= 0-120  cervical) 18-90   Max Extension Torque  169   Flex: ext ratio (ideal 1.4:1) 1.43:1     Date 4/25/24 4/23/24 4/16/24 4/12/2024 4/9/24 4/2/24   Lumbar Parameters         Top Dead Center (TDC) 21 21 21 21 21    Counterbalance (CB) 382 382 382 382 382    Seat Pad 1 1 1 1 1    Femur Restraint 6 6 6 6 6    Week/Visit         Enter Week/Visit # Wk3 v2  Wk 3 V 1 Wk 2 V 2 Wk 2 V 1 Wk1 V 2    Weight (lbs) 128# 124# 119# 114# 110#    Reps (#) 24 27 20 20 21    Time 134s 152  173 184s    ROM (degrees) 0-63 0-63 0-63 0-63 0-63    Pain Mild shanelle SI joint discomfort    fatigue fatigue    Therapist cues         Cervical Parameters         Top Dead Center (TDC) 51 51 51 51 51 51   Counterbalance (CB) 0.4 0.4 0.4 0.4 0.4 0.4   Seat Height 398 398 396 396 396 396   Week/Visit         Enter Week/Visit # Wk3 v2  Wk 3 V 1 Wk 2 V 2 Wk 2 V 1 Wk 1 V 2 Wk 1 V 1   Weight (lbs) 156# 153# 144# 132# 120# --   Reps (#) 34 34 30 30 30 --   Time 169s 165  163 174    ROM (degrees) 6-96 9-93 9-90 9-90 9-90 18-90   Pain    No pain No more pain    Therapist cues           Date 4/25/24 4/23/24 4/16/24 4/12/2024 4/9/24 4/2/24   Exercise         Warm up, treadmill 4 min  4 min 4 min X 4 min  4 min    Rotary torso,  B 90 seconds 46# to R  44# to L 40# to R 36#'s to L 30# to R    Chin tuck and scapular retraction      Hold 10 seconds x 10 reps   Cervical retraction with over pressure    X 10  (extensions inc UE pain)     Prone pressups    Gentle motion X 10     Marcy pose    X 30 seconds     Intro occiivot    today      Stretches: scalene, UT and levator scapulae  Did not feel he needed to review Hold 30 seconds x 1-3 reps      Stretches: sitting hip external rotation, piriformis, and hamstring, supine quadratus lumborum, piriformis above 90, standing gastroc and soleus   Did not feel he needed to review Hold 30 seconds X 1-3 reps    All B      Bridges with core engagement and leg extension Verbal review  Hold 10 seconds X 3 each leg       Bird dog UE only, then LE only, then UE and LE together                   Modifications instructed by therapist:

## 2024-04-30 ENCOUNTER — THERAPY VISIT (OUTPATIENT)
Dept: PHYSICAL THERAPY | Facility: CLINIC | Age: 59
End: 2024-04-30
Payer: COMMERCIAL

## 2024-04-30 DIAGNOSIS — M54.2 NECK PAIN: Primary | ICD-10-CM

## 2024-04-30 DIAGNOSIS — M79.10 MYALGIA: ICD-10-CM

## 2024-04-30 DIAGNOSIS — G89.29 CHRONIC BILATERAL LOW BACK PAIN WITHOUT SCIATICA: ICD-10-CM

## 2024-04-30 DIAGNOSIS — M54.50 CHRONIC BILATERAL LOW BACK PAIN WITHOUT SCIATICA: ICD-10-CM

## 2024-04-30 DIAGNOSIS — M47.816 LUMBAR SPONDYLOSIS: ICD-10-CM

## 2024-04-30 PROCEDURE — 97110 THERAPEUTIC EXERCISES: CPT | Mod: GP | Performed by: PHYSICAL THERAPIST

## 2024-04-30 NOTE — PROGRESS NOTES
All weight progressions in therapy sessions are dictated by the patient tolerance and therapist discretion. Testing on Med[facility] equipment is completed on 4-week intervals to allow for physiological change in muscle structure and neuromuscular re-education.    Lumbar MedX Initial testing   4/9/24   AROM (full=  0-72  lumbar) 0-63   Max Extension Torque  273   Flex: ext ratio (ideal 1.4:1) 2.42:1     Cervical MedX Initial testing  4/2/24   AROM (full= 0-120  cervical) 18-90   Max Extension Torque  169   Flex: ext ratio (ideal 1.4:1) 1.43:1     Date 4/30/24 4/25/24 4/23/24 4/16/24 4/12/2024 4/9/24 4/2/24   Lumbar Parameters          Top Dead Center (TDC) 21 21 21 21 21 21    Counterbalance (CB) 382 382 382 382 382 382    Seat Pad 1 1 1 1 1 1    Femur Restraint 6 6 6 6 6 6    Week/Visit          Enter Week/Visit # Wk 4 V 1 Wk3 v2  Wk 3 V 1 Wk 2 V 2 Wk 2 V 1 Wk1 V 2    Weight (lbs) 131s# 128# 124# 119# 114# 110#    Reps (#) 24 24 27 20 20 21    Time 144s 134s 152  173 184s    ROM (degrees) 0-63 0-63 0-63 0-63 0-63 0-63    Pain  Mild shanelle SI joint discomfort    fatigue fatigue    Therapist cues          Cervical Parameters          Top Dead Center (TDC) 51 51 51 51 51 51 51   Counterbalance (CB) 0.4 0.4 0.4 0.4 0.4 0.4 0.4   Seat Height 398 398 398 396 396 396 396   Week/Visit          Enter Week/Visit # Wk 4 V 1 Wk3 v2  Wk 3 V 1 Wk 2 V 2 Wk 2 V 1 Wk 1 V 2 Wk 1 V 1   Weight (lbs) 165 156# 153# 144# 132# 120# --   Reps (#) 30 34 34 30 30 30 --   Time 159s 169s 165  163 174    ROM (degrees)   RE-CHECK ROM 6-96 9-93 9-90 9-90 9-90 18-90   Pain     No pain No more pain    Therapist cues            Date 4/30/24 4/25/24 4/23/24 4/16/24 4/12/2024 4/9/24 4/2/24   Exercise          Warm up, treadmill 4 min 4 min  4 min 4 min X 4 min  4 min    Rotary torso,  B 90 seconds 48# to L 46# to R  44# to L 40# to R 36#'s to L 30# to R    Chin tuck and scapular retraction       Hold 10 seconds x 10 reps   Cervical retraction with over  pressure     X 10  (extensions inc UE pain)     Prone pressups     Gentle motion X 10     Marcy pose     X 30 seconds     Intro occiivot    today      Stretches: scalene, UT and levator scapulae   Did not feel he needed to review Hold 30 seconds x 1-3 reps      Stretches: sitting hip external rotation, piriformis, and hamstring, supine quadratus lumborum, piriformis above 90, standing gastroc and soleus    Did not feel he needed to review Hold 30 seconds X 1-3 reps    All B      Bridges with core engagement and leg extension  Verbal review  Hold 10 seconds X 3 each leg       Bird dog  UE only, then LE only, then UE and LE together                    Modifications instructed by therapist:

## 2024-05-07 ENCOUNTER — THERAPY VISIT (OUTPATIENT)
Dept: PHYSICAL THERAPY | Facility: CLINIC | Age: 59
End: 2024-05-07
Payer: COMMERCIAL

## 2024-05-07 DIAGNOSIS — M79.10 MYALGIA: ICD-10-CM

## 2024-05-07 DIAGNOSIS — M47.816 LUMBAR SPONDYLOSIS: ICD-10-CM

## 2024-05-07 DIAGNOSIS — M54.2 NECK PAIN: Primary | ICD-10-CM

## 2024-05-07 DIAGNOSIS — G89.29 CHRONIC BILATERAL LOW BACK PAIN WITHOUT SCIATICA: ICD-10-CM

## 2024-05-07 DIAGNOSIS — M54.50 CHRONIC BILATERAL LOW BACK PAIN WITHOUT SCIATICA: ICD-10-CM

## 2024-05-07 PROCEDURE — 97110 THERAPEUTIC EXERCISES: CPT | Mod: GP | Performed by: PHYSICAL THERAPIST

## 2024-05-07 NOTE — PROGRESS NOTES
All weight progressions in therapy sessions are dictated by the patient tolerance and therapist discretion. Testing on MedX equipment is completed on 4-week intervals to allow for physiological change in muscle structure and neuromuscular re-education.    Lumbar MedX Re-test  5/7/24 Initial testing   4/9/24   AROM (full=  0-72  lumbar) 0-63 0-63   Max Extension Torque  329 273   Flex: ext ratio (ideal 1.4:1) 2.30:1 2.42:1     Cervical MedX Re-test  5/7/24 Initial testing  4/2/24   AROM (full= 0-120  cervical) 6-99 18-90   Max Extension Torque  343 169   Flex: ext ratio (ideal 1.4:1) 2.48:1 1.43:1     Date 5/7/24 4/30/24 4/25/24 4/23/24 4/16/24 4/12/2024 4/9/24 4/2/24   Lumbar Parameters           Top Dead Center (TDC) 21 21 21 21 21 21 21    Counterbalance (CB) 382 382 382 382 382 382 382    Seat Pad 1 1 1 1 1 1 1    Femur Restraint 6 6 6 6 6 6 6    Week/Visit           Enter Week/Visit # Wk 4 V 2  Re-test Wk 4 V 1 Wk3 v2  Wk 3 V 1 Wk 2 V 2 Wk 2 V 1 Wk1 V 2    Weight (lbs) 140# 131s# 128# 124# 119# 114# 110#    Reps (#) 20 24 24 27 20 20 21    Time 95s 144s 134s 152  173 184s    ROM (degrees) 0-63 0-63 0-63 0-63 0-63 0-63 0-63    Pain   Mild shanelle SI joint discomfort    fatigue fatigue    Therapist cues           Cervical Parameters           Top Dead Center (TDC) 51 51 51 51 51 51 51 51   Counterbalance (CB) 0.4 0.4 0.4 0.4 0.4 0.4 0.4 0.4   Seat Height 398 398 398 398 396 396 396 396   Week/Visit           Enter Week/Visit # Wk 4 V 2  Re-test Wk 4 V 1 Wk3 v2  Wk 3 V 1 Wk 2 V 2 Wk 2 V 1 Wk 1 V 2 Wk 1 V 1   Weight (lbs) 174# 165 156# 153# 144# 132# 120# --   Reps (#) 28 30 34 34 30 30 30 --   Time 156s 159s 169s 165  163 174    ROM (degrees) 6-99   RE-CHECK ROM 6-96 9-93 9-90 9-90 9-90 18-90   Pain      No pain No more pain    Therapist cues             Date 5/7/24 4/30/24 4/25/24 4/23/24 4/16/24 4/12/2024 4/9/24 4/2/24   Exercise           Warm up, treadmill 4 min 4 min 4 min  4 min 4 min X 4 min  4 min     Rotary torso,  B 90 seconds 50# to R 48# to L 46# to R  44# to L 40# to R 36#'s to L 30# to R    Chin tuck and scapular retraction        Hold 10 seconds x 10 reps   Cervical retraction with over pressure      X 10  (extensions inc UE pain)     Prone pressups      Gentle motion X 10     Marcy pose      X 30 seconds     Intro occiivot     today      Stretches: scalene, UT and levator scapulae    Did not feel he needed to review Hold 30 seconds x 1-3 reps      Stretches: sitting hip external rotation, piriformis, and hamstring, supine quadratus lumborum, piriformis above 90, standing gastroc and soleus     Did not feel he needed to review Hold 30 seconds X 1-3 reps    All B      Bridges with core engagement and leg extension   Verbal review  Hold 10 seconds X 3 each leg       Bird dog   UE only, then LE only, then UE and LE together                     Modifications instructed by therapist:

## 2024-05-17 ENCOUNTER — THERAPY VISIT (OUTPATIENT)
Dept: PHYSICAL THERAPY | Facility: CLINIC | Age: 59
End: 2024-05-17
Payer: COMMERCIAL

## 2024-05-17 DIAGNOSIS — M54.2 NECK PAIN: Primary | ICD-10-CM

## 2024-05-17 DIAGNOSIS — G89.29 CHRONIC BILATERAL LOW BACK PAIN WITHOUT SCIATICA: ICD-10-CM

## 2024-05-17 DIAGNOSIS — M79.10 MYALGIA: ICD-10-CM

## 2024-05-17 DIAGNOSIS — M54.50 CHRONIC BILATERAL LOW BACK PAIN WITHOUT SCIATICA: ICD-10-CM

## 2024-05-17 DIAGNOSIS — M47.816 LUMBAR SPONDYLOSIS: ICD-10-CM

## 2024-05-17 PROCEDURE — 97110 THERAPEUTIC EXERCISES: CPT | Mod: GP | Performed by: PHYSICAL THERAPIST

## 2024-05-17 NOTE — PROGRESS NOTES
All weight progressions in therapy sessions are dictated by the patient tolerance and therapist discretion. Testing on MedX equipment is completed on 4-week intervals to allow for physiological change in muscle structure and neuromuscular re-education.    Lumbar MedX Re-test  5/7/24 Initial testing   4/9/24   AROM (full=  0-72  lumbar) 0-63 0-63   Max Extension Torque  329 273   Flex: ext ratio (ideal 1.4:1) 2.30:1 2.42:1     Cervical MedX Re-test  5/7/24 Initial testing  4/2/24   AROM (full= 0-120  cervical) 6-99 18-90   Max Extension Torque  343 169   Flex: ext ratio (ideal 1.4:1) 2.48:1 1.43:1     Date 5/17/2024 5/7/24 4/30/24 4/25/24 4/23/24 4/16/24 4/12/2024 4/9/24 4/2/24   Lumbar Parameters            Top Dead Center (TDC) 21 21 21 21 21 21 21 21    Counterbalance (CB) 382 382 382 382 382 382 382 382    Seat Pad 1 1 1 1 1 1 1 1    Femur Restraint 6 6 6 6 6 6 6 6    Week/Visit            Enter Week/Visit # Wk 5 V ! Wk 4 V 2  Re-test Wk 4 V 1 Wk3 v2  Wk 3 V 1 Wk 2 V 2 Wk 2 V 1 Wk1 V 2    Weight (lbs) 144# 140# 131s# 128# 124# 119# 114# 110#    Reps (#) 20 20 24 24 27 20 20 21    Time  95s 144s 134s 152  173 184s    ROM (degrees) 0-63 0-63 0-63 0-63 0-63 0-63 0-63 0-63    Pain    Mild shanelle SI joint discomfort    fatigue fatigue    Therapist cues            Cervical Parameters            Top Dead Center (TDC) 51 51 51 51 51 51 51 51 51   Counterbalance (CB) 0.4 0.4 0.4 0.4 0.4 0.4 0.4 0.4 0.4   Seat Height 398 398 398 398 398 396 396 396 396   Week/Visit            Enter Week/Visit # Wk 5 V 1 Wk 4 V 2  Re-test Wk 4 V 1 Wk3 v2  Wk 3 V 1 Wk 2 V 2 Wk 2 V 1 Wk 1 V 2 Wk 1 V 1   Weight (lbs) 186# 174# 165 156# 153# 144# 132# 120# --   Reps (#) 28 28 30 34 34 30 30 30 --   Time 133 156s 159s 169s 165  163 174    ROM (degrees) 6-99 6-99   RE-CHECK ROM 6-96 9-93 9-90 9-90 9-90 18-90   Pain       No pain No more pain    Therapist cues              Date 5/17/2024 5/7/24 4/30/24 4/25/24 4/23/24 4/16/24 4/12/2024 4/9/24  4/2/24   Exercise            Warm up, treadmill X 4 min  4 min 4 min 4 min  4 min 4 min X 4 min  4 min    Rotary torso,  B 90 seconds 52#'s to L 50# to R 48# to L 46# to R  44# to L 40# to R 36#'s to L 30# to R    Chin tuck and scapular retraction         Hold 10 seconds x 10 reps   Cervical retraction with over pressure       X 10  (extensions inc UE pain)     Prone pressups       Gentle motion X 10     Marcy pose       X 30 seconds     Intro occiivot      today      Stretches: scalene, UT and levator scapulae     Did not feel he needed to review Hold 30 seconds x 1-3 reps      Stretches: sitting hip external rotation, piriformis, and hamstring, supine quadratus lumborum, piriformis above 90, standing gastroc and soleus      Did not feel he needed to review Hold 30 seconds X 1-3 reps    All B      Bridges with core engagement and leg extension    Verbal review  Hold 10 seconds X 3 each leg       Bird dog    UE only, then LE only, then UE and LE together        4 way neck SH 2  40#'s SB X 15 Flexion 20#'s X 15 reps             Modifications instructed by therapist:

## 2024-05-22 ENCOUNTER — TELEPHONE (OUTPATIENT)
Dept: GASTROENTEROLOGY | Facility: CLINIC | Age: 59
End: 2024-05-22
Payer: COMMERCIAL

## 2024-05-22 NOTE — TELEPHONE ENCOUNTER
Caller: Miles    Reason for Reschedule/Cancellation   (please be detailed, any staff messages or encounters to note?): Provider out       Prior to reschedule please review:  Ordering Provider:     Oneyda Jones in [facility] EXTERNAL DATA DEPARTMENT     Sedation Determined: moderate  Does patient have any ASC Exclusions, please identify?: n      Notes on Cancelled Procedure:  Procedure: Lower Endoscopy [Colonoscopy]   Date: 06/26/2024  Location: [facility]; [address] [place] [address], [address] Floor, [place] [id number]  Surgeon:       Rescheduled: Yes,   Procedure: Lower Endoscopy [Colonoscopy]    Date: 08/06/2024   Location: [facility]; [address] [place] [id number]    Surgeon: Adeline   Sedation Level Scheduled  Moderate ,  Reason for Sedation Level per order   Instructions updated and sent: y     Does patient need PAC or Pre -Op Rescheduled? : no       Did you cancel or rescheduled an EUS procedure? No.

## 2024-05-24 ENCOUNTER — THERAPY VISIT (OUTPATIENT)
Dept: PHYSICAL THERAPY | Facility: CLINIC | Age: 59
End: 2024-05-24
Payer: COMMERCIAL

## 2024-05-24 DIAGNOSIS — M47.816 LUMBAR SPONDYLOSIS: ICD-10-CM

## 2024-05-24 DIAGNOSIS — M54.2 NECK PAIN: Primary | ICD-10-CM

## 2024-05-24 DIAGNOSIS — M79.10 MYALGIA: ICD-10-CM

## 2024-05-24 DIAGNOSIS — M54.50 CHRONIC BILATERAL LOW BACK PAIN WITHOUT SCIATICA: ICD-10-CM

## 2024-05-24 DIAGNOSIS — G89.29 CHRONIC BILATERAL LOW BACK PAIN WITHOUT SCIATICA: ICD-10-CM

## 2024-05-24 PROCEDURE — 97110 THERAPEUTIC EXERCISES: CPT | Mod: GP | Performed by: PHYSICAL THERAPIST

## 2024-05-24 NOTE — PROGRESS NOTES
All weight progressions in therapy sessions are dictated by the patient tolerance and therapist discretion. Testing on MedX equipment is completed on 4-week intervals to allow for physiological change in muscle structure and neuromuscular re-education.    Lumbar MedX Re-test  5/7/24 Initial testing   4/9/24   AROM (full=  0-72  lumbar) 0-63 0-63   Max Extension Torque  329 273   Flex: ext ratio (ideal 1.4:1) 2.30:1 2.42:1     Cervical MedX Re-test  5/7/24 Initial testing  4/2/24   AROM (full= 0-120  cervical) 6-99 18-90   Max Extension Torque  343 169   Flex: ext ratio (ideal 1.4:1) 2.48:1 1.43:1     Date 5/24/2024 5/17/2024 5/7/24 4/30/24 4/25/24 4/23/24 4/16/24 4/12/2024 4/9/24 4/2/24   Lumbar Parameters             Top Dead Center (TDC) 21 21 21 21 21 21 21 21 21    Counterbalance (CB) 382 382 382 382 382 382 382 382 382    Seat Pad 1 1 1 1 1 1 1 1 1    Femur Restraint 6 6 6 6 6 6 6 6 6    Week/Visit             Enter Week/Visit # Wk 6 V 1 Wk 5 V ! Wk 4 V 2  Re-test Wk 4 V 1 Wk3 v2  Wk 3 V 1 Wk 2 V 2 Wk 2 V 1 Wk1 V 2    Weight (lbs) 150# 144# 140# 131s# 128# 124# 119# 114# 110#    Reps (#) 20 20 20 24 24 27 20 20 21    Time 130  95s 144s 134s 152  173 184s    ROM (degrees) 0-63 0-63 0-63 0-63 0-63 0-63 0-63 0-63 0-63    Pain     Mild shanelle SI joint discomfort    fatigue fatigue    Therapist cues             Cervical Parameters             Top Dead Center (TDC) 51 51 51 51 51 51 51 51 51 51   Counterbalance (CB) 0.4 0.4 0.4 0.4 0.4 0.4 0.4 0.4 0.4 0.4   Seat Height 398 398 398 398 398 398 396 396 396 396   Week/Visit             Enter Week/Visit # Wk 6 V 1 Wk 5 V 1 Wk 4 V 2  Re-test Wk 4 V 1 Wk3 v2  Wk 3 V 1 Wk 2 V 2 Wk 2 V 1 Wk 1 V 2 Wk 1 V 1   Weight (lbs) 186# 186# 174# 165 156# 153# 144# 132# 120# --   Reps (#) 30 28 28 30 34 34 30 30 30 --   Time 158 133 156s 159s 169s 165  163 174    ROM (degrees) 6-88 6-99 6-99   RE-CHECK ROM 6-96 9-93 9-90 9-90 9-90 18-90   Pain        No pain No more pain     Therapist cues               Date 5/24/2024 5/17/2024 5/7/24 4/30/24 4/25/24 4/23/24 4/16/24 4/12/2024 4/9/24 4/2/24   Exercise             Warm up, treadmill X  4 min  X 4 min  4 min 4 min 4 min  4 min 4 min X 4 min  4 min    Rotary torso,  B 90 seconds 54#'s to R 52#'s to L 50# to R 48# to L 46# to R  44# to L 40# to R 36#'s to L 30# to R    Chin tuck and scapular retraction          Hold 10 seconds x 10 reps   Cervical retraction with over pressure Trial of repeated ext without any change in symptoms       X 10  (extensions inc UE pain)     Prone pressups        Gentle motion X 10     Marcy pose        X 30 seconds     Intro occiivot       today      Stretches: scalene, UT and levator scapulae      Did not feel he needed to review Hold 30 seconds x 1-3 reps      Stretches: sitting hip external rotation, piriformis, and hamstring, supine quadratus lumborum, piriformis above 90, standing gastroc and soleus       Did not feel he needed to review Hold 30 seconds X 1-3 reps    All B      Bridges with core engagement and leg extension     Verbal review  Hold 10 seconds X 3 each leg       Bird dog     UE only, then LE only, then UE and LE together        4 way neck SH 2   40#'s SB X 15 Flexion 20#'s X 15 reps             Modifications instructed by therapist:

## 2024-05-29 ENCOUNTER — OFFICE VISIT (OUTPATIENT)
Dept: FAMILY MEDICINE | Facility: CLINIC | Age: 59
End: 2024-05-29
Payer: COMMERCIAL

## 2024-05-29 VITALS
WEIGHT: 201 LBS | DIASTOLIC BLOOD PRESSURE: 82 MMHG | TEMPERATURE: 97.4 F | SYSTOLIC BLOOD PRESSURE: 128 MMHG | OXYGEN SATURATION: 95 % | HEART RATE: 86 BPM | BODY MASS INDEX: 29.68 KG/M2

## 2024-05-29 DIAGNOSIS — M25.511 RIGHT SHOULDER PAIN, UNSPECIFIED CHRONICITY: Primary | ICD-10-CM

## 2024-05-29 RX ORDER — UBIDECARENONE 200 MG
CAPSULE ORAL
COMMUNITY

## 2024-05-29 RX ORDER — ROSUVASTATIN CALCIUM 5 MG/1
1 TABLET, COATED ORAL
COMMUNITY
Start: 2024-04-29

## 2024-05-29 NOTE — PROGRESS NOTES
ASSESSMENT and PLAN:     RIGHT shoulder pain with possible rotator cuff tendinopathy and a very tight posterior capsule.    PLAN:    Discussed but deferred X-rays as unlikely to change clinical management  Referred to [facility]. Ask for Jeremy Resendez if available  If no improvement in another 1-3 months, let me know and we can order imaging and proceed accordingly.       Sees an N.P. at [place] for primary care needs.       Barry Wiseman MD  Family Medicine and Sports Medicine  [facility]      Medical assistant intake:  Miles Valencia is a 59 year old male who presents to [facility] today for Shoulder Pain (RIGHT shoulder pain for a couple months - painful when lying on RIGHT side and repositioning in bed, only noticed upon waking, but pain subsides during the day. Is currently doing PT for neck and back and takes tylenol and muscle relaxant before bed.)      SUBJECTIVE:   Miles is a 60 yo  who I have seen in the past for back pain and eczema.   Sees an N.P. at [place] for primary care needs.   He's here today with RIGHT shoulder pain for the past few months. The pains are notably worse in the mornings. The pain then dissipates over the next few hours.     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      He has multinodular goitar but with normal thyroid levels.   Review Of Systems:    See subjective.   Has otherwise been in usual state of health, e.g.   Cardiovascular: negative  Respiratory: No shortness of breath, dyspnea on exertion, cough, or hemoptysis  Gastrointestinal: negative  Genitourinary: negative    Problem list per EMR:  Patient Active Problem List   Diagnosis    Pain in joint, shoulder region    Achilles bursitis or tendinitis    History of total shoulder replacement, left    Non-toxic multinodular goiter    Prostate cancer (H)    Lumbar radiculopathy    Pain of right thumb    Right wrist pain    Neck  2018 23:52 pain    Chronic bilateral low back pain without sciatica       Current Outpatient Medications   Medication Sig Dispense Refill    acetaminophen (TYLENOL) 325 MG tablet Take 2 tablets (650 mg) by mouth every 4 hours as needed for other (mild pain) 30 tablet 0    albuterol (PROAIR HFA/PROVENTIL HFA/VENTOLIN HFA) 108 (90 BASE) MCG/ACT Inhaler Inhale 2 puffs into the lungs every 4 hours as needed for shortness of breath / dyspnea or wheezing      aspirin-acetaminophen-caffeine (EXCEDRIN MIGRAINE) 250-250-65 MG per tablet Take 2 tablets by mouth every 6 hours as needed for headaches       cholecalciferol 125 MCG (5000 UT) CAPS Take 5,000 Units by mouth every morning       coenzyme Q-10 200 MG CAPS       Cyanocobalamin (VITAMIN B-12 PO) Take 1 tablet by mouth every evening       cyclobenzaprine (FLEXERIL) 5 MG tablet Take 1-2 tablets (5-10 mg) by mouth nightly as needed for muscle spasms 60 tablet 1    diltiazem ER COATED BEADS (CARDIZEM CD/CARTIA XT) 240 MG 24 hr capsule Take 240 mg by mouth every morning       fexofenadine (ALLEGRA) 180 MG tablet Take 180 mg by mouth every morning       gabapentin (NEURONTIN) 300 MG capsule Take 1 capsule (300 mg) by mouth 3 times daily (Patient taking differently: Take 600 mg by mouth at bedtime) 60 capsule 0    HEMP OIL OR EXTRACT OR OTHER CBD CANNABINOID, NOT MEDICAL CANNABIS, Take 1 capsule by mouth every evening      hydrochlorothiazide (HYDRODIURIL) 25 MG tablet Take 25 mg by mouth every morning       ibuprofen (ADVIL/MOTRIN) 200 MG tablet Take 800 mg by mouth every 8 hours as needed for mild pain       meclizine (ANTIVERT) 12.5 MG tablet Take 2 tablets (25 mg) by mouth 4 times daily as needed for dizziness 30 tablet 0    MELATONIN PO Take 2.5 mg by mouth At Bedtime       NONFORMULARY Magnesium/MSM lotion - Brand: Aincient minerals  2 pumps every evening applied to feet/legs    20 mg elemental magnesium and 25 mg MSM Per 1 pump      omega 3 1000 MG CAPS Take by mouth At Bedtime       rizatriptan (MAXALT-MLT) 10 MG ODT Take 10 mg by mouth at onset of headache for migraine      rosuvastatin (CRESTOR) 5 MG tablet Take 1 tablet by mouth daily at 2 pm      sildenafil (REVATIO) 20 MG tablet Take 1 tab daily (Patient taking differently: Take 1 tab daily as needed) 90 tablet 11    zolpidem (AMBIEN) 5 MG tablet Take 5 mg by mouth nightly as needed for sleep      ammonium lactate (AMLACTIN) 12 % cream Apply topically daily as needed  (Patient not taking: Reported on 3/11/2024)      atorvastatin (LIPITOR) 10 MG tablet Take 10 mg by mouth At Bedtime (Patient not taking: Reported on 5/29/2024)      camphor-menthol (DERMASARRA) 0.5-0.5 % LOTN Apply topically every 6 hours as needed for skin care (Patient not taking: Reported on 3/11/2024)      DIAZEPAM PO  (Patient not taking: Reported on 3/11/2024)      hydrocortisone (CORTAID) 1 % cream Apply topically 2 times daily as needed (Patient not taking: Reported on 3/11/2024)         Allergies   Allergen Reactions    Amoxicillin Diarrhea    Nickel Itching and Rash        Social:    unchanged.  He works as a  at the [employer]    OBJECTIVE    Vitals: /82 (BP Location: Left arm, Patient Position: Sitting, Cuff Size: Adult Large)   Pulse 86   Temp 97.4  F (36.3  C) (Skin)   Wt 91.2 kg (201 lb)   SpO2 95%   BMI 29.68 kg/m    BMI= Body mass index is 29.68 kg/m .   he appears well and in no distress.    RIGHT shoulder:  Observation: No redness, warmth or effusion.  Palpation: Tender   Posteriorly near the posterior glenohumeral joint and also along the lateral deltoid  ROM: Abduction -  170 degrees, Internal ROM  reaches to T8, External ROM  85 degrees  Strength: Supraspinatus: 5/5, Infraspinatus and Teres Minor: 5/5, Subscapularis: 5/5  Biceps strength: intact  Impingement testing negative.     Side lying posterior capsule stretch:  reaches to only about 10 degrees.    normal exam distally with full ROM of elbow and digits.     Neck  with full ROM, negative Spurling's       SEE TOP OF NOTE FOR ASSESSMENT AND PLAN    --Barry Wiseman MD  [facility], Department of Family Medicine and Community Health

## 2024-05-29 NOTE — NURSING NOTE
Miles  59 year old    Chief Complaint   Patient presents with    Shoulder Pain     RIGHT shoulder pain for a couple months - painful when lying on RIGHT side and repositioning in bed, only noticed upon waking, but pain subsides during the day. Is currently doing PT for neck and back and takes tylenol and muscle relaxant before bed.            Blood pressure 128/82, pulse 86, temperature 97.4  F (36.3  C), temperature source Skin, weight 91.2 kg (201 lb), SpO2 95%. Body mass index is 29.68 kg/m .    Patient Active Problem List   Diagnosis    Pain in joint, shoulder region    Achilles bursitis or tendinitis    History of total shoulder replacement, left    Non-toxic multinodular goiter    Prostate cancer (H)    Lumbar radiculopathy    Pain of right thumb    Right wrist pain    Neck pain    Chronic bilateral low back pain without sciatica              Wt Readings from Last 2 Encounters:   05/29/24 91.2 kg (201 lb)   01/09/23 90.7 kg (199 lb 15.3 oz)       BP Readings from Last 3 Encounters:   05/29/24 128/82   03/11/24 (!) 134/90   08/02/23 123/82                Current Outpatient Medications   Medication Sig Dispense Refill    acetaminophen (TYLENOL) 325 MG tablet Take 2 tablets (650 mg) by mouth every 4 hours as needed for other (mild pain) 30 tablet 0    albuterol (PROAIR HFA/PROVENTIL HFA/VENTOLIN HFA) 108 (90 BASE) MCG/ACT Inhaler Inhale 2 puffs into the lungs every 4 hours as needed for shortness of breath / dyspnea or wheezing      aspirin-acetaminophen-caffeine (EXCEDRIN MIGRAINE) 250-250-65 MG per tablet Take 2 tablets by mouth every 6 hours as needed for headaches       cholecalciferol 125 MCG (5000 UT) CAPS Take 5,000 Units by mouth every morning       coenzyme Q-10 200 MG CAPS       Cyanocobalamin (VITAMIN B-12 PO) Take 1 tablet by mouth every evening       cyclobenzaprine (FLEXERIL) 5 MG tablet Take 1-2 tablets (5-10 mg) by mouth nightly as needed for muscle spasms 60 tablet 1    diltiazem ER COATED BEADS  (CARDIZEM CD/CARTIA XT) 240 MG 24 hr capsule Take 240 mg by mouth every morning       fexofenadine (ALLEGRA) 180 MG tablet Take 180 mg by mouth every morning       gabapentin (NEURONTIN) 300 MG capsule Take 1 capsule (300 mg) by mouth 3 times daily (Patient taking differently: Take 600 mg by mouth at bedtime) 60 capsule 0    HEMP OIL OR EXTRACT OR OTHER CBD CANNABINOID, NOT MEDICAL CANNABIS, Take 1 capsule by mouth every evening      hydrochlorothiazide (HYDRODIURIL) 25 MG tablet Take 25 mg by mouth every morning       ibuprofen (ADVIL/MOTRIN) 200 MG tablet Take 800 mg by mouth every 8 hours as needed for mild pain       meclizine (ANTIVERT) 12.5 MG tablet Take 2 tablets (25 mg) by mouth 4 times daily as needed for dizziness 30 tablet 0    MELATONIN PO Take 2.5 mg by mouth At Bedtime       NONFORMULARY Magnesium/MSM lotion - Brand: Aincient minerals  2 pumps every evening applied to feet/legs    20 mg elemental magnesium and 25 mg MSM Per 1 pump      omega 3 1000 MG CAPS Take by mouth At Bedtime      rizatriptan (MAXALT-MLT) 10 MG ODT Take 10 mg by mouth at onset of headache for migraine      rosuvastatin (CRESTOR) 5 MG tablet Take 1 tablet by mouth daily at 2 pm      sildenafil (REVATIO) 20 MG tablet Take 1 tab daily (Patient taking differently: Take 1 tab daily as needed) 90 tablet 11    zolpidem (AMBIEN) 5 MG tablet Take 5 mg by mouth nightly as needed for sleep      ammonium lactate (AMLACTIN) 12 % cream Apply topically daily as needed  (Patient not taking: Reported on 3/11/2024)      atorvastatin (LIPITOR) 10 MG tablet Take 10 mg by mouth At Bedtime (Patient not taking: Reported on 5/29/2024)      camphor-menthol (DERMASARRA) 0.5-0.5 % LOTN Apply topically every 6 hours as needed for skin care (Patient not taking: Reported on 3/11/2024)      DIAZEPAM PO  (Patient not taking: Reported on 3/11/2024)      hydrocortisone (CORTAID) 1 % cream Apply topically 2 times daily as needed (Patient not taking: Reported on  "3/11/2024)       No current facility-administered medications for this visit.              Social History     Tobacco Use    Smoking status: Never    Smokeless tobacco: Never   Substance Use Topics    Alcohol use: Yes     Alcohol/week: 0.0 standard drinks of alcohol     Comment: 1-2 drinks a week    Drug use: No              Health Maintenance Due   Topic Date Due    ASTHMA ACTION PLAN  Never done    ASTHMA CONTROL TEST  Never done    HIV SCREENING  Never done    HEPATITIS C SCREENING  Never done    Pneumococcal Vaccine: Pediatrics (0 to 5 Years) and At-Risk Patients (6 to 64 Years) (2 of 2 - PCV) 01/12/2017    LIPID  09/09/2023            No results found for: \"PAP\"           May 29, 2024 10:13 AM    "

## 2024-05-29 NOTE — PATIENT INSTRUCTIONS
ASSESSMENT and PLAN:     RIGHT shoulder pain with possible rotator cuff tendinopathy and a very tight posterior capsule.    PLAN:    Discussed but deferred X-rays as unlikely to change clinical management  Referred to [facility]. Ask for Jeremy Resendez if available  If no improvement in another 1-3 months, let me know and we can order imaging and proceed accordingly.       Sees madie N.BAR. at [place] for primary care needs.       Barry Wiseman MD  Family Medicine and Sports Medicine  [facility]

## 2024-05-30 ENCOUNTER — THERAPY VISIT (OUTPATIENT)
Dept: PHYSICAL THERAPY | Facility: CLINIC | Age: 59
End: 2024-05-30
Payer: COMMERCIAL

## 2024-05-30 DIAGNOSIS — M79.10 MYALGIA: ICD-10-CM

## 2024-05-30 DIAGNOSIS — M54.2 NECK PAIN: Primary | ICD-10-CM

## 2024-05-30 DIAGNOSIS — M54.50 CHRONIC BILATERAL LOW BACK PAIN WITHOUT SCIATICA: ICD-10-CM

## 2024-05-30 DIAGNOSIS — G89.29 CHRONIC BILATERAL LOW BACK PAIN WITHOUT SCIATICA: ICD-10-CM

## 2024-05-30 PROCEDURE — 97110 THERAPEUTIC EXERCISES: CPT | Mod: GP

## 2024-05-30 NOTE — PROGRESS NOTES
All weight progressions in therapy sessions are dictated by the patient tolerance and therapist discretion. Testing on MedX equipment is completed on 4-week intervals to allow for physiological change in muscle structure and neuromuscular re-education.    Lumbar MedX Re-test  5/7/24 Initial testing   4/9/24   AROM (full=  0-72  lumbar) 0-63 0-63   Max Extension Torque  329 273   Flex: ext ratio (ideal 1.4:1) 2.30:1 2.42:1     Cervical MedX Re-test  5/7/24 Initial testing  4/2/24   AROM (full= 0-120  cervical) 6-99 18-90   Max Extension Torque  343 169   Flex: ext ratio (ideal 1.4:1) 2.48:1 1.43:1     Date 5/30/2024 5/24/2024 5/17/2024 5/7/24 4/30/24 4/25/24 4/23/24 4/16/24 4/12/2024 4/9/24 4/2/24   Lumbar Parameters              Top Dead Center (TDC) 21 21 21 21 21 21 21 21 21 21    Counterbalance (CB) 382 382 382 382 382 382 382 382 382 382    Seat Pad 1 1 1 1 1 1 1 1 1 1    Femur Restraint 6 6 6 6 6 6 6 6 6 6    Week/Visit              Enter Week/Visit # W7 V1 Wk 6 V 1 Wk 5 V ! Wk 4 V 2  Re-test Wk 4 V 1 Wk3 v2  Wk 3 V 1 Wk 2 V 2 Wk 2 V 1 Wk1 V 2    Weight (lbs) 155# 150# 144# 140# 131s# 128# 124# 119# 114# 110#    Reps (#) 18 20 20 20 24 24 27 20 20 21    Time 122 130  95s 144s 134s 152  173 184s    ROM (degrees) 0-66 0-63 0-63 0-63 0-63 0-63 0-63 0-63 0-63 0-63    Pain      Mild shanelle SI joint discomfort    fatigue fatigue    Therapist cues              Cervical Parameters              Top Dead Center (TDC) 51 51 51 51 51 51 51 51 51 51 51   Counterbalance (CB) 0.4 0.4 0.4 0.4 0.4 0.4 0.4 0.4 0.4 0.4 0.4   Seat Height 398 398 398 398 398 398 398 396 396 396 396   Week/Visit              Enter Week/Visit # W7 V1 Wk 6 V 1 Wk 5 V 1 Wk 4 V 2  Re-test Wk 4 V 1 Wk3 v2  Wk 3 V 1 Wk 2 V 2 Wk 2 V 1 Wk 1 V 2 Wk 1 V 1   Weight (lbs) 210# 201# 186# 174# 165 156# 153# 144# 132# 120# --   Reps (#) 20 30 28 28 30 34 34 30 30 30 --   Time 145 158 133 156s 159s 169s 165  163 174    ROM (degrees) 6-108 6-88 6-99 6-99    RE-CHECK ROM 6-96 9-93 9-90 9-90 9-90 18-90   Pain         No pain No more pain    Therapist cues                Date 5/30/2024 5/24/2024 5/17/2024 5/7/24 4/30/24 4/25/24 4/23/24 4/16/24 4/12/2024 4/9/24 4/2/24   Exercise              Warm up, treadmill 4 mins X  4 min  X 4 min  4 min 4 min 4 min  4 min 4 min X 4 min  4 min    Rotary torso,  B 90 seconds 60#s to L 54#'s to R 52#'s to L 50# to R 48# to L 46# to R  44# to L 40# to R 36#'s to L 30# to R    Chin tuck and scapular retraction           Hold 10 seconds x 10 reps   Cervical retraction with over pressure  Trial of repeated ext without any change in symptoms       X 10  (extensions inc UE pain)     Prone pressups         Gentle motion X 10     Marcy pose         X 30 seconds     Intro occiivot        today      Stretches: scalene, UT and levator scapulae       Did not feel he needed to review Hold 30 seconds x 1-3 reps      Stretches: sitting hip external rotation, piriformis, and hamstring, supine quadratus lumborum, piriformis above 90, standing gastroc and soleus        Did not feel he needed to review Hold 30 seconds X 1-3 reps    All B      Bridges with core engagement and leg extension      Verbal review  Hold 10 seconds X 3 each leg       Bird dog      UE only, then LE only, then UE and LE together        4 way neck SH 2    40#'s SB X 15 Flexion 20#'s X 15 reps             Modifications instructed by therapist:

## 2024-06-06 ENCOUNTER — THERAPY VISIT (OUTPATIENT)
Dept: PHYSICAL THERAPY | Facility: CLINIC | Age: 59
End: 2024-06-06
Payer: COMMERCIAL

## 2024-06-06 DIAGNOSIS — M54.50 CHRONIC BILATERAL LOW BACK PAIN WITHOUT SCIATICA: ICD-10-CM

## 2024-06-06 DIAGNOSIS — M54.2 NECK PAIN: Primary | ICD-10-CM

## 2024-06-06 DIAGNOSIS — G89.29 CHRONIC BILATERAL LOW BACK PAIN WITHOUT SCIATICA: ICD-10-CM

## 2024-06-06 PROCEDURE — 97110 THERAPEUTIC EXERCISES: CPT | Mod: GP

## 2024-06-06 NOTE — PROGRESS NOTES
All weight progressions in therapy sessions are dictated by the patient tolerance and therapist discretion. Testing on MedX equipment is completed on 4-week intervals to allow for physiological change in muscle structure and neuromuscular re-education.    Lumbar MedX Re-test 6/6/24 Re-test  5/7/24 Initial testing   4/9/24   AROM (full=  0-72  lumbar) 0-66 0-63 0-63   Max Extension Torque  357 329 273   Flex: ext ratio (ideal 1.4:1) 2.09:1 2.30:1 2.42:1     Cervical MedX Re-test 6/6/24 Re-test  5/7/24 Initial testing  4/2/24   AROM (full= 0-120  cervical) 6-108 6-99 18-90   Max Extension Torque  374 343 169   Flex: ext ratio (ideal 1.4:1) 1.84:1 2.48:1 1.43:1     Date 6/6/2024 5/30/2024 5/24/2024 5/17/2024 5/7/24 4/30/24 4/25/24 4/23/24 4/16/24 4/12/2024 4/9/24 4/2/24   Lumbar Parameters               Top Dead Center (TDC) 21 21 21 21 21 21 21 21 21 21 21    Counterbalance (CB) 382 382 382 382 382 382 382 382 382 382 382    Seat Pad 1 1 1 1 1 1 1 1 1 1 1    Femur Restraint 6 6 6 6 6 6 6 6 6 6 6    Week/Visit               Enter Week/Visit # W8 V1 RETEST W7 V1 Wk 6 V 1 Wk 5 V ! Wk 4 V 2  Re-test Wk 4 V 1 Wk3 v2  Wk 3 V 1 Wk 2 V 2 Wk 2 V 1 Wk1 V 2    Weight (lbs) 160# 155# 150# 144# 140# 131s# 128# 124# 119# 114# 110#    Reps (#) 20 18 20 20 20 24 24 27 20 20 21    Time 96 122 130  95s 144s 134s 152  173 184s    ROM (degrees) 0-66 0-66 0-63 0-63 0-63 0-63 0-63 0-63 0-63 0-63 0-63    Pain       Mild shanelle SI joint discomfort    fatigue fatigue    Therapist cues               Cervical Parameters               Top Dead Center (TDC) 51 51 51 51 51 51 51 51 51 51 51 51   Counterbalance (CB) 0.4 0.4 0.4 0.4 0.4 0.4 0.4 0.4 0.4 0.4 0.4 0.4   Seat Height 398 398 398 398 398 398 398 398 396 396 396 396   Week/Visit               Enter Week/Visit # W8 V1 RETEST W7 V1 Wk 6 V 1 Wk 5 V 1 Wk 4 V 2  Re-test Wk 4 V 1 Wk3 v2  Wk 3 V 1 Wk 2 V 2 Wk 2 V 1 Wk 1 V 2 Wk 1 V 1   Weight (lbs) 219 210# 201# 186# 174# 165 156# 153# 144# 132#  120# --   Reps (#) 21 20 30 28 28 30 34 34 30 30 30 --   Time 116 145 158 133 156s 159s 169s 165  163 174    ROM (degrees) 6-108 6-108 6-88 6-99 6-99   RE-CHECK ROM 6-96 9-93 9-90 9-90 9-90 18-90   Pain          No pain No more pain    Therapist cues                 Date 6/6/2024 5/30/2024 5/24/2024 5/17/2024 5/7/24 4/30/24 4/25/24 4/23/24 4/16/24 4/12/2024 4/9/24 4/2/24   Exercise               Warm up, treadmill 4 mins 4 mins X  4 min  X 4 min  4 min 4 min 4 min  4 min 4 min X 4 min  4 min    Rotary torso,  B 90 seconds 64# to R 60#s to L 54#'s to R 52#'s to L 50# to R 48# to L 46# to R  44# to L 40# to R 36#'s to L 30# to R    Chin tuck and scapular retraction            Hold 10 seconds x 10 reps   Cervical retraction with over pressure   Trial of repeated ext without any change in symptoms       X 10  (extensions inc UE pain)     Prone pressups          Gentle motion X 10     Marcy pose          X 30 seconds     Intro occiivot         today      Stretches: scalene, UT and levator scapulae        Did not feel he needed to review Hold 30 seconds x 1-3 reps      Stretches: sitting hip external rotation, piriformis, and hamstring, supine quadratus lumborum, piriformis above 90, standing gastroc and soleus         Did not feel he needed to review Hold 30 seconds X 1-3 reps    All B      Bridges with core engagement and leg extension       Verbal review  Hold 10 seconds X 3 each leg       Bird dog       UE only, then LE only, then UE and LE together        4 way neck SH 2     40#'s SB X 15 Flexion 20#'s X 15 reps             Modifications instructed by therapist:     Pt tolerated skilled therapy well today as indicated by their success  with tolerance of the Med X machine with reports that they are feeling muscle soreness without any pain. He was able to progress his max force on the cervical machine from 343 to 374 and on the lumbar machine from 329 to 357. He reports that his headaches have decreased  overall and he is having less neck and back pain. Pt continues to have difficulty with limited ROM in the lumbar and cervical spine, strength deficits in the core, DNF and glutes, as well as poor body awareness and lack of motor control with prolonged position holding. He reports that he is sleeping better, taking less pain meds and improving his overall daily function. The plan for the next session includes progression of HEP, review of previous exercises and progression of MedX and core strength. For these reasons, the patient is appropriate to continue with skilled PT services until they have made a return to PLOF.     PLAN  Continue therapy per current plan of care.    Beginning/End Dates of Progress Note Reporting Period:    4/2/24 to 06/06/2024    Referring Provider:  Esther Kwok

## 2024-06-10 ENCOUNTER — THERAPY VISIT (OUTPATIENT)
Dept: PHYSICAL THERAPY | Facility: CLINIC | Age: 59
End: 2024-06-10
Payer: COMMERCIAL

## 2024-06-10 DIAGNOSIS — M54.2 NECK PAIN: Primary | ICD-10-CM

## 2024-06-10 DIAGNOSIS — M47.816 LUMBAR SPONDYLOSIS: ICD-10-CM

## 2024-06-10 DIAGNOSIS — G89.29 CHRONIC BILATERAL LOW BACK PAIN WITHOUT SCIATICA: ICD-10-CM

## 2024-06-10 DIAGNOSIS — M79.10 MYALGIA: ICD-10-CM

## 2024-06-10 DIAGNOSIS — M54.50 CHRONIC BILATERAL LOW BACK PAIN WITHOUT SCIATICA: ICD-10-CM

## 2024-06-10 PROCEDURE — 97110 THERAPEUTIC EXERCISES: CPT | Mod: GP | Performed by: PHYSICAL THERAPIST

## 2024-06-10 NOTE — PROGRESS NOTES
All weight progressions in therapy sessions are dictated by the patient tolerance and therapist discretion. Testing on MedX equipment is completed on 4-week intervals to allow for physiological change in muscle structure and neuromuscular re-education.    Lumbar MedX Re-test 6/6/24 Re-test  5/7/24 Initial testing   4/9/24   AROM (full=  0-72  lumbar) 0-66 0-63 0-63   Max Extension Torque  357 329 273   Flex: ext ratio (ideal 1.4:1) 2.09:1 2.30:1 2.42:1     Cervical MedX Re-test 6/6/24 Re-test  5/7/24 Initial testing  4/2/24   AROM (full= 0-120  cervical) 6-108 6-99 18-90   Max Extension Torque  374 343 169   Flex: ext ratio (ideal 1.4:1) 1.84:1 2.48:1 1.43:1     Date 6/10/2024 6/6/2024  5/30/2024  5/24/2024 5/17/2024 5/7/24 4/30/24 4/25/24 4/23/24 4/16/24 4/12/2024 4/9/24 4/2/24   Lumbar Parameters                Top Dead Center (TDC) 21 21 21 21 21 21 21 21 21 21 21 21    Counterbalance (CB) 382 382 382 382 382 382 382 382 382 382 382 382    Seat Pad 1 1 1 1 1 1 1 1 1 1 1 1    Femur Restraint 6 6 6 6 6 6 6 6 6 6 6 6    Week/Visit                Enter Week/Visit # Wk 9 V1 W8 V1 RETEST W7 V1 Wk 6 V 1 Wk 5 V ! Wk 4 V 2  Re-test Wk 4 V 1 Wk3 v2  Wk 3 V 1 Wk 2 V 2 Wk 2 V 1 Wk1 V 2    Weight (lbs) 165# 160# 155# 150# 144# 140# 131s# 128# 124# 119# 114# 110#    Reps (#) 21 20 18 20 20 20 24 24 27 20 20 21    Time 118 96 122 130  95s 144s 134s 152  173 184s    ROM (degrees) 0-66 0-66 0-66 0-63 0-63 0-63 0-63 0-63 0-63 0-63 0-63 0-63    Pain        Mild shanelle SI joint discomfort    fatigue fatigue    Therapist cues                Cervical Parameters                Top Dead Center (TDC) 51 51 51 51 51 51 51 51 51 51 51 51 51   Counterbalance (CB) 0.4 0.4 0.4 0.4 0.4 0.4 0.4 0.4 0.4 0.4 0.4 0.4 0.4   Seat Height 398 398 398 398 398 398 398 398 398 396 396 396 396   Week/Visit                Enter Week/Visit # Wk 9 V 1 W8 V1 RETEST W7 V1 Wk 6 V 1 Wk 5 V 1 Wk 4 V 2  Re-test Wk 4 V 1 Wk3 v2  Wk 3 V 1 Wk 2 V 2 Wk 2 V 1 Wk 1 V 2  Wk 1 V 1   Weight (lbs) 225# 219 210# 201# 186# 174# 165 156# 153# 144# 132# 120# --   Reps (#) 18 21 20 30 28 28 30 34 34 30 30 30 --   Time 114 116 145 158 133 156s 159s 169s 165  163 174    ROM (degrees) 6-108 6-108 6-108 6-88 6-99 6-99   RE-CHECK ROM 6-96 9-93 9-90 9-90 9-90 18-90   Pain           No pain No more pain    Therapist cues                  Date 6/10/2024 6/6/2024  5/30/2024  5/24/2024   5/17/2024 5/7/24 4/30/24 4/25/24 4/23/24 4/16/24 4/12/2024 4/9/24 4/2/24   Exercise                Warm up, treadmill X 5 min  4 mins 4 mins X  4 min  X 4 min  4 min 4 min 4 min  4 min 4 min X 4 min  4 min    Rotary torso,  B 90 seconds 66#'s to L 64# to R 60#s to L 54#'s to R 52#'s to L 50# to R 48# to L 46# to R  44# to L 40# to R 36#'s to L 30# to R    Chin tuck and scapular retraction             Hold 10 seconds x 10 reps   Cervical retraction with over pressure    Trial of repeated ext without any change in symptoms       X 10  (extensions inc UE pain)     Prone pressups           Gentle motion X 10     Marcy pose           X 30 seconds     Intro occiivot          today      Stretches: scalene, UT and levator scapulae         Did not feel he needed to review Hold 30 seconds x 1-3 reps      Stretches: sitting hip external rotation, piriformis, and hamstring, supine quadratus lumborum, piriformis above 90, standing gastroc and soleus          Did not feel he needed to review Hold 30 seconds X 1-3 reps    All B      Bridges with core engagement and leg extension        Verbal review  Hold 10 seconds X 3 each leg       Bird dog        UE only, then LE only, then UE and LE together        4 way neck SH 2      40#'s SB X 15 Flexion 20#'s X 15 reps           RDL X 10 with 2 X 10# dumbbells               Lopez Chair X 10                  Modifications instructed by therapist:

## 2024-06-13 DIAGNOSIS — M54.42 CHRONIC LEFT-SIDED LOW BACK PAIN WITH LEFT-SIDED SCIATICA: ICD-10-CM

## 2024-06-13 DIAGNOSIS — G89.29 CHRONIC LEFT-SIDED LOW BACK PAIN WITH LEFT-SIDED SCIATICA: ICD-10-CM

## 2024-06-13 RX ORDER — CYCLOBENZAPRINE HCL 5 MG
5-10 TABLET ORAL
Qty: 60 TABLET | Refills: 1 | Status: SHIPPED | OUTPATIENT
Start: 2024-06-13 | End: 2024-10-07

## 2024-06-13 NOTE — TELEPHONE ENCOUNTER
Refill request received from [facility] via fax    Medication: cyclobenzaprine (FLEXERIL) 5 MG tablet   Directions: Take 1-2 tablets (5-10 mg) by mouth nightly as needed for muscle spasms      Last ordered: 2/6/24   Qty: 60  RF: 1  Last OV: 3/11/24  Next OV: None scheduled; plan at last OV was to have follow up in 2-3 months    Separate message routed to Clinic facilitator to request assistance in getting pt scheduled for follow up visit as well.    Routing teed up refill request to Dr. Kwok to review and sign if appropriate.     JEY EricksonN RN Care Coordinator  [facility]   [facility] main phone # [phone] fax # [phone]

## 2024-06-14 ENCOUNTER — TELEPHONE (OUTPATIENT)
Dept: PHYSICAL MEDICINE AND REHAB | Facility: CLINIC | Age: 59
End: 2024-06-14
Payer: COMMERCIAL

## 2024-06-14 NOTE — TELEPHONE ENCOUNTER
----- Message from Danielle Jade RN sent at 6/13/2024  2:37 PM CDT -----  Regarding: Follow up visit needed  Dr. Rissa Talamantes is planning to refill patient's cyclobenzaprine (FLEXERIL) 5 MG tablets but states patient will need a follow up visit. He was last seen 3/11/24 with plan for a follow up visit in 2-3 months but none has been scheduled yet. Can you please call patient to assist with getting patient scheduled for that follow up visit?     Thank you,    Danielle

## 2024-06-14 NOTE — TELEPHONE ENCOUNTER
Reached out to pt, had to LVM stating to please call back and schedule a follow up appointment with Dr Kwok to check in for medication follow up.

## 2024-06-18 ENCOUNTER — THERAPY VISIT (OUTPATIENT)
Dept: PHYSICAL THERAPY | Facility: CLINIC | Age: 59
End: 2024-06-18
Payer: COMMERCIAL

## 2024-06-18 DIAGNOSIS — M54.2 NECK PAIN: Primary | ICD-10-CM

## 2024-06-18 DIAGNOSIS — M54.50 CHRONIC BILATERAL LOW BACK PAIN WITHOUT SCIATICA: ICD-10-CM

## 2024-06-18 DIAGNOSIS — G89.29 CHRONIC BILATERAL LOW BACK PAIN WITHOUT SCIATICA: ICD-10-CM

## 2024-06-18 DIAGNOSIS — Z96.612 STATUS POST TOTAL SHOULDER ARTHROPLASTY, LEFT: ICD-10-CM

## 2024-06-18 DIAGNOSIS — M47.816 LUMBAR SPONDYLOSIS: ICD-10-CM

## 2024-06-18 PROCEDURE — 97110 THERAPEUTIC EXERCISES: CPT | Mod: GP | Performed by: PHYSICAL THERAPIST

## 2024-06-18 NOTE — PROGRESS NOTES
All weight progressions in therapy sessions are dictated by the patient tolerance and therapist discretion. Testing on MedX equipment is completed on 4-week intervals to allow for physiological change in muscle structure and neuromuscular re-education.    Lumbar MedX Re-test 6/6/24 Re-test  5/7/24 Initial testing   4/9/24   AROM (full=  0-72  lumbar) 0-66 0-63 0-63   Max Extension Torque  357 329 273   Flex: ext ratio (ideal 1.4:1) 2.09:1 2.30:1 2.42:1     Cervical MedX Re-test 6/6/24 Re-test  5/7/24 Initial testing  4/2/24   AROM (full= 0-120  cervical) 6-108 6-99 18-90   Max Extension Torque  374 343 169   Flex: ext ratio (ideal 1.4:1) 1.84:1 2.48:1 1.43:1     Date 6/18/24 6/10/2024 6/6/2024  5/30/2024  5/24/2024 5/17/2024 5/7/24 4/30/24 4/25/24 4/23/24 4/16/24 4/12/2024 4/9/24 4/2/24   Lumbar Parameters                 Top Dead Center (TDC) 21 21 21 21 21 21 21 21 21 21 21 21 21    Counterbalance (CB) 382 382 382 382 382 382 382 382 382 382 382 382 382    Seat Pad 1 1 1 1 1 1 1 1 1 1 1 1 1    Femur Restraint 6 6 6 6 6 6 6 6 6 6 6 6 6    Week/Visit                 Enter Week/Visit # Wk 10 V 1 Wk 9 V1 W8 V1 RETEST W7 V1 Wk 6 V 1 Wk 5 V ! Wk 4 V 2  Re-test Wk 4 V 1 Wk3 v2  Wk 3 V 1 Wk 2 V 2 Wk 2 V 1 Wk1 V 2    Weight (lbs) 170# 165# 160# 155# 150# 144# 140# 131s# 128# 124# 119# 114# 110#    Reps (#) 18 21 20 18 20 20 20 24 24 27 20 20 21    Time 90s 118 96 122 130  95s 144s 134s 152  173 184s    ROM (degrees) 0-66 0-66 0-66 0-66 0-63 0-63 0-63 0-63 0-63 0-63 0-63 0-63 0-63    Pain         Mild shanelle SI joint discomfort    fatigue fatigue    Therapist cues                 Cervical Parameters                 Top Dead Center (TDC) 51 51 51 51 51 51 51 51 51 51 51 51 51 51   Counterbalance (CB) 0.4 0.4 0.4 0.4 0.4 0.4 0.4 0.4 0.4 0.4 0.4 0.4 0.4 0.4   Seat Height 398 398 398 398 398 398 398 398 398 398 396 396 396 396   Week/Visit                 Enter Week/Visit # Wk 10 V 1 Wk 9 V 1 W8 V1 RETEST W7 V1 Wk 6 V 1 Wk 5 V 1  Wk 4 V 2  Re-test Wk 4 V 1 Wk3 v2  Wk 3 V 1 Wk 2 V 2 Wk 2 V 1 Wk 1 V 2 Wk 1 V 1   Weight (lbs) 234# 225# 219 210# 201# 186# 174# 165 156# 153# 144# 132# 120# --   Reps (#) 22 18 21 20 30 28 28 30 34 34 30 30 30 --   Time --- 114 116 145 158 133 156s 159s 169s 165  163 174    ROM (degrees) 6-108 6-108 6-108 6-108 6-88 6-99 6-99   RE-CHECK ROM 6-96 9-93 9-90 9-90 9-90 18-90   Pain            No pain No more pain    Therapist cues                   Date 6/18/24 6/10/2024 6/6/2024  5/30/2024  5/24/2024   5/17/2024 5/7/24 4/30/24 4/25/24 4/23/24 4/16/24 4/12/2024 4/9/24 4/2/24   Exercise                 Warm up, treadmill 5 min X 5 min  4 mins 4 mins X  4 min  X 4 min  4 min 4 min 4 min  4 min 4 min X 4 min  4 min    Rotary torso,  B 90 seconds 68# to R 66#'s to L 64# to R 60#s to L 54#'s to R 52#'s to L 50# to R 48# to L 46# to R  44# to L 40# to R 36#'s to L 30# to R    Chin tuck and scapular retraction              Hold 10 seconds x 10 reps   Cervical retraction with over pressure     Trial of repeated ext without any change in symptoms       X 10  (extensions inc UE pain)     Prone pressups            Gentle motion X 10     Marcy pose            X 30 seconds     Intro occiivot           today      Stretches: scalene, UT and levator scapulae          Did not feel he needed to review Hold 30 seconds x 1-3 reps      Stretches: sitting hip external rotation, piriformis, and hamstring, supine quadratus lumborum, piriformis above 90, standing gastroc and soleus           Did not feel he needed to review Hold 30 seconds X 1-3 reps    All B      Bridges with core engagement and leg extension         Verbal review  Hold 10 seconds X 3 each leg       Bird dog         UE only, then LE only, then UE and LE together        4 way neck SH 2  30# X 20  SB  22# X 15  Flexion     40#'s SB X 15 Flexion 20#'s X 15 reps           RDL  X 10 with 2 X 10# dumbbells               Lopez Chair  X 10                  Modifications  instructed by therapist:

## 2024-06-24 ASSESSMENT — ACTIVITIES OF DAILY LIVING (ADL)
PUTTING_ON_AN_UNDERSHIRT_OR_A_PULLOVER_SWEATER: 0
CARRYING_A_HEAVY_OBJECT_OF_10_POUNDS: 0
AT_ITS_WORST?: 8
PLACING_AN_OBJECT_ON_A_HIGH_SHELF: 1
TOUCHING_THE_BACK_OF_YOUR_NECK: 0
WHEN_LYING_ON_THE_INVOLVED_SIDE: 3
PUSHING_WITH_THE_INVOLVED_ARM: 1
WASHING_YOUR_BACK: 1
REACHING_FOR_SOMETHING_ON_A_HIGH_SHELF: 1
PLEASE_INDICATE_YOR_PRIMARY_REASON_FOR_REFERRAL_TO_THERAPY:: SHOULDER
PUTTING_ON_YOUR_PANTS: 0
PUTTING_ON_A_SHIRT_THAT_BUTTONS_DOWN_THE_FRONT: 0
REMOVING_SOMETHING_FROM_YOUR_BACK_POCKET: 0
WASHING_YOUR_HAIR?: 0

## 2024-06-26 ENCOUNTER — THERAPY VISIT (OUTPATIENT)
Dept: PHYSICAL THERAPY | Facility: CLINIC | Age: 59
End: 2024-06-26
Attending: FAMILY MEDICINE
Payer: COMMERCIAL

## 2024-06-26 DIAGNOSIS — M25.511 RIGHT SHOULDER PAIN, UNSPECIFIED CHRONICITY: ICD-10-CM

## 2024-06-26 PROCEDURE — 97161 PT EVAL LOW COMPLEX 20 MIN: CPT | Mod: GP | Performed by: PHYSICAL THERAPIST

## 2024-06-26 PROCEDURE — 97110 THERAPEUTIC EXERCISES: CPT | Mod: GP | Performed by: PHYSICAL THERAPIST

## 2024-06-26 NOTE — PROGRESS NOTES
PHYSICAL THERAPY EVALUATION  Type of Visit: Evaluation  Therapist Impression:   When waking in the morning pain starts.  Will have pain if he sleeps with arm by ahead.  Back squat at gym is bothersome.  Pain is UT and neck.  Hx of neck and back pain.  Being seen for neck and being treated with MedEx machine which is helping.  Miles presents with RC pathology with underlying impairments of posterior shoulder tightness, scapular malpositioning, RC strength impairments.    NEXT: TPR? 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; left lobe thyroidectomy 5/2020; prostatectomy 6/2022    Prior diagnostic imaging/testing results:       Prior therapy history for the same diagnosis, illness or injury: No      Prior Level of Function  Transfers: Independent  Ambulation: Independent  ADL: Independent  IADL:     Living Environment  Social support: With a significant other or spouse   Type of home: House; 2-story   Stairs to enter the home: Yes 3 Is there a railing: Yes     Ramp: No   Stairs inside the home: Yes 14 Is there a railing: Yes     Help at home: None  Equipment owned:       Employment: Yes  / manager  Hobbies/Interests: Reading, knitting, walking    Patient goals for therapy: Sleep better s as nd wake without pain    Pain assessment: Pain present,     Objective   STATIC POSTURE  Forward head: mild   Rounded shoulders:mild  Shoulder internally rotated: mild   Visual inspection: Depressed scapula R and Downward rotation R    DYNAMIC SCAPULAR TESTS  Dynamic Scapular Assessment: NA    AROM  WNL    JOINT MOBILITY  NA    PROM Flexion Abd 90-90 ER 90-90 IR Scap Stabilized Horizontal Adduction   Left na na  78 80 33   Right na na 72 70 11   GH indicates pure glenohumeral ROM    SHOULDER STRENGTH  MMT Right Left   Flexion 5/5 5/5   Abduction 5/5 5/5   ER 4+p/5 5/5   IR 4+p/5 5/5   P= pain      Assessment & Plan   CLINICAL IMPRESSIONS  Medical Diagnosis: R shoulder pain    Treatment Diagnosis: R shoulder pain   Impression/Assessment: Patient is a 59 year old male with R shoulder complaints.  The following significant findings have been identified: Pain, Decreased ROM/flexibility, Decreased strength, Impaired muscle performance, and Impaired posture. These impairments interfere with their ability to perform self care tasks, work tasks, recreational activities, and household chores as compared to previous level of function.     Clinical Decision Making (Complexity):  Clinical Presentation: Stable/Uncomplicated  Clinical Presentation Rationale: based on medical and personal factors listed in PT evaluation  Clinical Decision Making (Complexity): Low complexity    PLAN OF CARE  Treatment Interventions:  Interventions: Manual Therapy, Neuromuscular Re-education, Therapeutic Activity, Therapeutic Exercise, Self-Care/Home Management    Long Term Goals     PT Goal 1  Goal Identifier: Sleeping  Goal Description: Be able to sleep and wake in the morning without pain  Rationale:  (For restorative sleep)  Target Date: 08/21/24      Frequency of Treatment: 2 x month  Duration of Treatment: 2 months    Recommended Referrals to Other Professionals:   Education Assessment:        Risks and benefits of evaluation/treatment have been explained.   Patient/Family/caregiver agrees with Plan of Care.     Evaluation Time:     PT Eval, Low Complexity Minutes (84891): 15       Signing Clinician: Jeremy Resendez PT

## 2024-06-27 ENCOUNTER — THERAPY VISIT (OUTPATIENT)
Dept: PHYSICAL THERAPY | Facility: CLINIC | Age: 59
End: 2024-06-27
Payer: COMMERCIAL

## 2024-06-27 DIAGNOSIS — M54.50 CHRONIC BILATERAL LOW BACK PAIN WITHOUT SCIATICA: ICD-10-CM

## 2024-06-27 DIAGNOSIS — M54.2 NECK PAIN: Primary | ICD-10-CM

## 2024-06-27 DIAGNOSIS — G89.29 CHRONIC BILATERAL LOW BACK PAIN WITHOUT SCIATICA: ICD-10-CM

## 2024-06-27 PROCEDURE — 97110 THERAPEUTIC EXERCISES: CPT | Mod: GP

## 2024-06-27 NOTE — PROGRESS NOTES
All weight progressions in therapy sessions are dictated by the patient tolerance and therapist discretion. Testing on MedX equipment is completed on 4-week intervals to allow for physiological change in muscle structure and neuromuscular re-education.    Lumbar MedX Re-test 6/6/24 Re-test  5/7/24 Initial testing   4/9/24   AROM (full=  0-72  lumbar) 0-66 0-63 0-63   Max Extension Torque  357 329 273   Flex: ext ratio (ideal 1.4:1) 2.09:1 2.30:1 2.42:1     Cervical MedX Re-test 6/6/24 Re-test  5/7/24 Initial testing  4/2/24   AROM (full= 0-120  cervical) 6-108 6-99 18-90   Max Extension Torque  374 343 169   Flex: ext ratio (ideal 1.4:1) 1.84:1 2.48:1 1.43:1     Date 6/27/2024  6/18/24 6/10/2024 6/6/2024  5/30/2024  5/24/2024 5/17/2024 5/7/24 4/30/24 4/25/24 4/23/24 4/16/24 4/12/2024 4/9/24 4/2/24   Lumbar Parameters                  Top Dead Center (TDC) 21 21 21 21 21 21 21 21 21 21 21 21 21 21    Counterbalance (CB) 382 382 382 382 382 382 382 382 382 382 382 382 382 382    Seat Pad 1 1 1 1 1 1 1 1 1 1 1 1 1 1    Femur Restraint 6 6 6 6 6 6 6 6 6 6 6 6 6 6    Week/Visit                  Enter Week/Visit # Wk 11 V1 Wk 10 V 1 Wk 9 V1 W8 V1 RETEST W7 V1 Wk 6 V 1 Wk 5 V ! Wk 4 V 2  Re-test Wk 4 V 1 Wk3 v2  Wk 3 V 1 Wk 2 V 2 Wk 2 V 1 Wk1 V 2    Weight (lbs) 175# 170# 165# 160# 155# 150# 144# 140# 131s# 128# 124# 119# 114# 110#    Reps (#) 15 18 21 20 18 20 20 20 24 24 27 20 20 21    Time 95 90s 118 96 122 130  95s 144s 134s 152  173 184s    ROM (degrees) 0-66 0-66 0-66 0-66 0-66 0-63 0-63 0-63 0-63 0-63 0-63 0-63 0-63 0-63    Pain          Mild shanelle SI joint discomfort    fatigue fatigue    Therapist cues                  Cervical Parameters                  Top Dead Center (TDC) 51 51 51 51 51 51 51 51 51 51 51 51 51 51 51   Counterbalance (CB) 0.4 0.4 0.4 0.4 0.4 0.4 0.4 0.4 0.4 0.4 0.4 0.4 0.4 0.4 0.4   Seat Height 398 398 398 398 398 398 398 398 398 398 398 396 396 396 396   Week/Visit                  Enter  Week/Visit # Wk 11 V1 Wk 10 V 1 Wk 9 V 1 W8 V1 RETEST W7 V1 Wk 6 V 1 Wk 5 V 1 Wk 4 V 2  Re-test Wk 4 V 1 Wk3 v2  Wk 3 V 1 Wk 2 V 2 Wk 2 V 1 Wk 1 V 2 Wk 1 V 1   Weight (lbs) 240# 234# 225# 219 210# 201# 186# 174# 165 156# 153# 144# 132# 120# --   Reps (#) 25 22 18 21 20 30 28 28 30 34 34 30 30 30 --   Time 125 --- 114 116 145 158 133 156s 159s 169s 165  163 174    ROM (degrees) 0-108 6-108 6-108 6-108 6-108 6-88 6-99 6-99   RE-CHECK ROM 6-96 9-93 9-90 9-90 9-90 18-90   Pain             No pain No more pain    Therapist cues                    Date 6/27/2024  6/18/24 6/10/2024 6/6/2024  5/30/2024  5/24/2024   5/17/2024 5/7/24 4/30/24 4/25/24 4/23/24 4/16/24 4/12/2024 4/9/24 4/2/24   Exercise                  Warm up, treadmill 5 min  5 min X 5 min  4 mins 4 mins X  4 min  X 4 min  4 min 4 min 4 min  4 min 4 min X 4 min  4 min    Rotary torso,  B 90 seconds 70# to L 68# to R 66#'s to L 64# to R 60#s to L 54#'s to R 52#'s to L 50# to R 48# to L 46# to R  44# to L 40# to R 36#'s to L 30# to R    Chin tuck and scapular retraction               Hold 10 seconds x 10 reps   Cervical retraction with over pressure      Trial of repeated ext without any change in symptoms       X 10  (extensions inc UE pain)     Prone pressups             Gentle motion X 10     Marcy pose             X 30 seconds     Intro occiivot            today      Stretches: scalene, UT and levator scapulae           Did not feel he needed to review Hold 30 seconds x 1-3 reps      Stretches: sitting hip external rotation, piriformis, and hamstring, supine quadratus lumborum, piriformis above 90, standing gastroc and soleus            Did not feel he needed to review Hold 30 seconds X 1-3 reps    All B      Bridges with core engagement and leg extension          Verbal review  Hold 10 seconds X 3 each leg       Bird dog          UE only, then LE only, then UE and LE together        4 way neck SH 2  34# x 20 SB, 24# x 15 30# X 20  SB  22# X  15  Flexion     40#'s SB X 15 Flexion 20#'s X 15 reps           RDL   X 10 with 2 X 10# dumbbells               Lopez Chair   X 10                  Modifications instructed by therapist: Discussed progression of HEP at home.

## 2024-07-03 ENCOUNTER — THERAPY VISIT (OUTPATIENT)
Dept: PHYSICAL THERAPY | Facility: CLINIC | Age: 59
End: 2024-07-03
Attending: FAMILY MEDICINE
Payer: COMMERCIAL

## 2024-07-03 DIAGNOSIS — M25.511 RIGHT SHOULDER PAIN, UNSPECIFIED CHRONICITY: Primary | ICD-10-CM

## 2024-07-03 PROCEDURE — 97110 THERAPEUTIC EXERCISES: CPT | Mod: GP | Performed by: PHYSICAL THERAPIST

## 2024-07-03 PROCEDURE — 97530 THERAPEUTIC ACTIVITIES: CPT | Mod: GP | Performed by: PHYSICAL THERAPIST

## 2024-07-03 PROCEDURE — 97140 MANUAL THERAPY 1/> REGIONS: CPT | Mod: GP | Performed by: PHYSICAL THERAPIST

## 2024-07-03 NOTE — PROGRESS NOTES
Therapist Impression:   Initiated TPR.  Improved horizontal adduction ROM and tolerance to T exercise.    When waking in the morning pain starts.  Will have pain if he sleeps with arm by ahead.  Back squat at gym is bothersome.  Pain is UT and neck.  Hx of neck and back pain.  Being seen for neck and being treated with MedEx machine which is helping.  Miles presents with RC pathology with underlying impairments of posterior shoulder tightness, scapular malpositioning, RC strength impairments.    NEXT: TPR? Address scapular position, progress RC strengthening     PTRX: online    GOALS: Reaching  Subjective:  Exercises seems to be good.  No pinching.  Intermittent UT twitching.  Objective:    PROM Flexion Abd 90-90 ER 90-90 IR Scap Stabilized Horizontal Adduction   Left na na  78 80 32   Right na na 72 70 23   GH indicates pure glenohumeral ROM

## 2024-07-12 ENCOUNTER — THERAPY VISIT (OUTPATIENT)
Dept: PHYSICAL THERAPY | Facility: CLINIC | Age: 59
End: 2024-07-12
Attending: FAMILY MEDICINE
Payer: COMMERCIAL

## 2024-07-12 DIAGNOSIS — M25.511 RIGHT SHOULDER PAIN, UNSPECIFIED CHRONICITY: Primary | ICD-10-CM

## 2024-07-12 PROCEDURE — 97110 THERAPEUTIC EXERCISES: CPT | Mod: GP | Performed by: PHYSICAL THERAPIST

## 2024-07-12 PROCEDURE — 97140 MANUAL THERAPY 1/> REGIONS: CPT | Mod: GP | Performed by: PHYSICAL THERAPIST

## 2024-07-12 PROCEDURE — 97530 THERAPEUTIC ACTIVITIES: CPT | Mod: GP | Performed by: PHYSICAL THERAPIST

## 2024-07-12 NOTE — PROGRESS NOTES
Therapist Impression:   Good improvement in 90-90 ER/IR ROM. Lack of progress with horizontal adduction ROM.  Added in contract relax for home and wall slides.    When waking in the morning pain starts.  Will have pain if he sleeps with arm by ahead.  Back squat at gym is bothersome.  Pain is UT and neck.  Hx of neck and back pain.  Being seen for neck and being treated with MedEx machine which is helping.  Miles presents with RC pathology with underlying impairments of posterior shoulder tightness, scapular malpositioning, RC strength impairments.    NEXT: TPR? Address scapular position, progress RC strengthening     PTRX: online    GOALS: Reaching  Subjective:  Shoulder is hurting a little bit right now.  Did exercises this morning.    Objective:    PROM Flexion Abd 90-90 ER 90-90 IR Scap Stabilized Horizontal Adduction   Left na na  78 80 32   Right na na 78 75 8 / 23   GH indicates pure glenohumeral ROM

## 2024-07-15 ENCOUNTER — THERAPY VISIT (OUTPATIENT)
Dept: PHYSICAL THERAPY | Facility: CLINIC | Age: 59
End: 2024-07-15
Payer: COMMERCIAL

## 2024-07-15 DIAGNOSIS — M54.50 CHRONIC BILATERAL LOW BACK PAIN WITHOUT SCIATICA: ICD-10-CM

## 2024-07-15 DIAGNOSIS — M54.2 NECK PAIN: Primary | ICD-10-CM

## 2024-07-15 DIAGNOSIS — G89.29 CHRONIC BILATERAL LOW BACK PAIN WITHOUT SCIATICA: ICD-10-CM

## 2024-07-15 PROCEDURE — 97110 THERAPEUTIC EXERCISES: CPT | Mod: GP

## 2024-07-15 NOTE — PROGRESS NOTES
All weight progressions in therapy sessions are dictated by the patient tolerance and therapist discretion. Testing on MedX equipment is completed on 4-week intervals to allow for physiological change in muscle structure and neuromuscular re-education.    Lumbar MedX Re-test 7/15/2024  Re-test 6/6/24 Re-test  5/7/24 Initial testing   4/9/24   AROM (full=  0-72  lumbar) 0-66 0-66 0-63 0-63   Max Extension Torque  344 357 329 273   Flex: ext ratio (ideal 1.4:1) 1.78:1 2.09:1 2.30:1 2.42:1     Cervical MedX Re-test 7/15/2024  Re-test 6/6/24 Re-test  5/7/24 Initial testing  4/2/24   AROM (full= 0-120  cervical) 6-108 6-108 6-99 18-90   Max Extension Torque  405 374 343 169   Flex: ext ratio (ideal 1.4:1) 1.62:1 1.84:1 2.48:1 1.43:1     Date 7/15/2024  6/27/2024  6/18/24 6/10/2024 6/6/2024  5/30/2024  5/24/2024 5/17/2024 5/7/24 4/30/24 4/25/24 4/23/24 4/16/24 4/12/2024 4/9/24 4/2/24   Lumbar Parameters                   Top Dead Center (TDC) 21 21 21 21 21 21 21 21 21 21 21 21 21 21 21    Counterbalance (CB) 382 382 382 382 382 382 382 382 382 382 382 382 382 382 382    Seat Pad 1 1 1 1 1 1 1 1 1 1 1 1 1 1 1    Femur Restraint 6 6 6 6 6 6 6 6 6 6 6 6 6 6 6    Week/Visit                   Enter Week/Visit # Wk 12 V1 RETEST Wk 11 V1 Wk 10 V 1 Wk 9 V1 W8 V1 RETEST W7 V1 Wk 6 V 1 Wk 5 V ! Wk 4 V 2  Re-test Wk 4 V 1 Wk3 v2  Wk 3 V 1 Wk 2 V 2 Wk 2 V 1 Wk1 V 2    Weight (lbs) -- 175# 170# 165# 160# 155# 150# 144# 140# 131s# 128# 124# 119# 114# 110#    Reps (#) -- 15 18 21 20 18 20 20 20 24 24 27 20 20 21    Time -- 95 90s 118 96 122 130  95s 144s 134s 152  173 184s    ROM (degrees) -- 0-66 0-66 0-66 0-66 0-66 0-63 0-63 0-63 0-63 0-63 0-63 0-63 0-63 0-63    Pain           Mild shanelle SI joint discomfort    fatigue fatigue    Therapist cues                   Cervical Parameters                   Top Dead Center (TDC) 51 51 51 51 51 51 51 51 51 51 51 51 51 51 51 51   Counterbalance (CB) 0.4 0.4 0.4 0.4 0.4 0.4 0.4 0.4 0.4 0.4 0.4  0.4 0.4 0.4 0.4 0.4   Seat Height 398 398 398 398 398 398 398 398 398 398 398 398 396 396 396 396   Week/Visit                   Enter Week/Visit # Wk 12 V1 RETEST Wk 11 V1 Wk 10 V 1 Wk 9 V 1 W8 V1 RETEST W7 V1 Wk 6 V 1 Wk 5 V 1 Wk 4 V 2  Re-test Wk 4 V 1 Wk3 v2  Wk 3 V 1 Wk 2 V 2 Wk 2 V 1 Wk 1 V 2 Wk 1 V 1   Weight (lbs) -- 240# 234# 225# 219 210# 201# 186# 174# 165 156# 153# 144# 132# 120# --   Reps (#) -- 25 22 18 21 20 30 28 28 30 34 34 30 30 30 --   Time -- 125 --- 114 116 145 158 133 156s 159s 169s 165  163 174    ROM (degrees) -- 0-108 6-108 6-108 6-108 6-108 6-88 6-99 6-99   RE-CHECK ROM 6-96 9-93 9-90 9-90 9-90 18-90   Pain              No pain No more pain    Therapist cues                     Date 7/15/2024  6/27/2024  6/18/24 6/10/2024 6/6/2024  5/30/2024  5/24/2024   5/17/2024 5/7/24 4/30/24 4/25/24 4/23/24 4/16/24 4/12/2024 4/9/24 4/2/24   Exercise                   Warm up, treadmill 4 min 5 min  5 min X 5 min  4 mins 4 mins X  4 min  X 4 min  4 min 4 min 4 min  4 min 4 min X 4 min  4 min    Rotary torso,  B 90 seconds  70# to L 68# to R 66#'s to L 64# to R 60#s to L 54#'s to R 52#'s to L 50# to R 48# to L 46# to R  44# to L 40# to R 36#'s to L 30# to R    Chin tuck and scapular retraction                Hold 10 seconds x 10 reps   Cervical retraction with over pressure       Trial of repeated ext without any change in symptoms       X 10  (extensions inc UE pain)     Prone pressups              Gentle motion X 10     Marcy pose              X 30 seconds     Intro occiivot             today      Stretches: scalene, UT and levator scapulae            Did not feel he needed to review Hold 30 seconds x 1-3 reps      Stretches: sitting hip external rotation, piriformis, and hamstring, supine quadratus lumborum, piriformis above 90, standing gastroc and soleus             Did not feel he needed to review Hold 30 seconds X 1-3 reps    All B      Bridges with core engagement and leg extension            Verbal review  Hold 10 seconds X 3 each leg       Bird dog           UE only, then LE only, then UE and LE together        4 way neck SH 2   34# x 20 SB, 24# x 15 30# X 20  SB  22# X 15  Flexion     40#'s SB X 15 Flexion 20#'s X 15 reps           RDL    X 10 with 2 X 10# dumbbells               Lopez Chair    X 10                Racobado 6x6 X6 each, added to HEP                    Modifications instructed by therapist: Discussed progression of HEP at home.

## 2024-07-29 ENCOUNTER — TELEPHONE (OUTPATIENT)
Dept: GASTROENTEROLOGY | Facility: CLINIC | Age: 59
End: 2024-07-29
Payer: COMMERCIAL

## 2024-07-29 NOTE — TELEPHONE ENCOUNTER
Pre visit planning completed.      Procedure details:    Patient scheduled for Colonoscopy on 8/6/24.     Arrival time: 1315. Procedure time 1400    Facility location: [facility]; [facility] [place] [address]. Check in location: 1st Floor [facility] [place].     Sedation type: Conscious sedation     Pre op exam needed? No.    Indication for procedure:   Screen for colon cancer [Z12.11]  - Primary            Chart review:     Electronic implanted devices? No    Recent diagnosis of diverticulitis within the last 6 weeks? No      Medication review:    Diabetic? No    Anticoagulants? No    Weight loss medication/injectable? No GLP 1 medications per patient's medication list.  RN will verify with pre-assessment call.    NSAIDS? Yes.  Ibuprofen (Advil, Motrin).  Holding interval of 1 day before procedure.    Other medication HOLDING recommendations:  N/A      Prep for procedure:     Bowel prep recommendation: Standard Miralax  Due to: standard bowel prep.    Prep instructions sent via [employer] 7/15/24         Evelin Buck RN  Endoscopy Procedure Pre Assessment RN  [phone] option 4

## 2024-07-30 NOTE — TELEPHONE ENCOUNTER
Attempted to contact patient in order to complete pre assessment questions.     No answer. Left message to return call to [phone] option 4    Callback required communication sent via [employer].      Rohini Simms RN  Endoscopy Procedure Pre Assessment

## 2024-07-31 NOTE — TELEPHONE ENCOUNTER
Second call attempt to complete pre assessment.     No answer.  Left message to return call to [phone] #4 by next business day prior to 4PM or procedure will be sent to cancel.     Callback required communication sent via [employer].      Rohini Simms RN  Endoscopy Procedure Pre Assessment

## 2024-08-01 ENCOUNTER — THERAPY VISIT (OUTPATIENT)
Dept: PHYSICAL THERAPY | Facility: CLINIC | Age: 59
End: 2024-08-01
Payer: COMMERCIAL

## 2024-08-01 ENCOUNTER — TELEPHONE (OUTPATIENT)
Dept: GASTROENTEROLOGY | Facility: CLINIC | Age: 59
End: 2024-08-01

## 2024-08-01 DIAGNOSIS — M54.2 NECK PAIN: Primary | ICD-10-CM

## 2024-08-01 DIAGNOSIS — M54.50 CHRONIC BILATERAL LOW BACK PAIN WITHOUT SCIATICA: ICD-10-CM

## 2024-08-01 DIAGNOSIS — G89.29 CHRONIC BILATERAL LOW BACK PAIN WITHOUT SCIATICA: ICD-10-CM

## 2024-08-01 PROCEDURE — 97110 THERAPEUTIC EXERCISES: CPT | Mod: GP

## 2024-08-01 NOTE — PROGRESS NOTES
24 0500   Appointment Info   Signing clinician's name / credentials Sherif Escobar, PT DPT   Visits Used 16th MedX   Medical Diagnosis Cervical and lumbar Pain   PT Tx Diagnosis Cervical and Lumbar pain   Precautions/Limitations HX L Total shoulder replacement   Progress Note/Certification   Onset of illness/injury or Date of Surgery 24  (date of referral)   Therapy Frequency 2x/week decreased to 1x/week   Predicted Duration 16 visits, cervical and lumbar MedX program   Progress Note Completed Date 24   PT Goal 1   Goal Identifier strategies   Goal Description Pt will learn an extensive HEP and strategies for neck and back to help with with stabilization and manage symptoms as they occur   Goal Progress Goal met   Target Date 24   Date Met 24   PT Goal 2   Goal Identifier sleep   Goal Description Pt will be able to sleep through the night and wake with  pain of 0-1/10   Goal Progress Goal met   Target Date 24   Date Met 24   PT Goal 3   Goal Identifier medication   Goal Description Pt will decrease amount of medication taken weekly to 3-4 days per week versus -daily   Goal Progress Goal met. Taking pain meds 1-2 days per week.   Target Date 24   Subjective Report   Subjective Report Pt reports that he just got back from travelling and his neck is a little stiff but doing well overall. He reports that his current HEP is going really well and is comfortable with continuing this on his own moving forward.   Objective Measure 1   Objective Measure ALONDRA: 14   Objective Measure 2   Objective Measure NDI: 26   Treatment Interventions (PT)   Interventions Manual Therapy   Therapeutic Procedure/Exercise   Therapeutic Procedures: strength, endurance, ROM, flexibility minutes (38237) 28   Ther Proc 2 - Details cervical and lumbar Medx DE   Skilled Intervention Retesting on machines, warm up and exercise to promote flexibility, strength and stability   Patient  Response/Progress no increase in pain today, felt looser after exercise   Education   Learner/Method Patient   Plan   Plan for next session cervical and lumbar MedX exercise, strengthening for HEP   Comments   Comments Pt arrives for final MedX treatment.  Continues to be able to increase dynamic weights in all MedX.  He feels comfortable with his HEP. Pt has made significant progress with this program and has had grea symptom reduction. He is ready to dischrge d/t all goals being met.   Total Session Time   Timed Code Treatment Minutes 28   Total Treatment Time (sum of timed and untimed services) 28         DISCHARGE  Reason for Discharge: Patient has met all goals.    Equipment Issued: HEP    Discharge Plan: Patient to continue home program.    Referring Provider:  Esther Kwok

## 2024-08-01 NOTE — PROGRESS NOTES
All weight progressions in therapy sessions are dictated by the patient tolerance and therapist discretion. Testing on MedX equipment is completed on 4-week intervals to allow for physiological change in muscle structure and neuromuscular re-education.    Lumbar MedX Re-test 7/15/2024  Re-test 6/6/24 Re-test  5/7/24 Initial testing   4/9/24   AROM (full=  0-72  lumbar) 0-66 0-66 0-63 0-63   Max Extension Torque  344 357 329 273   Flex: ext ratio (ideal 1.4:1) 1.78:1 2.09:1 2.30:1 2.42:1     Cervical MedX Re-test 7/15/2024  Re-test 6/6/24 Re-test  5/7/24 Initial testing  4/2/24   AROM (full= 0-120  cervical) 6-108 6-108 6-99 18-90   Max Extension Torque  405 374 343 169   Flex: ext ratio (ideal 1.4:1) 1.62:1 1.84:1 2.48:1 1.43:1     Date 8/1/2024  7/15/2024  6/27/2024  6/18/24 6/10/2024 6/6/2024  5/30/2024  5/24/2024 5/17/2024 5/7/24 4/30/24 4/25/24 4/23/24 4/16/24 4/12/2024 4/9/24 4/2/24   Lumbar Parameters                    Top Dead Center (TDC) 21 21 21 21 21 21 21 21 21 21 21 21 21 21 21 21    Counterbalance (CB) 382 382 382 382 382 382 382 382 382 382 382 382 382 382 382 382    Seat Pad 1 1 1 1 1 1 1 1 1 1 1 1 1 1 1 1    Femur Restraint 6 6 6 6 6 6 6 6 6 6 6 6 6 6 6 6    Week/Visit                    Enter Week/Visit # 13/1 Wk 12 V1 RETEST Wk 11 V1 Wk 10 V 1 Wk 9 V1 W8 V1 RETEST W7 V1 Wk 6 V 1 Wk 5 V ! Wk 4 V 2  Re-test Wk 4 V 1 Wk3 v2  Wk 3 V 1 Wk 2 V 2 Wk 2 V 1 Wk1 V 2    Weight (lbs) 180# -- 175# 170# 165# 160# 155# 150# 144# 140# 131s# 128# 124# 119# 114# 110#    Reps (#) 20 -- 15 18 21 20 18 20 20 20 24 24 27 20 20 21    Time 104 -- 95 90s 118 96 122 130  95s 144s 134s 152  173 184s    ROM (degrees) 0-66 -- 0-66 0-66 0-66 0-66 0-66 0-63 0-63 0-63 0-63 0-63 0-63 0-63 0-63 0-63    Pain            Mild shanelle SI joint discomfort    fatigue fatigue    Therapist cues                    Cervical Parameters                    Top Dead Center (TDC) 51 51 51 51 51 51 51 51 51 51 51 51 51 51 51 51 51   Counterbalance  (CB) 0.4 0.4 0.4 0.4 0.4 0.4 0.4 0.4 0.4 0.4 0.4 0.4 0.4 0.4 0.4 0.4 0.4   Seat Height 398 398 398 398 398 398 398 398 398 398 398 398 398 396 396 396 396   Week/Visit                    Enter Week/Visit # 13/1 Wk 12 V1 RETEST Wk 11 V1 Wk 10 V 1 Wk 9 V 1 W8 V1 RETEST W7 V1 Wk 6 V 1 Wk 5 V 1 Wk 4 V 2  Re-test Wk 4 V 1 Wk3 v2  Wk 3 V 1 Wk 2 V 2 Wk 2 V 1 Wk 1 V 2 Wk 1 V 1   Weight (lbs) 255 -- 240# 234# 225# 219 210# 201# 186# 174# 165 156# 153# 144# 132# 120# --   Reps (#) 20 -- 25 22 18 21 20 30 28 28 30 34 34 30 30 30 --   Time 96 -- 125 --- 114 116 145 158 133 156s 159s 169s 165  163 174    ROM (degrees) 0-114 -- 0-108 6-108 6-108 6-108 6-108 6-88 6-99 6-99   RE-CHECK ROM 6-96 9-93 9-90 9-90 9-90 18-90   Pain               No pain No more pain    Therapist cues                      Date 8/1/2024  7/15/2024  6/27/2024  6/18/24 6/10/2024 6/6/2024  5/30/2024  5/24/2024   5/17/2024 5/7/24 4/30/24 4/25/24 4/23/24 4/16/24 4/12/2024 4/9/24 4/2/24   Exercise                    Treadmill 4 min  4 min 5 min  5 min X 5 min  4 mins 4 mins X  4 min  X 4 min  4 min 4 min 4 min  4 min 4 min X 4 min  4 min    Rotary torso,  B 90 seconds 76#, to R  70# to L 68# to R 66#'s to L 64# to R 60#s to L 54#'s to R 52#'s to L 50# to R 48# to L 46# to R  44# to L 40# to R 36#'s to L 30# to R    Chin tuck and scapular retraction                 Hold 10 seconds x 10 reps   Cervical retraction with over pressure        Trial of repeated ext without any change in symptoms       X 10  (extensions inc UE pain)     Prone pressups               Gentle motion X 10     Marcy pose               X 30 seconds     Intro occiivot              today      Stretches: scalene, UT and levator scapulae             Did not feel he needed to review Hold 30 seconds x 1-3 reps      Stretches: sitting hip external rotation, piriformis, and hamstring, supine quadratus lumborum, piriformis above 90, standing gastroc and soleus              Did not feel he needed  to review Hold 30 seconds X 1-3 reps    All B      Bridges with core engagement and leg extension            Verbal review  Hold 10 seconds X 3 each leg       Bird dog            UE only, then LE only, then UE and LE together        4 way neck SH 2    34# x 20 SB, 24# x 15 30# X 20  SB  22# X 15  Flexion     40#'s SB X 15 Flexion 20#'s X 15 reps           RDL     X 10 with 2 X 10# dumbbells               Lopez Chair Reviewed for progressing at gym    X 10                Racobado 6x6 Reports that these are the most helpful X6 each, added to HEP                    Modifications instructed by therapist: Discussed progression of HEP at home.

## 2024-08-01 NOTE — TELEPHONE ENCOUNTER
Caller:     Reason for Reschedule/Cancellation   (please be detailed, any staff messages or encounters to note?): WANTED MW      Prior to reschedule please review:  Ordering Provider:     CANDICE SNOW     Sedation Determined: MAC  Does patient have any ASC Exclusions, please identify?:       Notes on Cancelled Procedure:  Procedure: Lower Endoscopy [Colonoscopy]   Date: 8/6  [facility] [address] [place] [place] [address]   Surgeon: CHARLEY      Rescheduled: Yes,   Procedure: Lower Endoscopy [Colonoscopy]    Date: 11/12   Location: [facility]; [address], [address], [place] [address]   Surgeon: RITO   Sedation Level Scheduled  MAC ,  Reason for Sedation Level MW   Instructions updated and sent: Y     Does patient need PAC or Pre -Op Rescheduled? : N       Did you cancel or rescheduled an EUS procedure? No.

## 2024-08-09 ENCOUNTER — LAB (OUTPATIENT)
Dept: LAB | Facility: CLINIC | Age: 59
End: 2024-08-09
Payer: COMMERCIAL

## 2024-08-09 DIAGNOSIS — Z85.46 PERSONAL HISTORY OF MALIGNANT NEOPLASM OF PROSTATE: ICD-10-CM

## 2024-08-09 DIAGNOSIS — Z85.46 PERSONAL HISTORY OF MALIGNANT NEOPLASM OF PROSTATE: Primary | ICD-10-CM

## 2024-08-09 LAB — PSA SERPL DL<=0.01 NG/ML-MCNC: 0.05 NG/ML (ref 0–3.5)

## 2024-08-09 PROCEDURE — 36415 COLL VENOUS BLD VENIPUNCTURE: CPT | Performed by: PATHOLOGY

## 2024-08-09 PROCEDURE — 84153 ASSAY OF PSA TOTAL: CPT | Performed by: PATHOLOGY

## 2024-08-12 ENCOUNTER — OFFICE VISIT (OUTPATIENT)
Dept: PHYSICAL MEDICINE AND REHAB | Facility: CLINIC | Age: 59
End: 2024-08-12
Payer: COMMERCIAL

## 2024-08-12 ENCOUNTER — THERAPY VISIT (OUTPATIENT)
Dept: PHYSICAL THERAPY | Facility: CLINIC | Age: 59
End: 2024-08-12
Payer: COMMERCIAL

## 2024-08-12 VITALS
RESPIRATION RATE: 16 BRPM | SYSTOLIC BLOOD PRESSURE: 135 MMHG | HEART RATE: 82 BPM | DIASTOLIC BLOOD PRESSURE: 85 MMHG | OXYGEN SATURATION: 94 %

## 2024-08-12 DIAGNOSIS — M79.10 MYALGIA: Primary | ICD-10-CM

## 2024-08-12 DIAGNOSIS — M25.511 RIGHT SHOULDER PAIN, UNSPECIFIED CHRONICITY: Primary | ICD-10-CM

## 2024-08-12 DIAGNOSIS — M54.42 CHRONIC LEFT-SIDED LOW BACK PAIN WITH LEFT-SIDED SCIATICA: ICD-10-CM

## 2024-08-12 DIAGNOSIS — M54.2 NECK PAIN: ICD-10-CM

## 2024-08-12 DIAGNOSIS — G89.29 CHRONIC LEFT-SIDED LOW BACK PAIN WITH LEFT-SIDED SCIATICA: ICD-10-CM

## 2024-08-12 PROCEDURE — 97140 MANUAL THERAPY 1/> REGIONS: CPT | Mod: GP | Performed by: PHYSICAL THERAPIST

## 2024-08-12 PROCEDURE — 97530 THERAPEUTIC ACTIVITIES: CPT | Mod: GP | Performed by: PHYSICAL THERAPIST

## 2024-08-12 PROCEDURE — 99213 OFFICE O/P EST LOW 20 MIN: CPT | Performed by: PHYSICAL MEDICINE & REHABILITATION

## 2024-08-12 ASSESSMENT — PAIN SCALES - GENERAL: PAINLEVEL: MILD PAIN (3)

## 2024-08-12 NOTE — LETTER
8/12/2024       RE: Miles Valencia  [address] [place] [address]     Dear Colleague,    Thank you for referring your patient, Miles Valencia, to the [facility] [place] [facility]. Please see a copy of my visit note below.    PM&R Follow-Up Visit -     Date of Initial Visit: 12/6/2021  LOV: 3/11/2024  TD: 8/12/2024      RECALL: Miles Valencia is a 59 year old male with history of RLS, dizziness, left TSA who follows up for cervical and lumbar issues    INTERVAL HISTORY:  Patient was last seen in clinic March 11, 2024.  At that visit, he was referred to physical therapy and has attended approximately 19 sessions of PT over the last 5 months.    Since last time, he completed MedX program which he found helpful overall. He is currently in PT for shoulder issues.     He did review medications with his primary care physician, and switch from atorvastatin and seemingly has reduced leg spasming and aching.  He uses ibuprofen gel is available OTC in [place].    Additionally, he uses cyclobenzaprine as needed finding this helpful with pain, muscle spasm and myalgia.  He essentially scheduled a follow-up visit to review medication and have refills for the Flexeril available as needed.    RECALL HISTORY OF PRESENT ILLNESS:  Miles Valencia is a male who was previously evaluated for lumbosacral radiculopathy now presents with a chief complaint of numbness/tingling in bilateral forearms and 4th/5th digits.     Symptoms began ~1 year ago and not associated with trauma. They have been getting progressively worse over that time. He has longstanding chronic neck pain as well that has been getting worse over the same time.     The hand/arm symptoms are localized to the bilateral ulnar forearms into the 4th and 5th digits (R 55%, L 45%); describes his symptoms as numbness/tingling. Has noticed decreased hand strength  "over the last ~3 years which he feels is associated with arthritis and tendinopathy making it difficult to open jars. No issues with fine motor control (buttons, zippers, typing, etc). Symptoms are worse when lying flat, hands rested on his stomach with elbows at ~90 degrees. Difficult falling asleep at night, but symptoms do not wake him up.     Reports longstanding history of neck pain for many years. Neck pain is primarily right sided with radiation into upper trapezius and periscapular region. Arm symptoms do not seem to be correlated with flares of his neck. Associated with some intermittent positional vertigo, described as poor balance/feeling like he \"had one too many\".       PRIOR INJURIES/TREATMENT:   Ice/Heat: +ice  Physical Therapy:   -Prior PT following prostate cancer diagnosis and prostatectomy in April 2023 and pelvic floor therapy which transitioned to current Spine PT -- overall improvement   -Recent PT for neck, LBP and shoulder.        - Current Pain Medications -   NSAIDs - Naproxen prn   Tylenol   Gabapentin 900mg at bedtime   Flexeril prn      Prior Procedures:  Date                                         Procedure                                           Improvement (%)  None                                                         Prior Related Surgery:   History of left TSA   Other (acupuncture, OMT, CMM, TENS, DME, etc.):   Massage gun     Specialists Seen - (with most recent, available notes and clinic visits reviewed)   1. Neurosurgery - Dr. Larry Ragsdale    2. Neurology - Dr. Aguilar     IMAGING - reviewed   10/01/2021 MR L spine   Findings:   There are 5 lumbar-type vertebrae assumed for the purposes of this  dictation.  The tip of the conus medullaris is at L1.       6 mm anterolisthesis at L4-5.  There is mild disc height narrowing and  disc desiccation at L4-5 and L5-S1.  Normal marrow signal.     On a level by level basis:  T12-L1: No spinal canal or neuroforaminal stenosis.   L1-2: No " spinal canal or neuroforaminal stenosis.   L2-3: No spinal canal or neuroforaminal stenosis.   L3-4: Bilateral facet hypertrophy. No spinal canal or neural foraminal  stenosis.   L4-5: Disc bulge and bilateral facet hypertrophy. Anterolisthesis  contributes to the mild to moderate bilateral neural foraminal  stenosis and abutment of the exiting nerve root on the right. Mild  spinal canal stenosis. Dorsal synovial cysts at that level.  L5-S1: Disc bulge and central tiny protrusion. Bilateral facet  hypertrophy. Mild left neural foraminal stenosis. No spinal canal or  right neuroforaminal stenosis.     Paraspinous tissues are within normal limits.                                                                   Impression:   1. 6 mm anterolisthesis at L4-5.  2. Multilevel lumbar spondylosis, most pronounced at L4-5 with mild to  moderate bilateral neural foraminal and mild spinal canal stenosis.     10/15/21 XR L spine  IMPRESSION: Grade 1 spondylolisthesis of L4 on L5 with borderline  motion between flexion and extension.        Medical History:  He  has a past medical history of Achilles bursitis or tendinitis (5/4/2014), Allergic rhinitis, Asthma, Benign positional vertigo, Congestion, Hypertension, Low back pain, Migraine, Multinodular goiter, Osteoarthritis, Pain in joint, shoulder region (4/18/2014), Prostate cancer (H), RLS (restless legs syndrome), and Sleep problems.     He  has a past surgical history that includes Colonoscopy (N/A, 3/21/2016); biopsy; Arthroplasty shoulder (Left, 6/13/2017); Thyroidectomy (Left, 5/27/2020); and Davinci Prostatectomy, Lymphadenectomy (N/A, 4/7/2021).         Family History  His family history includes Alcoholism in his father, maternal grandfather, and paternal grandfather; Asthma in his niece; Cancer in his cousin and maternal grandmother; Cerebrovascular Disease in his paternal grandmother; Deep Vein Thrombosis in his brother; Factor V Leiden deficiency in his brother;  Hypertension in his brother, father, and mother; Migraines in his brother; Other - See Comments in his nephew and niece; Skin Cancer in his mother; Testicular cancer in his brother; Thyroid Disease in an other family member; Unknown/Adopted in his paternal half-sister.      Social History:  Work:  [employer]   He  reports that he has never smoked. He has never used smokeless tobacco. He reports current alcohol use. He reports that he does not use drugs.      Current Medications:   He has a current medication list which includes the following prescription(s): acetaminophen, albuterol, aspirin-acetaminophen-caffeine, cholecalciferol, coenzyme q-10, cyanocobalamin, cyclobenzaprine, diltiazem er coated beads, fexofenadine, gabapentin, HEMP OIL OR EXTRACT OR OTHER CBD CANNABINOID, NOT MEDICAL CANNABIS,, hydrochlorothiazide, ibuprofen, meclizine, melatonin, NONFORMULARY, omega 3, rizatriptan, rosuvastatin, sildenafil, zolpidem, ammonium lactate, atorvastatin, camphor-menthol, diazepam, and hydrocortisone.        Allergies:    -- Amoxicillin -- Diarrhea    PHYSICAL EXAMINATION:  /85   Pulse 82   Resp 16   SpO2 94%    General: Pleasant, straightforward, WDWN individual.  Mental Status: Pleasant, direct, appropriate mood and affect  Resp: breathing is unlabored without audible wheeze  Vascular: No cyanosis   Heme: No visible ecchymosis or erythema on extremities  Skin: No notable rash     Musculoskeletal:  Gait with normal sagar and stride        ASSESSMENT:  Mr. Miles Valencia is a very pleasant 59 year old male who presents with:    #. Neck pain  (primary encounter diagnosis)  #. Myalgia  #. Other spondylosis, cervical region  #. Numbness and tingling of both upper extremities  #. Tendinopathy of right shoulder   #. Lumbar spondylosis  #. Lumbar radicular pain. (L5 through prior history).    Complicating co-morbidities include:   #. Dizziness. Follows with neurology    #. Migraine headache   #.  H/o Left TSA        PLAN:  -Continue PT/HEP.   -Continue flexeril as needed. Can contact our clinic when refill needed.  -Follow up ~8mos.     Ready to learn, no apparent learning barriers.  Education provided on treatment plan according to patient's preferred learning style.  Patient verbalizes understanding.     ________________________________   Esther Kwok MD  Department of Physical Medicine & Rehabilitation      I spent a total of 20 minutes on the day of the visit.   Time spent by me doing chart review, history and exam, documentation and further activities per the note       Again, thank you for allowing me to participate in the care of your patient.      Sincerely,    Esther Kwok MD

## 2024-08-12 NOTE — PROGRESS NOTES
PM&R Follow-Up Visit -     Date of Initial Visit: 12/6/2021  LOV: 3/11/2024  TD: 8/12/2024      RECALL: Miles Valencia is a 59 year old male with history of RLS, dizziness, left TSA who follows up for cervical and lumbar issues    INTERVAL HISTORY:  Patient was last seen in clinic March 11, 2024.  At that visit, he was referred to physical therapy and has attended approximately 19 sessions of PT over the last 5 months.    Since last time, he completed [facility] program which he found helpful overall. He is currently in PT for shoulder issues.     He did review medications with his primary care physician, and switch from atorvastatin and seemingly has reduced leg spasming and aching.  He uses ibuprofen gel is available OTC in [place].    Additionally, he uses cyclobenzaprine as needed finding this helpful with pain, muscle spasm and myalgia.  He essentially scheduled a follow-up visit to review medication and have refills for the Flexeril available as needed.    RECALL HISTORY OF PRESENT ILLNESS:  Miles Valencia is a male who was previously evaluated for lumbosacral radiculopathy now presents with a chief complaint of numbness/tingling in bilateral forearms and 4th/5th digits.     Symptoms began ~1 year ago and not associated with trauma. They have been getting progressively worse over that time. He has longstanding chronic neck pain as well that has been getting worse over the same time.     The hand/arm symptoms are localized to the bilateral ulnar forearms into the 4th and 5th digits (R 55%, L 45%); describes his symptoms as numbness/tingling. Has noticed decreased hand strength over the last ~3 years which he feels is associated with arthritis and tendinopathy making it difficult to open jars. No issues with fine motor control (buttons, zippers, typing, etc). Symptoms are worse when lying flat, hands rested on his stomach with elbows at ~90 degrees. Difficult falling asleep at night, but symptoms do not wake him  "up.     Reports longstanding history of neck pain for many years. Neck pain is primarily right sided with radiation into upper trapezius and periscapular region. Arm symptoms do not seem to be correlated with flares of his neck. Associated with some intermittent positional vertigo, described as poor balance/feeling like he \"had one too many\".       PRIOR INJURIES/TREATMENT:   Ice/Heat: +ice  Physical Therapy:   -Prior PT following prostate cancer diagnosis and prostatectomy in April 2023 and pelvic floor therapy which transitioned to current Spine PT -- overall improvement   -Recent PT for neck, LBP and shoulder.        - Current Pain Medications -   NSAIDs - Naproxen prn   Tylenol   Gabapentin 900mg at bedtime   Flexeril prn      Prior Procedures:  Date                                         Procedure                                           Improvement (%)  None                                                         Prior Related Surgery:   History of left TSA   Other (acupuncture, OMT, CMM, TENS, DME, etc.):   Massage gun     Specialists Seen - (with most recent, available notes and clinic visits reviewed)   1. Neurosurgery - Dr. Larry Ragsdale    2. Neurology - Dr. Aguilar     IMAGING - reviewed   10/01/2021 MR L spine   Findings:   There are 5 lumbar-type vertebrae assumed for the purposes of this  dictation.  The tip of the conus medullaris is at L1.       6 mm anterolisthesis at L4-5.  There is mild disc height narrowing and  disc desiccation at L4-5 and L5-S1.  Normal marrow signal.     On a level by level basis:  T12-L1: No spinal canal or neuroforaminal stenosis.   L1-2: No spinal canal or neuroforaminal stenosis.   L2-3: No spinal canal or neuroforaminal stenosis.   L3-4: Bilateral facet hypertrophy. No spinal canal or neural foraminal  stenosis.   L4-5: Disc bulge and bilateral facet hypertrophy. Anterolisthesis  contributes to the mild to moderate bilateral neural foraminal  stenosis and abutment of the " exiting nerve root on the right. Mild  spinal canal stenosis. Dorsal synovial cysts at that level.  L5-S1: Disc bulge and central tiny protrusion. Bilateral facet  hypertrophy. Mild left neural foraminal stenosis. No spinal canal or  right neuroforaminal stenosis.     Paraspinous tissues are within normal limits.                                                                   Impression:   1. 6 mm anterolisthesis at L4-5.  2. Multilevel lumbar spondylosis, most pronounced at L4-5 with mild to  moderate bilateral neural foraminal and mild spinal canal stenosis.     10/15/21 XR L spine  IMPRESSION: Grade 1 spondylolisthesis of L4 on L5 with borderline  motion between flexion and extension.        Medical History:  He  has a past medical history of Achilles bursitis or tendinitis (5/4/2014), Allergic rhinitis, Asthma, Benign positional vertigo, Congestion, Hypertension, Low back pain, Migraine, Multinodular goiter, Osteoarthritis, Pain in joint, shoulder region (4/18/2014), Prostate cancer (H), RLS (restless legs syndrome), and Sleep problems.     He  has a past surgical history that includes Colonoscopy (N/A, 3/21/2016); biopsy; Arthroplasty shoulder (Left, 6/13/2017); Thyroidectomy (Left, 5/27/2020); and Davinci Prostatectomy, Lymphadenectomy (N/A, 4/7/2021).         Family History  His family history includes Alcoholism in his father, maternal grandfather, and paternal grandfather; Asthma in his niece; Cancer in his cousin and maternal grandmother; Cerebrovascular Disease in his paternal grandmother; Deep Vein Thrombosis in his brother; Factor V Leiden deficiency in his brother; Hypertension in his brother, father, and mother; Migraines in his brother; Other - See Comments in his nephew and niece; Skin Cancer in his mother; Testicular cancer in his brother; Thyroid Disease in an other family member; Unknown/Adopted in his paternal half-sister.      Social History:  Work:  - [employer]   He  reports  that he has never smoked. He has never used smokeless tobacco. He reports current alcohol use. He reports that he does not use drugs.      Current Medications:   He has a current medication list which includes the following prescription(s): acetaminophen, albuterol, aspirin-acetaminophen-caffeine, cholecalciferol, coenzyme q-10, cyanocobalamin, cyclobenzaprine, diltiazem er coated beads, fexofenadine, gabapentin, HEMP OIL OR EXTRACT OR OTHER CBD CANNABINOID, NOT MEDICAL CANNABIS,, hydrochlorothiazide, ibuprofen, meclizine, melatonin, NONFORMULARY, omega 3, rizatriptan, rosuvastatin, sildenafil, zolpidem, ammonium lactate, atorvastatin, camphor-menthol, diazepam, and hydrocortisone.        Allergies:    -- Amoxicillin -- Diarrhea    PHYSICAL EXAMINATION:  /85   Pulse 82   Resp 16   SpO2 94%    General: Pleasant, straightforward, WDWN individual.  Mental Status: Pleasant, direct, appropriate mood and affect  Resp: breathing is unlabored without audible wheeze  Vascular: No cyanosis   Heme: No visible ecchymosis or erythema on extremities  Skin: No notable rash     Musculoskeletal:  Gait with normal sagar and stride        ASSESSMENT:  Mr. Miles Valencia is a very pleasant 59 year old male who presents with:    #. Neck pain  (primary encounter diagnosis)  #. Myalgia  #. Other spondylosis, cervical region  #. Numbness and tingling of both upper extremities  #. Tendinopathy of right shoulder   #. Lumbar spondylosis  #. Lumbar radicular pain. (L5 through prior history).    Complicating co-morbidities include:   #. Dizziness. Follows with neurology    #. Migraine headache   #. H/o Left TSA        PLAN:  -Continue PT/HEP.   -Continue flexeril as needed. Can contact our clinic when refill needed.  -Follow up ~8mos.     Ready to learn, no apparent learning barriers.  Education provided on treatment plan according to patient's preferred learning style.  Patient verbalizes understanding.      ________________________________   Esther Kwok MD  Department of Physical Medicine & Rehabilitation      I spent a total of 20 minutes on the day of the visit.   Time spent by me doing chart review, history and exam, documentation and further activities per the note    Yes

## 2024-08-12 NOTE — PROGRESS NOTES
PLAN  2 x month 2 months    Beginning/End Dates of Progress Note Reporting Period:  06/26/24 to 08/12/2024    Referring Provider:  Barry Wiseman  Therapist Impression:   Doing well.  Was on vacation and has not been able to do the exercises as much.  However, ROM is doing well.  Some pain with ER MMT today.  Continue same HEP and continue TPR for pain relief.    When waking in the morning pain starts.  Will have pain if he sleeps with arm by ahead.  Back squat at gym is bothersome.  Pain is UT and neck.  Hx of neck and back pain.  Being seen for neck and being treated with MedEx machine which is helping.  Miles presents with RC pathology with underlying impairments of posterior shoulder tightness, scapular malpositioning, RC strength impairments.    NEXT: TPR? Address scapular position, progress RC strengthening     PTRX: online    GOALS: Reaching  Subjective:  Shoulder is a little cranky.  Overall, notices improvement.  Stretching is better.      Objective:    PROM Flexion Abd 90-90 ER 90-90 IR Scap Stabilized Horizontal Adduction   Left na na  78 80 32   Right na na 85 73 25 /28   GH indicates pure glenohumeral ROM     Base ER pain 5-/5 R

## 2024-08-26 ENCOUNTER — THERAPY VISIT (OUTPATIENT)
Dept: PHYSICAL THERAPY | Facility: CLINIC | Age: 59
End: 2024-08-26
Payer: COMMERCIAL

## 2024-08-26 DIAGNOSIS — M25.511 RIGHT SHOULDER PAIN, UNSPECIFIED CHRONICITY: Primary | ICD-10-CM

## 2024-08-26 PROCEDURE — 97530 THERAPEUTIC ACTIVITIES: CPT | Mod: GP | Performed by: PHYSICAL THERAPIST

## 2024-08-26 PROCEDURE — 97140 MANUAL THERAPY 1/> REGIONS: CPT | Mod: GP | Performed by: PHYSICAL THERAPIST

## 2024-08-26 PROCEDURE — 97110 THERAPEUTIC EXERCISES: CPT | Mod: GP | Performed by: PHYSICAL THERAPIST

## 2024-08-26 NOTE — PROGRESS NOTES
Therapist Impression:   Continue same HEP and continue TPR for pain relief.    When waking in the morning pain starts.  Will have pain if he sleeps with arm by ahead.  Back squat at gym is bothersome.  Pain is UT and neck.  Hx of neck and back pain.  Being seen for neck and being treated with MedEx machine which is helping.  Miles presents with RC pathology with underlying impairments of posterior shoulder tightness, scapular malpositioning, RC strength impairments.    NEXT: TPR? Address scapular position, progress RC strengthening     PTRX: online    GOALS: Reaching  Subjective:  Was sore for a few days after last session.  Feels about the same.  Reports being at 80-90% overall.  Sleeping is most bothersome.    Objective:    PROM Flexion Abd 90-90 ER 90-90 IR Scap Stabilized Horizontal Adduction   Left na na  78 80 32   Right na na 85 77 22 /31   GH indicates pure glenohumeral ROM     Base ER pain 5-/5 R

## 2024-09-16 ENCOUNTER — THERAPY VISIT (OUTPATIENT)
Dept: PHYSICAL THERAPY | Facility: CLINIC | Age: 59
End: 2024-09-16
Payer: COMMERCIAL

## 2024-09-16 DIAGNOSIS — M25.511 RIGHT SHOULDER PAIN, UNSPECIFIED CHRONICITY: Primary | ICD-10-CM

## 2024-09-16 PROCEDURE — 97530 THERAPEUTIC ACTIVITIES: CPT | Mod: GP | Performed by: PHYSICAL THERAPIST

## 2024-09-16 PROCEDURE — 97140 MANUAL THERAPY 1/> REGIONS: CPT | Mod: GP | Performed by: PHYSICAL THERAPIST

## 2024-09-16 NOTE — PROGRESS NOTES
Therapist Impression:   Focus more on cervical spine due to lack of progress/worsening of symptoms over the past week.  Trial cervical retraction, extension with towel and thoracic extension over chair.  Miles presents with a Dowager's hump that could be contributing to his symptoms.    When waking in the morning pain starts.  Will have pain if he sleeps with arm by ahead.  Back squat at gym is bothersome.  Pain is UT and neck.  Hx of neck and back pain.  Being seen for neck and being treated with MedEx machine which is helping.  Miles presents with RC pathology with underlying impairments of posterior shoulder tightness, scapular malpositioning, RC strength impairments.    NEXT: TPR? Address scapular position, progress RC strengthening     PTRX: online    GOALS: Reaching  Subjective:  Scalenes are bothersome.  Sleeping has been painful.  Knots along scapula and into neck bothersome.  Feels ok now.    Objective:  Dowager's hump    PROM Flexion Abd 90-90 ER 90-90 IR Scap Stabilized Horizontal Adduction   Left na na  78 80 32   Right na na 85 77 21   GH indicates pure glenohumeral ROM     Base ER pain 5-/5 R   Additional Safety/Bands:

## 2024-10-07 ENCOUNTER — THERAPY VISIT (OUTPATIENT)
Dept: PHYSICAL THERAPY | Facility: CLINIC | Age: 59
End: 2024-10-07
Payer: COMMERCIAL

## 2024-10-07 DIAGNOSIS — M25.511 RIGHT SHOULDER PAIN, UNSPECIFIED CHRONICITY: Primary | ICD-10-CM

## 2024-10-07 DIAGNOSIS — M54.42 CHRONIC LEFT-SIDED LOW BACK PAIN WITH LEFT-SIDED SCIATICA: ICD-10-CM

## 2024-10-07 DIAGNOSIS — G89.29 CHRONIC LEFT-SIDED LOW BACK PAIN WITH LEFT-SIDED SCIATICA: ICD-10-CM

## 2024-10-07 PROCEDURE — 97140 MANUAL THERAPY 1/> REGIONS: CPT | Mod: GP | Performed by: PHYSICAL THERAPIST

## 2024-10-07 PROCEDURE — 97530 THERAPEUTIC ACTIVITIES: CPT | Mod: GP | Performed by: PHYSICAL THERAPIST

## 2024-10-07 RX ORDER — CYCLOBENZAPRINE HCL 5 MG
5-10 TABLET ORAL
Qty: 60 TABLET | Refills: 1 | Status: SHIPPED | OUTPATIENT
Start: 2024-10-07

## 2024-10-07 NOTE — PROGRESS NOTES
PLAN  Continue therapy per current plan of care.    Beginning/End Dates of Progress Note Reporting Period:  8/12/2024 to 10/07/2024    Referring Provider:  Barry Wiseman  Therapist Impression:   Focus more on cervical spine due to lack of progress/worsening of symptoms over the past week.  Trial cervical retraction, extension with towel and thoracic extension over chair.  Miles presents with a Dowager's hump that could be contributing to his symptoms.    When waking in the morning pain starts.  Will have pain if he sleeps with arm by ahead.  Back squat at gym is bothersome.  Pain is UT and neck.  Hx of neck and back pain.  Being seen for neck and being treated with MedEx machine which is helping.  Miles presents with RC pathology with underlying impairments of posterior shoulder tightness, scapular malpositioning, RC strength impairments.    NEXT: TPR? Address scapular position, progress RC strengthening     PTRX: online    GOALS: Reaching    Subjective:  Feels more about the same.  Radiculopathy noted on R when driving.      Objective:  Dowager's hump  Cervical Range of Motion (measured in % restriction)  Flexion: wnl  Extension: 25  Rotation Right: 25 Left 25  Sidebend Right 33 Left 33  Protraction: na  Retraction: na  Joint play: na  Upper extremity screen: na

## 2024-10-07 NOTE — TELEPHONE ENCOUNTER
Refill request received from [facility]    Medication: cyclobenzaprine (FLEXERIL) 5 MG tablet   Directions: Take 1-2 tablets (5-10 mg) by mouth nightly as needed for muscle spasms      Last ordered: 6/13/24   Qty: 60  RF: 1  Last OV: 8/12/24  Next OV: None scheduled    Routing to Dr. Kwok to review and sign if appropriate.    JEY EricksonN RN Care Coordinator  [facility]   [facility] main phone # [phone] fax # [phone]

## 2024-10-16 ENCOUNTER — THERAPY VISIT (OUTPATIENT)
Dept: PHYSICAL THERAPY | Facility: CLINIC | Age: 59
End: 2024-10-16
Payer: COMMERCIAL

## 2024-10-16 DIAGNOSIS — M25.511 RIGHT SHOULDER PAIN, UNSPECIFIED CHRONICITY: Primary | ICD-10-CM

## 2024-10-16 PROCEDURE — 97530 THERAPEUTIC ACTIVITIES: CPT | Mod: GP | Performed by: PHYSICAL THERAPIST

## 2024-10-16 PROCEDURE — 97110 THERAPEUTIC EXERCISES: CPT | Mod: GP | Performed by: PHYSICAL THERAPIST

## 2024-10-16 PROCEDURE — 97140 MANUAL THERAPY 1/> REGIONS: CPT | Mod: GP | Performed by: PHYSICAL THERAPIST

## 2024-10-16 NOTE — PROGRESS NOTES
Therapist Impression:   Miles presents with a RC strain following front squats.  Treat as a new RC injury and consider MD referral in a few weeks if not improving.    When waking in the morning pain starts.  Will have pain if he sleeps with arm by ahead.  Back squat at gym is bothersome.  Pain is UT and neck.  Hx of neck and back pain.  Being seen for neck and being treated with MedEx machine which is helping.  Miles presents with RC pathology with underlying impairments of posterior shoulder tightness, scapular malpositioning, RC strength impairments.    NEXT: TPR? Address scapular position, progress RC strengthening     PTRX: online    GOALS: Reaching    Subjective:  Went to gym and tried front squats.  Hurt shoulder more.  Flared up shoulder.  Tape lasted a full week.  Pain is more anterior.      Objective:  End range flexion abduction pain  5-/5 pain base ER  Pain with flexion and abduction MMT 5-/5

## 2024-10-28 ENCOUNTER — THERAPY VISIT (OUTPATIENT)
Dept: PHYSICAL THERAPY | Facility: CLINIC | Age: 59
End: 2024-10-28
Payer: COMMERCIAL

## 2024-10-28 DIAGNOSIS — M25.511 RIGHT SHOULDER PAIN, UNSPECIFIED CHRONICITY: Primary | ICD-10-CM

## 2024-10-28 PROCEDURE — 97140 MANUAL THERAPY 1/> REGIONS: CPT | Mod: GP | Performed by: PHYSICAL THERAPIST

## 2024-10-28 PROCEDURE — 97530 THERAPEUTIC ACTIVITIES: CPT | Mod: GP | Performed by: PHYSICAL THERAPIST

## 2024-10-28 NOTE — PROGRESS NOTES
Therapist Impression:   Recommend following up with Dr. Wiseman due to no change in pain.    When waking in the morning pain starts.  Will have pain if he sleeps with arm by ahead.  Back squat at gym is bothersome.  Pain is UT and neck.  Hx of neck and back pain.  Being seen for neck and being treated with MedEx machine which is helping.  Miles presents with RC pathology with underlying impairments of posterior shoulder tightness, scapular malpositioning, RC strength impairments.    NEXT: TPR? Address scapular position, progress RC strengthening     PTRX: online    GOALS: Reaching    Subjective:  Reports a constant ache.  Things have gotten worse.  Frequent sharp pains with motion.  Not a specific pattern.      Objective:  Cervical ROM  L rotation 50% limited and reproduced

## 2024-10-29 DIAGNOSIS — M25.511 RIGHT SHOULDER PAIN, UNSPECIFIED CHRONICITY: Primary | ICD-10-CM

## 2024-10-30 ENCOUNTER — HOSPITAL ENCOUNTER (OUTPATIENT)
Dept: RADIOLOGY | Facility: HOSPITAL | Age: 59
Discharge: HOME | End: 2024-10-30
Attending: FAMILY MEDICINE | Admitting: FAMILY MEDICINE
Payer: COMMERCIAL

## 2024-10-30 DIAGNOSIS — M25.511 RIGHT SHOULDER PAIN, UNSPECIFIED CHRONICITY: ICD-10-CM

## 2024-10-30 PROCEDURE — 73030 X-RAY EXAM OF SHOULDER: CPT | Mod: RT

## 2024-11-05 ENCOUNTER — ANCILLARY PROCEDURE (OUTPATIENT)
Dept: ULTRASOUND IMAGING | Facility: CLINIC | Age: 59
End: 2024-11-05
Attending: PSYCHIATRY & NEUROLOGY
Payer: COMMERCIAL

## 2024-11-05 DIAGNOSIS — E04.1 THYROID NODULE: ICD-10-CM

## 2024-11-05 PROCEDURE — 76536 US EXAM OF HEAD AND NECK: CPT | Mod: GC | Performed by: RADIOLOGY

## 2024-11-11 ENCOUNTER — ANESTHESIA EVENT (OUTPATIENT)
Dept: SURGERY | Facility: AMBULATORY SURGERY CENTER | Age: 59
End: 2024-11-11
Payer: COMMERCIAL

## 2024-11-12 ENCOUNTER — HOSPITAL ENCOUNTER (OUTPATIENT)
Facility: AMBULATORY SURGERY CENTER | Age: 59
Discharge: HOME | End: 2024-11-12
Attending: COLON & RECTAL SURGERY | Admitting: COLON & RECTAL SURGERY
Payer: COMMERCIAL

## 2024-11-12 ENCOUNTER — ANESTHESIA (OUTPATIENT)
Dept: SURGERY | Facility: AMBULATORY SURGERY CENTER | Age: 59
End: 2024-11-12
Payer: COMMERCIAL

## 2024-11-12 VITALS
DIASTOLIC BLOOD PRESSURE: 87 MMHG | HEIGHT: 69 IN | BODY MASS INDEX: 29.77 KG/M2 | OXYGEN SATURATION: 96 % | TEMPERATURE: 96.9 F | RESPIRATION RATE: 16 BRPM | WEIGHT: 201 LBS | SYSTOLIC BLOOD PRESSURE: 160 MMHG | HEART RATE: 81 BPM

## 2024-11-12 DIAGNOSIS — Z12.11 SCREEN FOR COLON CANCER: ICD-10-CM

## 2024-11-12 PROCEDURE — 45380 COLONOSCOPY AND BIOPSY: CPT | Mod: PT | Performed by: SURGERY

## 2024-11-12 RX ORDER — LIDOCAINE 40 MG/G
CREAM TOPICAL
Status: DISCONTINUED | OUTPATIENT
Start: 2024-11-12 | End: 2024-11-13 | Stop reason: HOSPADM

## 2024-11-12 RX ORDER — DEXAMETHASONE SODIUM PHOSPHATE 4 MG/ML
4 INJECTION, SOLUTION INTRA-ARTICULAR; INTRALESIONAL; INTRAMUSCULAR; INTRAVENOUS; SOFT TISSUE
Status: DISCONTINUED | OUTPATIENT
Start: 2024-11-12 | End: 2024-11-13 | Stop reason: HOSPADM

## 2024-11-12 RX ORDER — PROPOFOL 10 MG/ML
INJECTION, EMULSION INTRAVENOUS CONTINUOUS PRN
Status: DISCONTINUED | OUTPATIENT
Start: 2024-11-12 | End: 2024-11-12

## 2024-11-12 RX ORDER — LIDOCAINE HYDROCHLORIDE 20 MG/ML
INJECTION, SOLUTION INFILTRATION; PERINEURAL PRN
Status: DISCONTINUED | OUTPATIENT
Start: 2024-11-12 | End: 2024-11-12

## 2024-11-12 RX ORDER — ACETAMINOPHEN 325 MG/1
975 TABLET ORAL ONCE
Status: DISCONTINUED | OUTPATIENT
Start: 2024-11-12 | End: 2024-11-13 | Stop reason: HOSPADM

## 2024-11-12 RX ORDER — PROPOFOL 10 MG/ML
INJECTION, EMULSION INTRAVENOUS PRN
Status: DISCONTINUED | OUTPATIENT
Start: 2024-11-12 | End: 2024-11-12

## 2024-11-12 RX ORDER — ONDANSETRON 2 MG/ML
4 INJECTION INTRAMUSCULAR; INTRAVENOUS EVERY 30 MIN PRN
Status: DISCONTINUED | OUTPATIENT
Start: 2024-11-12 | End: 2024-11-13 | Stop reason: HOSPADM

## 2024-11-12 RX ORDER — NALOXONE HYDROCHLORIDE 0.4 MG/ML
0.1 INJECTION, SOLUTION INTRAMUSCULAR; INTRAVENOUS; SUBCUTANEOUS
Status: DISCONTINUED | OUTPATIENT
Start: 2024-11-12 | End: 2024-11-13 | Stop reason: HOSPADM

## 2024-11-12 RX ORDER — ONDANSETRON 4 MG/1
4 TABLET, ORALLY DISINTEGRATING ORAL EVERY 30 MIN PRN
Status: DISCONTINUED | OUTPATIENT
Start: 2024-11-12 | End: 2024-11-13 | Stop reason: HOSPADM

## 2024-11-12 RX ORDER — ONDANSETRON 2 MG/ML
4 INJECTION INTRAMUSCULAR; INTRAVENOUS
Status: DISCONTINUED | OUTPATIENT
Start: 2024-11-12 | End: 2024-11-13 | Stop reason: HOSPADM

## 2024-11-12 RX ADMIN — LIDOCAINE HYDROCHLORIDE 2 ML: 20 INJECTION, SOLUTION INFILTRATION; PERINEURAL at 09:37

## 2024-11-12 RX ADMIN — PROPOFOL 50 MG: 10 INJECTION, EMULSION INTRAVENOUS at 09:37

## 2024-11-12 RX ADMIN — PROPOFOL 160 MCG/KG/MIN: 10 INJECTION, EMULSION INTRAVENOUS at 09:37

## 2024-11-12 NOTE — DISCHARGE INSTRUCTIONS
If you have any questions or concerns regarding your procedure, please contact Dr. Schwab, his office number is [phone].   [facility]   Adult Discharge Orders & Instructions     For 24 hours after surgery    Get plenty of rest.  A responsible adult must stay with you for at least 24 hours after you leave the hospital.   Do not drive or use heavy equipment.  If you have weakness or tingling, don't drive or use heavy equipment until this feeling goes away.  Do not drink alcohol.  Avoid strenuous or risky activities.  Ask for help when climbing stairs.   You may feel lightheaded.  IF so, sit for a few minutes before standing.  Have someone help you get up.   If you have nausea (feel sick to your stomach): Drink only clear liquids such as apple juice, ginger ale, broth or 7-Up.  Rest may also help.  Be sure to drink enough fluids.  Move to a regular diet as you feel able.  You may have a slight fever. Call the doctor if your fever is over 100 F (37.7 C) (taken under the tongue) or lasts longer than 24 hours.  You may have a dry mouth, a sore throat, muscle aches or trouble sleeping.  These should go away after 24 hours.  Do not make important or legal decisions.   Call your doctor for any of the followin.  Signs of infection (fever, growing tenderness at the surgery site, a large amount of drainage or bleeding, severe pain, foul-smelling drainage, redness, swelling).    2. It has been over 8 to 10 hours since surgery and you are still not able to urinate (pass water).    3.  Headache for over 24 hours.     Discharge Instructions:    Take you medications as directed and follow up with you primary provider as scheduled.   You should expect to pass air from your rectum after the procedure.   Follow these care guidelines during your recovery for the next 24 hours.   If you have any questions or concerns please contact the provider that performed your procedure.     You were given medicine  for sedation:  You have been given medicines during your procedure that might make you sleepy and weak. To prevent problems:    *Rest for the rest of the day after you are home. You should be back to your normal activity tomorrow.  *For the next 24 hours:   -Do not drink alcoholic beverages.   -Do not make any important decisions or sign any legal forms.   -Do not work around machinery or power equipment.     The medicines used for sedation may make you feel nauseated.   *Start with clear liquids, such as tea, jell-o, broth and ginger ale. As you feel better you may add soft foods such as pudding and ice cream.   *When you no longer feel nauseated, you may try your normal diet.     You should be back to eating your normal after 24 hours.     Call if you have any of these problems:  *Fever of 101 degree F or 38 degrees C  *Bleeding from the rectum  *Black stool or blood in your bowel movements  *Nausea with vomiting that does not ease after a few hours.  *Abdominal pain or bloating  *Fainting

## 2024-11-12 NOTE — ANESTHESIA PREPROCEDURE EVALUATION
Anesthesia Pre-Procedure Evaluation    Patient: Miles Valencia   MRN: [id number] : 1965        Procedure : Procedure(s):  Colonoscopy          Past Medical History:   Diagnosis Date    Achilles bursitis or tendinitis 2014    Allergic rhinitis     Asthma     Benign positional vertigo     Congestion     Gastroesophageal reflux disease     Hypertension     Low back pain     Migraine     Multinodular goiter     Osteoarthritis     Other chronic pain     Pain in joint, shoulder region 2014    Pneumonia     Prostate cancer (H)     RLS (restless legs syndrome)     Sleep problems       Past Surgical History:   Procedure Laterality Date    ARTHROPLASTY SHOULDER Left 2017    Procedure: ARTHROPLASTY SHOULDER;  Left Total Shoulder Arthroplasty  ;  Surgeon: Jossy Swanson MD;  Location: [facility] OR    BIOPSY      Prostate    COLONOSCOPY N/A 3/21/2016    Procedure: COLONOSCOPY;  Surgeon: Toy Lima MD;  Location: [facility]    DAVINCI PROSTATECTOMY, LYMPHADENECTOMY N/A 2021    Procedure: Robot-assisted laparoscopic radical prostatectomy, pelvic lymphadenectomy,;  Surgeon: Eber Pereyra MD;  Location: [facility] OR    THYROIDECTOMY Left 2020    Procedure: Left Lobe THYROIDECTOMY;  Surgeon: Evelin Cutler MD;  Location: [facility] OR      Allergies   Allergen Reactions    Latex Shortness Of Breath    Amoxicillin Diarrhea    Nickel Itching and Rash      Social History     Tobacco Use    Smoking status: Never    Smokeless tobacco: Never   Substance Use Topics    Alcohol use: Yes     Alcohol/week: 0.0 standard drinks of alcohol     Comment: 1-2 drinks a week      Wt Readings from Last 1 Encounters:   24 91.2 kg (201 lb)        Anesthesia Evaluation   Pt has had prior anesthetic.     No history of anesthetic complications       ROS/MED HX  ENT/Pulmonary:  - neg pulmonary ROS   (+)                      asthma        recent URI, resolved, mild, no fevers, occ. clear phlegm:       "  Neurologic:  - neg neurologic ROS   (+)      migraines,                          Cardiovascular:  - neg cardiovascular ROS   (+)  hypertension- -   -  - -                                      METS/Exercise Tolerance:     Hematologic:  - neg hematologic  ROS     Musculoskeletal:  - neg musculoskeletal ROS (+)  arthritis,             GI/Hepatic:  - neg GI/hepatic ROS   (+) GERD, Asymptomatic on medication,                  Renal/Genitourinary:  - neg Renal ROS     Endo:  - neg endo ROS   (+)          thyroid problem, hypothyroidism,           Psychiatric/Substance Use:  - neg psychiatric ROS     Infectious Disease:  - neg infectious disease ROS     Malignancy:  - neg malignancy ROS (+) Malignancy, History of Prostate.Prostate CA Remission status post Surgery.      Other:  - neg other ROS          Physical Exam    Airway        Mallampati: II   TM distance: > 3 FB   Neck ROM: full   Mouth opening: > 3 cm    Respiratory Devices and Support         Dental       (+) Minor Abnormalities - some fillings, tiny chips      Cardiovascular   cardiovascular exam normal          Pulmonary   pulmonary exam normal        breath sounds clear to auscultation           OUTSIDE LABS:  CBC:   Lab Results   Component Value Date    WBC 8.9 05/20/2021    WBC 18.1 (H) 04/08/2021    HGB 14.4 05/20/2021    HGB 11.5 (L) 04/08/2021    HCT 41.9 05/20/2021    HCT 33.5 (L) 04/08/2021     05/20/2021     04/08/2021     BMP:   Lab Results   Component Value Date     04/08/2021     03/19/2021    POTASSIUM 3.7 04/08/2021    POTASSIUM 3.5 04/07/2021    CHLORIDE 109 04/08/2021    CHLORIDE 106 03/19/2021    CO2 23 04/08/2021    CO2 29 03/19/2021    BUN 17 04/08/2021    BUN 14 03/19/2021    CR 0.79 04/08/2021    CR 0.95 03/19/2021     (H) 04/08/2021     (H) 03/19/2021     COAGS: No results found for: \"PTT\", \"INR\", \"FIBR\"  POC:   Lab Results   Component Value Date     (H) 04/08/2021     HEPATIC:   Lab Results " "  Component Value Date    ALBUMIN 4.0 12/14/2018     OTHER:   Lab Results   Component Value Date    OTILIO 8.0 (L) 04/08/2021    PHOS 3.6 04/28/2010    MAG 2.1 04/28/2010    TSH 0.96 07/13/2020    T4 1.09 12/05/2019    T3 123 12/05/2019    SED 12 03/11/2024       Anesthesia Plan    ASA Status:  2    NPO Status:  NPO Appropriate    Anesthesia Type: MAC.     - Reason for MAC: immobility needed, straight local not clinically adequate   Induction: Propofol.   Maintenance: TIVA.        Consents    Anesthesia Plan(s) and associated risks, benefits, and realistic alternatives discussed. Questions answered and patient/representative(s) expressed understanding.     - Discussed:     - Discussed with:  Patient            Postoperative Care    Pain management: Multi-modal analgesia.        Comments:    Other Comments: Propofol    Anesthetic risks, benefits, and options reviewed. Patient agrees to proceed.               Pablo Madden MD    I have reviewed the pertinent notes and labs in the chart from the past 30 days and (re)examined the patient.  Any updates or changes from those notes are reflected in this note.                         # Overweight: Estimated body mass index is 29.68 kg/m  as calculated from the following:    Height as of this encounter: 1.753 m (5' 9\").    Weight as of this encounter: 91.2 kg (201 lb).             "

## 2024-11-12 NOTE — OP NOTE
COLONOSCOPY REPORT      Pre-op Dx:              Screening colonoscopy      Procedure:             Colonoscopy      Indications:            Colon Cancer Screening      Findings:                A polyp was found in the sigmoid colon at 30 cm    Procedure:              The patient is brought to the endoscopy suite placed in a lateral position and the patient is given conscious sedation anesthesia.  The colonoscope was advanced atraumatically into the anus taken all way up and around to the cecum.  The ileocecal valve and the appendiceal orifice are clearly identified.  The scope was slowly drawn back a low-profile 3 mm polyp was identified in the sigmoid colon at 30 cm and it was removed with cold biopsy forceps.  No other obvious lesions were noted throughout the colon.  The patient did not have sigmoid diverticular disease.   The scope was drawn back into the rectum and retroflexed I do not see evidence for any significant hemorrhoidal disease.  The colonoscope was straightened and taken up to the splenic flexure areas aspirated from the left side of the colon as the scope was withdrawn.    Withdrawal Time: 7 minutes 30 seconds    EBL:                        None      Medications:         MAC; see anesthesia note for details          Complications:      None      Post-op Dx:            A single polyp was noted and removed, otherwise normal colonoscopy      Recommendation:   Next Colonoscopy in 7 years; year 2031      Isaac Schwab MD  11/12/2024 9:58 AM  [facility]  [phone]

## 2024-11-12 NOTE — ANESTHESIA CARE TRANSFER NOTE
Patient: Miles Valencia    Procedure: Procedure(s):  Colonoscopy with polpectomy       Diagnosis: Screen for colon cancer [Z12.11]  Diagnosis Additional Information: No value filed.    Anesthesia Type:   MAC     Note:    Oropharynx: oropharynx clear of all foreign objects  Level of Consciousness: drowsy  Oxygen Supplementation: face mask  Level of Supplemental Oxygen (L/min / FiO2): 6  Independent Airway: airway patency satisfactory and stable  Dentition: dentition unchanged  Vital Signs Stable: post-procedure vital signs reviewed and stable  Report to RN Given: handoff report given  Patient transferred to: Phase II    Handoff Report: Identifed the Patient, Identified the Reponsible Provider, Reviewed the pertinent medical history, Discussed the surgical course, Reviewed Intra-OP anesthesia mangement and issues during anesthesia, Set expectations for post-procedure period and Allowed opportunity for questions and acknowledgement of understanding      Vitals:  Vitals Value Taken Time   /84    Temp 97.4    Pulse 73    Resp 15    SpO2 98%        Electronically Signed By: FIDELIA Johnson CRNA  November 12, 2024  9:59 AM

## 2024-11-13 ENCOUNTER — OFFICE VISIT (OUTPATIENT)
Dept: FAMILY MEDICINE | Facility: CLINIC | Age: 59
End: 2024-11-13
Payer: COMMERCIAL

## 2024-11-13 VITALS
OXYGEN SATURATION: 96 % | SYSTOLIC BLOOD PRESSURE: 116 MMHG | HEART RATE: 93 BPM | RESPIRATION RATE: 13 BRPM | TEMPERATURE: 97.7 F | DIASTOLIC BLOOD PRESSURE: 80 MMHG

## 2024-11-13 DIAGNOSIS — M25.511 RIGHT SHOULDER PAIN, UNSPECIFIED CHRONICITY: Primary | ICD-10-CM

## 2024-11-13 DIAGNOSIS — R05.3 REFRACTORY CHRONIC COUGH: ICD-10-CM

## 2024-11-13 RX ORDER — BENZONATATE 100 MG/1
100 CAPSULE ORAL 3 TIMES DAILY PRN
Qty: 30 CAPSULE | Refills: 0 | Status: SHIPPED | OUTPATIENT
Start: 2024-11-13

## 2024-11-13 NOTE — NURSING NOTE
59 year old  Chief Complaint   Patient presents with    Musculoskeletal Problem     Right shoulder pain, xray 10/30/24.     URI     Lingering cough in lungs, clear sputum.       Blood pressure 116/80, pulse 93, temperature 97.7  F (36.5  C), temperature source Temporal, resp. rate 13, SpO2 96%. There is no height or weight on file to calculate BMI.  Patient Active Problem List   Diagnosis    Pain in joint, shoulder region    Achilles bursitis or tendinitis    History of total shoulder replacement, left    Non-toxic multinodular goiter    Prostate cancer (H)    Lumbar radiculopathy    Pain of right thumb    Right wrist pain    Neck pain    Chronic bilateral low back pain without sciatica    Right shoulder pain, unspecified chronicity       Wt Readings from Last 2 Encounters:   11/12/24 91.2 kg (201 lb)   05/29/24 91.2 kg (201 lb)     BP Readings from Last 3 Encounters:   11/13/24 116/80   11/12/24 (!) 160/87   08/12/24 135/85         Current Outpatient Medications   Medication Sig Dispense Refill    acetaminophen (TYLENOL) 325 MG tablet Take 2 tablets (650 mg) by mouth every 4 hours as needed for other (mild pain) 30 tablet 0    albuterol (PROAIR HFA/PROVENTIL HFA/VENTOLIN HFA) 108 (90 BASE) MCG/ACT Inhaler Inhale 2 puffs into the lungs every 4 hours as needed for shortness of breath / dyspnea or wheezing      ammonium lactate (AMLACTIN) 12 % cream Apply topically daily as needed.      aspirin-acetaminophen-caffeine (EXCEDRIN MIGRAINE) 250-250-65 MG per tablet Take 2 tablets by mouth every 6 hours as needed for headaches       atorvastatin (LIPITOR) 10 MG tablet Take 10 mg by mouth at bedtime.      camphor-menthol (DERMASARRA) 0.5-0.5 % LOTN Apply topically every 6 hours as needed for skin care.      cholecalciferol 125 MCG (5000 UT) CAPS Take 5,000 Units by mouth every morning       coenzyme Q-10 200 MG CAPS       Cyanocobalamin (VITAMIN B-12 PO) Take 1 tablet by mouth every evening       cyclobenzaprine (FLEXERIL)  5 MG tablet Take 1-2 tablets (5-10 mg) by mouth nightly as needed for muscle spasms. 60 tablet 1    DIAZEPAM PO Take by mouth.      diltiazem ER COATED BEADS (CARDIZEM CD/CARTIA XT) 240 MG 24 hr capsule Take 240 mg by mouth every morning       fexofenadine (ALLEGRA) 180 MG tablet Take 180 mg by mouth every morning       gabapentin (NEURONTIN) 300 MG capsule Take 1 capsule (300 mg) by mouth 3 times daily (Patient taking differently: Take 600 mg by mouth at bedtime.) 60 capsule 0    HEMP OIL OR EXTRACT OR OTHER CBD CANNABINOID, NOT MEDICAL CANNABIS, Take 1 capsule by mouth every evening      hydrochlorothiazide (HYDRODIURIL) 25 MG tablet Take 25 mg by mouth every morning       hydrocortisone (CORTAID) 1 % cream Apply topically 2 times daily as needed.      ibuprofen (ADVIL/MOTRIN) 200 MG tablet Take 800 mg by mouth every 8 hours as needed for mild pain       meclizine (ANTIVERT) 12.5 MG tablet Take 2 tablets (25 mg) by mouth 4 times daily as needed for dizziness 30 tablet 0    MELATONIN PO Take 2.5 mg by mouth At Bedtime       NONFORMULARY Magnesium/MSM lotion - Brand: Aincient minerals  2 pumps every evening applied to feet/legs    20 mg elemental magnesium and 25 mg MSM Per 1 pump      omega 3 1000 MG CAPS Take by mouth At Bedtime      rizatriptan (MAXALT-MLT) 10 MG ODT Take 10 mg by mouth at onset of headache for migraine      rosuvastatin (CRESTOR) 5 MG tablet Take 1 tablet by mouth daily at 2 pm      sildenafil (REVATIO) 20 MG tablet Take 1 tab daily (Patient taking differently: Take 1 tab daily as needed) 90 tablet 11    zolpidem (AMBIEN) 5 MG tablet Take 5 mg by mouth nightly as needed for sleep       No current facility-administered medications for this visit.       Social History     Tobacco Use    Smoking status: Never    Smokeless tobacco: Never   Substance Use Topics    Alcohol use: Yes     Alcohol/week: 0.0 standard drinks of alcohol     Comment: 1-2 drinks a week    Drug use: No       Health Maintenance  "Due   Topic Date Due    ASTHMA ACTION PLAN  Never done    ASTHMA CONTROL TEST  Never done    HIV SCREENING  Never done    HEPATITIS C SCREENING  Never done    Pneumococcal Vaccine: Pediatrics (0 to 5 Years) and At-Risk Patients (6 to 64 Years) (2 of 2 - PCV) 01/12/2017    LIPID  09/09/2023    INFLUENZA VACCINE (1) 09/01/2024    COVID-19 Vaccine (7 - 2024-25 season) 09/01/2024       No results found for: \"PAP\"      November 13, 2024 10:34 AM    "

## 2024-11-13 NOTE — PROGRESS NOTES
ASSESSMENT and PLAN:      1. Right shoulder pain, unspecified chronicity (Primary)  Has had extensive PT and also a normal X-ray  At risk for rotator cuff tendinopathy given history of LEFT shoulder issues requiring a total shoulder replacement    PLAN  - MR Shoulder Right w/o Contrast; Future  - Orthopedic  Referral; Future - To see Ana Swanson MD as she performed his previous surgery on contralateral arm.     2. Refractory chronic cough  - Normal exam, but has troubles at night  - Rx for Tessalon Pearles  - X-ray Chest 2 vws*    For imaging, Please call [phone] to schedule imaging tests at the [facility].     Follow-up as needed. Still receives primary care at [place].     Barry Wiseman MD  Family Medicine and Sports Medicine  [facility]      Medical assistant intake:  Miles Valencia is a 59 year old male who presents to [facility] today for Musculoskeletal Problem (Right shoulder pain, xray 10/30/24. ) and URI (Lingering cough in lungs, clear sputum.)      SUBJECTIVE:   Miles is a 58 yo with a history of a LEFT shoulder replacement.     He's here today with RIGHT shoulder pain. I saw him for his RIGHT shoulder in May 2024. He had a very tight posterior capsule. Was referred to physical therapy. Worked with Jeremy Resendez, PT. Was doing well but then near the start of Oct. Was in a weight lifting class and felt a strain in the RIGHT shoulder. He went back to see Jeremy Resendez.   Had X-rays taken recently which were reviewed and normal. Pain is 2/10 at rest but worse at night.     Review Of Systems:     has had a cough for about the past 6 weeks.  The cough is worse at night.  He has not had a fever.  He is feeling otherwise in his usual state of health.    Problem list per EMR:  Patient Active Problem List   Diagnosis    Pain in joint, shoulder region    Achilles bursitis or tendinitis    History of total shoulder replacement, left    Non-toxic multinodular  goiter    Prostate cancer (H)    Lumbar radiculopathy    Pain of right thumb    Right wrist pain    Neck pain    Chronic bilateral low back pain without sciatica    Right shoulder pain, unspecified chronicity       Current Outpatient Medications   Medication Sig Dispense Refill    acetaminophen (TYLENOL) 325 MG tablet Take 2 tablets (650 mg) by mouth every 4 hours as needed for other (mild pain) 30 tablet 0    albuterol (PROAIR HFA/PROVENTIL HFA/VENTOLIN HFA) 108 (90 BASE) MCG/ACT Inhaler Inhale 2 puffs into the lungs every 4 hours as needed for shortness of breath / dyspnea or wheezing      ammonium lactate (AMLACTIN) 12 % cream Apply topically daily as needed.      aspirin-acetaminophen-caffeine (EXCEDRIN MIGRAINE) 250-250-65 MG per tablet Take 2 tablets by mouth every 6 hours as needed for headaches       atorvastatin (LIPITOR) 10 MG tablet Take 10 mg by mouth at bedtime.      benzonatate (TESSALON) 100 MG capsule Take 1 capsule (100 mg) by mouth 3 times daily as needed for cough. 30 capsule 0    camphor-menthol (DERMASARRA) 0.5-0.5 % LOTN Apply topically every 6 hours as needed for skin care.      cholecalciferol 125 MCG (5000 UT) CAPS Take 5,000 Units by mouth every morning       coenzyme Q-10 200 MG CAPS       Cyanocobalamin (VITAMIN B-12 PO) Take 1 tablet by mouth every evening       cyclobenzaprine (FLEXERIL) 5 MG tablet Take 1-2 tablets (5-10 mg) by mouth nightly as needed for muscle spasms. 60 tablet 1    DIAZEPAM PO Take by mouth.      diltiazem ER COATED BEADS (CARDIZEM CD/CARTIA XT) 240 MG 24 hr capsule Take 240 mg by mouth every morning       fexofenadine (ALLEGRA) 180 MG tablet Take 180 mg by mouth every morning       gabapentin (NEURONTIN) 300 MG capsule Take 1 capsule (300 mg) by mouth 3 times daily (Patient taking differently: Take 600 mg by mouth at bedtime.) 60 capsule 0    HEMP OIL OR EXTRACT OR OTHER CBD CANNABINOID, NOT MEDICAL CANNABIS, Take 1 capsule by mouth every evening       hydrochlorothiazide (HYDRODIURIL) 25 MG tablet Take 25 mg by mouth every morning       hydrocortisone (CORTAID) 1 % cream Apply topically 2 times daily as needed.      ibuprofen (ADVIL/MOTRIN) 200 MG tablet Take 800 mg by mouth every 8 hours as needed for mild pain       meclizine (ANTIVERT) 12.5 MG tablet Take 2 tablets (25 mg) by mouth 4 times daily as needed for dizziness 30 tablet 0    MELATONIN PO Take 2.5 mg by mouth At Bedtime       NONFORMULARY Magnesium/MSM lotion - Brand: Aincient minerals  2 pumps every evening applied to feet/legs    20 mg elemental magnesium and 25 mg MSM Per 1 pump      omega 3 1000 MG CAPS Take by mouth At Bedtime      rizatriptan (MAXALT-MLT) 10 MG ODT Take 10 mg by mouth at onset of headache for migraine      rosuvastatin (CRESTOR) 5 MG tablet Take 1 tablet by mouth daily at 2 pm      sildenafil (REVATIO) 20 MG tablet Take 1 tab daily (Patient taking differently: Take 1 tab daily as needed) 90 tablet 11    zolpidem (AMBIEN) 5 MG tablet Take 5 mg by mouth nightly as needed for sleep         Allergies   Allergen Reactions    Latex Shortness Of Breath    Amoxicillin Diarrhea    Nickel Itching and Rash        Social:     A  within the [facility] system    OBJECTIVE    Vitals: /80 (BP Location: Left arm, Patient Position: Sitting, Cuff Size: Adult Large)   Pulse 93   Temp 97.7  F (36.5  C) (Temporal)   Resp 13   SpO2 96%   BMI= There is no height or weight on file to calculate BMI.   he appears well and in no distress.  His right shoulder has no redness warmth or deformity.  He has abduction to about 165 degrees but there is pain with this beyond about 85 degrees.  His internal rotation is limited to approximately the L4 position behind his back.  External rotation is to 80 degrees.  Supraspinatus strength is limited at 5-/5, external rotation strength appears intact as does subscapularis strength.  Mildly positive Chilel impingement test.  Normal range  of motion of the neck with a negative Spurling's test.    His neck is supple and without lymphadenopathy.  His lungs appear clear to auscultation throughout.    Cardiovascular-regular rate and rhythm without murmurs.    SEE TOP OF NOTE FOR ASSESSMENT AND PLAN    --Barry Wiseman MD  [facility], Department of Family Medicine and Community Health

## 2024-11-13 NOTE — PATIENT INSTRUCTIONS
ASSESSMENT and PLAN:      1. Right shoulder pain, unspecified chronicity (Primary)  Has had extensive PT and also a normal X-ray  At risk for rotator cuff tendinopathy given history of LEFT shoulder issues requiring a total shoulder replacement    PLAN  - MR Shoulder Right w/o Contrast; Future  - Orthopedic  Referral; Future - To see Ana Swanson MD as she performed his previous surgery on contralateral arm.     2. Refractory chronic cough  - Normal exam, but has troubles at night  - Rx for Tessalon Pearles  - X-ray Chest 2 vws*    For imaging, Please call [phone] to schedule imaging tests at the [facility] center.     Follow-up as needed. Still receives primary care at [place].     Barry Wiseman MD  Family Medicine and Sports Medicine  [facility]

## 2024-11-14 ENCOUNTER — PATIENT OUTREACH (OUTPATIENT)
Dept: CARE COORDINATION | Facility: CLINIC | Age: 59
End: 2024-11-14

## 2024-11-14 ENCOUNTER — ANCILLARY PROCEDURE (OUTPATIENT)
Dept: GENERAL RADIOLOGY | Facility: CLINIC | Age: 59
End: 2024-11-14
Attending: FAMILY MEDICINE
Payer: COMMERCIAL

## 2024-11-14 PROCEDURE — 71046 X-RAY EXAM CHEST 2 VIEWS: CPT | Mod: GC | Performed by: RADIOLOGY

## 2024-11-14 ASSESSMENT — ASTHMA QUESTIONNAIRES
QUESTION_1 LAST FOUR WEEKS HOW MUCH OF THE TIME DID YOUR ASTHMA KEEP YOU FROM GETTING AS MUCH DONE AT WORK, SCHOOL OR AT HOME: MOST OF THE TIME
QUESTION_2 LAST FOUR WEEKS HOW OFTEN HAVE YOU HAD SHORTNESS OF BREATH: MORE THAN ONCE A DAY
QUESTION_3 LAST FOUR WEEKS HOW OFTEN DID YOUR ASTHMA SYMPTOMS (WHEEZING, COUGHING, SHORTNESS OF BREATH, CHEST TIGHTNESS OR PAIN) WAKE YOU UP AT NIGHT OR EARLIER THAN USUAL IN THE MORNING: TWO OR THREE NIGHTS A WEEK
QUESTION_4 LAST FOUR WEEKS HOW OFTEN HAVE YOU USED YOUR RESCUE INHALER OR NEBULIZER MEDICATION (SUCH AS ALBUTEROL): THREE OR MORE TIMES PER DAY
ACT_TOTALSCORE: 9
QUESTION_5 LAST FOUR WEEKS HOW WOULD YOU RATE YOUR ASTHMA CONTROL: SOMEWHAT CONTROLLED
ACT_TOTALSCORE: 9

## 2024-11-18 ENCOUNTER — PATIENT OUTREACH (OUTPATIENT)
Dept: CARE COORDINATION | Facility: CLINIC | Age: 59
End: 2024-11-18
Payer: COMMERCIAL

## 2024-11-19 ENCOUNTER — OFFICE VISIT (OUTPATIENT)
Dept: ALLERGY | Facility: CLINIC | Age: 59
End: 2024-11-19
Payer: COMMERCIAL

## 2024-11-19 VITALS
HEART RATE: 86 BPM | WEIGHT: 201 LBS | BODY MASS INDEX: 29.68 KG/M2 | DIASTOLIC BLOOD PRESSURE: 82 MMHG | SYSTOLIC BLOOD PRESSURE: 135 MMHG | OXYGEN SATURATION: 97 %

## 2024-11-19 DIAGNOSIS — J45.991 COUGH VARIANT ASTHMA: Primary | ICD-10-CM

## 2024-11-19 PROCEDURE — 99213 OFFICE O/P EST LOW 20 MIN: CPT | Performed by: INTERNAL MEDICINE

## 2024-11-19 RX ORDER — BUDESONIDE AND FORMOTEROL FUMARATE DIHYDRATE 160; 4.5 UG/1; UG/1
2 AEROSOL RESPIRATORY (INHALATION) 4 TIMES DAILY PRN
Qty: 10.2 G | Refills: 5 | Status: SHIPPED | OUTPATIENT
Start: 2024-11-19

## 2024-11-19 NOTE — LETTER
11/19/2024      Miles Valencia  [address] [place] [address]      Dear Colleague,    Thank you for referring your patient, Miles Valencia, to the [facility] [place]. Please see a copy of my visit note below.    Miles Valencia was seen in the Allergy Clinic at [facility].    Miles Valencia is a 59 year old male being seen today for ongoing evaluation of a cough.    Miles was last seen January 2023 at that time spirometry was normal.  He was diagnosed with cough variant asthma.  He has been doing well up until 5 to 6 weeks ago.  He also has a history of allergies to dust mite and mold.  At the last appointment in 2023 he was prescribed Symbicort 2 puffs twice daily for 2 weeks which worked quite well.  He is wondering if he can get a refill of that inhaler.      Last month, he developed an upper respiratory infection and was requiring albuterol 3 times a day for several weeks.  He also has prescribed Tessalon and Mucinex.  The Tessalon provided much more benefit compared to the Mucinex.    Past Medical History:   Diagnosis Date     Achilles bursitis or tendinitis 05/04/2014     Allergic rhinitis      Asthma      Benign positional vertigo      Congestion      Gastroesophageal reflux disease      Hypertension      Low back pain      Migraine      Multinodular goiter      Osteoarthritis      Other chronic pain      Pain in joint, shoulder region 04/18/2014     Pneumonia      Prostate cancer (H)      RLS (restless legs syndrome)      Sleep problems      Family History   Problem Relation Age of Onset     Eczema Mother      Skin Cancer Mother      Hypertension Mother      Hypertension Father      Alcoholism Father      Eczema Brother      Hypertension Brother      Migraines Brother      Testicular cancer Brother      Factor V Leiden deficiency Brother      Deep Vein Thrombosis Brother      Cancer Maternal Grandmother         Breast     Alcoholism Maternal  Grandfather      Cerebrovascular Disease Paternal Grandmother      Alcoholism Paternal Grandfather      Unknown/Adopted Paternal Half-Sister      Other - See Comments Niece         [place]     Asthma Niece      Other - See Comments Nephew         [place], Factor V Leiden     Cancer Cousin      Thyroid Disease Other      Anesthesia Reaction No family hx of      Past Surgical History:   Procedure Laterality Date     ARTHROPLASTY SHOULDER Left 6/13/2017    Procedure: ARTHROPLASTY SHOULDER;  Left Total Shoulder Arthroplasty  ;  Surgeon: Jossy Swanson MD;  Location: [facility]     BIOPSY      Prostate     COLONOSCOPY N/A 3/21/2016    Procedure: COLONOSCOPY;  Surgeon: Toy Lima MD;  Location: [facility]     COLONOSCOPY N/A 11/12/2024    Procedure: Colonoscopy with polpectomy;  Surgeon: Isaac Schwab MD;  Location: [facility]     DAVINCI PROSTATECTOMY, LYMPHADENECTOMY N/A 4/7/2021    Procedure: Robot-assisted laparoscopic radical prostatectomy, pelvic lymphadenectomy,;  Surgeon: Eber Pereyra MD;  Location: [facility] OR     THYROIDECTOMY Left 5/27/2020    Procedure: Left Lobe THYROIDECTOMY;  Surgeon: Evelin Cutler MD;  Location: [facility] OR         Current Outpatient Medications:      acetaminophen (TYLENOL) 325 MG tablet, Take 2 tablets (650 mg) by mouth every 4 hours as needed for other (mild pain), Disp: 30 tablet, Rfl: 0     albuterol (PROAIR HFA/PROVENTIL HFA/VENTOLIN HFA) 108 (90 BASE) MCG/ACT Inhaler, Inhale 2 puffs into the lungs every 4 hours as needed for shortness of breath / dyspnea or wheezing, Disp: , Rfl:      ammonium lactate (AMLACTIN) 12 % cream, Apply topically daily as needed., Disp: , Rfl:      aspirin-acetaminophen-caffeine (EXCEDRIN MIGRAINE) 250-250-65 MG per tablet, Take 2 tablets by mouth every 6 hours as needed for headaches , Disp: , Rfl:      atorvastatin (LIPITOR) 10 MG tablet, Take 10 mg by mouth at bedtime., Disp: , Rfl:      benzonatate  (TESSALON) 100 MG capsule, Take 1 capsule (100 mg) by mouth 3 times daily as needed for cough., Disp: 30 capsule, Rfl: 0     budesonide-formoterol (SYMBICORT) 160-4.5 MCG/ACT Inhaler, Inhale 2 puffs into the lungs 4 times daily as needed (cough)., Disp: 10.2 g, Rfl: 5     camphor-menthol (DERMASARRA) 0.5-0.5 % LOTN, Apply topically every 6 hours as needed for skin care., Disp: , Rfl:      cholecalciferol 125 MCG (5000 UT) CAPS, Take 5,000 Units by mouth every morning , Disp: , Rfl:      coenzyme Q-10 200 MG CAPS, , Disp: , Rfl:      Cyanocobalamin (VITAMIN B-12 PO), Take 1 tablet by mouth every evening , Disp: , Rfl:      cyclobenzaprine (FLEXERIL) 5 MG tablet, Take 1-2 tablets (5-10 mg) by mouth nightly as needed for muscle spasms., Disp: 60 tablet, Rfl: 1     DIAZEPAM PO, Take by mouth., Disp: , Rfl:      diltiazem ER COATED BEADS (CARDIZEM CD/CARTIA XT) 240 MG 24 hr capsule, Take 240 mg by mouth every morning , Disp: , Rfl:      fexofenadine (ALLEGRA) 180 MG tablet, Take 180 mg by mouth every morning , Disp: , Rfl:      gabapentin (NEURONTIN) 300 MG capsule, Take 1 capsule (300 mg) by mouth 3 times daily, Disp: 60 capsule, Rfl: 0     HEMP OIL OR EXTRACT OR OTHER CBD CANNABINOID, NOT MEDICAL CANNABIS,, Take 1 capsule by mouth every evening, Disp: , Rfl:      hydrochlorothiazide (HYDRODIURIL) 25 MG tablet, Take 25 mg by mouth every morning , Disp: , Rfl:      hydrocortisone (CORTAID) 1 % cream, Apply topically 2 times daily as needed., Disp: , Rfl:      ibuprofen (ADVIL/MOTRIN) 200 MG tablet, Take 800 mg by mouth every 8 hours as needed for mild pain , Disp: , Rfl:      meclizine (ANTIVERT) 12.5 MG tablet, Take 2 tablets (25 mg) by mouth 4 times daily as needed for dizziness, Disp: 30 tablet, Rfl: 0     MELATONIN PO, Take 2.5 mg by mouth At Bedtime , Disp: , Rfl:      NONFORMULARY, Magnesium/MSM lotion - Brand: Aincient minerals 2 pumps every evening applied to feet/legs  20 mg elemental magnesium and 25 mg MSM Per  1 pump, Disp: , Rfl:      omega 3 1000 MG CAPS, Take by mouth At Bedtime, Disp: , Rfl:      rizatriptan (MAXALT-MLT) 10 MG ODT, Take 10 mg by mouth at onset of headache for migraine, Disp: , Rfl:      rosuvastatin (CRESTOR) 5 MG tablet, Take 1 tablet by mouth daily at 2 pm, Disp: , Rfl:      sildenafil (REVATIO) 20 MG tablet, Take 1 tab daily, Disp: 90 tablet, Rfl: 11     zolpidem (AMBIEN) 5 MG tablet, Take 5 mg by mouth nightly as needed for sleep, Disp: , Rfl:   Allergies   Allergen Reactions     Latex Shortness Of Breath     Amoxicillin Diarrhea     Nickel Itching and Rash         EXAM:   /82   Pulse 86   Wt 91.2 kg (201 lb)   SpO2 97%   BMI 29.68 kg/m      Constitutional:       General: He is not in acute distress.     Appearance: Normal appearance. He is not ill-appearing.   HENT:      Head: Normocephalic and atraumatic.      Nose: Nose normal. No congestion or rhinorrhea.      Mouth/Throat:      Mouth: Mucous membranes are moist.      Pharynx: Oropharynx is clear. No posterior oropharyngeal erythema.   Eyes:      General:         Right eye: No discharge.         Left eye: No discharge.   Cardiovascular:      Rate and Rhythm: Normal rate and regular rhythm.      Heart sounds: Normal heart sounds.   Pulmonary:      Effort: Pulmonary effort is normal.      Breath sounds: Normal breath sounds. No wheezing or rhonchi.   Skin:     General: Skin is warm.      Findings: No erythema or rash.   Neurological:      General: No focal deficit present.      Mental Status: He is alert. Mental status is at baseline.   Psychiatric:         Mood and Affect: Mood normal.         Behavior: Behavior normal.        ASSESSMENT/PLAN:  Miles Valencia is a 59 year old male seen today for ongoing evaluation of cough variant asthma.  He has been doing well over the last year and a half up until recent upper respiratory infection that did trigger the cough for the last 5 to 6 weeks.  It has started to subside.  He like a refill  of the Symbicort which worked well in the past.    [email or web address] for a coupon  Symbicort 2 puffs every 4 hours as needed    Follow-up as needed      Thank you for allowing me to participate in the care of Miles Valencia.      I spent 25 minutes on the date of the encounter doing chart review, history and exam, documentation and further coordination as noted above exclusive of separately reported interpretations    Jeremy Givens MD  Allergy/Immunology  [facility]      Again, thank you for allowing me to participate in the care of your patient.        Sincerely,        Jeremy Givens MD

## 2024-11-19 NOTE — PROGRESS NOTES
Miles Valencia was seen in the Allergy Clinic at [facility].    Miles Valencia is a 59 year old male being seen today for ongoing evaluation of a cough.    Miles was last seen January 2023 at that time spirometry was normal.  He was diagnosed with cough variant asthma.  He has been doing well up until 5 to 6 weeks ago.  He also has a history of allergies to dust mite and mold.  At the last appointment in 2023 he was prescribed Symbicort 2 puffs twice daily for 2 weeks which worked quite well.  He is wondering if he can get a refill of that inhaler.      Last month, he developed an upper respiratory infection and was requiring albuterol 3 times a day for several weeks.  He also has prescribed Tessalon and Mucinex.  The Tessalon provided much more benefit compared to the Mucinex.    Past Medical History:   Diagnosis Date    Achilles bursitis or tendinitis 05/04/2014    Allergic rhinitis     Asthma     Benign positional vertigo     Congestion     Gastroesophageal reflux disease     Hypertension     Low back pain     Migraine     Multinodular goiter     Osteoarthritis     Other chronic pain     Pain in joint, shoulder region 04/18/2014    Pneumonia     Prostate cancer (H)     RLS (restless legs syndrome)     Sleep problems      Family History   Problem Relation Age of Onset    Eczema Mother     Skin Cancer Mother     Hypertension Mother     Hypertension Father     Alcoholism Father     Eczema Brother     Hypertension Brother     Migraines Brother     Testicular cancer Brother     Factor V Leiden deficiency Brother     Deep Vein Thrombosis Brother     Cancer Maternal Grandmother         Breast    Alcoholism Maternal Grandfather     Cerebrovascular Disease Paternal Grandmother     Alcoholism Paternal Grandfather     Unknown/Adopted Paternal Half-Sister     Other - See Comments Niece         [place]    Asthma Niece     Other - See Comments Nephew         [place]ands, Factor V Leiden     Cancer Cousin     Thyroid Disease Other     Anesthesia Reaction No family hx of      Past Surgical History:   Procedure Laterality Date    ARTHROPLASTY SHOULDER Left 6/13/2017    Procedure: ARTHROPLASTY SHOULDER;  Left Total Shoulder Arthroplasty  ;  Surgeon: Jossy Swanson MD;  Location: [facility]    BIOPSY      Prostate    COLONOSCOPY N/A 3/21/2016    Procedure: COLONOSCOPY;  Surgeon: Toy Lima MD;  Location: [facility]    COLONOSCOPY N/A 11/12/2024    Procedure: Colonoscopy with polpectomy;  Surgeon: Isaac Schwab MD;  Location: [facility]    DAVINCI PROSTATECTOMY, LYMPHADENECTOMY N/A 4/7/2021    Procedure: Robot-assisted laparoscopic radical prostatectomy, pelvic lymphadenectomy,;  Surgeon: Eber Pereyra MD;  Location: [facility]    THYROIDECTOMY Left 5/27/2020    Procedure: Left Lobe THYROIDECTOMY;  Surgeon: Evelin Cutler MD;  Location: [facility] OR         Current Outpatient Medications:     acetaminophen (TYLENOL) 325 MG tablet, Take 2 tablets (650 mg) by mouth every 4 hours as needed for other (mild pain), Disp: 30 tablet, Rfl: 0    albuterol (PROAIR HFA/PROVENTIL HFA/VENTOLIN HFA) 108 (90 BASE) MCG/ACT Inhaler, Inhale 2 puffs into the lungs every 4 hours as needed for shortness of breath / dyspnea or wheezing, Disp: , Rfl:     ammonium lactate (AMLACTIN) 12 % cream, Apply topically daily as needed., Disp: , Rfl:     aspirin-acetaminophen-caffeine (EXCEDRIN MIGRAINE) 250-250-65 MG per tablet, Take 2 tablets by mouth every 6 hours as needed for headaches , Disp: , Rfl:     atorvastatin (LIPITOR) 10 MG tablet, Take 10 mg by mouth at bedtime., Disp: , Rfl:     benzonatate (TESSALON) 100 MG capsule, Take 1 capsule (100 mg) by mouth 3 times daily as needed for cough., Disp: 30 capsule, Rfl: 0    budesonide-formoterol (SYMBICORT) 160-4.5 MCG/ACT Inhaler, Inhale 2 puffs into the lungs 4 times daily as needed (cough)., Disp: 10.2 g, Rfl: 5    camphor-menthol (DERMASARRA)  0.5-0.5 % LOTN, Apply topically every 6 hours as needed for skin care., Disp: , Rfl:     cholecalciferol 125 MCG (5000 UT) CAPS, Take 5,000 Units by mouth every morning , Disp: , Rfl:     coenzyme Q-10 200 MG CAPS, , Disp: , Rfl:     Cyanocobalamin (VITAMIN B-12 PO), Take 1 tablet by mouth every evening , Disp: , Rfl:     cyclobenzaprine (FLEXERIL) 5 MG tablet, Take 1-2 tablets (5-10 mg) by mouth nightly as needed for muscle spasms., Disp: 60 tablet, Rfl: 1    DIAZEPAM PO, Take by mouth., Disp: , Rfl:     diltiazem ER COATED BEADS (CARDIZEM CD/CARTIA XT) 240 MG 24 hr capsule, Take 240 mg by mouth every morning , Disp: , Rfl:     fexofenadine (ALLEGRA) 180 MG tablet, Take 180 mg by mouth every morning , Disp: , Rfl:     gabapentin (NEURONTIN) 300 MG capsule, Take 1 capsule (300 mg) by mouth 3 times daily, Disp: 60 capsule, Rfl: 0    HEMP OIL OR EXTRACT OR OTHER CBD CANNABINOID, NOT MEDICAL CANNABIS,, Take 1 capsule by mouth every evening, Disp: , Rfl:     hydrochlorothiazide (HYDRODIURIL) 25 MG tablet, Take 25 mg by mouth every morning , Disp: , Rfl:     hydrocortisone (CORTAID) 1 % cream, Apply topically 2 times daily as needed., Disp: , Rfl:     ibuprofen (ADVIL/MOTRIN) 200 MG tablet, Take 800 mg by mouth every 8 hours as needed for mild pain , Disp: , Rfl:     meclizine (ANTIVERT) 12.5 MG tablet, Take 2 tablets (25 mg) by mouth 4 times daily as needed for dizziness, Disp: 30 tablet, Rfl: 0    MELATONIN PO, Take 2.5 mg by mouth At Bedtime , Disp: , Rfl:     NONFORMULARY, Magnesium/MSM lotion - Brand: Aincient minerals 2 pumps every evening applied to feet/legs  20 mg elemental magnesium and 25 mg MSM Per 1 pump, Disp: , Rfl:     omega 3 1000 MG CAPS, Take by mouth At Bedtime, Disp: , Rfl:     rizatriptan (MAXALT-MLT) 10 MG ODT, Take 10 mg by mouth at onset of headache for migraine, Disp: , Rfl:     rosuvastatin (CRESTOR) 5 MG tablet, Take 1 tablet by mouth daily at 2 pm, Disp: , Rfl:     sildenafil (REVATIO) 20 MG  tablet, Take 1 tab daily, Disp: 90 tablet, Rfl: 11    zolpidem (AMBIEN) 5 MG tablet, Take 5 mg by mouth nightly as needed for sleep, Disp: , Rfl:   Allergies   Allergen Reactions    Latex Shortness Of Breath    Amoxicillin Diarrhea    Nickel Itching and Rash         EXAM:   /82   Pulse 86   Wt 91.2 kg (201 lb)   SpO2 97%   BMI 29.68 kg/m      Constitutional:       General: He is not in acute distress.     Appearance: Normal appearance. He is not ill-appearing.   HENT:      Head: Normocephalic and atraumatic.      Nose: Nose normal. No congestion or rhinorrhea.      Mouth/Throat:      Mouth: Mucous membranes are moist.      Pharynx: Oropharynx is clear. No posterior oropharyngeal erythema.   Eyes:      General:         Right eye: No discharge.         Left eye: No discharge.   Cardiovascular:      Rate and Rhythm: Normal rate and regular rhythm.      Heart sounds: Normal heart sounds.   Pulmonary:      Effort: Pulmonary effort is normal.      Breath sounds: Normal breath sounds. No wheezing or rhonchi.   Skin:     General: Skin is warm.      Findings: No erythema or rash.   Neurological:      General: No focal deficit present.      Mental Status: He is alert. Mental status is at baseline.   Psychiatric:         Mood and Affect: Mood normal.         Behavior: Behavior normal.        ASSESSMENT/PLAN:  Miles Valencia is a 59 year old male seen today for ongoing evaluation of cough variant asthma.  He has been doing well over the last year and a half up until recent upper respiratory infection that did trigger the cough for the last 5 to 6 weeks.  It has started to subside.  He like a refill of the Symbicort which worked well in the past.    [email or web address] for a coupon  Symbicort 2 puffs every 4 hours as needed    Follow-up as needed      Thank you for allowing me to participate in the care of Miles Valencia.      I spent 25 minutes on the date of the encounter doing chart review, history and exam,  documentation and further coordination as noted above exclusive of separately reported interpretations    Jeremy Givens MD  Allergy/Immunology  [facility]

## 2024-11-29 ENCOUNTER — HOSPITAL ENCOUNTER (OUTPATIENT)
Dept: MRI IMAGING | Facility: HOSPITAL | Age: 59
Discharge: HOME | End: 2024-11-29
Attending: FAMILY MEDICINE | Admitting: FAMILY MEDICINE
Payer: COMMERCIAL

## 2024-11-29 DIAGNOSIS — M25.511 RIGHT SHOULDER PAIN, UNSPECIFIED CHRONICITY: ICD-10-CM

## 2024-11-29 PROCEDURE — 73221 MRI JOINT UPR EXTREM W/O DYE: CPT | Mod: RT

## 2024-12-04 ENCOUNTER — THERAPY VISIT (OUTPATIENT)
Dept: PHYSICAL THERAPY | Facility: CLINIC | Age: 59
End: 2024-12-04
Payer: COMMERCIAL

## 2024-12-04 DIAGNOSIS — M25.511 RIGHT SHOULDER PAIN, UNSPECIFIED CHRONICITY: Primary | ICD-10-CM

## 2024-12-04 PROCEDURE — 97140 MANUAL THERAPY 1/> REGIONS: CPT | Mod: GP | Performed by: PHYSICAL THERAPIST

## 2024-12-04 PROCEDURE — 97110 THERAPEUTIC EXERCISES: CPT | Mod: GP | Performed by: PHYSICAL THERAPIST

## 2024-12-04 PROCEDURE — 97530 THERAPEUTIC ACTIVITIES: CPT | Mod: GP | Performed by: PHYSICAL THERAPIST

## 2024-12-04 NOTE — PROGRESS NOTES
PLAN    Reach out to Dr. Wiseman regarding discussing an injection for pain    Beginning/End Dates of Progress Note Reporting Period:  10/07/24 to 12/04/2024    Referring Provider:  Barry Wiseman  Therapist Impression:   Slightly better in terms of pain.  Strength is better, but starting to lose ROM.  Keep frozen shoulder as a differential diagnosis.    When waking in the morning pain starts.  Will have pain if he sleeps with arm by ahead.  Back squat at gym is bothersome.  Pain is UT and neck.  Hx of neck and back pain.  Being seen for neck and being treated with MedEx machine which is helping.  Miles presents with RC pathology with underlying impairments of posterior shoulder tightness, scapular malpositioning, RC strength impairments.    NEXT: TPR? Address scapular position, progress RC strengthening     PTRX: online    GOALS: Reaching    Subjective:  Pain has gotten better in the last week or two.  Not needing as much pain meds.  Changing arm support position has helped.  Pain in scalenes and SCM region.        Objective:  End range flexion abduction pain  5/5 not painful  Limitation 20 deg base ER

## 2024-12-17 NOTE — TELEPHONE ENCOUNTER
DIAGNOSIS: (R) shoulder, req inj / Dr. Barry Wiseman / [facility] / no previous surgery, x-rays & MRI    APPOINTMENT DATE: 12/19/24   NOTES STATUS DETAILS   OFFICE NOTE from referring provider Internal [facility]:   11/13/24, 5/29/24 - Barry Wiseman MD      OFFICE NOTE from other specialist Internal [facility] PM&R:  8/12/24, 3/11/24 - OV with Esther Kwok MD      MEDICATION LIST Internal    MRI Internal MR R Shoulder - 11/29/24     CT SCAN Internal CTV Head Neck - 12/12/23     XRAYS (IMAGES & REPORTS) Internal XR R Shoulder - 10/30/24

## 2024-12-19 ENCOUNTER — PRE VISIT (OUTPATIENT)
Dept: ORTHOPEDICS | Facility: CLINIC | Age: 59
End: 2024-12-19

## 2024-12-19 ENCOUNTER — OFFICE VISIT (OUTPATIENT)
Dept: ORTHOPEDICS | Facility: CLINIC | Age: 59
End: 2024-12-19
Payer: COMMERCIAL

## 2024-12-19 DIAGNOSIS — M25.511 RIGHT SHOULDER PAIN, UNSPECIFIED CHRONICITY: Primary | ICD-10-CM

## 2024-12-19 RX ORDER — LIDOCAINE HYDROCHLORIDE 10 MG/ML
3 INJECTION, SOLUTION EPIDURAL; INFILTRATION; INTRACAUDAL; PERINEURAL
Status: COMPLETED | OUTPATIENT
Start: 2024-12-19 | End: 2024-12-19

## 2024-12-19 RX ORDER — LIDOCAINE HYDROCHLORIDE 10 MG/ML
2 INJECTION, SOLUTION EPIDURAL; INFILTRATION; INTRACAUDAL; PERINEURAL
Status: COMPLETED | OUTPATIENT
Start: 2024-12-19 | End: 2024-12-19

## 2024-12-19 RX ORDER — TRIAMCINOLONE ACETONIDE 40 MG/ML
40 INJECTION, SUSPENSION INTRA-ARTICULAR; INTRAMUSCULAR
Status: COMPLETED | OUTPATIENT
Start: 2024-12-19 | End: 2024-12-19

## 2024-12-19 RX ADMIN — LIDOCAINE HYDROCHLORIDE 3 ML: 10 INJECTION, SOLUTION EPIDURAL; INFILTRATION; INTRACAUDAL; PERINEURAL at 11:41

## 2024-12-19 RX ADMIN — LIDOCAINE HYDROCHLORIDE 2 ML: 10 INJECTION, SOLUTION EPIDURAL; INFILTRATION; INTRACAUDAL; PERINEURAL at 11:41

## 2024-12-19 RX ADMIN — TRIAMCINOLONE ACETONIDE 40 MG: 40 INJECTION, SUSPENSION INTRA-ARTICULAR; INTRAMUSCULAR at 11:41

## 2024-12-19 NOTE — NURSING NOTE
[address] [address]  Dept: [phone]  ______________________________________________________________________________    Patient: Miles Valencia   : 1965   MRN: [id number]   2024    INVASIVE PROCEDURE SAFETY CHECKLIST    Date: 2024     Procedure: R Shoulder GH joint USGI  Patient Name: Miles Valencia  MRN: [id number]  YOB: 1965    Action: Complete sections as appropriate. Any discrepancy results in a HARD COPY until resolved.     PRE PROCEDURE:  Patient ID verified with 2 identifiers (name and  or MRN): Yes  Procedure and site verified with patient/designee (when able): Yes  Accurate consent documentation in medical record: Yes  H&P (or appropriate assessment) documented in medical record: Yes  H&P must be up to 20 days prior to procedure and updates within 24 hours of procedure as applicable: NA  Relevant diagnostic and radiology test results appropriately labeled and displayed as applicable: Yes  Procedure site(s) marked with provider initials: NA    TIMEOUT:  Time-Out performed immediately prior to starting procedure, including verbal and active participation of all team members addressing the following:Yes  * Correct patient identify  * Confirmed that the correct side and site are marked  * An accurate procedure consent form  * Agreement on the procedure to be done  * Correct patient position  * Relevant images and results are properly labeled and appropriately displayed  * The need to administer antibiotics or fluids for irrigation purposes during the procedure as applicable   * Safety precautions based on patient history or medication use    DURING PROCEDURE: Verification of correct person, site, and procedures any time the responsibility for care of the patient is transferred to another member of the care team.       Prior to injection, verified patient identity using patient's name and  date of birth.  Due to injection administration, patient instructed to remain in clinic for 15 minutes  afterwards, and to report any adverse reaction to me immediately.    Joint injection was performed.      Drug Amount Wasted:  None.  Vial/Syringe: Single dose vial  Expiration Date:  07/2028      Carol Barry ATC  December 19, 2024

## 2024-12-19 NOTE — LETTER
12/19/2024      RE: Miles Valencia  [address] [place] [address]     Dear Colleague,    Thank you for referring your patient, Miles Valencia, to the [facility] [place]. Please see a copy of my visit note below.      [facility]  SPORTS & ORTHOPEDIC CLINIC VISIT     Dec 19, 2024         USG Right glenohumeral injection    Date/Time: 12/19/2024 11:41 AM    Performed by: Marco Mena MD  Authorized by: Marco Mena MD    Indications:  Osteoarthritis  Needle Size:  22 G  Guidance: ultrasound    Approach:  Posterolateral  Location:  Shoulder      Site:  R glenohumeral joint  Medications:  40 mg triamcinolone 40 MG/ML; 2 mL lidocaine (PF) 1 %; 3 mL lidocaine (PF) 1 %  Outcome:  Tolerated well, no immediate complications  Procedure discussed: discussed risks, benefits, and alternatives    Consent Given by:  Patient  Timeout: timeout called immediately prior to procedure    Prep: patient was prepped and draped in usual sterile fashion     PROCEDURE: Ultrasound Guided Right Glehohumeral Injection   The patient was apprised of the risks and the benefits of the procedure written consent was signed by the patient.   The patient was positioned seated in a chair.  The posterior glenohumeral joint was identified with ultrasound and marked with a pen.  Ultrasound was utilized to ensure proper placement of medication into the glenohumeral joint clear of surrounding neurovascular structures.  This area was then cleaned with a chlorhexidine swab.  Anesthesia of the skin was obtained with 3mL 1% lidocaine.  Next using direct and continuous ultrasound guidance with sterile technique a 22-gauge needle was introduced into the glenohumeral joint and a solution of 40 mg triamcinolone and 2mL 1% lidocaine was injected and seen flowing into the joint space.  Images were captured and saved to the permanent record.  The patient tolerated the  procedure well and there were no immediate complications.  Patient was instructed to ice the shoulder upon leaving the clinic and refrain from overuse for the next 2 days.  Follow-up promptly for any increase in pain, swelling, redness or warmth from the injection site.  Otherwise routine postinjection instructions were given.    Marco Mena MD          Again, thank you for allowing me to participate in the care of your patient.      Sincerely,    Marco Mena MD

## 2024-12-19 NOTE — PROGRESS NOTES
[facility]  SPORTS & ORTHOPEDIC CLINIC VISIT     Dec 19, 2024         USG Right glenohumeral injection    Date/Time: 12/19/2024 11:41 AM    Performed by: Marco Mena MD  Authorized by: Marco Mena MD    Indications:  Osteoarthritis  Needle Size:  22 G  Guidance: ultrasound    Approach:  Posterolateral  Location:  Shoulder      Site:  R glenohumeral joint  Medications:  40 mg triamcinolone 40 MG/ML; 2 mL lidocaine (PF) 1 %; 3 mL lidocaine (PF) 1 %  Outcome:  Tolerated well, no immediate complications  Procedure discussed: discussed risks, benefits, and alternatives    Consent Given by:  Patient  Timeout: timeout called immediately prior to procedure    Prep: patient was prepped and draped in usual sterile fashion     PROCEDURE: Ultrasound Guided Right Glehohumeral Injection   The patient was apprised of the risks and the benefits of the procedure written consent was signed by the patient.   The patient was positioned seated in a chair.  The posterior glenohumeral joint was identified with ultrasound and marked with a pen.  Ultrasound was utilized to ensure proper placement of medication into the glenohumeral joint clear of surrounding neurovascular structures.  This area was then cleaned with a chlorhexidine swab.  Anesthesia of the skin was obtained with 3mL 1% lidocaine.  Next using direct and continuous ultrasound guidance with sterile technique a 22-gauge needle was introduced into the glenohumeral joint and a solution of 40 mg triamcinolone and 2mL 1% lidocaine was injected and seen flowing into the joint space.  Images were captured and saved to the permanent record.  The patient tolerated the procedure well and there were no immediate complications.  Patient was instructed to ice the shoulder upon leaving the clinic and refrain from overuse for the next 2 days.  Follow-up promptly for any increase in pain, swelling, redness or warmth from the injection site.   Otherwise routine postinjection instructions were given.    Marco Mena MD

## 2025-01-08 DIAGNOSIS — M25.511 RIGHT SHOULDER PAIN, UNSPECIFIED CHRONICITY: Primary | ICD-10-CM

## 2025-01-20 ENCOUNTER — OFFICE VISIT (OUTPATIENT)
Dept: ORTHOPEDICS | Facility: CLINIC | Age: 60
End: 2025-01-20
Attending: FAMILY MEDICINE
Payer: COMMERCIAL

## 2025-01-20 ENCOUNTER — ANCILLARY PROCEDURE (OUTPATIENT)
Dept: GENERAL RADIOLOGY | Facility: CLINIC | Age: 60
End: 2025-01-20
Attending: ORTHOPAEDIC SURGERY
Payer: COMMERCIAL

## 2025-01-20 VITALS — WEIGHT: 201 LBS | BODY MASS INDEX: 29.68 KG/M2

## 2025-01-20 DIAGNOSIS — M25.511 RIGHT SHOULDER PAIN, UNSPECIFIED CHRONICITY: ICD-10-CM

## 2025-01-20 PROCEDURE — 73020 X-RAY EXAM OF SHOULDER: CPT | Mod: RT | Performed by: RADIOLOGY

## 2025-01-20 NOTE — NURSING NOTE
Reason For Visit:   Chief Complaint   Patient presents with    Consult     T Right shoulder pain, unspecified chronicity. Referred by Barry Wiseman MD        PCP: Barry Wiseman  Ref: Barry Wiseman    ?  No  Occupation [employer]  Currently working? Yes.  Work status?  Full time.  Date of injury: N/A  Type of injury: N/A.  Date of surgery: 2017  Type of surgery: Left total replacement.  Smoker: No  Request smoking cessation information: No    Right hand dominant    SANE score  Affected shoulder: Right  Right shoulder SANE: 75  Left shoulder SANE: 100    Wt 91.2 kg (201 lb)   BMI 29.68 kg/m        Pain Assessment  Patient Currently in Pain: Yes  Patient's Stated Pain Goal: 2  0-10 Pain Scale: 2  Primary Pain Location: Shoulder (Right)  Pain Descriptors: Edmondl    Deja Chand, JAZMINE

## 2025-01-20 NOTE — PROGRESS NOTES
CHIEF CONCERN: Right shoulder pain    HISTORY OF PRESENT ILLNESS: This is a 59-year-old right-hand-dominant male with an orthopedic history significant for previous left TSA in 2017 with Dr. Swanson presenting for evaluation of his right shoulder pain.  Patient reports he first developed some right diffuse shoulder soreness, worse across the anterior shoulder, in May 2024.  He was sent for physical therapy which actually helped his symptoms significantly.  However later this past fall he was trying to get back in the gym and workout more often and was doing front squats which she had not done since his previous total shoulder replacement from.      Following this front squat session, he had worsened right shoulder pain although in a different location from his prior right shoulder pain.  Instead of being diffuse throughout the shoulder, most significant in the anterior shoulder, his symptoms were now most significant in his neck, right trapezius and right scapula.  He continued to work with physical therapy and underwent a right corticosteroid injection on 12/19/2024.  Patient reports following the corticosteroid injection, his shoulder symptoms resolved significantly.  He is now only bothered by his neck/trapezius/scapular symptoms.  However, he wanted to keep this appointment given his history of arthritis in his contralateral shoulder to ensure there is nothing more significant developing in his right shoulder.    Patient does endorse bilateral shooting pain and numbness down his forearms and fingers.  Does not seem to be related to his neck position.  Has previously been seen by neurologist for his neck pain and radicular symptoms.  Underwent an EMG in 2023 which was normal without any abnormalities.  Patient takes Tylenol and uses Voltaren gel for the pain in his shoulders.  No issues or concerns with his left shoulder at this time.    Past Medical History:   Diagnosis Date    Achilles bursitis or tendinitis  05/04/2014    Allergic rhinitis     Asthma     Benign positional vertigo     Congestion     Gastroesophageal reflux disease     Hypertension     Low back pain     Migraine     Multinodular goiter     Osteoarthritis     Other chronic pain     Pain in joint, shoulder region 04/18/2014    Pneumonia     Prostate cancer (H)     RLS (restless legs syndrome)     Sleep problems        Past Surgical History:   Procedure Laterality Date    ARTHROPLASTY SHOULDER Left 6/13/2017    Procedure: ARTHROPLASTY SHOULDER;  Left Total Shoulder Arthroplasty  ;  Surgeon: Jossy Swanson MD;  [facility]    BIOPSY      Prostate    COLONOSCOPY N/A 3/21/2016    Procedure: COLONOSCOPY;  Surgeon: Toy Lima MD;  Location: [facility]    COLONOSCOPY N/A 11/12/2024    Procedure: Colonoscopy with polpectomy;  Surgeon: Isaac Schwab MD;  Location: [facility]    DAVINCI PROSTATECTOMY, LYMPHADENECTOMY N/A 4/7/2021    Procedure: Robot-assisted laparoscopic radical prostatectomy, pelvic lymphadenectomy,;  Surgeon: Eber Pereyra MD;  [facility]    THYROIDECTOMY Left 5/27/2020    Procedure: Left Lobe THYROIDECTOMY;  Surgeon: Evelin Cutler MD;  [facility]       Current Outpatient Medications   Medication Sig Dispense Refill    acetaminophen (TYLENOL) 325 MG tablet Take 2 tablets (650 mg) by mouth every 4 hours as needed for other (mild pain) 30 tablet 0    albuterol (PROAIR HFA/PROVENTIL HFA/VENTOLIN HFA) 108 (90 BASE) MCG/ACT Inhaler Inhale 2 puffs into the lungs every 4 hours as needed for shortness of breath / dyspnea or wheezing      ammonium lactate (AMLACTIN) 12 % cream Apply topically daily as needed.      aspirin-acetaminophen-caffeine (EXCEDRIN MIGRAINE) 250-250-65 MG per tablet Take 2 tablets by mouth every 6 hours as needed for headaches       atorvastatin (LIPITOR) 10 MG tablet Take 10 mg by mouth at bedtime.      benzonatate (TESSALON) 100 MG capsule Take 1 capsule (100 mg) by  mouth 3 times daily as needed for cough. 30 capsule 0    budesonide-formoterol (SYMBICORT) 160-4.5 MCG/ACT Inhaler Inhale 2 puffs into the lungs 4 times daily as needed (cough). 10.2 g 5    camphor-menthol (DERMASARRA) 0.5-0.5 % LOTN Apply topically every 6 hours as needed for skin care.      cholecalciferol 125 MCG (5000 UT) CAPS Take 5,000 Units by mouth every morning       coenzyme Q-10 200 MG CAPS       Cyanocobalamin (VITAMIN B-12 PO) Take 1 tablet by mouth every evening       cyclobenzaprine (FLEXERIL) 5 MG tablet Take 1-2 tablets (5-10 mg) by mouth nightly as needed for muscle spasms. 60 tablet 1    DIAZEPAM PO Take by mouth.      diltiazem ER COATED BEADS (CARDIZEM CD/CARTIA XT) 240 MG 24 hr capsule Take 240 mg by mouth every morning       fexofenadine (ALLEGRA) 180 MG tablet Take 180 mg by mouth every morning       gabapentin (NEURONTIN) 300 MG capsule Take 1 capsule (300 mg) by mouth 3 times daily 60 capsule 0    HEMP OIL OR EXTRACT OR OTHER CBD CANNABINOID, NOT MEDICAL CANNABIS, Take 1 capsule by mouth every evening      hydrochlorothiazide (HYDRODIURIL) 25 MG tablet Take 25 mg by mouth every morning       hydrocortisone (CORTAID) 1 % cream Apply topically 2 times daily as needed.      ibuprofen (ADVIL/MOTRIN) 200 MG tablet Take 800 mg by mouth every 8 hours as needed for mild pain       meclizine (ANTIVERT) 12.5 MG tablet Take 2 tablets (25 mg) by mouth 4 times daily as needed for dizziness 30 tablet 0    MELATONIN PO Take 2.5 mg by mouth At Bedtime       NONFORMULARY Magnesium/MSM lotion - Brand: Aincient minerals  2 pumps every evening applied to feet/legs    20 mg elemental magnesium and 25 mg MSM Per 1 pump      omega 3 1000 MG CAPS Take by mouth At Bedtime      rizatriptan (MAXALT-MLT) 10 MG ODT Take 10 mg by mouth at onset of headache for migraine      rosuvastatin (CRESTOR) 5 MG tablet Take 1 tablet by mouth daily at 2 pm      sildenafil (REVATIO) 20 MG tablet Take 1 tab daily 90 tablet 11     zolpidem (AMBIEN) 5 MG tablet Take 5 mg by mouth nightly as needed for sleep            Allergies   Allergen Reactions    Latex Shortness Of Breath    Amoxicillin Diarrhea    Nickel Itching and Rash       SOCIAL HISTORY:      Social History     Socioeconomic History    Marital status:      Spouse name: Not on file    Number of children: 0    Years of education: Not on file    Highest education level: Not on file   Occupational History    Occupation:      Employer: [employer]   Tobacco Use    Smoking status: Never    Smokeless tobacco: Never   Substance and Sexual Activity    Alcohol use: Yes     Alcohol/week: 0.0 standard drinks of alcohol     Comment: 1-2 drinks a week    Drug use: No    Sexual activity: Not on file   Other Topics Concern    Parent/sibling w/ CABG, MI or angioplasty before 65F 55M? Not Asked   Social History Narrative    Not on file     Social Drivers of Health     Financial Resource Strain: Not on file   Food Insecurity: Not on file   Transportation Needs: Not on file   Physical Activity: Not on file   Stress: Not on file   Social Connections: Not on file   Interpersonal Safety: Low Risk  (11/8/2024)    Interpersonal Safety     Do you feel physically and emotionally safe where you currently live?: Yes     Within the past 12 months, have you been hit, slapped, kicked or otherwise physically hurt by someone?: No     Within the past 12 months, have you been humiliated or emotionally abused in other ways by your partner or ex-partner?: No   Housing Stability: Not on file       FAMILY HISTORY: Reviewed in EMR      REVIEW OF SYSTEMS: Positive for that noted in past medical history and history of present illness and otherwise reviewed in EMR     PHYSICAL EXAM:    Gen: Adult  in no acute distress. Articulates and communicates with normal affect.  Pulm: Respirations even and unlabored  CV: Extr wwp  MSK: Focused upper extremity exam:   Skin intact. No erythema. Sensation intact  all dermatomes into the hand to light touch.      Right shoulder active motion is FE to 180, ER at side to 50, and IR to T12. No pain on palpation over the AC joint. Minimal pain on palpation over the long head of the biceps.   Neers and Chilel negative for signs of impingement  Speed's and Bryanrsuni Negative   5/5 Strength with IR, ER, Abduction, Adduction  5/5 strength with deltoid, triceps, biceps, EPL, FPL, and Intrinsics.    IMAGING:  Review of x-ray of the right shoulder demonstrates overall well-preserved joint space.    MRI of the right shoulder from 11/29/2024 demonstrates synovitis of the right shoulder is aware as edema/tendinosis of the rotator cuff muscles.  No obvious tearing of the rotator cuff muscles.  Also with bicipital tendinitis.  Additionally, there is a focal area of cartilage wear on the glenoid but the overall cartilage appears intact.  There does appear to be some the loose body in the axillary recess.    ASSESSMENT:    Early osteoarthritis, right shoulder    We discussed the finding of shoulder arthritis. We discussed the non-operative and operative treatment options including the risks and potential benefits of each. These include activity modifications, cortisone injections, and PT for non-operative management, and total shoulder arthroplasty as the operative treatment option. We discussed the expectations for pain relief and/or motion improvement with each option.     Symptoms remain overall minimal and well-managed with PT, anti-inflammatories, corticosteroid injections.  Patient elected to continue with nonoperative management at this time.     PLAN:  Continue conservative management  Rest, ice, anti-inflammatories, intermittent corticosteroid injections  Follow-up as needed if symptoms worsen    Patient expressed understanding and is in agreement with this plan. All questions answered.      Patient seen and discussed with Dr. Swanson.    Robbi Burns MD  Orthopaedic Surgery,  PGY-5

## 2025-01-20 NOTE — LETTER
1/20/2025      Miles Valencia  [address] [place] [address]      Dear Colleague,    Thank you for referring your patient, Miles Valencia, to the [facility] [place]. Please see a copy of my visit note below.    CHIEF CONCERN: Right shoulder pain    HISTORY OF PRESENT ILLNESS: This is a 59-year-old right-hand-dominant male with an orthopedic history significant for previous left TSA in 2017 with Dr. Swanson presenting for evaluation of his right shoulder pain.  Patient reports he first developed some right diffuse shoulder soreness, worse across the anterior shoulder, in May 2024.  He was sent for physical therapy which actually helped his symptoms significantly.  However later this past fall he was trying to get back in the gym and workout more often and was doing front squats which she had not done since his previous total shoulder replacement from.      Following this front squat session, he had worsened right shoulder pain although in a different location from his prior right shoulder pain.  Instead of being diffuse throughout the shoulder, most significant in the anterior shoulder, his symptoms were now most significant in his neck, right trapezius and right scapula.  He continued to work with physical therapy and underwent a right corticosteroid injection on 12/19/2024.  Patient reports following the corticosteroid injection, his shoulder symptoms resolved significantly.  He is now only bothered by his neck/trapezius/scapular symptoms.  However, he wanted to keep this appointment given his history of arthritis in his contralateral shoulder to ensure there is nothing more significant developing in his right shoulder.    Patient does endorse bilateral shooting pain and numbness down his forearms and fingers.  Does not seem to be related to his neck position.  Has previously been seen by neurologist for his neck pain and radicular symptoms.  Underwent an EMG in 2023 which  was normal without any abnormalities.  Patient takes Tylenol and uses Voltaren gel for the pain in his shoulders.  No issues or concerns with his left shoulder at this time.    Past Medical History:   Diagnosis Date     Achilles bursitis or tendinitis 05/04/2014     Allergic rhinitis      Asthma      Benign positional vertigo      Congestion      Gastroesophageal reflux disease      Hypertension      Low back pain      Migraine      Multinodular goiter      Osteoarthritis      Other chronic pain      Pain in joint, shoulder region 04/18/2014     Pneumonia      Prostate cancer (H)      RLS (restless legs syndrome)      Sleep problems        Past Surgical History:   Procedure Laterality Date     ARTHROPLASTY SHOULDER Left 6/13/2017    Procedure: ARTHROPLASTY SHOULDER;  Left Total Shoulder Arthroplasty  ;  Surgeon: Jossy Swanson MD;  [facility]     BIOPSY      Prostate     COLONOSCOPY N/A 3/21/2016    Procedure: COLONOSCOPY;  Surgeon: Toy Lima MD;  [facility]     COLONOSCOPY N/A 11/12/2024    Procedure: Colonoscopy with polpectomy;  Surgeon: Isaac Schwab MD;  Location: [facility]     DAVINCI PROSTATECTOMY, LYMPHADENECTOMY N/A 4/7/2021    Procedure: Robot-assisted laparoscopic radical prostatectomy, pelvic lymphadenectomy,;  Surgeon: Eber Pereyra MD;  [facility]     THYROIDECTOMY Left 5/27/2020    Procedure: Left Lobe THYROIDECTOMY;  Surgeon: Evelin Cutler MD;  [facility]       Current Outpatient Medications   Medication Sig Dispense Refill     acetaminophen (TYLENOL) 325 MG tablet Take 2 tablets (650 mg) by mouth every 4 hours as needed for other (mild pain) 30 tablet 0     albuterol (PROAIR HFA/PROVENTIL HFA/VENTOLIN HFA) 108 (90 BASE) MCG/ACT Inhaler Inhale 2 puffs into the lungs every 4 hours as needed for shortness of breath / dyspnea or wheezing       ammonium lactate (AMLACTIN) 12 % cream Apply topically daily as needed.        aspirin-acetaminophen-caffeine (EXCEDRIN MIGRAINE) 250-250-65 MG per tablet Take 2 tablets by mouth every 6 hours as needed for headaches        atorvastatin (LIPITOR) 10 MG tablet Take 10 mg by mouth at bedtime.       benzonatate (TESSALON) 100 MG capsule Take 1 capsule (100 mg) by mouth 3 times daily as needed for cough. 30 capsule 0     budesonide-formoterol (SYMBICORT) 160-4.5 MCG/ACT Inhaler Inhale 2 puffs into the lungs 4 times daily as needed (cough). 10.2 g 5     camphor-menthol (DERMASARRA) 0.5-0.5 % LOTN Apply topically every 6 hours as needed for skin care.       cholecalciferol 125 MCG (5000 UT) CAPS Take 5,000 Units by mouth every morning        coenzyme Q-10 200 MG CAPS        Cyanocobalamin (VITAMIN B-12 PO) Take 1 tablet by mouth every evening        cyclobenzaprine (FLEXERIL) 5 MG tablet Take 1-2 tablets (5-10 mg) by mouth nightly as needed for muscle spasms. 60 tablet 1     DIAZEPAM PO Take by mouth.       diltiazem ER COATED BEADS (CARDIZEM CD/CARTIA XT) 240 MG 24 hr capsule Take 240 mg by mouth every morning        fexofenadine (ALLEGRA) 180 MG tablet Take 180 mg by mouth every morning        gabapentin (NEURONTIN) 300 MG capsule Take 1 capsule (300 mg) by mouth 3 times daily 60 capsule 0     HEMP OIL OR EXTRACT OR OTHER CBD CANNABINOID, NOT MEDICAL CANNABIS, Take 1 capsule by mouth every evening       hydrochlorothiazide (HYDRODIURIL) 25 MG tablet Take 25 mg by mouth every morning        hydrocortisone (CORTAID) 1 % cream Apply topically 2 times daily as needed.       ibuprofen (ADVIL/MOTRIN) 200 MG tablet Take 800 mg by mouth every 8 hours as needed for mild pain        meclizine (ANTIVERT) 12.5 MG tablet Take 2 tablets (25 mg) by mouth 4 times daily as needed for dizziness 30 tablet 0     MELATONIN PO Take 2.5 mg by mouth At Bedtime        NONFORMULARY Magnesium/MSM lotion - Brand: Aincient minerals  2 pumps every evening applied to feet/legs    20 mg elemental magnesium and 25 mg MSM Per 1  pump       omega 3 1000 MG CAPS Take by mouth At Bedtime       rizatriptan (MAXALT-MLT) 10 MG ODT Take 10 mg by mouth at onset of headache for migraine       rosuvastatin (CRESTOR) 5 MG tablet Take 1 tablet by mouth daily at 2 pm       sildenafil (REVATIO) 20 MG tablet Take 1 tab daily 90 tablet 11     zolpidem (AMBIEN) 5 MG tablet Take 5 mg by mouth nightly as needed for sleep            Allergies   Allergen Reactions     Latex Shortness Of Breath     Amoxicillin Diarrhea     Nickel Itching and Rash       SOCIAL HISTORY:      Social History     Socioeconomic History     Marital status:      Spouse name: Not on file     Number of children: 0     Years of education: Not on file     Highest education level: Not on file   Occupational History     Occupation:      Employer: [employer]   Tobacco Use     Smoking status: Never     Smokeless tobacco: Never   Substance and Sexual Activity     Alcohol use: Yes     Alcohol/week: 0.0 standard drinks of alcohol     Comment: 1-2 drinks a week     Drug use: No     Sexual activity: Not on file   Other Topics Concern     Parent/sibling w/ CABG, MI or angioplasty before 65F 55M? Not Asked   Social History Narrative     Not on file     Social Drivers of Health     Financial Resource Strain: Not on file   Food Insecurity: Not on file   Transportation Needs: Not on file   Physical Activity: Not on file   Stress: Not on file   Social Connections: Not on file   Interpersonal Safety: Low Risk  (11/8/2024)    Interpersonal Safety      Do you feel physically and emotionally safe where you currently live?: Yes      Within the past 12 months, have you been hit, slapped, kicked or otherwise physically hurt by someone?: No      Within the past 12 months, have you been humiliated or emotionally abused in other ways by your partner or ex-partner?: No   Housing Stability: Not on file       FAMILY HISTORY: Reviewed in EMR      REVIEW OF SYSTEMS: Positive for that noted  in past medical history and history of present illness and otherwise reviewed in EMR     PHYSICAL EXAM:    Gen: Adult  in no acute distress. Articulates and communicates with normal affect.  Pulm: Respirations even and unlabored  CV: Extr wwp  MSK: Focused upper extremity exam:   Skin intact. No erythema. Sensation intact all dermatomes into the hand to light touch.      Right shoulder active motion is FE to 180, ER at side to 50, and IR to T12. No pain on palpation over the AC joint. Minimal pain on palpation over the long head of the biceps.   Neers and Chilel negative for signs of impingement  Speed's and Yergason Negative   5/5 Strength with IR, ER, Abduction, Adduction  5/5 strength with deltoid, triceps, biceps, EPL, FPL, and Intrinsics.    IMAGING:  Review of x-ray of the right shoulder demonstrates overall well-preserved joint space.    MRI of the right shoulder from 11/29/2024 demonstrates synovitis of the right shoulder is aware as edema/tendinosis of the rotator cuff muscles.  No obvious tearing of the rotator cuff muscles.  Also with bicipital tendinitis.  Additionally, there is a focal area of cartilage wear on the glenoid but the overall cartilage appears intact.  There does appear to be some the loose body in the axillary recess.    ASSESSMENT:    Early osteoarthritis, right shoulder    We discussed the finding of shoulder arthritis. We discussed the non-operative and operative treatment options including the risks and potential benefits of each. These include activity modifications, cortisone injections, and PT for non-operative management, and total shoulder arthroplasty as the operative treatment option. We discussed the expectations for pain relief and/or motion improvement with each option.     Symptoms remain overall minimal and well-managed with PT, anti-inflammatories, corticosteroid injections.  Patient elected to continue with nonoperative management at this time.     PLAN:  Continue  conservative management  Rest, ice, anti-inflammatories, intermittent corticosteroid injections  Follow-up as needed if symptoms worsen    Patient expressed understanding and is in agreement with this plan. All questions answered.      Patient seen and discussed with Dr. Swanson.    Robbi Burns MD  Orthopaedic Surgery, PGY-5    I have personally examined this patient and have reviewed the clinical presentation and progress note with the resident.  I agree with the treatment plan as outlined.  The plan was formulated with the resident on the day of the resident's note.       Again, thank you for allowing me to participate in the care of your patient.        Sincerely,        Jossy Swanson MD    Electronically signed

## 2025-02-03 ENCOUNTER — PRE VISIT (OUTPATIENT)
Dept: UROLOGY | Facility: CLINIC | Age: 60
End: 2025-02-03
Payer: COMMERCIAL

## 2025-02-03 NOTE — TELEPHONE ENCOUNTER
Reason for visit: follow-up     Relevant information: 1 yr, review PSA results. PSA completed on 8/9/2024.    Records/imaging/labs/orders: all records available    Ama Dumont  2/3/2025  12:47 PM

## 2025-02-13 ENCOUNTER — LAB (OUTPATIENT)
Dept: LAB | Facility: CLINIC | Age: 60
End: 2025-02-13
Payer: COMMERCIAL

## 2025-02-13 DIAGNOSIS — Z85.46 PERSONAL HISTORY OF MALIGNANT NEOPLASM OF PROSTATE: ICD-10-CM

## 2025-02-21 ENCOUNTER — TELEPHONE (OUTPATIENT)
Dept: UROLOGY | Facility: CLINIC | Age: 60
End: 2025-02-21

## 2025-02-21 NOTE — TELEPHONE ENCOUNTER
Left Voicemail (1st Attempt) for the patient to call back and schedule the following:    Appointment type: Return Patient  Provider: Kimberly Nino  Return date: Return in about 1 year (around 2/21/2026) for with Kimberly Nino, Follow up, using a video visit.  Specialty phone number: [phone]  Additional appointment(s) needed:   Additonal Notes:

## 2025-02-24 NOTE — TELEPHONE ENCOUNTER
Spoke with patient to schedule 1 year follow up (around 2/21/2026) for with Kimberly Nino, using a video visit.Patient stated he will call back to schedule.     Sent patient a myc message.

## 2025-03-02 ENCOUNTER — HEALTH MAINTENANCE LETTER (OUTPATIENT)
Age: 60
End: 2025-03-02

## 2025-04-28 ENCOUNTER — OFFICE VISIT (OUTPATIENT)
Dept: PHYSICAL MEDICINE AND REHAB | Facility: CLINIC | Age: 60
End: 2025-04-28
Payer: COMMERCIAL

## 2025-04-28 VITALS
DIASTOLIC BLOOD PRESSURE: 87 MMHG | SYSTOLIC BLOOD PRESSURE: 136 MMHG | RESPIRATION RATE: 16 BRPM | HEART RATE: 79 BPM | OXYGEN SATURATION: 97 %

## 2025-04-28 DIAGNOSIS — G24.3 ISOLATED CERVICAL DYSTONIA: Primary | ICD-10-CM

## 2025-04-28 PROCEDURE — 3075F SYST BP GE 130 - 139MM HG: CPT | Performed by: PHYSICAL MEDICINE & REHABILITATION

## 2025-04-28 PROCEDURE — 3079F DIAST BP 80-89 MM HG: CPT | Performed by: PHYSICAL MEDICINE & REHABILITATION

## 2025-04-28 PROCEDURE — 99213 OFFICE O/P EST LOW 20 MIN: CPT | Mod: GC | Performed by: PHYSICAL MEDICINE & REHABILITATION

## 2025-04-28 NOTE — PATIENT INSTRUCTIONS
It was a pleasure seeing [facility] in [facility]. We discussed the following:    #.  I have placed a referral to one of my other PM&R colleagues (Dr. Rodriguez or Dr. Bear) for Botox injections or other botulinum toxin injections for the muscle issues in the neck and shoulder region.       Follow-up: as needed.

## 2025-04-28 NOTE — NURSING NOTE
Chief Complaint   Patient presents with    RECHECK     Here for a follow up, confirmed with patient     Eli Agustin

## 2025-04-28 NOTE — PROGRESS NOTES
PM&R Follow-Up Visit -     Date of Initial Visit: 12/6/2021  LOV: 8/12/2024  TD: 4/28/2025    RECALL: Miles Valencia is a 60 year old male with history of RLS, dizziness, left TSA who follows up for cervical and lumbar issues.    INTERVAL HISTORY:  Patient was last seen in clinic August 12, 2024.  At that visit, he continued PT/HEP and continued flexeril as needed.    He reports right side neck pain localized in the neck muscles, trapezius, scapula and shoulder. Described as tightness. Worse in the morning and with neck movements. Diclofenac cream helps. Tingling in the forearms improved. No weakness in arms or hands.    Today he also reports constant low back pain when lying down or sitting for prolonged periods. Located in the left lower lumbar region. It often radiates to the posterior leg down to the bottom of the foot. 60% in the back, 40% in the leg. No weakness, no numbness. No changes in bowel or bladder. Last PT session December 2024.    Additionally, he uses cyclobenzaprine as needed finding this helpful with pain, especially with the neck.    RECALL HISTORY OF PRESENT ILLNESS:  Miles Valencia is a male who was previously evaluated for lumbosacral radiculopathy now presents with a chief complaint of numbness/tingling in bilateral forearms and 4th/5th digits.     Symptoms began ~1 year ago and not associated with trauma. They have been getting progressively worse over that time. He has longstanding chronic neck pain as well that has been getting worse over the same time.     The hand/arm symptoms are localized to the bilateral ulnar forearms into the 4th and 5th digits (R 55%, L 45%); describes his symptoms as numbness/tingling. Has noticed decreased hand strength over the last ~3 years which he feels is associated with arthritis and tendinopathy making it difficult to open jars. No issues with fine motor control (buttons, zippers, typing, etc). Symptoms are worse when lying flat, hands rested on his  "stomach with elbows at ~90 degrees. Difficult falling asleep at night, but symptoms do not wake him up.     Reports longstanding history of neck pain for many years. Neck pain is primarily right sided with radiation into upper trapezius and periscapular region. Arm symptoms do not seem to be correlated with flares of his neck. Associated with some intermittent positional vertigo, described as poor balance/feeling like he \"had one too many\".       PRIOR INJURIES/TREATMENT:   Ice/Heat: +ice  Physical Therapy:   -Prior PT following prostate cancer diagnosis and prostatectomy in April 2023 and pelvic floor therapy which transitioned to current Spine PT -- overall improvement   -Recent PT for neck, LBP and shoulder.   -Completed [facility] program in the summer/fall of 2024       - Current Pain Medications -   NSAIDs - Naproxen prn   Tylenol   Diclofenac topical   Flexeril prn      Prior Procedures:  Date                                         Procedure                                           Improvement (%)  12/19/2024                 US guided R glenohumeral injection        50% for 3 mo                                    Prior Related Surgery:   History of left TSA   Other (acupuncture, OMT, CMM, TENS, DME, etc.):   Massage gun     Specialists Seen - (with most recent, available notes and clinic visits reviewed)   1. Neurosurgery - Dr. Larry Ragsdale    2. Neurology - Dr. Aguilar     IMAGING - reviewed   6/19/2023 Cervical MRI    IMPRESSION:  1.  Degenerative change throughout the cervical spine with variable degrees of uncovertebral joint and facet hypertrophic change which results in left-sided foraminal narrowing at C3-C4, bilateral foraminal narrowing at C5-C6, left-sided foraminal   narrowing at C6-C7. Small right-sided foraminal disc protrusion at C5-C6 contributes to narrowing of the right foramen.     2.  Minimal degrees of central ventral osteophyte/disc complex at the levels indicated above. Please see body of the " report for details as above. No evidence of critical spinal canal stenosis. No evidence of acute disc herniation.     3.  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 Would recommend thyroid ultrasound and potential FNA of this thyroid mass to exclude   underlying malignancy.        Medical History:  He  has a past medical history of Achilles bursitis or tendinitis (05/04/2014), Allergic rhinitis, Asthma, Benign positional vertigo, Congestion, Gastroesophageal reflux disease, Hypertension, Low back pain, Migraine, Multinodular goiter, Osteoarthritis, Other chronic pain, Pain in joint, shoulder region (04/18/2014), Pneumonia, Prostate cancer (H), RLS (restless legs syndrome), and Sleep problems.    He has no past medical history of Anemia, Antiplatelet or antithrombotic long-term use, Arrhythmia, Cerebral artery occlusion with cerebral infarction (H), Chronic infection, Coagulation disorder, Complication of anesthesia, Congenital heart disease, Congestive heart failure (H), COPD (chronic obstructive pulmonary disease) (H), Coronary artery disease, Diabetes (H), Dialysis patient, Difficult intubation, Difficulty walking, Dyspnea on exertion, Heart attack (H), Heart murmur, Hepatitis, Hiatal hernia, History of angina, Irregular heart beat, Liver disease, Malignant hyperthermia, Motion sickness, Multiple sclerosis (H), Muscular dystrophy (H), Noninfectious ileitis, Orthopnea, Oxygen dependent, Pacemaker, Pancreatic disease, Parkinsons disease (H), PONV (postoperative nausea and vomiting), Pulmonary hypertension (H), Renal disease, Seizures (H), Sleep apnea, Stented coronary artery, Thrombosis, or Walking troubles.     He  has a past surgical history that includes Colonoscopy (N/A, 3/21/2016); biopsy; Arthroplasty shoulder (Left, 6/13/2017); Thyroidectomy (Left, 5/27/2020); Davinci Prostatectomy,  Lymphadenectomy (N/A, 4/7/2021); and Colonoscopy (N/A, 11/12/2024).           Family History  His family history includes Alcoholism in his father, maternal grandfather, and paternal grandfather; Asthma in his niece; Cancer in his cousin and maternal grandmother; Cerebrovascular Disease in his paternal grandmother; Deep Vein Thrombosis in his brother; Factor V Leiden deficiency in his brother; Hypertension in his brother, father, and mother; Migraines in his brother; Other - See Comments in his nephew and niece; Skin Cancer in his mother; Testicular cancer in his brother; Thyroid Disease in an other family member; Unknown/Adopted in his paternal half-sister.      Social History:  Work:  [employer]   He  reports that he has never smoked. He has never used smokeless tobacco. He reports current alcohol use. He reports that he does not use drugs.      Current Medications:   He has a current medication list which includes the following prescription(s): acetaminophen, albuterol, ammonium lactate, aspirin-acetaminophen-caffeine, benzonatate, budesonide-formoterol, camphor-menthol, cholecalciferol, coenzyme q-10, cyanocobalamin, cyclobenzaprine, diltiazem er coated beads, fexofenadine, gabapentin, HEMP OIL OR EXTRACT OR OTHER CBD CANNABINOID, NOT MEDICAL CANNABIS,, hydrochlorothiazide, hydrocortisone, ibuprofen, meclizine, melatonin, NONFORMULARY, omega 3, rizatriptan, sildenafil, zolpidem, atorvastatin, diazepam, and rosuvastatin.        Allergies:    -- Amoxicillin -- Diarrhea    PHYSICAL EXAMINATION:  /87 (BP Location: Left arm, Patient Position: Sitting, Cuff Size: Adult Large)   Pulse 79   Resp 16   SpO2 97%    General: Pleasant, straightforward, WDWN individual.  Mental Status: Pleasant, direct, appropriate mood and affect  Resp: breathing is unlabored without audible wheeze  Vascular: No cyanosis   Heme: No visible ecchymosis or erythema on extremities  Skin: No notable rash      Musculoskeletal:  Neck:   Inspection: protracted neck posture with rounded shoulders. The right shoulder is inferior compared to the left side   Palpation: tenderness at the trapezius bilateral (right>left), right levator and right posterior scalene with associated muscle fullness.   ROM: neck rotation to the left and bending to the left are reduced compared to the contralateral side. Extension is slightly limited. Flexion is normal.  Spurling negative.    Shoulder: pain with right shoulder internal rotation. Neer caused mild pain in the right shoulder.  Gait with normal sagar and stride.    Neurological: strength 5/5 in upper and lower extremities. Sensation intact in upper and lower extremities.       ASSESSMENT:  Mr. Miles Valencia is a very pleasant 60 year old RHD male who presents with atraumatic right side neck pain for at least 5 years. Reports tightness in the neck muscles. Pain radiates into upper trapezius and periscapular region. On exam the head is on a protracted position with decreased range of motion when turning or bending to the left side. The right shoulder in inferior compared to the left.      #. Cervical dystonia - Patient has a history of recurrent involuntary contraction of one or more muscles in the neck with sustained head tilt, forward neck protrusion and abnormal posturing with limited range of motion in the neck   #. Neck pain  #. Myalgia  #. Other spondylosis, cervical region  #. Numbness and tingling of both upper extremities  #. Tendinopathy of right shoulder   #. Lumbar spondylosis  #. Lumbar radicular pain. (L5 through prior history).    Complicating co-morbidities include:   #. Dizziness. Follows with neurology    #. Migraine headache   #. H/o Left TSA        PLAN:  -We discussed the possibility of trigger point injections or dry needling; however, I think that he would best benefit from a trial of botulinum toxin.  I placed a referral for one of my colleagues in PM&R for  treatment.   -Continue flexeril as needed. Can contact our clinic when refill needed.  -Follow up for botox.    Ready to learn, no apparent learning barriers.  Education provided on treatment plan according to patient's preferred learning style.  Patient verbalizes understanding.       Adrien Stock MD  PGY-4 Rehabilitation Medicine  [facility]     Patient seen and discussed with Dr. Kwok.       I was physically present for the E/M service provided. I agree with the House Officer note and plan, which I have reviewed and edited where appropriate.  ________________________________   Esther Kwok MD  Department of Physical Medicine & Rehabilitation

## 2025-05-06 ENCOUNTER — OFFICE VISIT (OUTPATIENT)
Dept: PHYSICAL MEDICINE AND REHAB | Facility: CLINIC | Age: 60
End: 2025-05-06
Payer: COMMERCIAL

## 2025-05-06 DIAGNOSIS — G24.3 ISOLATED CERVICAL DYSTONIA: ICD-10-CM

## 2025-05-06 DIAGNOSIS — G24.3 CERVICAL DYSTONIA: Primary | ICD-10-CM

## 2025-05-06 PROCEDURE — 99213 OFFICE O/P EST LOW 20 MIN: CPT | Mod: 25 | Performed by: PHYSICAL MEDICINE & REHABILITATION

## 2025-05-06 PROCEDURE — 64616 CHEMODENERV MUSC NECK DYSTON: CPT | Mod: 50 | Performed by: PHYSICAL MEDICINE & REHABILITATION

## 2025-05-06 PROCEDURE — 95874 GUIDE NERV DESTR NEEDLE EMG: CPT | Performed by: PHYSICAL MEDICINE & REHABILITATION

## 2025-05-06 NOTE — PROGRESS NOTES
"       [facility] Clinic Note     Patient Name: Miles Valencia : 1965 Medical Record: [id number]     Requesting Physician/clinician: Esther Kwok MD            History of Present Illness:     Miles Valencia is a 60 year old male who presented to our clinic for more evaluation and management of his neck pain and cervical dystonia.     Followed by Dr. Kwok - last seen   Was also followed by Dr. Ragsdale neurosurgery and Dr. Aguilar neurology     Copied from Dr. Kwok's note -   Symptoms began ~1 year ago and not associated with trauma. They have been getting progressively worse over that time. He has longstanding chronic neck pain as well that has been getting worse over the same time.      The hand/arm symptoms are localized to the bilateral ulnar forearms into the 4th and 5th digits (R 55%, L 45%); describes his symptoms as numbness/tingling. Has noticed decreased hand strength over the last ~3 years which he feels is associated with arthritis and tendinopathy making it difficult to open jars. No issues with fine motor control (buttons, zippers, typing, etc). Symptoms are worse when lying flat, hands rested on his stomach with elbows at ~90 degrees. Difficult falling asleep at night, but symptoms do not wake him up.      Reports longstanding history of neck pain for many years. Neck pain is primarily right sided with radiation into upper trapezius and periscapular region. Described as tightness. Worse in the morning and with neck movements.Arm symptoms do not seem to be correlated with flares of his neck. Associated with some intermittent positional vertigo, described as poor balance/feeling like he \"had one too many\".   No weakness in arms or hands.     Symptoms,  - Severe muscle tightness around the neck and shoulder area, ongoing for more than a decade   - Neck symptoms present for many years, arm symptoms for about a year, mainly on the right side, shooting down to the top of the shoulder and around the " shoulder area.  - Pain worsens in the morning with movement, any movement can trigger pain and discomfort.  - Occasional poor balance and dizziness, no obvious weakness in the hands. No changes in his bowel or bladder function   - Pain involves the edge of the scapula and up into the scapula, typically unilateral on the right side, sometimes triggers migraines.  - Neck and shoulder pain and tightness triggers headaches   - No obvious positioning and tilting of the neck but has to massage constantly to help with tightness and keep his head straight   - Tried topical cream, cyclobenzaprine (Flexeril), ice, physical therapy, Tylenol, ibuprofen, and shoulder injections with limited benefit.  - Concern about long-term use of acetaminophen or NSAIDs daily for years.      Meds/interventions,   Flexeril   Diclofenac cream   Rizatriptan for headaches   OTC tylenol and NSAIDs  US guided R glenohumeral injection in 12/2024 with about 50% improvement of shoulder pain for about 3 months   Uses massage gun as well     Therapies/HEP/equipments,  Copied from Dr. Kwok's note and verified today   -Prior PT following prostate cancer diagnosis and prostatectomy in April 2023 and pelvic floor therapy which transitioned to current Spine PT -- overall improvement   -Recent PT for neck, LBP and shoulder.   -Completed [employer] program in the summer/fall of 2024     Functionally/social situation,   Works from home as a  and is on the computer for work and meetings   I with all aspects of his life   Pain and muscle tightness impacts his day to day life and requires frequent breaks               Past Medical and Surgical History:     Past Medical History:   Diagnosis Date    Achilles bursitis or tendinitis 05/04/2014    Allergic rhinitis     Asthma     Benign positional vertigo     Congestion     Gastroesophageal reflux disease     Hypertension     Low back pain     Migraine     Multinodular goiter     Osteoarthritis     Other chronic  pain     Pain in joint, shoulder region 04/18/2014    Pneumonia     Prostate cancer (H)     RLS (restless legs syndrome)     Sleep problems      Past Surgical History:   Procedure Laterality Date    ARTHROPLASTY SHOULDER Left 6/13/2017    Procedure: ARTHROPLASTY SHOULDER;  Left Total Shoulder Arthroplasty  ;  Surgeon: Jossy Swanson MD;  Location: [facility]    BIOPSY      Prostate    COLONOSCOPY N/A 3/21/2016    Procedure: COLONOSCOPY;  Surgeon: Toy Lima MD;  Location: [facility]    COLONOSCOPY N/A 11/12/2024    Procedure: Colonoscopy with polpectomy;  Surgeon: Isaac Schwab MD;  Location: [facility]    DAVINCI PROSTATECTOMY, LYMPHADENECTOMY N/A 4/7/2021    Procedure: Robot-assisted laparoscopic radical prostatectomy, pelvic lymphadenectomy,;  Surgeon: Eber Pereyra MD;  Location: [facility]    THYROIDECTOMY Left 5/27/2020    Procedure: Left Lobe THYROIDECTOMY;  Surgeon: Evelin Cutler MD;  Location: [facility] OR            Social History:     Social History     Tobacco Use    Smoking status: Never    Smokeless tobacco: Never   Substance Use Topics    Alcohol use: Yes     Alcohol/week: 0.0 standard drinks of alcohol     Comment: 1-2 drinks a week            Family History:     Family History   Problem Relation Age of Onset    Eczema Mother     Skin Cancer Mother     Hypertension Mother     Hypertension Father     Alcoholism Father     Eczema Brother     Hypertension Brother     Migraines Brother     Testicular cancer Brother     Factor V Leiden deficiency Brother     Deep Vein Thrombosis Brother     Cancer Maternal Grandmother         Breast    Alcoholism Maternal Grandfather     Cerebrovascular Disease Paternal Grandmother     Alcoholism Paternal Grandfather     Unknown/Adopted Paternal Half-Sister     Other - See Comments Niece         [place]    Asthma Niece     Other - See Comments Nephew         [place], Factor V Leiden    Cancer Cousin     Thyroid Disease  Other     Anesthesia Reaction No family hx of             Medications:     Current Outpatient Medications   Medication Sig Dispense Refill    acetaminophen (TYLENOL) 325 MG tablet Take 2 tablets (650 mg) by mouth every 4 hours as needed for other (mild pain) 30 tablet 0    albuterol (PROAIR HFA/PROVENTIL HFA/VENTOLIN HFA) 108 (90 BASE) MCG/ACT Inhaler Inhale 2 puffs into the lungs every 4 hours as needed for shortness of breath / dyspnea or wheezing      ammonium lactate (AMLACTIN) 12 % cream Apply topically daily as needed.      aspirin-acetaminophen-caffeine (EXCEDRIN MIGRAINE) 250-250-65 MG per tablet Take 2 tablets by mouth every 6 hours as needed for headaches       benzonatate (TESSALON) 100 MG capsule Take 1 capsule (100 mg) by mouth 3 times daily as needed for cough. 30 capsule 0    budesonide-formoterol (SYMBICORT) 160-4.5 MCG/ACT Inhaler Inhale 2 puffs into the lungs 4 times daily as needed (cough). 10.2 g 5    camphor-menthol (DERMASARRA) 0.5-0.5 % LOTN Apply topically every 6 hours as needed for skin care.      cholecalciferol 125 MCG (5000 UT) CAPS Take 5,000 Units by mouth every morning       coenzyme Q-10 200 MG CAPS       Cyanocobalamin (VITAMIN B-12 PO) Take 1 tablet by mouth every evening       cyclobenzaprine (FLEXERIL) 5 MG tablet Take 1-2 tablets (5-10 mg) by mouth nightly as needed for muscle spasms. 60 tablet 1    diltiazem ER COATED BEADS (CARDIZEM CD/CARTIA XT) 240 MG 24 hr capsule Take 240 mg by mouth every morning       fexofenadine (ALLEGRA) 180 MG tablet Take 180 mg by mouth every morning       gabapentin (NEURONTIN) 300 MG capsule Take 1 capsule (300 mg) by mouth 3 times daily 60 capsule 0    HEMP OIL OR EXTRACT OR OTHER CBD CANNABINOID, NOT MEDICAL CANNABIS, Take 1 capsule by mouth every evening      hydrochlorothiazide (HYDRODIURIL) 25 MG tablet Take 25 mg by mouth every morning       hydrocortisone (CORTAID) 1 % cream Apply topically 2 times daily as needed.      ibuprofen  "(ADVIL/MOTRIN) 200 MG tablet Take 800 mg by mouth every 8 hours as needed for mild pain       meclizine (ANTIVERT) 12.5 MG tablet Take 2 tablets (25 mg) by mouth 4 times daily as needed for dizziness 30 tablet 0    MELATONIN PO Take 2.5 mg by mouth At Bedtime       NONFORMULARY Magnesium/MSM lotion - Brand: [employer]ient minerals  2 pumps every evening applied to feet/legs    20 mg elemental magnesium and 25 mg MSM Per 1 pump      omega 3 1000 MG CAPS Take by mouth At Bedtime      rizatriptan (MAXALT-MLT) 10 MG ODT Take 10 mg by mouth at onset of headache for migraine.      rosuvastatin (CRESTOR) 5 MG tablet Take 1 tablet by mouth daily at 2 pm      sildenafil (REVATIO) 20 MG tablet Take 1 tab daily 90 tablet 11    zolpidem (AMBIEN) 5 MG tablet Take 5 mg by mouth nightly as needed for sleep              Allergies:     Allergies   Allergen Reactions    Latex Shortness Of Breath    Amoxicillin Diarrhea    Nickel Itching and Rash              ROS:     A focused ROS is negative other than the symptoms noted above in the HPI.           Physical Examiniation:     VITAL SIGNS: There were no vitals taken for this visit.  BMI: Estimated body mass index is 29.53 kg/m  as calculated from the following:    Height as of 25: 1.753 m (5' 9\").    Weight as of 25: 90.7 kg (200 lb).    Gen: awake, alert, NAD, sitting comfortably in upright position  Pulm: unlabored at rest, speaks with ease  CVS: ext wwp    MSK:          Inspection: symmetric shoulder levels, upright posture with neutral head alignment          ROM: neck ROM slight limited with rotation and tilt to the left           Palpation: tenderness to palpation at base of skull, posterior neck, traps and amor-scapular areas            Laboratory/Imagin2023 Cervical MRI     IMPRESSION:  1.  Degenerative change throughout the cervical spine with variable degrees of uncovertebral joint and facet hypertrophic change which results in left-sided foraminal narrowing " at C3-C4, bilateral foraminal narrowing at C5-C6, left-sided foraminal   narrowing at C6-C7. Small right-sided foraminal disc protrusion at C5-C6 contributes to narrowing of the right foramen.     2.  Minimal degrees of central ventral osteophyte/disc complex at the levels indicated above. Please see body of the report for details as above. No evidence of critical spinal canal stenosis. No evidence of acute disc herniation.     3.  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 Would recommend thyroid ultrasound and potential FNA of this thyroid mass to exclude   underlying malignancy.           Assessment/Plan:     Cervical dystonia   Chronic neck and amor-scapular pain pain  Paresthesia at bilateral upper extremities    Right shoulder pain. Follows with sports medicine team  Dizziness. Follows with neurology    Migraine headache   H/o Left TSA       - Cervical dystonia confirmed, characterized by involuntary muscle tightness around the neck and shoulder area, primarily on the right side. Symptoms have been progressively worsening over the past year, with neck symptoms persisting for many years. No trauma reported.  - Xeomin injections approved and offered as a treatment option. Initial dose between 50 to 70 units, with follow-up appointments every 3 months to assess response and adjust dosage as needed.  - Risks and side effects: Minimal risk of infection, bleeding, and bruising. Potential risk of muscle weakness, dysphagia, and difficulty breathing if dosage is too high. Side effects are temporary and typically resolve within a few weeks.      Patient education: In depth discussion and education was provided about the assessment and implications of each of the below recommendations for management. Patient indicated readiness to learn, all questions were answered and understanding of material  presented was confirmed.    Work-up: none today; consider repeat NCS/EMG pending his course     Therapy/equipment/braces: continue HEP for  now; consider new referral to PT     Medications: Continue flexeril as needed as well as OTC meds and topical diclofenac     Interventions: first round of Xeomin injections today - started at 50 units     Referral / follow up with other providers: no new today     Follow up: in 12 weeks     Danyelle Rodriguez MD  Physical Medicine & Rehabilitation        BOTULINUM NEUROTOXIN INJECTION PROCEDURES    VERIFICATION OF PATIENT IDENTIFICATION AND PROCEDURE     Initials   Patient Name PS   Patient  PS   Procedure Verified by: PS     Prior to the start of the procedure and with procedural staff participation, I verbally confirmed the patient s identity using two indicators, relevant allergies, that the procedure was appropriate and matched the consent or emergent situation, and that the correct equipment/implants were available. Immediately prior to starting the procedure I conducted the Time Out with the procedural staff and re-confirmed the patient s name, procedure, and site/side. (The Joint Commission universal protocol was followed.)  Yes    Sedation (Moderate or Deep): None    ABOVE ASSESSMENTS PERFORMED BY  Danyelle Rodriguez MD      TOTAL DOSE ADMINISTERED  Dose Administered:  50 units  Xeomin (Botulinum Toxin Type A)        2:1 Dilution   Unavoidable Drug Waste: No  Diluent Used:  Preservative Free Normal Saline  Total Volume of Diluent Used:  1 ml  NDC #: Xeomin 50u ([address]-22)      CONSENT  The risks, benefits, and treatment options were discussed with Miles Valencia and he agreed to proceed.    Written consent was obtained by PS.     EQUIPMENT USED  Needle-25mm stimulating/recording  EMG/NCS Machine    SKIN PREPARATION  Skin preparation was performed using an alcohol wipe.    GUIDANCE DESCRIPTION  Electro-myographic guidance was necessary throughout the procedure to  accurately identify all areas of dystonic muscles while avoiding injection of non-dystonic muscles, neighboring nerves and nearby vascular structures.     AREA/MUSCLE INJECTED   Right Splenius Capitis - 5 units of Xeomin at 1 site/s.   Left Splenius Capitis - 5 units of Xeomin at 1 site/s.     Right Lateral Trapezius - 15 units of Xeomin at 3 site/s.   Left Lateral Trapezius (upper cervical) - 5 units of Xeomin at 2 site/s.     Right Levator Scapulae (neck) - 5 units of Xeomin at 1 site/s.   Right Posterior Scalene - 5 units of Xeomin at 1 site/s.   Right Rhomboids - 10 units of Xeomin at 2 site/s.        RESPONSE TO PROCEDURE  Miles Valencia tolerated the procedure well and there were no immediate complications. He was allowed to recover for an appropriate period of time and was discharged home in stable condition.

## 2025-05-06 NOTE — LETTER
2025       RE: Miles Valencia  [address] [place] [address]     Dear Colleague,    Thank you for referring your patient, Miles Valencia, to the [facility] [place] at [facility]. Please see a copy of my visit note below.           PM[facility]R Clinic Note     Patient Name: Miles Valencia : 1965 Medical Record: [id number]     Requesting Physician/clinician: Esther Kwok MD            History of Present Illness:     Miles Valencia is a 60 year old male who presented to our clinic for more evaluation and management of his neck pain and cervical dystonia.     Followed by Dr. Kwok - last seen   Was also followed by Dr. Ragsdale neurosurgery and Dr. Aguilar neurology     Copied from Dr. Kwok's note -   Symptoms began ~1 year ago and not associated with trauma. They have been getting progressively worse over that time. He has longstanding chronic neck pain as well that has been getting worse over the same time.      The hand/arm symptoms are localized to the bilateral ulnar forearms into the 4th and 5th digits (R 55%, L 45%); describes his symptoms as numbness/tingling. Has noticed decreased hand strength over the last ~3 years which he feels is associated with arthritis and tendinopathy making it difficult to open jars. No issues with fine motor control (buttons, zippers, typing, etc). Symptoms are worse when lying flat, hands rested on his stomach with elbows at ~90 degrees. Difficult falling asleep at night, but symptoms do not wake him up.      Reports longstanding history of neck pain for many years. Neck pain is primarily right sided with radiation into upper trapezius and periscapular region. Described as tightness. Worse in the morning and with neck movements.Arm symptoms do not seem to be correlated with flares of his neck. Associated with some intermittent positional vertigo, described as  "poor balance/feeling like he \"had one too many\".   No weakness in arms or hands.     Symptoms,  - Severe muscle tightness around the neck and shoulder area, ongoing for more than a decade   - Neck symptoms present for many years, arm symptoms for about a year, mainly on the right side, shooting down to the top of the shoulder and around the shoulder area.  - Pain worsens in the morning with movement, any movement can trigger pain and discomfort.  - Occasional poor balance and dizziness, no obvious weakness in the hands. No changes in his bowel or bladder function   - Pain involves the edge of the scapula and up into the scapula, typically unilateral on the right side, sometimes triggers migraines.  - Neck and shoulder pain and tightness triggers headaches   - No obvious positioning and tilting of the neck but has to massage constantly to help with tightness and keep his head straight   - Tried topical cream, cyclobenzaprine (Flexeril), ice, physical therapy, Tylenol, ibuprofen, and shoulder injections with limited benefit.  - Concern about long-term use of acetaminophen or NSAIDs daily for years.      Meds/interventions,   Flexeril   Diclofenac cream   Rizatriptan for headaches   OTC tylenol and NSAIDs  US guided R glenohumeral injection in 12/2024 with about 50% improvement of shoulder pain for about 3 months   Uses massage gun as well     Therapies/HEP/equipments,  Copied from Dr. Kwok's note and verified today   -Prior PT following prostate cancer diagnosis and prostatectomy in April 2023 and pelvic floor therapy which transitioned to current Spine PT -- overall improvement   -Recent PT for neck, LBP and shoulder.   -Completed [employer] program in the summer/fall of 2024     Functionally/social situation,   Works from home as a  and is on the computer for work and meetings   I with all aspects of his life   Pain and muscle tightness impacts his day to day life and requires frequent breaks               Past " Medical and Surgical History:     Past Medical History:   Diagnosis Date     Achilles bursitis or tendinitis 05/04/2014     Allergic rhinitis      Asthma      Benign positional vertigo      Congestion      Gastroesophageal reflux disease      Hypertension      Low back pain      Migraine      Multinodular goiter      Osteoarthritis      Other chronic pain      Pain in joint, shoulder region 04/18/2014     Pneumonia      Prostate cancer (H)      RLS (restless legs syndrome)      Sleep problems      Past Surgical History:   Procedure Laterality Date     ARTHROPLASTY SHOULDER Left 6/13/2017    Procedure: ARTHROPLASTY SHOULDER;  Left Total Shoulder Arthroplasty  ;  Surgeon: Jossy Swanson MD;  Location: [facility] OR     BIOPSY      Prostate     COLONOSCOPY N/A 3/21/2016    Procedure: COLONOSCOPY;  Surgeon: Toy Lima MD;  Location: [facility]     COLONOSCOPY N/A 11/12/2024    Procedure: Colonoscopy with polpectomy;  Surgeon: Isaac Schwab MD;  Location: [facility]     DAVINCI PROSTATECTOMY, LYMPHADENECTOMY N/A 4/7/2021    Procedure: Robot-assisted laparoscopic radical prostatectomy, pelvic lymphadenectomy,;  Surgeon: Eber Pereyra MD;  Location: [facility] OR     THYROIDECTOMY Left 5/27/2020    Procedure: Left Lobe THYROIDECTOMY;  Surgeon: Evelin Cutler MD;  Location: [facility] OR            Social History:     Social History     Tobacco Use     Smoking status: Never     Smokeless tobacco: Never   Substance Use Topics     Alcohol use: Yes     Alcohol/week: 0.0 standard drinks of alcohol     Comment: 1-2 drinks a week            Family History:     Family History   Problem Relation Age of Onset     Eczema Mother      Skin Cancer Mother      Hypertension Mother      Hypertension Father      Alcoholism Father      Eczema Brother      Hypertension Brother      Migraines Brother      Testicular cancer Brother      Factor V Leiden deficiency Brother      Deep Vein Thrombosis Brother      Cancer  Maternal Grandmother         Breast     Alcoholism Maternal Grandfather      Cerebrovascular Disease Paternal Grandmother      Alcoholism Paternal Grandfather      Unknown/Adopted Paternal Half-Sister      Other - See Comments Niece         [place]     Asthma Niece      Other - See Comments Nephew         [place], Factor V Leiden     Cancer Cousin      Thyroid Disease Other      Anesthesia Reaction No family hx of             Medications:     Current Outpatient Medications   Medication Sig Dispense Refill     acetaminophen (TYLENOL) 325 MG tablet Take 2 tablets (650 mg) by mouth every 4 hours as needed for other (mild pain) 30 tablet 0     albuterol (PROAIR HFA/PROVENTIL HFA/VENTOLIN HFA) 108 (90 BASE) MCG/ACT Inhaler Inhale 2 puffs into the lungs every 4 hours as needed for shortness of breath / dyspnea or wheezing       ammonium lactate (AMLACTIN) 12 % cream Apply topically daily as needed.       aspirin-acetaminophen-caffeine (EXCEDRIN MIGRAINE) 250-250-65 MG per tablet Take 2 tablets by mouth every 6 hours as needed for headaches        benzonatate (TESSALON) 100 MG capsule Take 1 capsule (100 mg) by mouth 3 times daily as needed for cough. 30 capsule 0     budesonide-formoterol (SYMBICORT) 160-4.5 MCG/ACT Inhaler Inhale 2 puffs into the lungs 4 times daily as needed (cough). 10.2 g 5     camphor-menthol (DERMASARRA) 0.5-0.5 % LOTN Apply topically every 6 hours as needed for skin care.       cholecalciferol 125 MCG (5000 UT) CAPS Take 5,000 Units by mouth every morning        coenzyme Q-10 200 MG CAPS        Cyanocobalamin (VITAMIN B-12 PO) Take 1 tablet by mouth every evening        cyclobenzaprine (FLEXERIL) 5 MG tablet Take 1-2 tablets (5-10 mg) by mouth nightly as needed for muscle spasms. 60 tablet 1     diltiazem ER COATED BEADS (CARDIZEM CD/CARTIA XT) 240 MG 24 hr capsule Take 240 mg by mouth every morning        fexofenadine (ALLEGRA) 180 MG tablet Take 180 mg by mouth every morning     "    gabapentin (NEURONTIN) 300 MG capsule Take 1 capsule (300 mg) by mouth 3 times daily 60 capsule 0     HEMP OIL OR EXTRACT OR OTHER CBD CANNABINOID, NOT MEDICAL CANNABIS, Take 1 capsule by mouth every evening       hydrochlorothiazide (HYDRODIURIL) 25 MG tablet Take 25 mg by mouth every morning        hydrocortisone (CORTAID) 1 % cream Apply topically 2 times daily as needed.       ibuprofen (ADVIL/MOTRIN) 200 MG tablet Take 800 mg by mouth every 8 hours as needed for mild pain        meclizine (ANTIVERT) 12.5 MG tablet Take 2 tablets (25 mg) by mouth 4 times daily as needed for dizziness 30 tablet 0     MELATONIN PO Take 2.5 mg by mouth At Bedtime        NONFORMULARY Magnesium/MSM lotion - Brand: Aincient minerals  2 pumps every evening applied to feet/legs    20 mg elemental magnesium and 25 mg MSM Per 1 pump       omega 3 1000 MG CAPS Take by mouth At Bedtime       rizatriptan (MAXALT-MLT) 10 MG ODT Take 10 mg by mouth at onset of headache for migraine.       rosuvastatin (CRESTOR) 5 MG tablet Take 1 tablet by mouth daily at 2 pm       sildenafil (REVATIO) 20 MG tablet Take 1 tab daily 90 tablet 11     zolpidem (AMBIEN) 5 MG tablet Take 5 mg by mouth nightly as needed for sleep              Allergies:     Allergies   Allergen Reactions     Latex Shortness Of Breath     Amoxicillin Diarrhea     Nickel Itching and Rash              ROS:     A focused ROS is negative other than the symptoms noted above in the HPI.           Physical Examiniation:     VITAL SIGNS: There were no vitals taken for this visit.  BMI: Estimated body mass index is 29.53 kg/m  as calculated from the following:    Height as of 2/21/25: 1.753 m (5' 9\").    Weight as of 2/21/25: 90.7 kg (200 lb).    Gen: awake, alert, NAD, sitting comfortably in upright position  Pulm: unlabored at rest, speaks with ease  CVS: ext wwp    MSK:          Inspection: symmetric shoulder levels, upright posture with neutral head alignment          ROM: neck ROM " slight limited with rotation and tilt to the left           Palpation: tenderness to palpation at base of skull, posterior neck, traps and amor-scapular areas            Laboratory/Imagin2023 Cervical MRI     IMPRESSION:  1.  Degenerative change throughout the cervical spine with variable degrees of uncovertebral joint and facet hypertrophic change which results in left-sided foraminal narrowing at C3-C4, bilateral foraminal narrowing at C5-C6, left-sided foraminal   narrowing at C6-C7. Small right-sided foraminal disc protrusion at C5-C6 contributes to narrowing of the right foramen.     2.  Minimal degrees of central ventral osteophyte/disc complex at the levels indicated above. Please see body of the report for details as above. No evidence of critical spinal canal stenosis. No evidence of acute disc herniation.     3.  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 Would recommend thyroid ultrasound and potential FNA of this thyroid mass to exclude   underlying malignancy.           Assessment/Plan:     Cervical dystonia   Chronic neck and amor-scapular pain pain  Paresthesia at bilateral upper extremities    Right shoulder pain. Follows with sports medicine team  Dizziness. Follows with neurology    Migraine headache   H/o Left TSA       - Cervical dystonia confirmed, characterized by involuntary muscle tightness around the neck and shoulder area, primarily on the right side. Symptoms have been progressively worsening over the past year, with neck symptoms persisting for many years. No trauma reported.  - Xeomin injections approved and offered as a treatment option. Initial dose between 50 to 70 units, with follow-up appointments every 3 months to assess response and adjust dosage as needed.  - Risks and side effects: Minimal risk of infection, bleeding, and bruising. Potential risk  of muscle weakness, dysphagia, and difficulty breathing if dosage is too high. Side effects are temporary and typically resolve within a few weeks.      Patient education: In depth discussion and education was provided about the assessment and implications of each of the below recommendations for management. Patient indicated readiness to learn, all questions were answered and understanding of material presented was confirmed.    Work-up: none today; consider repeat NCS/EMG pending his course     Therapy/equipment/braces: continue HEP for  now; consider new referral to PT     Medications: Continue flexeril as needed as well as OTC meds and topical diclofenac     Interventions: first round of Xeomin injections today - started at 50 units     Referral / follow up with other providers: no new today     Follow up: in 12 weeks     Danyelle Rodriguez MD  Physical Medicine & Rehabilitation        BOTULINUM NEUROTOXIN INJECTION PROCEDURES    VERIFICATION OF PATIENT IDENTIFICATION AND PROCEDURE     Initials   Patient Name PS   Patient  PS   Procedure Verified by: PS     Prior to the start of the procedure and with procedural staff participation, I verbally confirmed the patient s identity using two indicators, relevant allergies, that the procedure was appropriate and matched the consent or emergent situation, and that the correct equipment/implants were available. Immediately prior to starting the procedure I conducted the Time Out with the procedural staff and re-confirmed the patient s name, procedure, and site/side. (The Joint Commission universal protocol was followed.)  Yes    Sedation (Moderate or Deep): None    ABOVE ASSESSMENTS PERFORMED BY  Danyelle Rodriguez MD      TOTAL DOSE ADMINISTERED  Dose Administered:  50 units  Xeomin (Botulinum Toxin Type A)        2:1 Dilution   Unavoidable Drug Waste: No  Diluent Used:  Preservative Free Normal Saline  Total Volume of Diluent Used:  1 ml  NDC #: Xeomin 50u  ([address]-04)      CONSENT  The risks, benefits, and treatment options were discussed with Miles Valencia and he agreed to proceed.    Written consent was obtained by PS.     EQUIPMENT USED  Needle-25mm stimulating/recording  EMG/NCS Machine    SKIN PREPARATION  Skin preparation was performed using an alcohol wipe.    GUIDANCE DESCRIPTION  Electro-myographic guidance was necessary throughout the procedure to accurately identify all areas of dystonic muscles while avoiding injection of non-dystonic muscles, neighboring nerves and nearby vascular structures.     AREA/MUSCLE INJECTED   Right Splenius Capitis - 5 units of Xeomin at 1 site/s.   Left Splenius Capitis - 5 units of Xeomin at 1 site/s.     Right Lateral Trapezius - 15 units of Xeomin at 3 site/s.   Left Lateral Trapezius (upper cervical) - 5 units of Xeomin at 2 site/s.     Right Levator Scapulae (neck) - 5 units of Xeomin at 1 site/s.   Right Posterior Scalene - 5 units of Xeomin at 1 site/s.   Right Rhomboids - 10 units of Xeomin at 2 site/s.        RESPONSE TO PROCEDURE  Miles Valencia tolerated the procedure well and there were no immediate complications. He was allowed to recover for an appropriate period of time and was discharged home in stable condition.          Again, thank you for allowing me to participate in the care of your patient.      Sincerely,    Danyelle Rodriguez MD

## 2025-07-17 DIAGNOSIS — G89.29 CHRONIC LEFT-SIDED LOW BACK PAIN WITH LEFT-SIDED SCIATICA: ICD-10-CM

## 2025-07-17 DIAGNOSIS — M54.42 CHRONIC LEFT-SIDED LOW BACK PAIN WITH LEFT-SIDED SCIATICA: ICD-10-CM

## 2025-07-17 RX ORDER — CYCLOBENZAPRINE HCL 5 MG
5-10 TABLET ORAL
Qty: 60 TABLET | Refills: 1 | Status: SHIPPED | OUTPATIENT
Start: 2025-07-17

## 2025-07-17 NOTE — TELEPHONE ENCOUNTER
"Refill request received from [facility] via fax    Medication: cyclobenzaprine (FLEXERIL) 5 MG tablet   Directions: Take 1-2 tablets (5-10 mg) by mouth nightly as needed for muscle spasms.      Last ordered: 2/28/25   Qty: 60  RF: 1  Last OV with Dr. Kwok: 4/28/25 Plan-\"Continue flexeril as needed. Can contact our clinic when refill needed.\"   Next OV: none scheduled with Dr. Kwok    Routing to Dr. Kwok to review and sign if appropriate.    JEY EricksonN RN Care Coordinator  [facility]   [facility] R Clinic main phone # [phone] fax # [phone]    "

## 2025-08-06 ENCOUNTER — OFFICE VISIT (OUTPATIENT)
Dept: PHYSICAL MEDICINE AND REHAB | Facility: CLINIC | Age: 60
End: 2025-08-06
Payer: COMMERCIAL

## 2025-08-06 DIAGNOSIS — G24.3 CERVICAL DYSTONIA: Primary | ICD-10-CM

## 2025-08-06 PROCEDURE — 95874 GUIDE NERV DESTR NEEDLE EMG: CPT | Performed by: PHYSICAL MEDICINE & REHABILITATION

## 2025-08-06 PROCEDURE — 64616 CHEMODENERV MUSC NECK DYSTON: CPT | Mod: 50 | Performed by: PHYSICAL MEDICINE & REHABILITATION

## 2025-08-21 ENCOUNTER — LAB (OUTPATIENT)
Dept: LAB | Facility: CLINIC | Age: 60
End: 2025-08-21
Payer: COMMERCIAL

## 2025-08-21 DIAGNOSIS — Z85.46 PERSONAL HISTORY OF MALIGNANT NEOPLASM OF PROSTATE: ICD-10-CM

## 2025-08-21 LAB — PSA SERPL DL<=0.01 NG/ML-MCNC: 0.06 NG/ML (ref 0–4.5)

## (undated) DEVICE — CLIP HORIZON MED BLUE 002200

## (undated) DEVICE — SU CHROMIC 3-0 SH 27" G122H

## (undated) DEVICE — PAD CHUX UNDERPAD 30X36" P3036C

## (undated) DEVICE — ENDO POUCH UNIV RETRIEVAL SYSTEM INZII 10MM CD001

## (undated) DEVICE — BLADE SAW SAGITTAL STRK 20.7X85X0.89MM 2108-109-000S13

## (undated) DEVICE — SOL NACL 0.9% IRRIG 1000ML BOTTLE 2F7124

## (undated) DEVICE — SURGICEL FIBRILLAR HEMOSTAT 4"X4" 1963

## (undated) DEVICE — DECANTER TRANSFER DEVICE 2008S

## (undated) DEVICE — DRAIN JACKSON PRATT ROUND SIL 19FR W/TROCAR LF JP-2232

## (undated) DEVICE — SYR PISTON URETHRAL 60ML 68000

## (undated) DEVICE — NIM PROBE PRASS INCREMENTING TIP 8225825

## (undated) DEVICE — DRSG ADAPTIC 3X8" 6113

## (undated) DEVICE — SU ETHILON 2-0 PS 18" 585H

## (undated) DEVICE — DAVINCI S FCP BIPOLAR FENESTRATED 420205

## (undated) DEVICE — Device

## (undated) DEVICE — SYR 30ML LL W/O NDL 302832

## (undated) DEVICE — SU SILK 2-0 TIE 12X30" A305H

## (undated) DEVICE — SU MONOCRYL 4-0 PS-2 18" UND Y496G

## (undated) DEVICE — DRSG GAUZE 4X8"

## (undated) DEVICE — SU VICRYL 3-0 SH 27" UND J416H

## (undated) DEVICE — BLADE KNIFE SURG 15 371115

## (undated) DEVICE — ENDO TROCAR FIRST ENTRY KII FIOS Z-THRD 12X100MM CTF73

## (undated) DEVICE — ESU ELEC NDL 1" E1552

## (undated) DEVICE — ESU CORD BIPOLAR GREEN 10-4000

## (undated) DEVICE — SU MONOCRYL 3-0 PS-1 27" Y936H

## (undated) DEVICE — SYR BULB IRRIG 50ML LATEX FREE 0035280

## (undated) DEVICE — ADH SKIN CLOSURE PREMIERPRO EXOFIN 1.0ML 3470

## (undated) DEVICE — BLADE SWITCH SCISSORS TIP 5MM 89-5100B

## (undated) DEVICE — DRAPE U SPLIT 74X120" 29440

## (undated) DEVICE — CATH TRAY FOLEY SURESTEP 16FR WDRAIN BAG STLK LATEX A300316A

## (undated) DEVICE — DAVINCI S CANNULA SEAL 8MM 400077

## (undated) DEVICE — DAVINCI S DRAPE ARM INSTRUMENT 420015

## (undated) DEVICE — ESU LIGASURE OPEN SEALER/DIVIDER SM JAW 16.5MM LF1212A

## (undated) DEVICE — LINEN GOWN X4 5410

## (undated) DEVICE — DRAIN JACKSON PRATT RESERVOIR 100ML SU130-1305

## (undated) DEVICE — HYDROGEN PEROXIDE 3% 16OZ D0012

## (undated) DEVICE — CLIP ENDO HEMO-LOC PURPLE LG 544240

## (undated) DEVICE — ESU GROUND PAD UNIVERSAL W/O CORD

## (undated) DEVICE — DRAPE IOBAN INCISE 23X17" 6650EZ

## (undated) DEVICE — SUCTION IRR STRYKERFLOW II W/TIP 250-070-520

## (undated) DEVICE — DRAPE STERI U 1015

## (undated) DEVICE — SOL WATER IRRIG 1000ML BOTTLE 2F7114

## (undated) DEVICE — COVER CAMERA IN-LIGHT DISP LT-C02

## (undated) DEVICE — DAVINCI HOT SHEARS TIP COVER  400180

## (undated) DEVICE — SU ETHIBOND 2 V-37 4X30" MX69G

## (undated) DEVICE — JELLY LUBRICATING SURGILUBE 2OZ TUBE 0281-0205-02

## (undated) DEVICE — NDL INSUFFLATION 13GA 120MM C2201

## (undated) DEVICE — DAVINCI SI DRAPE ACCESSORY KIT 3-ARM 420290

## (undated) DEVICE — DRAPE TIBURON TOP SHEET 100X60" 29352

## (undated) DEVICE — SUCTION MANIFOLD NEPTUNE 2 SYS 4 PORT 0702-020-000

## (undated) DEVICE — SU ETHIBOND 0 CT-1 CR 8X18" CX21D

## (undated) DEVICE — SU VICRYL 0 UR-6 27" J603H

## (undated) DEVICE — GLOVE PROTEXIS W/NEU-THERA 7.5  2D73TE75

## (undated) DEVICE — LINEN TOWEL PACK X5 5464

## (undated) DEVICE — PREP TECHNI-CARE CHLOROXYLENOL 3% 4OZ BOTTLE C222-4ZWO

## (undated) DEVICE — PACK OPEN SHOULDER CUSTOM ASC

## (undated) DEVICE — SU SILK 3-0 TIE 12X30" A304H

## (undated) DEVICE — ESU GROUND PAD ADULT W/CORD E7507

## (undated) DEVICE — DRSG ABDOMINAL 07 1/2X8" 7197D

## (undated) DEVICE — IMM KIT SHOULDER TMAX MASK FACE 7210559

## (undated) DEVICE — GOWN IMPERVIOUS SPECIALTY XLG/XLONG 32474

## (undated) DEVICE — SURGICEL HEMOSTAT 4X8" 1952

## (undated) DEVICE — PREP DURAPREP 26ML APL 8630

## (undated) DEVICE — SYR 50ML CATH TIP W/O NDL 309620

## (undated) DEVICE — SU SILK 4-0 TIE 12X30" A303H

## (undated) DEVICE — SYR PISTON IRRIGATION 60 ML DYND20325

## (undated) DEVICE — SU MONOCRYL 3-0 RB-1 27" Y305H

## (undated) DEVICE — CLIP HORIZON SM RED WIDE SLOT 001201

## (undated) DEVICE — DRSG TEGADERM 1 3/4X1 3/4" 1622W

## (undated) DEVICE — LINEN ORTHO PACK 5446

## (undated) DEVICE — ESU ELEC BLADE 2.75" COATED/INSULATED E1455

## (undated) DEVICE — ESU CLEANER TIP 31142717

## (undated) DEVICE — SOL NACL 0.9% INJ 1000ML BAG 2B1324X

## (undated) DEVICE — DRAPE U-POUCH 34X29" 1067

## (undated) DEVICE — SU VICRYL 3-0 SH 27" J316H

## (undated) DEVICE — GLOVE PROTEXIS W/NEU-THERA 6.5  2D73TE65

## (undated) DEVICE — EYE PREP BETADINE 5% SOLUTION 30ML 0065-0411-30

## (undated) DEVICE — SU MONOCRYL 5-0 P-3 18" UND Y493G

## (undated) DEVICE — ENDO TROCAR FIRST ENTRY KII FIOS ADV FIX 12X100MM CFF73

## (undated) DEVICE — SPONGE KITTNER 31001010

## (undated) DEVICE — SYR EAR BULB 3OZ 0035830

## (undated) DEVICE — PREP SKIN SCRUB TRAY 4461A

## (undated) DEVICE — PACK SET-UP STD 9102

## (undated) DEVICE — KIT POSITIONER PINK PAD XL ADVANCED 40588

## (undated) DEVICE — SU MONOCRYL 2-0 SH 27" UND Y417H

## (undated) DEVICE — ESU PENCIL W/SMOKE EVAC E2515HS

## (undated) DEVICE — SPONGE LAP 18X18" X8435

## (undated) DEVICE — DRAPE WARMER 66X44" ORS-300

## (undated) DEVICE — BASIN SET MINOR DISP

## (undated) DEVICE — DRSG AQUACEL AG 3.5X9.75" HYDROFIBER 412011

## (undated) DEVICE — RX SURGIFLO HEMOSTATIC MATRIX W/THROMBIN 8ML NEXTGEN 2993

## (undated) DEVICE — SPECIMEN CONTAINER W/10% BUFFERED FORMALIN 120ML 591201

## (undated) DEVICE — GLOVE PROTEXIS POWDER FREE 7.5 ORTHOPEDIC 2D73ET75

## (undated) DEVICE — DAVINCI S NDL DRIVER LARGE 420006

## (undated) DEVICE — TUBING SUCTION MEDI-VAC SOFT 3/16"X20' N520A

## (undated) DEVICE — DAVINCI S MONOPOLAR SCISSORS PRECURVED HOT SHEARS 420179

## (undated) DEVICE — POSITIONER ARMBOARD FOAM 1PAIR LF FP-ARMB1

## (undated) DEVICE — WIPES FOLEY CARE SURESTEP PROVON DFC100

## (undated) DEVICE — SU MONOCRYL 3-0 RB-1 27" UND Y215H

## (undated) DEVICE — IMM KIT SHOULDER STABILIZATION 7210573

## (undated) DEVICE — SU VICRYL 0 CT-1 36" J346H

## (undated) DEVICE — STRAP KNEE/BODY 31143004

## (undated) DEVICE — SUCTION MANIFOLD DORNOCH ULTRA CART UL-CL500

## (undated) DEVICE — BRUSH SURGICAL SCRUB W/PCMX 4456A

## (undated) DEVICE — ESU PENCIL W/SMOKE EVAC NEPTUNE STRYKER 0703-046-000

## (undated) DEVICE — DAVINCI S GRASPER ENDOWRIST PROGRASP 420093

## (undated) RX ORDER — FENTANYL CITRATE 50 UG/ML
INJECTION, SOLUTION INTRAMUSCULAR; INTRAVENOUS
Status: DISPENSED
Start: 2017-06-13

## (undated) RX ORDER — FENTANYL CITRATE 50 UG/ML
INJECTION, SOLUTION INTRAMUSCULAR; INTRAVENOUS
Status: DISPENSED
Start: 2020-05-27

## (undated) RX ORDER — FENTANYL CITRATE-0.9 % NACL/PF 10 MCG/ML
PLASTIC BAG, INJECTION (ML) INTRAVENOUS
Status: DISPENSED
Start: 2021-04-07

## (undated) RX ORDER — GENTAMICIN 40 MG/ML
INJECTION, SOLUTION INTRAMUSCULAR; INTRAVENOUS
Status: DISPENSED
Start: 2020-10-26

## (undated) RX ORDER — PROPOFOL 10 MG/ML
INJECTION, EMULSION INTRAVENOUS
Status: DISPENSED
Start: 2017-06-13

## (undated) RX ORDER — ONDANSETRON 2 MG/ML
INJECTION INTRAMUSCULAR; INTRAVENOUS
Status: DISPENSED
Start: 2020-05-27

## (undated) RX ORDER — CIPROFLOXACIN 500 MG/1
TABLET, FILM COATED ORAL
Status: DISPENSED
Start: 2017-06-08

## (undated) RX ORDER — FENTANYL CITRATE 50 UG/ML
INJECTION, SOLUTION INTRAMUSCULAR; INTRAVENOUS
Status: DISPENSED
Start: 2021-04-07

## (undated) RX ORDER — BUPIVACAINE HYDROCHLORIDE 2.5 MG/ML
INJECTION, SOLUTION EPIDURAL; INFILTRATION; INTRACAUDAL
Status: DISPENSED
Start: 2021-04-07

## (undated) RX ORDER — LIDOCAINE HYDROCHLORIDE 10 MG/ML
INJECTION, SOLUTION EPIDURAL; INFILTRATION; INTRACAUDAL; PERINEURAL
Status: DISPENSED
Start: 2020-10-26

## (undated) RX ORDER — GLYCOPYRROLATE 0.2 MG/ML
INJECTION INTRAMUSCULAR; INTRAVENOUS
Status: DISPENSED
Start: 2017-06-13

## (undated) RX ORDER — GENTAMICIN 40 MG/ML
INJECTION, SOLUTION INTRAMUSCULAR; INTRAVENOUS
Status: DISPENSED
Start: 2017-06-08

## (undated) RX ORDER — PROPOFOL 10 MG/ML
INJECTION, EMULSION INTRAVENOUS
Status: DISPENSED
Start: 2021-04-07

## (undated) RX ORDER — PHENYLEPHRINE HCL IN 0.9% NACL 1 MG/10 ML
SYRINGE (ML) INTRAVENOUS
Status: DISPENSED
Start: 2020-05-27

## (undated) RX ORDER — CLINDAMYCIN PHOSPHATE 900 MG/50ML
INJECTION, SOLUTION INTRAVENOUS
Status: DISPENSED
Start: 2017-06-13

## (undated) RX ORDER — PROPOFOL 10 MG/ML
INJECTION, EMULSION INTRAVENOUS
Status: DISPENSED
Start: 2020-05-27

## (undated) RX ORDER — LIDOCAINE HYDROCHLORIDE 10 MG/ML
INJECTION, SOLUTION EPIDURAL; INFILTRATION; INTRACAUDAL; PERINEURAL
Status: DISPENSED
Start: 2024-12-19

## (undated) RX ORDER — DEXAMETHASONE SODIUM PHOSPHATE 4 MG/ML
INJECTION, SOLUTION INTRA-ARTICULAR; INTRALESIONAL; INTRAMUSCULAR; INTRAVENOUS; SOFT TISSUE
Status: DISPENSED
Start: 2020-05-27

## (undated) RX ORDER — ALBUTEROL SULFATE 90 UG/1
AEROSOL, METERED RESPIRATORY (INHALATION)
Status: DISPENSED
Start: 2021-04-07

## (undated) RX ORDER — ACETAMINOPHEN 325 MG/1
TABLET ORAL
Status: DISPENSED
Start: 2017-06-13

## (undated) RX ORDER — OXYCODONE HYDROCHLORIDE 5 MG/1
TABLET ORAL
Status: DISPENSED
Start: 2020-05-27

## (undated) RX ORDER — HYDROMORPHONE HYDROCHLORIDE 1 MG/ML
INJECTION, SOLUTION INTRAMUSCULAR; INTRAVENOUS; SUBCUTANEOUS
Status: DISPENSED
Start: 2021-04-07

## (undated) RX ORDER — GLYCOPYRROLATE 0.2 MG/ML
INJECTION, SOLUTION INTRAMUSCULAR; INTRAVENOUS
Status: DISPENSED
Start: 2020-05-27

## (undated) RX ORDER — EPHEDRINE SULFATE 50 MG/ML
INJECTION, SOLUTION INTRAMUSCULAR; INTRAVENOUS; SUBCUTANEOUS
Status: DISPENSED
Start: 2021-04-07

## (undated) RX ORDER — LIDOCAINE HYDROCHLORIDE 20 MG/ML
INJECTION, SOLUTION EPIDURAL; INFILTRATION; INTRACAUDAL; PERINEURAL
Status: DISPENSED
Start: 2021-04-07

## (undated) RX ORDER — ACETAMINOPHEN 325 MG/1
TABLET ORAL
Status: DISPENSED
Start: 2020-05-27

## (undated) RX ORDER — DEXAMETHASONE SODIUM PHOSPHATE 10 MG/ML
INJECTION, SOLUTION INTRAMUSCULAR; INTRAVENOUS
Status: DISPENSED
Start: 2020-05-27

## (undated) RX ORDER — CIPROFLOXACIN 500 MG/1
TABLET, FILM COATED ORAL
Status: DISPENSED
Start: 2021-04-19

## (undated) RX ORDER — ONDANSETRON 2 MG/ML
INJECTION INTRAMUSCULAR; INTRAVENOUS
Status: DISPENSED
Start: 2021-04-07

## (undated) RX ORDER — DIPHENHYDRAMINE HYDROCHLORIDE 50 MG/ML
INJECTION INTRAMUSCULAR; INTRAVENOUS
Status: DISPENSED
Start: 2021-04-07

## (undated) RX ORDER — CEFAZOLIN SODIUM 2 G/100ML
INJECTION, SOLUTION INTRAVENOUS
Status: DISPENSED
Start: 2021-04-07

## (undated) RX ORDER — DEXAMETHASONE SODIUM PHOSPHATE 4 MG/ML
INJECTION, SOLUTION INTRA-ARTICULAR; INTRALESIONAL; INTRAMUSCULAR; INTRAVENOUS; SOFT TISSUE
Status: DISPENSED
Start: 2017-06-13

## (undated) RX ORDER — LIDOCAINE HYDROCHLORIDE 10 MG/ML
INJECTION, SOLUTION INFILTRATION; PERINEURAL
Status: DISPENSED
Start: 2017-06-08

## (undated) RX ORDER — ACETAMINOPHEN 325 MG/1
TABLET ORAL
Status: DISPENSED
Start: 2021-04-07

## (undated) RX ORDER — DEXAMETHASONE SODIUM PHOSPHATE 4 MG/ML
INJECTION, SOLUTION INTRA-ARTICULAR; INTRALESIONAL; INTRAMUSCULAR; INTRAVENOUS; SOFT TISSUE
Status: DISPENSED
Start: 2021-04-07

## (undated) RX ORDER — CEFAZOLIN SODIUM 2 G/100ML
INJECTION, SOLUTION INTRAVENOUS
Status: DISPENSED
Start: 2020-05-27

## (undated) RX ORDER — CEFAZOLIN SODIUM 1 G/3ML
INJECTION, POWDER, FOR SOLUTION INTRAMUSCULAR; INTRAVENOUS
Status: DISPENSED
Start: 2021-04-07

## (undated) RX ORDER — GABAPENTIN 300 MG/1
CAPSULE ORAL
Status: DISPENSED
Start: 2017-06-13

## (undated) RX ORDER — CELECOXIB 200 MG/1
CAPSULE ORAL
Status: DISPENSED
Start: 2017-06-13

## (undated) RX ORDER — TRIAMCINOLONE ACETONIDE 40 MG/ML
INJECTION, SUSPENSION INTRA-ARTICULAR; INTRAMUSCULAR
Status: DISPENSED
Start: 2024-12-19

## (undated) RX ORDER — KETOROLAC TROMETHAMINE 30 MG/ML
INJECTION, SOLUTION INTRAMUSCULAR; INTRAVENOUS
Status: DISPENSED
Start: 2017-06-13

## (undated) RX ORDER — EPHEDRINE SULFATE 50 MG/ML
INJECTION, SOLUTION INTRAMUSCULAR; INTRAVENOUS; SUBCUTANEOUS
Status: DISPENSED
Start: 2020-05-27

## (undated) RX ORDER — LIDOCAINE HYDROCHLORIDE 20 MG/ML
INJECTION, SOLUTION EPIDURAL; INFILTRATION; INTRACAUDAL; PERINEURAL
Status: DISPENSED
Start: 2020-05-27

## (undated) RX ORDER — GLYCOPYRROLATE 0.2 MG/ML
INJECTION INTRAMUSCULAR; INTRAVENOUS
Status: DISPENSED
Start: 2021-04-07

## (undated) RX ORDER — ATROPA BELLADONNA AND OPIUM 16.2; 3 MG/1; MG/1
SUPPOSITORY RECTAL
Status: DISPENSED
Start: 2021-04-07

## (undated) RX ORDER — ALBUTEROL SULFATE 0.83 MG/ML
SOLUTION RESPIRATORY (INHALATION)
Status: DISPENSED
Start: 2020-05-27

## (undated) RX ORDER — ONDANSETRON 2 MG/ML
INJECTION INTRAMUSCULAR; INTRAVENOUS
Status: DISPENSED
Start: 2017-06-13